# Patient Record
Sex: FEMALE | Race: BLACK OR AFRICAN AMERICAN | NOT HISPANIC OR LATINO | Employment: UNEMPLOYED | URBAN - METROPOLITAN AREA
[De-identification: names, ages, dates, MRNs, and addresses within clinical notes are randomized per-mention and may not be internally consistent; named-entity substitution may affect disease eponyms.]

---

## 2017-03-08 ENCOUNTER — HISTORICAL (OUTPATIENT)
Dept: ADMINISTRATIVE | Facility: HOSPITAL | Age: 42
End: 2017-03-08

## 2017-03-20 ENCOUNTER — OFFICE VISIT (OUTPATIENT)
Dept: PHYSICAL MEDICINE AND REHAB | Facility: CLINIC | Age: 42
End: 2017-03-20
Payer: COMMERCIAL

## 2017-03-20 VITALS
BODY MASS INDEX: 31.96 KG/M2 | DIASTOLIC BLOOD PRESSURE: 90 MMHG | SYSTOLIC BLOOD PRESSURE: 128 MMHG | WEIGHT: 191.81 LBS | HEART RATE: 91 BPM | HEIGHT: 65 IN

## 2017-03-20 DIAGNOSIS — G95.9 CERVICAL MYELOPATHY: Primary | ICD-10-CM

## 2017-03-20 PROCEDURE — 99999 PR PBB SHADOW E&M-EST. PATIENT-LVL III: CPT | Mod: PBBFAC,,, | Performed by: PHYSICAL MEDICINE & REHABILITATION

## 2017-03-20 PROCEDURE — 99204 OFFICE O/P NEW MOD 45 MIN: CPT | Mod: S$GLB,,, | Performed by: PHYSICAL MEDICINE & REHABILITATION

## 2017-03-20 PROCEDURE — 1160F RVW MEDS BY RX/DR IN RCRD: CPT | Mod: S$GLB,,, | Performed by: PHYSICAL MEDICINE & REHABILITATION

## 2017-03-20 RX ORDER — ACETAMINOPHEN AND CODEINE PHOSPHATE 300; 30 MG/1; MG/1
TABLET ORAL
Refills: 0 | COMMUNITY
Start: 2017-02-16 | End: 2017-03-20

## 2017-03-20 NOTE — MR AVS SNAPSHOT
[facility]  [address] [place] [address]  [place] [address]  Phone: [phone]  Fax: [phone]                  Zaira Jensen   3/20/2017 11:00 AM   Office Visit    Description:  Female : 1975   Provider:  Aminata Reyna DO   Department:  [facility]           Reason for Visit     Consult           Diagnoses this Visit        Comments    Cervical myelopathy    -  Primary            To Do List           Future Appointments        Provider Department Dept Phone    2017 11:40 AM Aminata Reyna DO [facility] [phone]      Goals (5 Years of Data)     None      Follow-Up and Disposition     Return in about 4 weeks (around 2017).    Follow-up and Disposition History      [facility]     [facility] -  Assistance  Registered nurses in the [facility] provide clinical advisement, health education, appointment booking, and other advisory services.  Call for this free service at [phone].             Medications           Message regarding Medications     Verify the changes and/or additions to your medication regime listed below are the same as discussed with your clinician today.  If any of these changes or additions are incorrect, please notify your healthcare provider.        STOP taking these medications     acetaminophen-codeine 300-30mg (TYLENOL #3) 300-30 mg Tab TK 1 T PO Q 4 TO 6 H PRN           Verify that the below list of medications is an accurate representation of the medications you are currently taking.  If none reported, the list may be blank. If incorrect, please contact your healthcare provider. Carry this list with you in case of emergency.           Current Medications     acetaminophen (TYLENOL) 500 MG tablet Take 500 mg by mouth every 6 (six) hours as needed.    fluticasone (FLONASE) 50 mcg/actuation nasal spray 1 spray by Nasal route once daily.      "ibuprofen (ADVIL,MOTRIN) 800 MG tablet Take 800 mg by mouth every 6 (six) hours as needed.     loratadine-pseudoephedrine  mg (CLARITIN-D 24-HOUR)  mg per 24 hr tablet Take 1 tablet by mouth once daily.           Clinical Reference Information           Your Vitals Were     BP Pulse Height Weight BMI    128/90 (BP Location: Right arm, Patient Position: Sitting, BP Method: Automatic) 91 5' 5" (1.651 m) 87 kg (191 lb 12.8 oz) 31.92 kg/m2      Blood Pressure          Most Recent Value    BP  (!)  128/90      Allergies as of 3/20/2017     Bactrim [Sulfamethoxazole-trimethoprim]    Pcn [Penicillins]    Vicks Vapor Inhaler [L-desoxyephedrine]      Immunizations Administered on Date of Encounter - 3/20/2017     None      Orders Placed During Today's Visit      Normal Orders This Visit    Ambulatory Referral to Physical/Occupational Therapy     Ambulatory Referral to Physical/Occupational Therapy       Language Assistance Services     ATTENTION: Language assistance services are available, free of charge. Please call [phone].      ATENCIÓN: Si livela robel, tiene a baker disposición servicios gratuitos de asistencia lingüística. Llame al [phone].     ORQUIDEA Ý: N?u b?n nói Ti?ng Vi?t, có các d?ch v? h? tr? ngôn ng? mi?n phí dành cho b?n. G?i s? [phone].         [facility] complies with applicable Federal civil rights laws and does not discriminate on the basis of race, color, national origin, age, disability, or sex.        "

## 2017-03-20 NOTE — LETTER
March 20, 2017      Demetrio Marion MD  [address]  [address]  [facility] [address]           [facility]  [address] [place] [address]  Phone: [phone]  Fax: [phone]          Patient: Zaira Jensen   MR Number: [id number]   YOB: 1975   Date of Visit: 3/20/2017       Dear Dr. Demetrio Marion:    Thank you for referring Zaira Jensen to me for evaluation. Attached you will find relevant portions of my assessment and plan of care.    If you have questions, please do not hesitate to call me. I look forward to following Zaira Jensen along with you.    Sincerely,    Aminata Reyna,     Enclosure  CC:  No Recipients    If you would like to receive this communication electronically, please contact [email or web address] or [phone] to request more information on [employer] access.    For providers and/or their staff who would like to refer a patient to [facility], please contact us through our one-stop-shop provider referral line, [facility] , at [phone].    If you feel you have received this communication in error or would no longer like to receive these types of communications, please e-mail [email or web address]

## 2017-03-20 NOTE — PROGRESS NOTES
HPI:  Patient is a 41 y.o. year old female w. Cervical myelpathy she is s/p decompression on 2012, and 2015. After the second surgery she seemed to have some mild improvement. She lives w. Parents. She is having a harder time w. Adl's including bathing, self care and feeding. She does have poor motor skills.    Imaging  3/9/2017  Mri cervical spine  Subtle changes of cord signal alteration at C3/C4 related to myelomyalacia unchanged from prior studies  C5/C6 shallow broad based disc bulge slightly deforms ventral margin of cord ( does not recommend surgery)    Labs  EGFR, cr, lft's gluc nl      Past Medical History:   Diagnosis Date    Allergy     Anemia     Arthritis     Chronic back pain     Chronic neck pain     Functional ovarian cysts     GERD (gastroesophageal reflux disease)     Headache(784.0)     Radiculopathy of arm     Rash     Respiratory distress     bronchospasm at emergence years ago     Past Surgical History:   Procedure Laterality Date    CERVICAL FUSION      CHOLECYSTECTOMY      OVARIAN CYST REMOVAL      TOTAL BODY LIFT       Family History   Problem Relation Age of Onset    Diabetes Father     Cancer Father      prostate    Heart disease Father     Allergic rhinitis Neg Hx     Allergies Neg Hx     Angioedema Neg Hx     Asthma Neg Hx     Atopy Neg Hx     Eczema Neg Hx     Immunodeficiency Neg Hx     Rhinitis Neg Hx     Urticaria Neg Hx      Social History     Social History    Marital status: Single     Spouse name: N/A    Number of children: N/A    Years of education: N/A     Social History Main Topics    Smoking status: Never Smoker    Smokeless tobacco: Never Used    Alcohol use No    Drug use: No    Sexual activity: Not on file     Other Topics Concern    Not on file     Social History Narrative       Review of patient's allergies indicates:   Allergen Reactions    Bactrim [sulfamethoxazole-trimethoprim]     Pcn [penicillins]     Vicks vapor inhaler  [l-desoxyephedrine]        Current Outpatient Prescriptions:     acetaminophen (TYLENOL) 500 MG tablet, Take 500 mg by mouth every 6 (six) hours as needed., Disp: , Rfl:     fluticasone (FLONASE) 50 mcg/actuation nasal spray, 1 spray by Nasal route once daily. , Disp: , Rfl: 1    ibuprofen (ADVIL,MOTRIN) 800 MG tablet, Take 800 mg by mouth every 6 (six) hours as needed. , Disp: , Rfl:     loratadine-pseudoephedrine  mg (CLARITIN-D 24-HOUR)  mg per 24 hr tablet, Take 1 tablet by mouth once daily., Disp: , Rfl:       Review of Systems:  No nausea, vomiting, fevers, chills , contipation, diarrhea or sweats,no weight change, occ neck stiffness, no chest pain, no sob, no change of bowel or bladder habits,+ coordination issues        Physical Exam:      Vitals:    03/20/17 1042   BP: (!) 128/90   Pulse: 91   alert and oriented ×4 follows commands answers all questions appropriately,affect wnl  Manual muscle test 4 out of 5 sensation to light touch grossly intact  + tenderness b/l cervical paraspinals   Ataxic gait  No C/C/E      Assessment:  S/p cervical myelomalacia  Cervical paraspinals   Plan:  Trial of baclofen nightly  PT/OT for gait training and motor skills    Thank you for this interesting referral

## 2017-03-31 ENCOUNTER — CLINICAL SUPPORT (OUTPATIENT)
Dept: REHABILITATION | Facility: HOSPITAL | Age: 42
End: 2017-03-31
Attending: PHYSICAL MEDICINE & REHABILITATION
Payer: COMMERCIAL

## 2017-03-31 DIAGNOSIS — M25.611 SHOULDER STIFFNESS, RIGHT: Primary | ICD-10-CM

## 2017-03-31 DIAGNOSIS — R29.898 BILATERAL ARM WEAKNESS: ICD-10-CM

## 2017-03-31 DIAGNOSIS — M25.631 WRIST STIFFNESS, RIGHT: ICD-10-CM

## 2017-03-31 DIAGNOSIS — M25.631 STIFFNESS OF RIGHT WRIST JOINT: ICD-10-CM

## 2017-03-31 DIAGNOSIS — G95.9 CERVICAL MYELOPATHY: Primary | ICD-10-CM

## 2017-03-31 PROCEDURE — 97162 PT EVAL MOD COMPLEX 30 MIN: CPT | Mod: PN | Performed by: PHYSICAL THERAPIST

## 2017-03-31 PROCEDURE — 97166 OT EVAL MOD COMPLEX 45 MIN: CPT | Mod: PN,59

## 2017-03-31 NOTE — PLAN OF CARE
TIME RECORD    Date: 03/31/2017    Start Time:  1205  Stop Time:  100    PROCEDURES:    TIMED  Procedure Time Min.    Start:  Stop:     Start:  Stop:     Start:  Stop:     Start:  Stop:          UNTIMED  Procedure Time Min.    Start:  Stop:     Start:  Stop:      Total Timed Minutes:  0  Total Timed Units:  0  Total Untimed Units:  1  Charges Billed/# of units:  1    OUTPATIENT PHYSICAL THERAPY   PATIENT EVALUATION  Onset Date: 2011  Primary Diagnosis:   1. Cervical myelopathy       Treatment Diagnosis: gait abnormality  Past Medical History:   Diagnosis Date    Allergy     Anemia     Arthritis     Chronic back pain     Chronic neck pain     Functional ovarian cysts     GERD (gastroesophageal reflux disease)     Headache(784.0)     Radiculopathy of arm     Rash     Respiratory distress     bronchospasm at emergence years ago   SX history: tummy tuck, gall bladder removed, cystectomy from ovary, 2 cervical fusion  Precautions: fall risk  Prior Therapy: Yes, after her first surgery (for 9 months, got to 85%, then had the second HNP).   Medications: Zaira Jensen has a current medication list which includes the following prescription(s): acetaminophen, fluticasone, ibuprofen, and loratadine-pseudoephedrine  mg.  Nutrition:  Overweight  History of Present Illness: Pt was working out a lot to lose 100 pounds. 2011: HNP in neck doing exercise (2010), SX started having symptoms. Took 2 years to get correct dx. Symptoms worsening the entire time. First surgery in 2012, fusion of c spine affect RUE. 2.5 years to get back to 85% normal, then another HTN occurred. This sx took place in Oct 2015 (another fusion). Second sx caused issues in B hands. As far as legs go, first HNP caused prob with RLE, this started to improve until the second HNP occurred. Pt states the LLE has now begun to feel like the L knee and ankle are unstable. Pt still has pain in the R hip where bone was taken for cervical fusions.  "Balance and walking upright is very difficult for her right now. Pt feels like she is not having appropriate motor control of her LEs. Pt states that she feelslide she has regressed over the last 2 months.   Prior Level of Function: Assistive Device, pt knows she needs to use walker or cane and wants to use one again, but today presents without AD.   Social History: Pt lives with her parents until she recovers well enough to live independently. 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  Place of Residence (Steps/Adaptations): Lives in 2 stephanie home, but she is able to live downstairs. Pt was using upstairs bathroom, but now is afraid to go downstairs.   Functional Deficits Leading to Referral/Nature of Injury: difficulty with balance, fall risk, standing, walking  Patient Therapy Goals: improve balance and walk swiftly, improve transfers    Subjective     Zaira Jensen states "I want to strength my neck". Pt also states that she is becoming more reclusive because she does not want people to see her disabilities and because she is afraid of falling or getting hurt in the community. She has stopped driving because she is afraid to drive due to lack of fine motor coordination of her hands.     Pain:  Location: left knee, right hip (posterior), left shoulder   Description: Tightness around joints in RLE and left knee  Activities Which Increase Pain: nothing in particular  Activities Which Decrease Pain: rest  Pain Scale: 4/10 at best 6/10 now  10/10 at worst    Objective     Posture: minimal movement of the neck  Palpation: hypertonicity in cervical parapspinals  Sensation: light touch sensation is equal bilaterally  DTRs: Absent lumbar reflexes B  Range of Motion/Strength:     Knee ext and DF ROM are to neutral against gravity    Strength  Right  Left  Hip flex   3/5  4/5  Hip ext   3+/5  4/5  Hip abd  4-/5  4/5  Hip add  4-/5  4/5  Knee flex  4-/5  4-/5  Knee " ext  4/5  4/5  DF   4-/5  4-/5     Cervical rotation 30* B.    Flexibility: Decreased flexibility in B hamstings and gastroc-soleus complexes  Gait: Without AD (however pt would benefit from use of an AD and we will train as part of PT program)  Analysis: SBA - to CGA, minimal foot clearance, toe drag intermittently (especially as pt fatigues), slow orquidea, hip ER, minimal knee and hip flexion, insufficient hip extension, no trunk rotation/UE rotation. Ataxic.  Transfers: Assistance - SBA, difficulty with motor control/motor planning to execute sit to stand transfer  Special Tests: Neg Clonus B  Other: LEFS: 15/80  Treatment: discussed PT POC with patient. Discussed safety concerns due to not walking with an AD.     Assessment       Initial Assessment (Pertinent finding, problem list and factors affecting outcome): Pt is a 40 y/o female with c/o BLE weakness and lack of cervical spine strength/mobility s/p two cervical fusions since 2012. She demonstrates gait ataxia which has been present since before her first surgery. Weakness of the RLE > LLE. She also demonstrates lack of functional cervical range of motion, limiting her ability to turn her head for community involvement. Pt also demonstrates significant mobility dysfunction in BUEs, which is being addressed in OT. Pt will benefit from PT to improve her BLE strength, balance and coordination and to improve ROM and stability of the cervical spine to improve her quality of gait and transfers so she can resume community involvement and eventually resume working. Pt meets criteria for moderate complexity based on several comorbidities, examination of greater than 3 body structures/functions, activity and participation limitation, evolving clinical presentation and moderate complexity of clinical decision making.     Rehab Potiential: good    Short Term Goals (6 Weeks):   1. Pt will walk 200 feet with SBA, with least restrictive AD, demonstrating full foot  clearance.  2. Pt will be able to perform 5 sit to stand transfers in 45 seconds with UE support.  3. Pt will be able to ascend/descend one flight of stairs, step-to gait pattern, unilateral UE support, with CGA.  Long Term Goals (12 Weeks):   1. Pt will walk 500 feet without AD, mod I, demonstrating full foot clearance and maintaining straight gait path.  2. Pt will be able to perform 10 sit to stand transfers in 30 seconds without UE support.   3. Pt will be able to ascend/descend one flight of stairs, reciprocal gait pattern, unilateral UE support, mod I.   3. Pt will score >/=30/80 on LEFS.     Plan     Certification Period: 3/31/17 to 6/23/17  Recommended Treatment Plan: 3 times per week, dropping to 2 times per week as pt progresses, for 12 weeks: Electrical Stimulation IFC for pain control, NMES/Russian stim for muscle recruitment, Gait Training, Group Therapy, Manual Therapy, Moist Heat/ Ice, Neuromuscular Re-ed, Patient Education, Therapeutic Activites, Therapeutic Exercise and Ultrasound/Phonophoresis  Other Recommendations: NA      Therapist: Jordyn Love, PT    I CERTIFY THE NEED FOR THESE SERVICES FURNISHED UNDER THIS PLAN OF TREATMENT AND WHILE UNDER MY CARE    Physician's comments: ________________________________________________________________________________________________________________________________________________      Physician's Name: ___________________________________

## 2017-03-31 NOTE — PLAN OF CARE
TIME RECORD    Date: 03/31/2017    Start Time:  11  Stop Time:  12    PROCEDURES:    TIMED  Procedure Time Min.    Start:  Stop:     Start:  Stop:     Start:  Stop:     Start:  Stop:          UNTIMED  Procedure Time Min.   eval Start:11  Stop:12     Start:  Stop:      Total Timed Minutes:  0  Total Timed Units:  0  Total Untimed Units:  1  Charges Billed/# of units:  1    OUTPATIENT OCCUPATIONAL THERAPY   PATIENT EVALUATION  Onset Date: about 2 months ago  Primary Diagnosis: cervical fusion c4 and 5 2012, C3 and 4 fusion 2015  Treatment Diagnosis: bilateral arm weakness, r shoulder stiffness, r wrist stiffness  Past Medical History:   Diagnosis Date    Allergy     Anemia     Arthritis     Chronic back pain     Chronic neck pain     Functional ovarian cysts     GERD (gastroesophageal reflux disease)     Headache(784.0)     Radiculopathy of arm     Rash     Respiratory distress     bronchospasm at emergence years ago     Precautions: universal  Prior Therapy: PT/OT post last surgery  Medications: Zaira Jensen has a current medication list which includes the following prescription(s): acetaminophen, fluticasone, ibuprofen, and loratadine-pseudoephedrine  mg.  Nutrition:  Normal  History of Present Illness: reports insidious onset of bilateral arm weakness which is concerning since she states since last surgery arms were improving with respect to strength and use  Prior Level of Function: Independent   Social History: over the counter pain meds prn, denies nicotine, caffeine sometimes if in over the counter pain meds, lives with parents  Place of Residence (Steps/Adaptations): no concerns  DME: not tested  Functional Deficits Leading to Referral/Nature of Injury: decreased rom, use, and strength with ADL  Patient Therapy Goals: full painless use    Subjective     Zaira Jensen states understanding of HEP importance and general anticipated progression of therapy.    Pain:  0/10 at rest, with  "sleep, with light use    Objective     Posture: odalys deferred, r ue with slight guarding  Palpation: nt  Sensation: intermittent diffuse burning thur both arms  DTRs: wnl  Range of Motion/Strength:     r prom er at 0 abd 40   at 45 abd 90    at 90 abd 90      slir 50         func ir 15" lift off  l prom er at 0 abd 70    at 45 abd 90   At  90 abd 90     Slir 70         func ir 15" lift off    Elbow thru hands with = prom bilaterally except r wrist df 20 vs 45 on l    Formal strength testing not done however overall roughly between 3+/5 and 2-/5 both arms      Edema: none noted  ADL: severe decrease in r ue use and moderate decrease in l ue use with all basic self care tasks; roughly max to mod a with all ADL  Hand Dominance: r  Job: not working  Duties: Normal self  care tasks  Balance: wnl for OT  Fine Motor Coordination: moderate decrease  Gross Motor Coordination: mild decrease  Special Tests: 1/2 finger inferior gh subluxation bilaterally via sulcus sign    Treatment: issued wrist df stretch on r, explained rehab will focus on resolving joint stiffness and on strengthening both arms    Assessment       Initial Assessment (Pertinent finding, problem list and factors affecting outcome): Needs skilled OT to properly promote rom, use, and strength given type, nature, and extent of diagnosis  Rehab Potiential: good    stg 1. Pt. Will be I with HEP 2. Pt. Will have 2/10 pain with light use 3. Pt. Will have = prom of bilateral  arms To enhance affected arm use with ADL      ltg 1. Pt. Will be I with d/c HEP 2. Pt. Will have 1/10 pain with all use 3. Pt. Will have roughly 3+/5 MMT bilaterally shoulder thru hand to improve ADL status                                                                          4. Pt. Will be roughly min a with all ADL    Plan     Certification Period: 3-31-17 to 9-15-17  Recommended Treatment Plan: 3 times per week for 24 weeks: eval and tx  Other Recommendations: above visit frequency and " duration in above dates may be adjusted based on pt. progress and need for therapy      Therapist: Brady Mora OT/TARIQ    I CERTIFY THE NEED FOR THESE SERVICES FURNISHED UNDER THIS PLAN OF TREATMENT AND WHILE UNDER MY CARE    Physician's comments: ________________________________________________________________________________________________________________________________________________      Physician's Name: ___________________________________

## 2017-04-07 ENCOUNTER — CLINICAL SUPPORT (OUTPATIENT)
Dept: REHABILITATION | Facility: HOSPITAL | Age: 42
End: 2017-04-07
Attending: PHYSICAL MEDICINE & REHABILITATION
Payer: COMMERCIAL

## 2017-04-07 DIAGNOSIS — R29.898 BILATERAL ARM WEAKNESS: ICD-10-CM

## 2017-04-07 DIAGNOSIS — M25.611 SHOULDER STIFFNESS, RIGHT: Primary | ICD-10-CM

## 2017-04-07 DIAGNOSIS — M25.631 WRIST STIFFNESS, RIGHT: ICD-10-CM

## 2017-04-07 DIAGNOSIS — G95.9 CERVICAL MYELOPATHY: ICD-10-CM

## 2017-04-07 PROCEDURE — 97010 HOT OR COLD PACKS THERAPY: CPT | Mod: PN

## 2017-04-07 PROCEDURE — 97140 MANUAL THERAPY 1/> REGIONS: CPT | Mod: PN

## 2017-04-07 PROCEDURE — 97110 THERAPEUTIC EXERCISES: CPT | Mod: PN

## 2017-04-07 NOTE — PROGRESS NOTES
Time in 115  Time out 2      Timed units  1 manual                              Time:115-130  2 therex                              Time:130-2      S:doing HEP, understands likely tx progression  Pain:no difference since last visit    O:  HEP: added sleeper stretch on r    manual tx:     r gh resting position traction, prom circumduction, bursal massage abd 1 and 2, axial traction 90 abd    prom as tolerated-pain free: n/a    stretches as tolerated-pain free: r er 0 abd, sleeper    theraband 2 x 20:  n/a            A: once all tightness gone on r ue, progressive PRE of shoulder complex thru hand should improver fxnl use of r ue with ADL            P: fix gh hypomobility then screen prom elevation x 3, fix r wrist df hypomobility

## 2017-04-07 NOTE — PROGRESS NOTES
Name: Zaira Jensen  [facility] Number: [id number]  Date of Treatment: 04/07/2017   Diagnosis:   Encounter Diagnosis   Name Primary?    Cervical myelopathy        Time in: 1202  Time Out: 1305  Total Treatment Time: 63        Subjective:    Zaira reports her pain is mostly in her joints (ie hips, knees, R ankle, lower back, L shoulder).  Patient reports their pain to be 4/10 on a 0-10 scale with 0 being no pain and 10 being the worst pain imaginable.    Objective    Patient received individual therapy to increase strength, endurance, ROM, flexibility, posture and core stabilization with 0 patients with activities as follows:     Zaira received therapeutic exercises to develop strength, endurance, ROM, flexibility, posture and core stabilization for 53 minutes including:     nustep x 10 min L-2  Quad sets 2 x 10 reps  Bridging x 10 reps  Ball squeezes 2 x 10 rerps  Hip abd RTB 2 x 10 reps  SLR 2 x 10 reps B LE  SAQ 2 x 10 reps B LE  LTR 2 x 10 reps    MHP x 10 min to low/mid back for muscle relaxation    Written Home Exercises Provided: Copy pf HEP given to patients  Pt demo good understanding of the education provided. Zaira demonstrated fair  return demonstration of activities.     Assessment:       Pt will continue to benefit from skilled PT intervention. Medical Necessity is demonstrated by:  Fall Risk, Unable to participate in daily activities, Continued inability to participate in vocational pursuits, Pain limits function of effected part for some activities, Unable to participate fully in daily activities, Requires skilled supervision to complete and progress HEP and Weakness.    Patient is making fair progress towards established goals.          Plan:  Continue with established Plan of Care towards PT goals.

## 2017-04-10 ENCOUNTER — CLINICAL SUPPORT (OUTPATIENT)
Dept: REHABILITATION | Facility: HOSPITAL | Age: 42
End: 2017-04-10
Attending: PHYSICAL MEDICINE & REHABILITATION
Payer: COMMERCIAL

## 2017-04-10 DIAGNOSIS — M25.631 WRIST STIFFNESS, RIGHT: ICD-10-CM

## 2017-04-10 DIAGNOSIS — R29.898 BILATERAL ARM WEAKNESS: ICD-10-CM

## 2017-04-10 DIAGNOSIS — M25.611 SHOULDER STIFFNESS, RIGHT: Primary | ICD-10-CM

## 2017-04-10 DIAGNOSIS — G95.9 CERVICAL MYELOPATHY: ICD-10-CM

## 2017-04-10 DIAGNOSIS — M25.631 STIFFNESS OF RIGHT WRIST JOINT: ICD-10-CM

## 2017-04-10 PROCEDURE — 97140 MANUAL THERAPY 1/> REGIONS: CPT | Mod: PN

## 2017-04-10 PROCEDURE — 97110 THERAPEUTIC EXERCISES: CPT | Mod: PN

## 2017-04-10 NOTE — PROGRESS NOTES
Time in 10  Time out 11        Timed units  2 manual                              Time:  2 therex                              Time:103-11      S:understands rationale of current care and probable tx progression  Pain:continues to decrease in intensity and frequency    O:  HEP: added er 0 abd stretch on r    manual tx:     R: gh resting position traction, prom circumduction, bursal massage abd 1 and 2, axial traction 90 abd    prom as tolerated-pain free: n/a    stretches as tolerated-pain free: R sleeper, er 0 abd    theraband 2 x 20:  n/a            A:r joint hypomobility needs to be resolved; functional use of arms should improve once all stiffness gone            P: test prom elevation x 3 on r once gh hypomobility resolved; progress to full shoulder complex PRE HEP bilaterally as well as bilateral distal PRE HEP eventually so improved functional use and mechanics can increase arm use with ADL

## 2017-04-10 NOTE — PROGRESS NOTES
Name: Zaira Jensen  [facility] Number: [id number]  Date of Treatment: 04/10/2017   Diagnosis:   Encounter Diagnosis   Name Primary?    Cervical myelopathy        Time in: 1103  Time Out: 1200  Total Treatment Time: 57      Subjective:    Zaira reports weakness and pain combined.  Patient reports she was too sore and fatigued to perform her HEP this weekend, but she will be doing it.  Patient reports their pain to be 4/10 on a 0-10 scale with 0 being no pain and 10 being the worst pain imaginable.    Objective  Zaira received therapeutic exercises to develop strength, endurance, ROM, flexibility, posture and core stabilization for 57 minutes including:    Nustep x 10 min L-2   Quad sets 3 x 10 reps   Bridging 3 x 5 reps   Ball squeezes 3 x 10 rerps   Hip abd RTB 3 x 10 reps   SLR 3 x 10 reps B LE (with frequent rests)   SAQ(with QS)  3 x 10 reps B LE   LTR 3 x 10 reps     Written Home Exercises Provided: Cont with HEP   Pt demo fair understanding of the education provided. Zaira demonstrated fair return demonstration of activities.     Assessment:   Patient tolerated therex with increase in reps without difficulty, rests during sets req'd    Pt will continue to benefit from skilled PT intervention. Medical Necessity is demonstrated by:  Unable to participate in daily activities, Continued inability to participate in vocational pursuits, Pain limits function of effected part for some activities, Requires skilled supervision to complete and progress HEP and Weakness.    Patient is making fair progress towards established goals.    Plan:  Continue with established Plan of Care towards PT goals.

## 2017-04-13 ENCOUNTER — CLINICAL SUPPORT (OUTPATIENT)
Dept: REHABILITATION | Facility: HOSPITAL | Age: 42
End: 2017-04-13
Attending: PHYSICAL MEDICINE & REHABILITATION
Payer: COMMERCIAL

## 2017-04-13 DIAGNOSIS — G95.9 CERVICAL MYELOPATHY: ICD-10-CM

## 2017-04-13 PROCEDURE — 97150 GROUP THERAPEUTIC PROCEDURES: CPT | Mod: PN

## 2017-04-13 NOTE — PROGRESS NOTES
Name: Zaira Jensen  [facility] Number: [id number]  Date of Treatment: 04/13/2017   Diagnosis:   Encounter Diagnosis   Name Primary?    Cervical myelopathy        Time in: 1302  Time Out: 1400  Total Treatment Time: 58  Group Time: 58      Subjective:    Zaira reports her knees feel like they are locking in place when she is walking.  Patient reports their pain to be 3/10 on a 0-10 scale with 0 being no pain and 10 being the worst pain imaginable.    Objective    Patient received individual therapy to increase strength, endurance, ROM, flexibility, posture and core stabilization with 0 patients with activities as follows:     Zaira received therapeutic exercises to develop strength, endurance, ROM, flexibility, posture and core stabilization for 58 minutes including:       nustep x 10 min L-2  Hamstring stretch 3 x 30 sec B LE  gastroc stretch 3 x 30 sec B LE  Quad sets x 30 reps  Bridging x 30 reps  Ball squeezes x 30 reps  Hip abd RTB x 30 reps      Pt demo good understanding of the education provided. Zaira demonstrated fair return demonstration of activities.     Assessment:       Pt will continue to benefit from skilled PT intervention. Medical Necessity is demonstrated by:  Fall Risk, Pain limits function of effected part for some activities, Unable to participate fully in daily activities, Requires skilled supervision to complete and progress HEP and Weakness.    Patient is making fair progress towards established goals.          Plan:  Continue with established Plan of Care towards PT goals.

## 2017-04-18 ENCOUNTER — CLINICAL SUPPORT (OUTPATIENT)
Dept: REHABILITATION | Facility: HOSPITAL | Age: 42
End: 2017-04-18
Attending: PHYSICAL MEDICINE & REHABILITATION
Payer: COMMERCIAL

## 2017-04-18 DIAGNOSIS — M25.611 SHOULDER STIFFNESS, RIGHT: Primary | ICD-10-CM

## 2017-04-18 DIAGNOSIS — G95.9 CERVICAL MYELOPATHY: ICD-10-CM

## 2017-04-18 DIAGNOSIS — M25.631 WRIST STIFFNESS, RIGHT: ICD-10-CM

## 2017-04-18 DIAGNOSIS — R29.898 BILATERAL ARM WEAKNESS: ICD-10-CM

## 2017-04-18 PROCEDURE — 97110 THERAPEUTIC EXERCISES: CPT | Mod: PN

## 2017-04-18 NOTE — PROGRESS NOTES
Name: Zaira Jensen  Date:   04/18/2017  Primary Diagnosis: .  Problem List Items Addressed This Visit     Cervical myelopathy          Time in: 1408  Time Out: 1458  Total Treatment Time: 50  Group Time: 0      Subjective:    Patient reports improvement of symptoms. However, reports her knees feel like they are locking when she walks. Patient reports their pain to be 3/10 on a 0-10 scale with 0 being no pain and 10 being the worst pain imaginable.    Objective    Patient received individual therapy to address strength, endurance, ROM and flexibility with 0 other patients with activities as follows:     Patient received therapeutic exercises to develop strength, endurance, ROM and flexibility for 50 minutes including:     Seated hamstring stretches 3/30sec B  Nustep L3 x 10 min  Hip ADD ball squeezes  Standing gastroc stretch 3/30sec B  Seated LAQ 2x10    Assessment:     Patient tolerating treatment well.    Pt will continue to benefit from skilled PT intervention. Medical Necessity is demonstrated by:  Fall Risk, Pain limits function of effected part for some activities, Unable to participate fully in daily activities, Requires skilled supervision to complete and progress HEP and Weakness.    Patient is making good progress towards established goals.    Plan:  Continue with established Plan of Care towards PT goals.

## 2017-04-19 ENCOUNTER — CLINICAL SUPPORT (OUTPATIENT)
Dept: REHABILITATION | Facility: HOSPITAL | Age: 42
End: 2017-04-19
Attending: PHYSICAL MEDICINE & REHABILITATION
Payer: COMMERCIAL

## 2017-04-19 DIAGNOSIS — G95.9 CERVICAL MYELOPATHY: ICD-10-CM

## 2017-04-19 PROCEDURE — 97110 THERAPEUTIC EXERCISES: CPT | Mod: PN | Performed by: PHYSICAL THERAPIST

## 2017-04-19 NOTE — PROGRESS NOTES
TIME RECORD    Date:  04/19/2017    Start Time:  1505  Stop Time:  1551    PROCEDURES:    TIMED  Procedure Time Min.   Exercise Start:1505  Stop:1551 46    Start:  Stop:     Start:  Stop:     Start:  Stop:          UNTIMED  Procedure Time Min.    Start:  Stop:     Start:  Stop:      Total Timed Minutes:  46  Total Timed Units:  3  Total Untimed Units:  0  Charges Billed/# of units:  3      Progress/Current Status    Subjective:     Patient ID: Zaira Jensen is a 41 y.o. female.  Diagnosis:   1. Shoulder stiffness, right     2. Bilateral arm weakness     3. Wrist stiffness, right       Pain:Pt reports pain with side stretches (IR) and ER    Objective:     Session began with AIDET. Began with pt to demonstrate HEP previously assigned by CHT. Pt unable to recall specific exercises. Supine on mat: ER R UE at 0 abduction: to 30 degrees PROM. IR while sidelying 5 stretches 1 minute each. Instructed pt in HEP including prolonged gentle stretches, pendulum exercises.    Assessment:     Pt presents with B shoulder tightness, limited ER, IR: encouraged to comply with HEP.    Patient Education/Response:     ongoing    Plans and Goals:     Cont per poc.

## 2017-04-20 NOTE — PROGRESS NOTES
Name: Zaira Jensen  [facility] Number: [id number]  Date of Treatment:4/19/17  Diagnosis:   Encounter Diagnosis   Name Primary?    Cervical myelopathy        Time in: 200  Time Out: 253  Total Treatment Time: 53  Group Time: 0      Subjective:    Zaira reports she is feeling pretty good today.  Patient reports their pain to be 0/10 on a 0-10 scale with 0 being no pain and 10 being the worst pain imaginable.    Objective    Patient received individual therapy to increase strength, endurance, flexibility and core stabilization with 0 patients with activities as follows:     Zaira received therapeutic exercises to develop strength, endurance, flexibility and core stabilization for 53 minutes including:     Nustep x 10 min L-2  Quad sets 3 x 10 reps  Bridging 3 x 5 reps  Ball squeezes 3 x 10 rerps  Hip abd RTB 3 x 10 reps    Gait training with rollator and without AD x 12 min. Cuing for appropriate body to AD distance, foot clearance, posture.       Assessment:   Pt demonstrates multiple freezing episodes during transfers and ambulation in clinic today.     Pt will continue to benefit from skilled PT intervention. Medical Necessity is demonstrated by:  Fall Risk, Continued inability to participate in vocational pursuits, Unable to participate fully in daily activities, Requires skilled supervision to complete and progress HEP and Weakness.    Patient is making good progress towards established goals.    New/Revised goals: NA      Plan:  Continue with established Plan of Care towards PT goals.

## 2017-04-24 ENCOUNTER — CLINICAL SUPPORT (OUTPATIENT)
Dept: REHABILITATION | Facility: HOSPITAL | Age: 42
End: 2017-04-24
Attending: PHYSICAL MEDICINE & REHABILITATION
Payer: MEDICAID

## 2017-04-24 ENCOUNTER — CLINICAL SUPPORT (OUTPATIENT)
Dept: REHABILITATION | Facility: HOSPITAL | Age: 42
End: 2017-04-24
Attending: PHYSICAL MEDICINE & REHABILITATION
Payer: COMMERCIAL

## 2017-04-24 DIAGNOSIS — R29.898 BILATERAL ARM WEAKNESS: ICD-10-CM

## 2017-04-24 DIAGNOSIS — G95.9 CERVICAL MYELOPATHY: ICD-10-CM

## 2017-04-24 DIAGNOSIS — M25.631 WRIST STIFFNESS, RIGHT: ICD-10-CM

## 2017-04-24 DIAGNOSIS — M25.611 SHOULDER STIFFNESS, RIGHT: Primary | ICD-10-CM

## 2017-04-24 PROCEDURE — 97110 THERAPEUTIC EXERCISES: CPT | Mod: PN

## 2017-04-24 PROCEDURE — 97140 MANUAL THERAPY 1/> REGIONS: CPT | Mod: PN

## 2017-04-24 NOTE — PROGRESS NOTES
"Time in 1  Time out 2      Timed units  2 manual                              Time:1-130  2 therex                              Time:130-2      S: understands likely tx progression  Pain:continues to decrease in intensity and frequency  O:  r prom er at 0 abd 70   at 45 abd 90    at 90 abd 90      slir 70         func ir 15" lift off  l prom er at 0 abd 70    at 45 abd 90   At  90 abd 90     Slir 60         func ir 15' lift off    HEP: reviewed    manual tx:   r gh resting position traction, prom circumduction, bursal massage abd 1 and 2, axial traction 90 abd  abd pain relief gh resting position traction, slide; scapulothoracic tx and subscapularis stretch not done; warm up rhythmic inf slides, abd with inf slides    prom as tolerated-pain free: n/a    stretches as tolerated-pain free: r sleeper    theraband 2 x 20:  r orange row 2 x 20    isometrics 2 x 20: n/a    education: explained transition to shoulder thru hand PRE should happen soon    ube: n/a            A: r shoulder with continued decrease in hypomobility, may need d2 stretches once er x 3 and ir x 2 = to l side            P: clinic PRE bilateral arms    6-8-17: d/cd since has not attended since 4-24-17      "

## 2017-04-24 NOTE — PROGRESS NOTES
"Name: Zaira Jensen  [facility] Number: [id number]  Date of Treatment: 04/24/2017   Diagnosis:   Encounter Diagnosis   Name Primary?    Cervical myelopathy        Time in: 1400  Time Out: 1500  Total Treatment Time: 60        Subjective:    Pt wanting to continue with PT despite not feeling well.  PTA discussed with patient perf light exercises.  Pt also reports that all she had eaten today was a [place] muffin around 8 and some nuts around 10 am.    Zaira reports feeling weak and lightheaded.  Also, "weakness in the chest". Pt stated she did a lot of walking and activities om Saturday a\nd she thinks it really wore her out.   Patient reports their pain to be 2/10 on a 0-10 scale with 0 being no pain and 10 being the worst pain imaginable.    Objective    Patient received individual therapy to increase strength, endurance, ROM, flexibility, posture and core stabilization with 0 patients with activities as follows:     Zaira received therapeutic exercises to develop strength, endurance, ROM, flexibility, posture and core stabilization for 60 minutes including:     Vitals prior to PT:  130/78, 82 bpm      Gait training without AD.   Decreased orquidea, decreased step length, decreased stride length, decreased weight shifting/foot clearance, increased time in stance       hamstring stretch 3 x 30 sec B LE  Ball squeezes x 30 reps  Hip abd RTB x 30 reps  LAQ 3 x 10 reps B LE  Trunk flexion with large SB x 10 reps      Pt demo fair understanding of the education provided. Zaira demonstrated fair return demonstration of activities.     Assessment:   Held nustep today due to pt not feeling well (reports feeling weak, lightheaded, weakness in chest, tingling).  Vitals taken prior to PT.  Pt ambulating and perf transfers VERY SLOWLY.  Small steps L >R, decreased foot clearance, decreased weight shifting.  Guarded posture and movements.  Increased time need ed to perf gait as well as transfers.    Pt will continue to benefit " from skilled PT intervention. Medical Necessity is demonstrated by:  Fall Risk, Unable to participate in daily activities, Continued inability to participate in vocational pursuits, Pain limits function of effected part for some activities, Unable to participate fully in daily activities, Requires skilled supervision to complete and progress HEP and Weakness.    Patient is making fair progress towards established goals.          Plan:  Continue with established Plan of Care towards PT goals.

## 2017-04-27 ENCOUNTER — TELEPHONE (OUTPATIENT)
Dept: PHYSICAL MEDICINE AND REHAB | Facility: CLINIC | Age: 42
End: 2017-04-27

## 2017-04-27 ENCOUNTER — OFFICE VISIT (OUTPATIENT)
Dept: PHYSICAL MEDICINE AND REHAB | Facility: CLINIC | Age: 42
End: 2017-04-27
Payer: COMMERCIAL

## 2017-04-27 VITALS
HEIGHT: 65 IN | SYSTOLIC BLOOD PRESSURE: 134 MMHG | DIASTOLIC BLOOD PRESSURE: 82 MMHG | WEIGHT: 185 LBS | BODY MASS INDEX: 30.82 KG/M2 | HEART RATE: 95 BPM

## 2017-04-27 DIAGNOSIS — R53.1 WEAKNESS: Primary | ICD-10-CM

## 2017-04-27 PROCEDURE — 99999 PR PBB SHADOW E&M-EST. PATIENT-LVL III: CPT | Mod: PBBFAC,,, | Performed by: PHYSICAL MEDICINE & REHABILITATION

## 2017-04-27 PROCEDURE — 1160F RVW MEDS BY RX/DR IN RCRD: CPT | Mod: S$GLB,,, | Performed by: PHYSICAL MEDICINE & REHABILITATION

## 2017-04-27 PROCEDURE — 99214 OFFICE O/P EST MOD 30 MIN: CPT | Mod: S$GLB,,, | Performed by: PHYSICAL MEDICINE & REHABILITATION

## 2017-04-27 RX ORDER — ALPRAZOLAM 0.25 MG/1
TABLET ORAL
COMMUNITY
Start: 2017-04-25 | End: 2017-06-29

## 2017-04-27 NOTE — PROGRESS NOTES
HPI:  Patient is a 41 y.o. year old female  W. Significantly worsened weakness for the last 2 wks. She has to hold on because of worsening balance. She is s/p cervical myelopathy s/p cervical fusion *2 2012 and 2015. She has had to use wheelchair twice b/c she stated she couldn't walk.    Past Medical History:   Diagnosis Date    Allergy     Anemia     Arthritis     Chronic back pain     Chronic neck pain     Functional ovarian cysts     GERD (gastroesophageal reflux disease)     Headache     Radiculopathy of arm     Rash     Respiratory distress     bronchospasm at emergence years ago     Past Surgical History:   Procedure Laterality Date    CERVICAL FUSION      CHOLECYSTECTOMY      OVARIAN CYST REMOVAL      TOTAL BODY LIFT       Family History   Problem Relation Age of Onset    Diabetes Father     Cancer Father      prostate    Heart disease Father     Allergic rhinitis Neg Hx     Allergies Neg Hx     Angioedema Neg Hx     Asthma Neg Hx     Atopy Neg Hx     Eczema Neg Hx     Immunodeficiency Neg Hx     Rhinitis Neg Hx     Urticaria Neg Hx      Social History     Social History    Marital status: Single     Spouse name: N/A    Number of children: N/A    Years of education: N/A     Social History Main Topics    Smoking status: Never Smoker    Smokeless tobacco: Never Used    Alcohol use No    Drug use: No    Sexual activity: Not on file     Other Topics Concern    Not on file     Social History Narrative       Review of patient's allergies indicates:   Allergen Reactions    Bactrim [sulfamethoxazole-trimethoprim]     Codeine Other (See Comments)     cramping    Hydrocodone     Pcn [penicillins]     Sulfa (sulfonamide antibiotics)     Vicks vapor inhaler [l-desoxyephedrine]        Current Outpatient Prescriptions:     acetaminophen (TYLENOL) 500 MG tablet, Take 500 mg by mouth every 6 (six) hours as needed., Disp: , Rfl:     alprazolam (XANAX) 0.25 MG tablet, , Disp: , Rfl:      fluticasone (FLONASE) 50 mcg/actuation nasal spray, 1 spray by Nasal route once daily. , Disp: , Rfl: 1    ibuprofen (ADVIL,MOTRIN) 800 MG tablet, Take 800 mg by mouth every 6 (six) hours as needed. , Disp: , Rfl:     loratadine-pseudoephedrine  mg (CLARITIN-D 24-HOUR)  mg per 24 hr tablet, Take 1 tablet by mouth once daily., Disp: , Rfl:         Review of Systems  No nausea, vomiting, fevers, Chills , contipation, diarrhea or sweats      Physical Exam:      Vitals:    04/27/17 1211   BP: 134/82   Pulse: 95     alert and oriented ×4 follows commands answers all questions appropriately,affect wnl  Manual muscle test 4 out of 5  Right weaker than left sensation to light touch grossly intact  Gait not tested pt in wheelchair  Babinski down  No C/C/E  No atrophy  Assessment:  H/o cervical myelopathy s/p fusion worsening weakness      Plan:  Refer to neuro  Mri's of cervical, thoracic, and lumbar  labs  Any signficant worsensing go to ER    ADDENDUM  LABS DONE IN ER [facility]  WBC LOW 3.7, H/H 11.8/35.9, GLUC , CR, LFTS NL, TSH NL    Sacrum xray  Right sacrolitis

## 2017-04-27 NOTE — TELEPHONE ENCOUNTER
I spoke with pt and she received my v/m, but I again let her know again Dr. Reyna would like for her to take it easy for the next few days until we get her MRI results back from her Wednesday appts. Pt v/u and also informed me she had done a lot of walking on Saturday going up and down stairs in [place], too. She said she will take it easy until after the results are given to her though.

## 2017-04-27 NOTE — TELEPHONE ENCOUNTER
----- Message from Ricco Holley sent at 4/27/2017  3:11 PM CDT -----  Contact: same  Unsuccessful call placed to office.  Patient called in and stated she was returning a call to Amanda.  Patient call back number is [phone]

## 2017-04-27 NOTE — MR AVS SNAPSHOT
[facility]  [address] [place] [address]  [place] [address]  Phone: [phone]  Fax: [phone]                  Zaira Jensen   2017 11:40 AM   Office Visit    Description:  Female : 1975   Provider:  Aminata Reyna DO   Department:  [facility]           Reason for Visit     Knee Pain     Hip Pain           Diagnoses this Visit        Comments    Weakness    -  Primary            To Do List           Future Appointments        Provider Department Dept Phone    2017 12:00 PM Jordyn Love, PT [facility] [phone]    2017 1:00 PM Brady Mora, OT [facility] [phone]    2017 1:00 PM Brady Mora, OT [facility] [phone]    2017 2:00 PM Jordyn Love, PT [facility] [phone]    5/3/2017 11:00 AM Mica Church PTA [facility] [phone]      Goals (5 Years of Data)     None      [facility]     [facility] Nurse Care Line -  Assistance  Unless otherwise directed by your provider, please contact [facility], our nurse care line that is available for  assistance.     Registered nurses in the [facility] Center provide: appointment scheduling, clinical advisement, health education, and other advisory services.  Call: [phone] (toll free)               Medications           Message regarding Medications     Verify the changes and/or additions to your medication regime listed below are the same as discussed with your clinician today.  If any of these changes or additions are incorrect, please notify your healthcare provider.             Verify that the below list of medications is an accurate representation of the medications you are currently taking.  If none reported, the list may be blank. If incorrect, please contact your healthcare provider. Carry this list with you in case  "of emergency.           Current Medications     acetaminophen (TYLENOL) 500 MG tablet Take 500 mg by mouth every 6 (six) hours as needed.    alprazolam (XANAX) 0.25 MG tablet     fluticasone (FLONASE) 50 mcg/actuation nasal spray 1 spray by Nasal route once daily.     ibuprofen (ADVIL,MOTRIN) 800 MG tablet Take 800 mg by mouth every 6 (six) hours as needed.     loratadine-pseudoephedrine  mg (CLARITIN-D 24-HOUR)  mg per 24 hr tablet Take 1 tablet by mouth once daily.           Clinical Reference Information           Your Vitals Were     BP Pulse Height Weight BMI    134/82 95 5' 5" (1.651 m) 83.9 kg (185 lb) 30.79 kg/m2      Blood Pressure          Most Recent Value    BP  134/82      Allergies as of 4/27/2017     Bactrim [Sulfamethoxazole-trimethoprim]    Codeine    Hydrocodone    Pcn [Penicillins]    Sulfa (Sulfonamide Antibiotics)    Vicks Vapor Inhaler [L-desoxyephedrine]      Immunizations Administered on Date of Encounter - 4/27/2017     None      Orders Placed During Today's Visit      Normal Orders This Visit    Ambulatory Referral to Neurology     Future Labs/Procedures Expected by Expires    C-REACTIVE PROTEIN  4/27/2017 6/26/2018    CBC W/ AUTO DIFFERENTIAL  4/27/2017 6/26/2018    CK  4/27/2017 6/26/2018    COMPREHENSIVE METABOLIC PANEL  4/27/2017 6/26/2018    MRI Cervical Spine Without Contrast  4/27/2017 4/27/2018    MRI Lumbar Spine Without Contrast  4/27/2017 4/27/2018    MRI Thoracic Spine Without Contrast  4/27/2017 4/27/2018    SEDIMENTATION RATE, MANUAL  4/27/2017 6/26/2018    TSH  4/27/2017 6/26/2018    VITAMIN B12  4/27/2017 6/26/2018    VITAMIN B1  4/27/2017 6/26/2018    VITAMIN B6  4/27/2017 6/26/2018      Language Assistance Services     ATTENTION: Language assistance services are available, free of charge. Please call [phone].      ATENCIÓN: Si habla español, tiene a baker disposición servicios gratuitos de asistencia lingüística. Llame al [phone].     CHÚ Ý: N?u b?n " nói Ti?ng Vi?t, có các d?ch v? h? tr? ngôn ng? mi?n phí dành cho b?n. G?i s? [phone].         [facility] complies with applicable Federal civil rights laws and does not discriminate on the basis of race, color, national origin, age, disability, or sex.

## 2017-04-28 ENCOUNTER — TELEPHONE (OUTPATIENT)
Dept: PHYSICAL MEDICINE AND REHAB | Facility: CLINIC | Age: 42
End: 2017-04-28

## 2017-04-28 NOTE — TELEPHONE ENCOUNTER
----- Message from Stacey M Lefort sent at 4/28/2017  9:48 AM CDT -----  Contact: Patient   Patient would like a callback - she wants to talk to you about obtaining home health services. She can be reached at [phone]. Thank you.

## 2017-04-28 NOTE — TELEPHONE ENCOUNTER
I am very concerned about her sudden weakness. I would like to get the results from her MRI's before starting her in any kind of therapy including home health-pls notify

## 2017-04-28 NOTE — TELEPHONE ENCOUNTER
LM that Dr. Reyna wants to wait until she sees the results of her MRI to order home health or any other therapy at this time. The pt called at the time I was typing this msg and I spoke with her letting her know the same. She v/u and thanked me for calling.

## 2017-05-06 ENCOUNTER — HOSPITAL ENCOUNTER (OUTPATIENT)
Dept: RADIOLOGY | Facility: HOSPITAL | Age: 42
Discharge: HOME | End: 2017-05-06
Attending: PHYSICAL MEDICINE & REHABILITATION
Payer: COMMERCIAL

## 2017-05-06 DIAGNOSIS — R53.1 WEAKNESS: ICD-10-CM

## 2017-05-06 PROCEDURE — 72141 MRI NECK SPINE W/O DYE: CPT | Mod: TC

## 2017-05-06 PROCEDURE — 72148 MRI LUMBAR SPINE W/O DYE: CPT | Mod: TC

## 2017-05-06 PROCEDURE — 72146 MRI CHEST SPINE W/O DYE: CPT | Mod: TC

## 2017-05-06 PROCEDURE — 72148 MRI LUMBAR SPINE W/O DYE: CPT | Mod: 26,,, | Performed by: RADIOLOGY

## 2017-05-06 PROCEDURE — 72146 MRI CHEST SPINE W/O DYE: CPT | Mod: 26,,, | Performed by: RADIOLOGY

## 2017-05-06 PROCEDURE — 72141 MRI NECK SPINE W/O DYE: CPT | Mod: 26,,, | Performed by: RADIOLOGY

## 2017-05-08 ENCOUNTER — TELEPHONE (OUTPATIENT)
Dept: PHYSICAL MEDICINE AND REHAB | Facility: CLINIC | Age: 42
End: 2017-05-08

## 2017-05-08 DIAGNOSIS — G95.9 MYELOPATHY: Primary | ICD-10-CM

## 2017-05-08 NOTE — TELEPHONE ENCOUNTER
MRI does show narrowing, I still do not think it is the cause of her symptoms, but pls refer to nsg to get their opinion. Also pls make sure she has emg already scheduled, this will help us to diagnose- pls notify pt

## 2017-05-10 ENCOUNTER — TELEPHONE (OUTPATIENT)
Dept: REHABILITATION | Facility: HOSPITAL | Age: 42
End: 2017-05-10

## 2017-05-11 ENCOUNTER — TELEPHONE (OUTPATIENT)
Dept: PHYSICAL MEDICINE AND REHAB | Facility: CLINIC | Age: 42
End: 2017-05-11

## 2017-05-11 ENCOUNTER — TELEPHONE (OUTPATIENT)
Dept: NEUROSURGERY | Facility: CLINIC | Age: 42
End: 2017-05-11

## 2017-05-11 NOTE — TELEPHONE ENCOUNTER
Called pt to schedule appt per referral. Appt date, time, and location given. Pt verbalized understanding.

## 2017-05-11 NOTE — TELEPHONE ENCOUNTER
She was complaining of a lot more than sijt pain last time i evaluated her- she can follow up w me first before anything further is done to reassess

## 2017-05-12 NOTE — TELEPHONE ENCOUNTER
TOMAS for pt to return my call on Monday. Dr. Reyna would like to see the pt back before proceeding with anything else.

## 2017-05-15 ENCOUNTER — TELEPHONE (OUTPATIENT)
Dept: PHYSICAL MEDICINE AND REHAB | Facility: CLINIC | Age: 42
End: 2017-05-15

## 2017-05-15 NOTE — TELEPHONE ENCOUNTER
Spoke with pt and she is asking if Dr. Reyna still wants her to get an EMG. The question has been sent to Dr. Reyna and a request for orders if she does and I will schedule.

## 2017-05-22 ENCOUNTER — TELEPHONE (OUTPATIENT)
Dept: REHABILITATION | Facility: HOSPITAL | Age: 42
End: 2017-05-22

## 2017-06-01 ENCOUNTER — TELEPHONE (OUTPATIENT)
Dept: PHYSICAL MEDICINE AND REHAB | Facility: CLINIC | Age: 42
End: 2017-06-01

## 2017-06-01 DIAGNOSIS — G95.9 MYELOPATHY: Primary | ICD-10-CM

## 2017-06-01 NOTE — TELEPHONE ENCOUNTER
----- Message from Stacey M Lefort sent at 5/31/2017 12:43 PM CDT -----  Patient needs a callback at [phone]. Thank you.

## 2017-06-01 NOTE — TELEPHONE ENCOUNTER
Returned pts call. LM that I may not be able to answer during clinic hours, but that I could call her back after clinic.

## 2017-06-01 NOTE — TELEPHONE ENCOUNTER
The pt called this morning asking if you still want her to get the EMG done? If so, please place the orders and I'll get her scheduled.  She is also asking for orders for a skilled nursing facility or home health as she is not doing PT at this time because she is still having a hard time walking and getting things done on her own. Her mother is in her mid 80's and is not able to assist her at all.   Please advise on both and I'll call the pt back. Thank you.

## 2017-06-01 NOTE — TELEPHONE ENCOUNTER
She does not qualify for home health or skilled nursing- she needs to be receiving therapy as an outpt- It is what she will benefit most from. I will place emg orders

## 2017-06-02 NOTE — TELEPHONE ENCOUNTER
Spoke with patient made appt for emg and explained qualifications for home health and explained to patient that she did not meet those qualifications.

## 2017-06-29 ENCOUNTER — OFFICE VISIT (OUTPATIENT)
Dept: FAMILY MEDICINE | Facility: CLINIC | Age: 42
End: 2017-06-29
Payer: COMMERCIAL

## 2017-06-29 ENCOUNTER — TELEPHONE (OUTPATIENT)
Dept: NEUROLOGY | Facility: CLINIC | Age: 42
End: 2017-06-29

## 2017-06-29 VITALS
WEIGHT: 189 LBS | SYSTOLIC BLOOD PRESSURE: 132 MMHG | HEART RATE: 80 BPM | DIASTOLIC BLOOD PRESSURE: 91 MMHG | TEMPERATURE: 99 F | HEIGHT: 65 IN | BODY MASS INDEX: 31.49 KG/M2

## 2017-06-29 DIAGNOSIS — R25.1 TREMOR: ICD-10-CM

## 2017-06-29 DIAGNOSIS — R26.9 ABNORMALITY OF GAIT AND MOBILITY: Primary | ICD-10-CM

## 2017-06-29 PROBLEM — F48.8 BRIEF SITUATIONAL NON-PSYCHOTIC DISORDER: Status: ACTIVE | Noted: 2017-06-29

## 2017-06-29 PROBLEM — M99.59 INTERVERTEBRAL DISC STENOSIS OF NEURAL CANAL: Status: ACTIVE | Noted: 2017-06-29

## 2017-06-29 PROBLEM — G95.89 MYELOMALACIA: Status: ACTIVE | Noted: 2017-06-29

## 2017-06-29 PROBLEM — R09.89 OTHER SPECIFIED SYMPTOMS AND SIGNS INVOLVING THE CIRCULATORY AND RESPIRATORY SYSTEMS: Status: ACTIVE | Noted: 2017-06-29

## 2017-06-29 PROBLEM — E04.9 GOITER: Status: ACTIVE | Noted: 2017-06-29

## 2017-06-29 PROCEDURE — 99204 OFFICE O/P NEW MOD 45 MIN: CPT | Mod: ,,, | Performed by: INTERNAL MEDICINE

## 2017-06-29 NOTE — PROGRESS NOTES
Subjective:       Patient ID: Zaira Jensen is a 42 y.o. female.    Chief Complaint: Establish Care ( )    This is a 42-year-old -American who seeks to establish with me as a new primary care physician.    She has a long and unfortunate history of back surgeries after which she has been rendered incapacitated and significantly handicapped.    She seeks help for difficulty walking and continued back pain and incapacitation.    She had the first back surgery (cervical spine) approximately 7 or 8 years back after which she had weakness in all extremities. She went through rehabilitation and showed improvement. Unfortunately she apparently had another disc herniation following which she underwent another surgery and never made a good recovery.    She has gone to multiple rehabs including consultations in and out of town from physiatrists, neurosurgeons, chronic pain managements and neurologists.    At this point she is on a wheelchair and is barely able to stand and ambulate at home. She is significantly dependent upon activities of daily living including cooking and transportation. She is able to go to the bathroom but is  is unable to bath effectively.    Patient's upper extremities strength is diminished with difficulty with finer  and coordination.    Apparently at one time there was a suspicion that she may have Parkinson's based upon resting tremors and an expressionless face,  but this was never confirmed by Neuro consultation at [place].    In the interim, suspicions of multiple sclerosis, and amyotrophic lateral sclerosis have also been made but never confirmed. I do not have the details for theseworkups    Thus far patient does not have any major chronic diagnoses like hypertension, dyslipidemia , asthma or diabetes.    As time passes by, patient's frustrations are increasing and her support system is dwindling. Her parents are frail and in their late 80s and 90s and only help at this  point    She is currently continuing with [employer] as well as perhaps Medicare and Medicaid insurance.    She is wondering if she can go to acute rehabilitation facility to get a better.      She also needs a DMV handicap.    She currently does not seem to have any durable equipment at home.      Most of the chores at home are done by her parents. She does not have any siblings. She is not  and has no children. She is currently disabled and not employed.    Patient has a history of morbid obesity and she had lost significant weight after Elisha Rohan diet. It seems that her first problem of a disc herniation have started after the weight loss.            Past Medical History:   Diagnosis Date    Allergy     Anemia     Arthritis     Chronic back pain     Chronic neck pain     Functional ovarian cysts     GERD (gastroesophageal reflux disease)     Headache     Radiculopathy of arm     Rash     Respiratory distress     bronchospasm at emergence years ago     Social History     Social History    Marital status: Single     Spouse name: N/A    Number of children: N/A    Years of education: N/A     Occupational History    Not on file.     Social History Main Topics    Smoking status: Never Smoker    Smokeless tobacco: Never Used    Alcohol use No    Drug use: No    Sexual activity: No     Other Topics Concern    Not on file     Social History Narrative    No narrative on file     Past Surgical History:   Procedure Laterality Date    CERVICAL FUSION      CHOLECYSTECTOMY      COSMETIC SURGERY      EYE SURGERY      FRACTURE SURGERY      OVARIAN CYST REMOVAL      SPINE SURGERY      TOTAL BODY LIFT       Family History   Problem Relation Age of Onset    Diabetes Father     Cancer Father      prostate    Heart disease Father     No Known Problems Mother     Allergic rhinitis Neg Hx     Allergies Neg Hx     Angioedema Neg Hx     Asthma Neg Hx     Atopy Neg Hx     Eczema  "Neg Hx     Immunodeficiency Neg Hx     Rhinitis Neg Hx     Urticaria Neg Hx        Review of Systems   Constitutional: Negative for activity change, chills, fatigue, fever and unexpected weight change.   HENT: Negative for congestion, postnasal drip and sinus pressure.    Eyes: Negative for pain, discharge and visual disturbance.   Respiratory: Negative for cough, chest tightness and shortness of breath.    Cardiovascular: Negative for chest pain, palpitations and leg swelling.   Gastrointestinal: Negative for abdominal distention, anal bleeding, constipation and diarrhea.   Genitourinary: Negative for difficulty urinating, dysuria, flank pain, frequency, menstrual problem, pelvic pain, vaginal bleeding and vaginal pain.   Musculoskeletal: Positive for arthralgias, back pain, gait problem, neck pain and neck stiffness. Negative for joint swelling.   Skin: Negative for color change, pallor and rash.   Allergic/Immunologic: Negative for environmental allergies, food allergies and immunocompromised state.   Neurological: Negative for dizziness, tremors, seizures, syncope, light-headedness and headaches.   Hematological: Negative for adenopathy. Does not bruise/bleed easily.   Psychiatric/Behavioral: Negative for agitation, confusion, dysphoric mood, self-injury and suicidal ideas. The patient is nervous/anxious.        Objective:       Vitals:    06/29/17 1434   BP: (!) 132/91   Pulse: 80   Temp: 98.8 °F (37.1 °C)   Weight: 85.7 kg (189 lb)   Height: 5' 5" (1.651 m)     Physical Exam   Constitutional: She is oriented to person, place, and time. She appears well-developed and well-nourished. She is cooperative. No distress.   HENT:   Head: Normocephalic and atraumatic.   Right Ear: Tympanic membrane normal.   Left Ear: Tympanic membrane normal.   Eyes: Conjunctivae, EOM and lids are normal. Pupils are equal, round, and reactive to light. Lids are everted and swept, no foreign bodies found. Right pupil is round and " reactive. Left pupil is round and reactive.   Neck: Trachea normal and normal range of motion. Neck supple. No JVD present.   Cardiovascular: Normal rate, regular rhythm, S1 normal, S2 normal and normal heart sounds.    Pulmonary/Chest: Breath sounds normal.   Abdominal: Soft. Bowel sounds are normal. There is no rigidity and no guarding.   Musculoskeletal:   Patient has restricted range of motion of both shoulders. She took time to stand but needed assistance for balance.   Lymphadenopathy:     She has no cervical adenopathy.     She has no axillary adenopathy.   Neurological: She is alert and oriented to person, place, and time. She is not disoriented. She displays tremor (resting in both hands). No sensory deficit. She exhibits abnormal muscle tone. Coordination and gait (able to stand but lacks coordination) abnormal.   Reflex Scores:       Tricep reflexes are 0 on the right side and 0 on the left side.       Bicep reflexes are 1+ on the right side and 0 on the left side.       Brachioradialis reflexes are 1+ on the right side and 0 on the left side.       Patellar reflexes are 1+ on the right side and 1+ on the left side.       Achilles reflexes are 1+ on the right side and 1+ on the left side.  Pt is wheel chair bound    Expressiononless face    Dysymmetry and lack of coordination in upper arm     Slight spasticity and hypertonia in upper extremities.   Skin: Skin is warm and dry.   Psychiatric: Judgment normal. Her affect is not inappropriate. She exhibits a depressed mood.   Nursing note and vitals reviewed.      Assessment:       1. Abnormality of gait and mobility    2. Tremor         Plan:           Abnormality of gait and mobility  -     Ambulatory referral to Home Health    Tremor  -     Ambulatory referral to Home Health    Pt seen with Jenni as Chaperone.    This is patient's first visit to establish with me as a primary care physician. I have no detailed information about her complete get past  medical history and phasic surgeries.    She had multiple visits and trips in and out [facility] ( [address]) to various specialists including neurologists, pain management specialties and neurosurgeons.    The cause of for failed back surgery and incapacitation is still not clear to me.    Apparently at one time there was a suspicion for Parkinson's given her resting tremors and lack of facial expression. This was never conformed by an urologist. Other suspicions were multiple sclerosis and ALS. I would think any of these suspicions with conformed.    Patient states after the first surgery she felt better and was more or less back to near normal status mention another of the discs had herniated and the  surgery after  that  had rendered her incapacitated.    Patient seems to have limited social help from her parents octogenarians and nonagenarians. She is not only child and has no siblings. There are no close by cousins. No friends and acquaintances at this station of her life.     Patient requests me to put her in the hospital for 3 days so that she qualifies for inpatient rehabilitation. I'm unable to comply with her ldemand and request because she does not meet any acute inpatient care criteria at this point.    I'll be willing to help her within my limitations a primary care physician and to the extent that I can. I probably cannot solve her complex neurological/rehabilitation/orthopedic issues.    I will be happy to guide her and counseled her otherwise including primary care and preventive care issues.    Home health will keep me updated including social service input.

## 2017-06-29 NOTE — PATIENT INSTRUCTIONS
Fall Prevention  Falls often occur due to slipping, tripping or losing your balance. Millions of people fall every year and injure themselves. Here are ways to reduce your risk of falling again.  · Think about your fall, was there anything that caused your fall that can be fixed, removed, or replaced?  · Make your home safe by keeping walkways clear of objects you may trip over.  · Use non-slip pads under rugs. Do not use area rugs or small throw rugs.  · Use non-slip mats in bathtubs and showers.  · Install handrails and lights on staircases.  · Do not walk in poorly lit areas.  · Do not stand on chairs or wobbly ladders.  · Use caution when reaching overhead or looking upward. This position can cause a loss of balance.  · Be sure your shoes fit properly, have non-slip bottoms and are in good condition.   · Wear shoes both inside and out. Avoid going barefoot or wearing slippers.  · Be cautious when going up and down stairs, curbs, and when walking on uneven sidewalks.  · If your balance is poor, consider using a cane or walker.  · If your fall was related to alcohol use, stop or limit alcohol intake.   · If your fall was related to use of sleeping medicines, talk to your doctor about this. You may need to reduce your dosage at bedtime if you awaken during the night to go to the bathroom.    · To reduce the need for nighttime bathroom trips:  ¨ Avoid drinking fluids for several hours before going to bed  ¨ Empty your bladder before going to bed  ¨ Men can keep a urinal at the bedside  · Stay as active as you can. Balance, flexibility, strength, and endurance all come from exercise. They all play a role in preventing falls. Ask your healthcare provider which types of activity are right for you.  · Get your vision checked on a regular basis.  · If you have pets, know where they are before you stand up or walk so you don't trip over them.  · Use night lights.  Date Last Reviewed: 11/5/2015  © 7519-6467 [employer]. [address] [place] [address], [place] [address]. All rights reserved. This information is not intended as a substitute for professional medical care. Always follow your healthcare professional's instructions.

## 2017-07-07 ENCOUNTER — TELEPHONE (OUTPATIENT)
Dept: NEUROLOGY | Facility: CLINIC | Age: 42
End: 2017-07-07

## 2017-07-11 ENCOUNTER — TELEPHONE (OUTPATIENT)
Dept: PHYSICAL MEDICINE AND REHAB | Facility: CLINIC | Age: 42
End: 2017-07-11

## 2017-07-11 NOTE — TELEPHONE ENCOUNTER
----- Message from Stacey M Lefort sent at 7/11/2017 12:31 PM CDT -----  Patient would like a callback so that she can reschedule her EMG appointment with you. She can be reached at [phone]. Thank you.

## 2017-07-12 PROCEDURE — G0180 MD CERTIFICATION HHA PATIENT: HCPCS | Mod: ,,, | Performed by: INTERNAL MEDICINE

## 2017-08-02 PROBLEM — G25.2 COARSE TREMORS: Status: ACTIVE | Noted: 2017-08-02

## 2017-08-02 PROBLEM — R26.9 ABNORMALITY OF GAIT: Status: ACTIVE | Noted: 2017-08-02

## 2017-08-09 ENCOUNTER — OFFICE VISIT (OUTPATIENT)
Dept: PHYSICAL MEDICINE AND REHAB | Facility: CLINIC | Age: 42
End: 2017-08-09
Payer: MEDICARE

## 2017-08-09 ENCOUNTER — PROCEDURE VISIT (OUTPATIENT)
Dept: PHYSICAL MEDICINE AND REHAB | Facility: CLINIC | Age: 42
End: 2017-08-09
Payer: MEDICARE

## 2017-08-09 DIAGNOSIS — G95.9 MYELOPATHY: ICD-10-CM

## 2017-08-09 DIAGNOSIS — M53.3 SACROILIAC DYSFUNCTION: Primary | ICD-10-CM

## 2017-08-09 DIAGNOSIS — R53.1 WEAKNESS: ICD-10-CM

## 2017-08-09 PROCEDURE — 95910 NRV CNDJ TEST 7-8 STUDIES: CPT | Mod: 26,S$PBB,, | Performed by: PHYSICAL MEDICINE & REHABILITATION

## 2017-08-09 PROCEDURE — 99212 OFFICE O/P EST SF 10 MIN: CPT | Mod: PBBFAC,PN | Performed by: PHYSICAL MEDICINE & REHABILITATION

## 2017-08-09 PROCEDURE — 99999 PR PBB SHADOW E&M-EST. PATIENT-LVL II: CPT | Mod: PBBFAC,,, | Performed by: PHYSICAL MEDICINE & REHABILITATION

## 2017-08-09 PROCEDURE — 95886 MUSC TEST DONE W/N TEST COMP: CPT | Mod: 26,S$PBB,, | Performed by: PHYSICAL MEDICINE & REHABILITATION

## 2017-08-09 PROCEDURE — 99214 OFFICE O/P EST MOD 30 MIN: CPT | Mod: S$PBB,,, | Performed by: PHYSICAL MEDICINE & REHABILITATION

## 2017-08-09 NOTE — PROCEDURES
Procedures     [facility]  Aminata Reyna D.O  Physical Medicine and Rehab  Phone: [phone]   Fax: [phone]        Full Name: Zaira Jensen Gender: Female  Patient ID: [id number] YOB: 1975      Visit Date: 8/9/2017 12:37  Age: 42 Years 3 Months Old  Reason for referral: arm and leg weakness s/p cervical myelopathy w. decompression      Sensory NCS      Nerve / Sites Rec. Site Onset Lat Peak Lat Ref. NP Amp Ref. PP Amp Ref. Segments Distance Velocity     ms ms ms µV µV µV µV  cm m/s   R Median - Digit II (Antidromic)      Wrist Dig II 2.45 3.18 ?3.40 33.4 ?15.0 33.1 ?20.0 Wrist - Dig II 13 53   R Ulnar - Digit V (Antidromic)      Wrist Dig V 2.24 2.92 ?3.10 31.5 ?10.0 42.9 ?15.0 Wrist - Dig V 11 49   R Sural - Ankle (Calf)      Calf Ankle NR NR ?4.20 NR ?5.0 NR ?5.0 Calf - Ankle 14 NR   R Superficial peroneal - Ankle      Lat leg Ankle 1.04 1.77 ?4.40 25.6 ?5.0 3.2 ?5.0 Lat leg - Ankle 14 134       Motor NCS      Nerve / Sites Muscle Latency Ref. Amplitude Ref. Amp % Duration Segments Distance Lat Diff Velocity Ref.     ms ms mV mV % ms  cm ms m/s m/s   R Median - APB      Wrist APB 2.92 ?4.40 10.0 ?4.0 100 5.89 Wrist - APB 7         Elbow APB 7.34  0.8  7.5 5.16 Elbow - Wrist 22 4.43 50 ?49   R Ulnar - ADM      Wrist ADM 2.66 ?3.60 7.9 ?5.0 100 5.89 Wrist - ADM 7         B.Elbow ADM 5.99  8.0  101 8.18 B.Elbow - Wrist 21 3.33 63 ?49      A.Elbow ADM 8.70  1.2  15 6.67 A.Elbow - B.Elbow 10 2.71 37 ?49   R Peroneal - EDB      Ankle EDB 3.85 ?6.20 3.0 ?2.0 100 5.78 Ankle - EDB 8         Fib head EDB 9.74  3.2  106 6.30 Fib head - Ankle 30 5.89 51 ?39      Pop fossa EDB 10.94  3.0  100 5.89 Pop fossa - Fib head 10 1.20 83 ?39   R Tibial - AH      Ankle AH 4.27 ?6.00 5.5 ?3.0 100 5.78 Ankle - AH 8         Pop fossa AH 11.35  0.5  8.61 5.89 Pop fossa - Ankle 33 7.08 47 ?39       F  Wave      Nerve Fmin Ref.    ms ms   R Tibial - AH 48.18 ?58.00       EMG      EMG Summary Table      Spontaneous MUAP Recruitment   Muscle IA Fib PSW Fasc H.F. Amp Dur. PPP Pattern   R. Triceps brachii N None None None None N N N N   R. Deltoid N None None None None 1+ 1+ 1+ Reduced   R. Biceps brachii N None None None None N N 1+ Reduced   R. Extensor carpi radialis brevis N None None None None N N N N   R. First dorsal interosseous N None None None None N N N N   R. Vastus lateralis N None None None None N N N N   R. Biceps femoris (short head) N None None None None N N N N   R. Rectus femoris N None None None None N N N N   R. Tibialis anterior N None None None None N N N N   R. Gastrocnemius (Medial head) N None None None None N N N N       Summary    The motor conduction test was performed on 4 nerve(s). The results were normal in 3 nerve(s): R Median - APB, R Peroneal - EDB, R Tibial - AH. Results outside the specified normal range were found in 1 nerve(s), as follows:   In the R Ulnar - ADM study  o the take off velocity result was reduced for A.Elbow - B.Elbow segment    The sensory conduction test was performed on 4 nerve(s). The results were normal in 3 nerve(s): R Median - Digit II (Antidromic), R Ulnar - Digit V (Antidromic), R Superficial peroneal - Ankle. Results outside the specified normal range were found in 1 nerve(s), as follows:   In the R Sural - Ankle (Calf) study  o the response was considered absent for Calf stimulation    The F wave study was normal in all 1 of the tested nerves: R Tibial - AH.    The needle EMG examination was performed in 10 muscles. It was normal in 8 muscle(s): R. Triceps brachii, R. Extensor carpi radialis brevis, R. First dorsal interosseous, R. Vastus lateralis, R. Biceps femoris (short head), R. Rectus femoris, R. Tibialis anterior, R. Gastrocnemius (Medial head). The study was abnormal in 2 muscle(s), with the following distribution:   The MUP waveform abnormality was found in R. Deltoid, R. Biceps brachii.   Abnormal interference pattern was found in R.  Deltoid, R. Biceps brachii.          Conclusion:   Large units right biceps and deltoid. This could be remanant of her history of cervical myelopathy. However , it can also be seen in chronic myopathies.  Superimposed mild right ulnar neuropathy at elbow.          ____________________________  Aminata Reyna D.O.

## 2017-08-09 NOTE — PROGRESS NOTES
HPI:  Patient is a 42 y.o. year old female w. H/o cervical myelopathy in 2011. She is still having difficulty w. adl's and feels she is regressing. She uses a wheelchair most of the time. Today's emg was nl except for chronic neurogenic muaps of biceps and deltoid. This could be a remanant from the myelpathy, however the pattern should have normalized proximally  To distally. I had ordered a cpk to make sure she did not have a myopathy but it had never been done. Althought myopathies typically have smaller muap's they can develop into a more neurogenic pattern if the myopathies have been occurring long enough.  Pt. Today is also complaining of SIJT pain. This started prior to the myelopathy. 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    Past Medical History:   Diagnosis Date    Allergy     Anemia     Arthritis     Chronic back pain     Chronic neck pain     Functional ovarian cysts     GERD (gastroesophageal reflux disease)     Headache(784.0)     Radiculopathy of arm     Rash     Respiratory distress     bronchospasm at emergence years ago     Past Surgical History:   Procedure Laterality Date    CERVICAL FUSION      CHOLECYSTECTOMY      COSMETIC SURGERY      EYE SURGERY      FRACTURE SURGERY      OVARIAN CYST REMOVAL      SPINE SURGERY      TOTAL BODY LIFT       Family History   Problem Relation Age of Onset    Diabetes Father     Cancer Father      prostate    Heart disease Father     No Known Problems Mother     Allergic rhinitis Neg Hx     Allergies Neg Hx     Angioedema Neg Hx     Asthma Neg Hx     Atopy Neg Hx     Eczema Neg Hx     Immunodeficiency Neg Hx     Rhinitis Neg Hx     Urticaria Neg Hx      Social History     Social History    Marital status: Single     Spouse name: N/A    Number of children: N/A    Years of education: N/A     Social History Main Topics    Smoking status: Never Smoker    Smokeless tobacco:  Never Used    Alcohol use No    Drug use: No    Sexual activity: No     Other Topics Concern    Not on file     Social History Narrative    No narrative on file       Review of patient's allergies indicates:   Allergen Reactions    Bactrim [sulfamethoxazole-trimethoprim]     Codeine Other (See Comments)     cramping    Hydrocodone     Nexium [esomeprazole magnesium]     Nickel sutures [surgical stainless steel]     Pcn [penicillins]     Sulfa (sulfonamide antibiotics)     Vicks vapor inhaler [l-desoxyephedrine]        Current Outpatient Prescriptions:     ibuprofen (ADVIL,MOTRIN) 800 MG tablet, Take 800 mg by mouth every 6 (six) hours as needed. , Disp: , Rfl:     Review of Systems  No nausea, vomiting, fevers, Chills , contipation, diarrhea or sweats      Physical Exam:      She has tremors throughout  Weakness throughtout, mmt 3-4 all muscles unchanged, no atrophy  She is able to get on the examination table  She comes in on a wheelchair  alert and oriented ×4 follows commands answers all questions appropriately,affect wnl  No C/C/E    Assessment:  H/o cervical myelopathy s/p decompression  sijt pain  Plan:  sijt xray  She is requesting prp to right sijt, apparently she saw  who recommended prp. She has an xray 4 yrs ago that indicated right sacrolitis. She fully understands this would only be to help her sijt. NOT the numerous other symptoms she has including her weakness  The EMG and the mri's did not provide an explanation to her profound weakness that affects her adl's. The only other potential differential I can think of is a chronic myopathy. I will order labs including a cpk.  At this time, I would feel more comfortable if a neuromusc. Specialist , specifically  would examine her and I do not think her history of spine issues accounts for her weakness today.  Will schedule prp to help w. Her sijt  She will need to get in a good pelvic alignment before we perform prp. I have  given her prescription for Brandon Callais PT for this

## 2017-08-12 ENCOUNTER — OUTSIDE PLACE OF SERVICE (OUTPATIENT)
Dept: ADMINISTRATIVE | Facility: HOSPITAL | Age: 42
End: 2017-08-12
Payer: COMMERCIAL

## 2017-08-14 ENCOUNTER — TELEPHONE (OUTPATIENT)
Dept: PHYSICAL MEDICINE AND REHAB | Facility: CLINIC | Age: 42
End: 2017-08-14

## 2017-08-14 NOTE — TELEPHONE ENCOUNTER
----- Message from Jessica Levy sent at 8/11/2017  2:07 PM CDT -----  Contact: self  Patient [phone] is calling to speak with Nurse/Dr Reyna's office has to fax the referral to the doctor that Dr Reyna is recommending/Dr Angelo Burt fax [phone]/the office will not schedule the appt unless the referral is faxed/please fax

## 2017-08-18 ENCOUNTER — HOSPITAL ENCOUNTER (OUTPATIENT)
Dept: RADIOLOGY | Facility: HOSPITAL | Age: 42
Discharge: HOME | End: 2017-08-18
Attending: PHYSICAL MEDICINE & REHABILITATION
Payer: MEDICARE

## 2017-08-18 DIAGNOSIS — M53.3 SACROILIAC DYSFUNCTION: ICD-10-CM

## 2017-08-18 PROCEDURE — 72200 X-RAY EXAM SI JOINTS: CPT | Mod: 26,,, | Performed by: RADIOLOGY

## 2017-08-18 PROCEDURE — 72200 X-RAY EXAM SI JOINTS: CPT | Mod: TC

## 2017-08-23 ENCOUNTER — TELEPHONE (OUTPATIENT)
Dept: PHYSICAL MEDICINE AND REHAB | Facility: CLINIC | Age: 42
End: 2017-08-23

## 2017-08-23 NOTE — TELEPHONE ENCOUNTER
Dr. Reyna, Pt states she is scheduled to see Dr. Burt, but not until 3/18/18. Is there another MD you can suggest? She is also asking about her SI Joint x-ray results. Thank you.

## 2017-08-24 NOTE — TELEPHONE ENCOUNTER
Xray shows mild arthritis b/l sijts.  Dr [facility] another option- pls notify

## 2017-08-31 ENCOUNTER — TELEPHONE (OUTPATIENT)
Dept: PHYSICAL MEDICINE AND REHAB | Facility: CLINIC | Age: 42
End: 2017-08-31

## 2017-08-31 RX ORDER — ALCOHOL 2.38 KG/3.79L
1 GEL TOPICAL 2 TIMES DAILY
Qty: 60 CAPSULE | Refills: 3 | Status: ON HOLD | OUTPATIENT
Start: 2017-08-31 | End: 2017-11-16 | Stop reason: HOSPADM

## 2017-08-31 NOTE — TELEPHONE ENCOUNTER
Pt notified. I also answered a couple of questions she had about her injection on 9/19. She thanked me for calling.

## 2017-09-15 ENCOUNTER — TELEPHONE (OUTPATIENT)
Dept: PHYSICAL MEDICINE AND REHAB | Facility: CLINIC | Age: 42
End: 2017-09-15

## 2017-09-15 NOTE — TELEPHONE ENCOUNTER
Spoke with pt and she is waiting until after she heals from neck surgery before she does her PRP injection.

## 2017-09-15 NOTE — TELEPHONE ENCOUNTER
----- Message from Jessica Rogers sent at 9/15/2017 12:45 PM CDT -----  Contact: Zaira  Patient is calling regarding appointment on 9/19/17 and asking to speak to Amadna. Please call [phone]. Thanks!

## 2017-09-21 ENCOUNTER — HOSPITAL ENCOUNTER (OUTPATIENT)
Dept: PREADMISSION TESTING | Facility: HOSPITAL | Age: 42
Discharge: HOME | End: 2017-09-21
Attending: ORTHOPAEDIC SURGERY
Payer: MEDICARE

## 2017-09-21 ENCOUNTER — HOSPITAL ENCOUNTER (OUTPATIENT)
Dept: RADIOLOGY | Facility: HOSPITAL | Age: 42
Discharge: HOME | End: 2017-09-21
Attending: ORTHOPAEDIC SURGERY
Payer: MEDICARE

## 2017-09-21 VITALS — WEIGHT: 186 LBS | HEIGHT: 65 IN | BODY MASS INDEX: 30.99 KG/M2

## 2017-09-21 DIAGNOSIS — Z01.818 PRE-OP EXAM: ICD-10-CM

## 2017-09-21 DIAGNOSIS — M48.02 CERVICAL SPINAL STENOSIS: Primary | ICD-10-CM

## 2017-09-21 LAB
ALBUMIN SERPL BCP-MCNC: 3.5 G/DL
ALP SERPL-CCNC: 40 U/L
ALT SERPL W/O P-5'-P-CCNC: 12 U/L
ANION GAP SERPL CALC-SCNC: 9 MMOL/L
APTT BLDCRRT: 31.4 SEC
AST SERPL-CCNC: 14 U/L
BASOPHILS # BLD AUTO: 0 K/UL
BASOPHILS NFR BLD: 0.4 %
BILIRUB SERPL-MCNC: 0.4 MG/DL
BILIRUB UR QL STRIP: NEGATIVE
BUN SERPL-MCNC: 14 MG/DL
CALCIUM SERPL-MCNC: 9.3 MG/DL
CHLORIDE SERPL-SCNC: 107 MMOL/L
CLARITY UR: CLEAR
CO2 SERPL-SCNC: 24 MMOL/L
COLOR UR: YELLOW
CREAT SERPL-MCNC: 0.7 MG/DL
DIFFERENTIAL METHOD: ABNORMAL
EOSINOPHIL # BLD AUTO: 0.1 K/UL
EOSINOPHIL NFR BLD: 3.8 %
ERYTHROCYTE [DISTWIDTH] IN BLOOD BY AUTOMATED COUNT: 13.7 %
EST. GFR  (AFRICAN AMERICAN): >60 ML/MIN/1.73 M^2
EST. GFR  (NON AFRICAN AMERICAN): >60 ML/MIN/1.73 M^2
GLUCOSE SERPL-MCNC: 81 MG/DL
GLUCOSE UR QL STRIP: NEGATIVE
HCT VFR BLD AUTO: 36.5 %
HGB BLD-MCNC: 12.3 G/DL
HGB UR QL STRIP: NEGATIVE
INR PPP: 1
KETONES UR QL STRIP: NEGATIVE
LEUKOCYTE ESTERASE UR QL STRIP: NEGATIVE
LYMPHOCYTES # BLD AUTO: 0.9 K/UL
LYMPHOCYTES NFR BLD: 25.9 %
MCH RBC QN AUTO: 30.4 PG
MCHC RBC AUTO-ENTMCNC: 33.7 G/DL
MCV RBC AUTO: 90 FL
MONOCYTES # BLD AUTO: 0.2 K/UL
MONOCYTES NFR BLD: 5.6 %
NEUTROPHILS # BLD AUTO: 2.3 K/UL
NEUTROPHILS NFR BLD: 64.3 %
NITRITE UR QL STRIP: NEGATIVE
PH UR STRIP: 7 [PH] (ref 5–8)
PLATELET # BLD AUTO: 252 K/UL
PMV BLD AUTO: 8 FL
POTASSIUM SERPL-SCNC: 3.6 MMOL/L
PROT SERPL-MCNC: 7.5 G/DL
PROT UR QL STRIP: NEGATIVE
PROTHROMBIN TIME: 10.8 SEC
RBC # BLD AUTO: 4.04 M/UL
SODIUM SERPL-SCNC: 140 MMOL/L
SP GR UR STRIP: 1.02 (ref 1–1.03)
URN SPEC COLLECT METH UR: NORMAL
UROBILINOGEN UR STRIP-ACNC: 1 EU/DL
WBC # BLD AUTO: 3.5 K/UL

## 2017-09-21 PROCEDURE — 93010 ELECTROCARDIOGRAM REPORT: CPT | Mod: ,,, | Performed by: INTERNAL MEDICINE

## 2017-09-21 PROCEDURE — 87081 CULTURE SCREEN ONLY: CPT

## 2017-09-21 PROCEDURE — 36415 COLL VENOUS BLD VENIPUNCTURE: CPT

## 2017-09-21 PROCEDURE — 72040 X-RAY EXAM NECK SPINE 2-3 VW: CPT | Mod: 26,,, | Performed by: RADIOLOGY

## 2017-09-21 PROCEDURE — 85610 PROTHROMBIN TIME: CPT

## 2017-09-21 PROCEDURE — 85025 COMPLETE CBC W/AUTO DIFF WBC: CPT

## 2017-09-21 PROCEDURE — 81003 URINALYSIS AUTO W/O SCOPE: CPT

## 2017-09-21 PROCEDURE — 72040 X-RAY EXAM NECK SPINE 2-3 VW: CPT | Mod: TC

## 2017-09-21 PROCEDURE — 99900103 DSU ONLY-NO CHARGE-INITIAL HR (STAT)

## 2017-09-21 PROCEDURE — 93005 ELECTROCARDIOGRAM TRACING: CPT

## 2017-09-21 PROCEDURE — 71020 XR CHEST PA AND LATERAL PRE-OP: CPT | Mod: 26,,, | Performed by: RADIOLOGY

## 2017-09-21 PROCEDURE — 99900104 DSU ONLY-NO CHARGE-EA ADD'L HR (STAT)

## 2017-09-21 PROCEDURE — 85730 THROMBOPLASTIN TIME PARTIAL: CPT

## 2017-09-21 PROCEDURE — 71020 XR CHEST PA AND LATERAL PRE-OP: CPT | Mod: TC

## 2017-09-21 PROCEDURE — 80053 COMPREHEN METABOLIC PANEL: CPT

## 2017-09-23 LAB — MRSA SPEC QL CULT: NORMAL

## 2017-09-29 ENCOUNTER — TELEPHONE (OUTPATIENT)
Dept: PHYSICAL MEDICINE AND REHAB | Facility: CLINIC | Age: 42
End: 2017-09-29

## 2017-09-29 NOTE — TELEPHONE ENCOUNTER
----- Message from Steven Hurley sent at 9/29/2017 12:25 PM CDT -----  Contact: pt  She's calling in regards to a questions about her Rehab, please advise, [phone] (home)

## 2017-10-02 ENCOUNTER — ANESTHESIA EVENT (OUTPATIENT)
Dept: SURGERY | Facility: HOSPITAL | Age: 42
DRG: 472 | End: 2017-10-02
Payer: MEDICARE

## 2017-10-03 ENCOUNTER — ANESTHESIA (OUTPATIENT)
Dept: SURGERY | Facility: HOSPITAL | Age: 42
DRG: 472 | End: 2017-10-03
Payer: MEDICARE

## 2017-10-03 ENCOUNTER — HOSPITAL ENCOUNTER (INPATIENT)
Facility: HOSPITAL | Age: 42
LOS: 2 days | Discharge: NURSING, REHAB OR LONG-TERM CARE | DRG: 472 | End: 2017-10-05
Attending: ORTHOPAEDIC SURGERY | Admitting: INTERNAL MEDICINE
Payer: MEDICARE

## 2017-10-03 DIAGNOSIS — M48.02 CERVICAL SPINAL STENOSIS: Primary | ICD-10-CM

## 2017-10-03 PROBLEM — M54.9 CHRONIC BACK PAIN: Status: ACTIVE | Noted: 2017-10-03

## 2017-10-03 PROBLEM — K21.9 GERD (GASTROESOPHAGEAL REFLUX DISEASE): Status: ACTIVE | Noted: 2017-10-03

## 2017-10-03 PROBLEM — G89.29 CHRONIC BACK PAIN: Status: ACTIVE | Noted: 2017-10-03

## 2017-10-03 LAB
ANION GAP SERPL CALC-SCNC: 8 MMOL/L
B-HCG UR QL: NEGATIVE
BASOPHILS # BLD AUTO: 0 K/UL
BASOPHILS NFR BLD: 0.3 %
BUN SERPL-MCNC: 6 MG/DL
CALCIUM SERPL-MCNC: 8.6 MG/DL
CHLORIDE SERPL-SCNC: 105 MMOL/L
CO2 SERPL-SCNC: 25 MMOL/L
CREAT SERPL-MCNC: 0.7 MG/DL
CTP QC/QA: YES
DIFFERENTIAL METHOD: ABNORMAL
EOSINOPHIL # BLD AUTO: 0.1 K/UL
EOSINOPHIL NFR BLD: 3.1 %
ERYTHROCYTE [DISTWIDTH] IN BLOOD BY AUTOMATED COUNT: 14.3 %
EST. GFR  (AFRICAN AMERICAN): >60 ML/MIN/1.73 M^2
EST. GFR  (NON AFRICAN AMERICAN): >60 ML/MIN/1.73 M^2
GLUCOSE SERPL-MCNC: 101 MG/DL
HCT VFR BLD AUTO: 32.6 %
HGB BLD-MCNC: 11 G/DL
LYMPHOCYTES # BLD AUTO: 0.9 K/UL
LYMPHOCYTES NFR BLD: 23.8 %
MCH RBC QN AUTO: 30.8 PG
MCHC RBC AUTO-ENTMCNC: 33.8 G/DL
MCV RBC AUTO: 91 FL
MONOCYTES # BLD AUTO: 0.1 K/UL
MONOCYTES NFR BLD: 3.1 %
NEUTROPHILS # BLD AUTO: 2.5 K/UL
NEUTROPHILS NFR BLD: 69.7 %
PLATELET # BLD AUTO: 209 K/UL
PMV BLD AUTO: 7.5 FL
POTASSIUM SERPL-SCNC: 3.6 MMOL/L
RBC # BLD AUTO: 3.58 M/UL
SODIUM SERPL-SCNC: 138 MMOL/L
WBC # BLD AUTO: 3.6 K/UL

## 2017-10-03 PROCEDURE — 25000003 PHARM REV CODE 250: Performed by: ANESTHESIOLOGY

## 2017-10-03 PROCEDURE — S0020 INJECTION, BUPIVICAINE HYDRO: HCPCS | Performed by: ORTHOPAEDIC SURGERY

## 2017-10-03 PROCEDURE — 99900103 DSU ONLY-NO CHARGE-INITIAL HR (STAT): Performed by: ORTHOPAEDIC SURGERY

## 2017-10-03 PROCEDURE — 81025 URINE PREGNANCY TEST: CPT | Performed by: ANESTHESIOLOGY

## 2017-10-03 PROCEDURE — 25000003 PHARM REV CODE 250: Performed by: PHYSICIAN ASSISTANT

## 2017-10-03 PROCEDURE — 85025 COMPLETE CBC W/AUTO DIFF WBC: CPT

## 2017-10-03 PROCEDURE — 25000003 PHARM REV CODE 250: Performed by: ORTHOPAEDIC SURGERY

## 2017-10-03 PROCEDURE — D9220A PRA ANESTHESIA: Mod: CRNA,,, | Performed by: NURSE ANESTHETIST, CERTIFIED REGISTERED

## 2017-10-03 PROCEDURE — 0RT30ZZ RESECTION OF CERVICAL VERTEBRAL DISC, OPEN APPROACH: ICD-10-PCS | Performed by: ORTHOPAEDIC SURGERY

## 2017-10-03 PROCEDURE — 71000033 HC RECOVERY, INTIAL HOUR: Performed by: ORTHOPAEDIC SURGERY

## 2017-10-03 PROCEDURE — 0RG10A0 FUSION OF CERVICAL VERTEBRAL JOINT WITH INTERBODY FUSION DEVICE, ANTERIOR APPROACH, ANTERIOR COLUMN, OPEN APPROACH: ICD-10-PCS | Performed by: ORTHOPAEDIC SURGERY

## 2017-10-03 PROCEDURE — 36000711: Performed by: ORTHOPAEDIC SURGERY

## 2017-10-03 PROCEDURE — 99222 1ST HOSP IP/OBS MODERATE 55: CPT | Mod: ,,, | Performed by: INTERNAL MEDICINE

## 2017-10-03 PROCEDURE — 37000008 HC ANESTHESIA 1ST 15 MINUTES: Performed by: ORTHOPAEDIC SURGERY

## 2017-10-03 PROCEDURE — C1713 ANCHOR/SCREW BN/BN,TIS/BN: HCPCS | Performed by: ORTHOPAEDIC SURGERY

## 2017-10-03 PROCEDURE — 99900104 DSU ONLY-NO CHARGE-EA ADD'L HR (STAT): Performed by: ORTHOPAEDIC SURGERY

## 2017-10-03 PROCEDURE — 27201423 OPTIME MED/SURG SUP & DEVICES STERILE SUPPLY: Performed by: ORTHOPAEDIC SURGERY

## 2017-10-03 PROCEDURE — D9220A PRA ANESTHESIA: Mod: ANES,,, | Performed by: ANESTHESIOLOGY

## 2017-10-03 PROCEDURE — 63600175 PHARM REV CODE 636 W HCPCS: Performed by: ORTHOPAEDIC SURGERY

## 2017-10-03 PROCEDURE — 63600175 PHARM REV CODE 636 W HCPCS: Performed by: NURSE ANESTHETIST, CERTIFIED REGISTERED

## 2017-10-03 PROCEDURE — 0QB20ZZ EXCISION OF RIGHT PELVIC BONE, OPEN APPROACH: ICD-10-PCS | Performed by: ORTHOPAEDIC SURGERY

## 2017-10-03 PROCEDURE — 36000710: Performed by: ORTHOPAEDIC SURGERY

## 2017-10-03 PROCEDURE — 37000009 HC ANESTHESIA EA ADD 15 MINS: Performed by: ORTHOPAEDIC SURGERY

## 2017-10-03 PROCEDURE — 63600175 PHARM REV CODE 636 W HCPCS: Performed by: PHYSICIAN ASSISTANT

## 2017-10-03 PROCEDURE — 25000003 PHARM REV CODE 250: Performed by: NURSE ANESTHETIST, CERTIFIED REGISTERED

## 2017-10-03 PROCEDURE — 88304 TISSUE EXAM BY PATHOLOGIST: CPT | Performed by: PATHOLOGY

## 2017-10-03 PROCEDURE — C1729 CATH, DRAINAGE: HCPCS | Performed by: ORTHOPAEDIC SURGERY

## 2017-10-03 PROCEDURE — 88311 DECALCIFY TISSUE: CPT | Mod: 26,,, | Performed by: PATHOLOGY

## 2017-10-03 PROCEDURE — 12000002 HC ACUTE/MED SURGE SEMI-PRIVATE ROOM

## 2017-10-03 PROCEDURE — 63600175 PHARM REV CODE 636 W HCPCS: Performed by: ANESTHESIOLOGY

## 2017-10-03 PROCEDURE — 80048 BASIC METABOLIC PNL TOTAL CA: CPT

## 2017-10-03 PROCEDURE — 36415 COLL VENOUS BLD VENIPUNCTURE: CPT

## 2017-10-03 PROCEDURE — 71000039 HC RECOVERY, EACH ADD'L HOUR: Performed by: ORTHOPAEDIC SURGERY

## 2017-10-03 DEVICE — IMPLANTABLE DEVICE: Type: IMPLANTABLE DEVICE | Site: NECK | Status: FUNCTIONAL

## 2017-10-03 RX ORDER — LIDOCAINE HCL/PF 100 MG/5ML
SYRINGE (ML) INTRAVENOUS
Status: DISCONTINUED | OUTPATIENT
Start: 2017-10-03 | End: 2017-10-03

## 2017-10-03 RX ORDER — PHENYLEPHRINE HYDROCHLORIDE 10 MG/ML
INJECTION INTRAVENOUS
Status: DISCONTINUED | OUTPATIENT
Start: 2017-10-03 | End: 2017-10-03

## 2017-10-03 RX ORDER — CLINDAMYCIN PHOSPHATE 900 MG/50ML
INJECTION, SOLUTION INTRAVENOUS
Status: DISCONTINUED
Start: 2017-10-03 | End: 2017-10-03 | Stop reason: WASHOUT

## 2017-10-03 RX ORDER — SODIUM CHLORIDE, SODIUM LACTATE, POTASSIUM CHLORIDE, CALCIUM CHLORIDE 600; 310; 30; 20 MG/100ML; MG/100ML; MG/100ML; MG/100ML
INJECTION, SOLUTION INTRAVENOUS CONTINUOUS
Status: DISCONTINUED | OUTPATIENT
Start: 2017-10-03 | End: 2017-10-05

## 2017-10-03 RX ORDER — HYDROMORPHONE HYDROCHLORIDE 2 MG/ML
0.2 INJECTION, SOLUTION INTRAMUSCULAR; INTRAVENOUS; SUBCUTANEOUS EVERY 5 MIN PRN
Status: DISCONTINUED | OUTPATIENT
Start: 2017-10-03 | End: 2017-10-03 | Stop reason: HOSPADM

## 2017-10-03 RX ORDER — KETAMINE HYDROCHLORIDE 100 MG/ML
INJECTION, SOLUTION INTRAMUSCULAR; INTRAVENOUS
Status: DISCONTINUED | OUTPATIENT
Start: 2017-10-03 | End: 2017-10-03

## 2017-10-03 RX ORDER — BACITRACIN 50000 [IU]/1
INJECTION, POWDER, FOR SOLUTION INTRAMUSCULAR
Status: DISCONTINUED | OUTPATIENT
Start: 2017-10-03 | End: 2017-10-03 | Stop reason: HOSPADM

## 2017-10-03 RX ORDER — PROPOFOL 10 MG/ML
VIAL (ML) INTRAVENOUS CONTINUOUS PRN
Status: DISCONTINUED | OUTPATIENT
Start: 2017-10-03 | End: 2017-10-03

## 2017-10-03 RX ORDER — ROCURONIUM BROMIDE 10 MG/ML
INJECTION, SOLUTION INTRAVENOUS
Status: DISCONTINUED | OUTPATIENT
Start: 2017-10-03 | End: 2017-10-03

## 2017-10-03 RX ORDER — GLYCOPYRROLATE 0.2 MG/ML
INJECTION INTRAMUSCULAR; INTRAVENOUS
Status: DISCONTINUED | OUTPATIENT
Start: 2017-10-03 | End: 2017-10-03

## 2017-10-03 RX ORDER — LORAZEPAM 2 MG/ML
0.25 INJECTION INTRAMUSCULAR
Status: COMPLETED | OUTPATIENT
Start: 2017-10-03 | End: 2017-10-03

## 2017-10-03 RX ORDER — ACETAMINOPHEN 10 MG/ML
INJECTION, SOLUTION INTRAVENOUS
Status: DISCONTINUED | OUTPATIENT
Start: 2017-10-03 | End: 2017-10-03

## 2017-10-03 RX ORDER — DEXTROMETHORPHAN HYDROBROMIDE, GUAIFENESIN 5; 100 MG/5ML; MG/5ML
650 LIQUID ORAL EVERY 8 HOURS
Status: ON HOLD | COMMUNITY
End: 2017-11-16 | Stop reason: HOSPADM

## 2017-10-03 RX ORDER — SODIUM CHLORIDE, SODIUM LACTATE, POTASSIUM CHLORIDE, CALCIUM CHLORIDE 600; 310; 30; 20 MG/100ML; MG/100ML; MG/100ML; MG/100ML
1000 INJECTION, SOLUTION INTRAVENOUS ONCE
Status: DISCONTINUED | OUTPATIENT
Start: 2017-10-03 | End: 2017-10-03 | Stop reason: HOSPADM

## 2017-10-03 RX ORDER — CEFAZOLIN SODIUM 2 G/50ML
2 SOLUTION INTRAVENOUS
Status: COMPLETED | OUTPATIENT
Start: 2017-10-03 | End: 2017-10-03

## 2017-10-03 RX ORDER — NALOXONE HCL 0.4 MG/ML
0.02 VIAL (ML) INJECTION
Status: DISCONTINUED | OUTPATIENT
Start: 2017-10-03 | End: 2017-10-05

## 2017-10-03 RX ORDER — LIDOCAINE HYDROCHLORIDE 10 MG/ML
1 INJECTION, SOLUTION EPIDURAL; INFILTRATION; INTRACAUDAL; PERINEURAL ONCE
Status: COMPLETED | OUTPATIENT
Start: 2017-10-03 | End: 2017-10-03

## 2017-10-03 RX ORDER — HYDROMORPHONE HCL IN 0.9% NACL 6 MG/30 ML
PATIENT CONTROLLED ANALGESIA SYRINGE INTRAVENOUS CONTINUOUS
Status: DISCONTINUED | OUTPATIENT
Start: 2017-10-03 | End: 2017-10-05

## 2017-10-03 RX ORDER — BUPIVACAINE HYDROCHLORIDE 5 MG/ML
INJECTION, SOLUTION EPIDURAL; INTRACAUDAL
Status: DISCONTINUED | OUTPATIENT
Start: 2017-10-03 | End: 2017-10-03 | Stop reason: HOSPADM

## 2017-10-03 RX ORDER — KETOROLAC TROMETHAMINE 30 MG/ML
15 INJECTION, SOLUTION INTRAMUSCULAR; INTRAVENOUS ONCE
Status: COMPLETED | OUTPATIENT
Start: 2017-10-03 | End: 2017-10-03

## 2017-10-03 RX ORDER — ONDANSETRON HYDROCHLORIDE 2 MG/ML
INJECTION, SOLUTION INTRAMUSCULAR; INTRAVENOUS
Status: DISCONTINUED | OUTPATIENT
Start: 2017-10-03 | End: 2017-10-03

## 2017-10-03 RX ORDER — SODIUM CHLORIDE, SODIUM LACTATE, POTASSIUM CHLORIDE, CALCIUM CHLORIDE 600; 310; 30; 20 MG/100ML; MG/100ML; MG/100ML; MG/100ML
INJECTION, SOLUTION INTRAVENOUS CONTINUOUS
Status: DISCONTINUED | OUTPATIENT
Start: 2017-10-03 | End: 2017-10-04

## 2017-10-03 RX ORDER — MIDAZOLAM HYDROCHLORIDE 1 MG/ML
INJECTION, SOLUTION INTRAMUSCULAR; INTRAVENOUS
Status: DISCONTINUED | OUTPATIENT
Start: 2017-10-03 | End: 2017-10-03

## 2017-10-03 RX ORDER — HYDROMORPHONE HCL 2 MG/ML
VIAL (ML) INJECTION
Status: DISCONTINUED | OUTPATIENT
Start: 2017-10-03 | End: 2017-10-03

## 2017-10-03 RX ORDER — SUCCINYLCHOLINE CHLORIDE 20 MG/ML
INJECTION INTRAMUSCULAR; INTRAVENOUS
Status: DISCONTINUED | OUTPATIENT
Start: 2017-10-03 | End: 2017-10-03

## 2017-10-03 RX ORDER — PROPOFOL 10 MG/ML
VIAL (ML) INTRAVENOUS
Status: DISCONTINUED | OUTPATIENT
Start: 2017-10-03 | End: 2017-10-03

## 2017-10-03 RX ORDER — DEXAMETHASONE SODIUM PHOSPHATE 4 MG/ML
INJECTION, SOLUTION INTRA-ARTICULAR; INTRALESIONAL; INTRAMUSCULAR; INTRAVENOUS; SOFT TISSUE
Status: DISCONTINUED | OUTPATIENT
Start: 2017-10-03 | End: 2017-10-03

## 2017-10-03 RX ORDER — FENTANYL CITRATE 50 UG/ML
INJECTION, SOLUTION INTRAMUSCULAR; INTRAVENOUS
Status: DISCONTINUED | OUTPATIENT
Start: 2017-10-03 | End: 2017-10-03

## 2017-10-03 RX ADMIN — SUCCINYLCHOLINE CHLORIDE 140 MG: 20 INJECTION, SOLUTION INTRAMUSCULAR; INTRAVENOUS at 12:10

## 2017-10-03 RX ADMIN — KETOROLAC TROMETHAMINE 15 MG: 30 INJECTION, SOLUTION INTRAMUSCULAR at 04:10

## 2017-10-03 RX ADMIN — ACETAMINOPHEN 1000 MG: 10 INJECTION, SOLUTION INTRAVENOUS at 01:10

## 2017-10-03 RX ADMIN — LORAZEPAM 0.25 MG: 2 INJECTION, SOLUTION INTRAMUSCULAR; INTRAVENOUS at 04:10

## 2017-10-03 RX ADMIN — GLYCOPYRROLATE 0.2 MG: 0.2 INJECTION, SOLUTION INTRAMUSCULAR; INTRAVENOUS at 01:10

## 2017-10-03 RX ADMIN — PHENYLEPHRINE HYDROCHLORIDE 120 MCG: 10 INJECTION INTRAVENOUS at 12:10

## 2017-10-03 RX ADMIN — ROCURONIUM BROMIDE 5 MG: 10 INJECTION, SOLUTION INTRAVENOUS at 12:10

## 2017-10-03 RX ADMIN — FENTANYL CITRATE 50 MCG: 50 INJECTION INTRAMUSCULAR; INTRAVENOUS at 01:10

## 2017-10-03 RX ADMIN — CEFAZOLIN SODIUM 2 G: 2 SOLUTION INTRAVENOUS at 12:10

## 2017-10-03 RX ADMIN — DEXAMETHASONE SODIUM PHOSPHATE 8 MG: 10 INJECTION, SOLUTION INTRAMUSCULAR; INTRAVENOUS at 11:10

## 2017-10-03 RX ADMIN — HYDROMORPHONE HYDROCHLORIDE 0.5 MG: 2 INJECTION INTRAMUSCULAR; INTRAVENOUS; SUBCUTANEOUS at 12:10

## 2017-10-03 RX ADMIN — Medication: at 03:10

## 2017-10-03 RX ADMIN — LIDOCAINE HYDROCHLORIDE 100 MG: 20 INJECTION, SOLUTION INTRAVENOUS at 12:10

## 2017-10-03 RX ADMIN — MIDAZOLAM 2 MG: 1 INJECTION INTRAMUSCULAR; INTRAVENOUS at 12:10

## 2017-10-03 RX ADMIN — SODIUM CHLORIDE, SODIUM LACTATE, POTASSIUM CHLORIDE, AND CALCIUM CHLORIDE: .6; .31; .03; .02 INJECTION, SOLUTION INTRAVENOUS at 10:10

## 2017-10-03 RX ADMIN — SODIUM CHLORIDE, SODIUM LACTATE, POTASSIUM CHLORIDE, AND CALCIUM CHLORIDE: 600; 310; 30; 20 INJECTION, SOLUTION INTRAVENOUS at 04:10

## 2017-10-03 RX ADMIN — SODIUM CHLORIDE, SODIUM LACTATE, POTASSIUM CHLORIDE, AND CALCIUM CHLORIDE: .6; .31; .03; .02 INJECTION, SOLUTION INTRAVENOUS at 01:10

## 2017-10-03 RX ADMIN — LIDOCAINE HYDROCHLORIDE 10 MG: 10 INJECTION, SOLUTION EPIDURAL; INFILTRATION; INTRACAUDAL; PERINEURAL at 10:10

## 2017-10-03 RX ADMIN — VANCOMYCIN HYDROCHLORIDE 1500 MG: 1 INJECTION, POWDER, LYOPHILIZED, FOR SOLUTION INTRAVENOUS at 06:10

## 2017-10-03 RX ADMIN — KETAMINE HYDROCHLORIDE 25 MG: 100 INJECTION, SOLUTION, CONCENTRATE INTRAMUSCULAR; INTRAVENOUS at 01:10

## 2017-10-03 RX ADMIN — HYDROMORPHONE HYDROCHLORIDE 0.5 MG: 2 INJECTION INTRAMUSCULAR; INTRAVENOUS; SUBCUTANEOUS at 01:10

## 2017-10-03 RX ADMIN — ONDANSETRON 4 MG: 2 INJECTION, SOLUTION INTRAMUSCULAR; INTRAVENOUS at 01:10

## 2017-10-03 RX ADMIN — PROPOFOL 100 MCG/KG/MIN: 10 INJECTION, EMULSION INTRAVENOUS at 01:10

## 2017-10-03 RX ADMIN — FENTANYL CITRATE 50 MCG: 50 INJECTION INTRAMUSCULAR; INTRAVENOUS at 12:10

## 2017-10-03 RX ADMIN — DEXAMETHASONE SODIUM PHOSPHATE 8 MG: 4 INJECTION, SOLUTION INTRAMUSCULAR; INTRAVENOUS at 01:10

## 2017-10-03 RX ADMIN — PROPOFOL 150 MG: 10 INJECTION, EMULSION INTRAVENOUS at 12:10

## 2017-10-03 NOTE — ANESTHESIA POSTPROCEDURE EVALUATION
"Anesthesia Post Evaluation    Patient: Zaira Jensen    Procedure(s) Performed: Procedure(s) (LRB):  DISKECTOMY AND FUSION-ANTERIOR CERVICAL (ACDF) C5-6 (N/A)    Final Anesthesia Type: general  Patient location during evaluation: PACU  Patient participation: Yes- Able to Participate  Level of consciousness: awake and alert  Post-procedure vital signs: reviewed and stable  Pain management: adequate  Airway patency: patent  PONV status at discharge: No PONV  Anesthetic complications: no      Cardiovascular status: hemodynamically stable  Respiratory status: unassisted and room air  Hydration status: euvolemic  Follow-up not needed.        Visit Vitals  /75   Pulse (!) 56   Temp 36.4 °C (97.5 °F) (Temporal)   Resp 16   Ht 5' 5" (1.651 m)   Wt 84.4 kg (186 lb)   LMP 09/03/2017   SpO2 99%   Breastfeeding? No   BMI 30.95 kg/m²       Pain/Vivi Score: Pain Assessment Performed: Yes (10/3/2017  5:00 PM)  Presence of Pain: complains of pain/discomfort (10/3/2017  5:00 PM)  Pain Rating Prior to Med Admin: 0 (10/3/2017  5:14 PM)  Vivi Score: 8 (10/3/2017  5:00 PM)      "

## 2017-10-03 NOTE — ANESTHESIA PREPROCEDURE EVALUATION
10/03/2017  Zaira Jensne is a 42 y.o., female.    Anesthesia Evaluation    I have reviewed the Patient Summary Reports.    I have reviewed the Nursing Notes.   I have reviewed the Medications.     Review of Systems  Anesthesia Hx:  No problems with previous Anesthesia    Social:  Non-Smoker    Hematology/Oncology:     Oncology Normal    -- Anemia:   EENT/Dental:EENT/Dental Normal   Cardiovascular:  Cardiovascular Normal     Pulmonary:  Pulmonary Normal    Renal/:  Renal/ Normal     Hepatic/GI:   GERD, well controlled    Musculoskeletal:   Arthritis   Spine Disorders: cervical and lumbar    Neurological:   Neuromuscular Disease, Headaches   Chronic Pain Syndrome   Psych:   Psychiatric History          Physical Exam  General:  Obesity    Airway/Jaw/Neck:  Airway Findings: Mouth Opening: Normal Tongue: Normal  General Airway Assessment: Adult  Mallampati: II  Jaw/Neck Findings:  Neck ROM: Extension Decreased, Mod., Extension Painful, Decreased Lateral Motion     Eyes/Ears/Nose:  EYES/EARS/NOSE FINDINGS: Normal   Dental:  DENTAL FINDINGS: Normal   Chest/Lungs:  Chest/Lungs Findings: Clear to auscultation, Normal Respiratory Rate     Heart/Vascular:  Heart Findings: Rate: Normal  Rhythm: Regular Rhythm  Sounds: Normal     Abdomen:  Abdomen Findings: Normal    Musculoskeletal:  Musculoskeletal Findings: Normal   Skin:  Skin Findings: Normal    Mental Status:  Mental Status Findings: Normal        Anesthesia Plan  Type of Anesthesia, risks & benefits discussed:  Anesthesia Type:  general  Patient's Preference:   Intra-op Monitoring Plan: standard ASA monitors  Intra-op Monitoring Plan Comments:   Post Op Pain Control Plan:   Post Op Pain Control Plan Comments:   Induction:   IV  Beta Blocker:  Patient is not currently on a Beta-Blocker (No further documentation required).       Informed Consent: Patient  understands risks and agrees with Anesthesia plan.  Questions answered. Anesthesia consent signed with patient.  ASA Score: 3     Day of Surgery Review of History & Physical: I have interviewed and examined the patient. I have reviewed the patient's H&P dated:  There are no significant changes.  H&P update referred to the surgeon.         Ready For Surgery From Anesthesia Perspective.

## 2017-10-03 NOTE — BRIEF OP NOTE
[facility]  Brief Operative Note    SUMMARY     Surgery Date: 10/3/2017     Surgeon(s) and Role:     * Magan Neal MD - Primary    Assisting Surgeon: dante Burnett    Pre-op Diagnosis:  Myelomalacia of cervical cord [G95.89]  Cervical stenosis of spine [M48.02]    Post-op Diagnosis:  Post-Op Diagnosis Codes:     * Myelomalacia of cervical cord [G95.89]     * Cervical stenosis of spine [M48.02]    Procedure(s) (LRB):  DISKECTOMY AND FUSION-ANTERIOR CERVICAL (ACDF) C5-6 (N/A)    Anesthesia: General    Description of Procedure: dictated    Description of the findings of the procedure: dictated    Estimated Blood Loss: 50 mL         Specimens:   Specimen (12h ago through future)    Start     Ordered    10/03/17 1352  Specimen to Pathology - Surgery  Once     Comments:  Pre-op Diagnosis: Myelomalacia of cervical cord [G95.89]Cervical stenosis of spine [M48.02]Post-op Diagnosis: sameProcedure(s):DISKECTOMY AND FUSION-ANTERIOR CERVICAL (ACDF) C5-6 Number of specimens: 1Name of specimens: disk C5-6      10/03/17 7578

## 2017-10-03 NOTE — H&P
PCP: Cuate Alvarez MD    History & Physical    Chief Complaint: s/p C5-6 ACDF    History of Present Illness:  Patient is a 42 y.o. female admitted to Hospitalist Service from Operation Room s/p C5-6 ACDF performed by Dr. Neal. Patient reportedly has past medical history significant for chronic neck pain, chronic back pain, GERD and history of anemia. Post-operatively, patient denied chest pain, shortness of breath, abdominal pain, nausea, vomiting, headache, vision changes, focal neuro-deficits, cough or fever.    Past Medical History:   Diagnosis Date    Allergy     Anemia     Arthritis     Chronic back pain     Chronic neck pain     Functional ovarian cysts     GERD (gastroesophageal reflux disease)     Headache(784.0)     Radiculopathy of arm     Rash     Respiratory distress     bronchospasm at emergence years ago     Past Surgical History:   Procedure Laterality Date    CERVICAL FUSION      CHOLECYSTECTOMY      COSMETIC SURGERY      EYE SURGERY      FRACTURE SURGERY      OVARIAN CYST REMOVAL      SPINE SURGERY      TOTAL BODY LIFT       Family History   Problem Relation Age of Onset    Diabetes Father     Cancer Father      prostate    Heart disease Father     No Known Problems Mother     Allergic rhinitis Neg Hx     Allergies Neg Hx     Angioedema Neg Hx     Asthma Neg Hx     Atopy Neg Hx     Eczema Neg Hx     Immunodeficiency Neg Hx     Rhinitis Neg Hx     Urticaria Neg Hx      Social History   Substance Use Topics    Smoking status: Never Smoker    Smokeless tobacco: Never Used    Alcohol use No      Review of patient's allergies indicates:   Allergen Reactions    Bactrim [sulfamethoxazole-trimethoprim]     Codeine Other (See Comments)     cramping    Hydrocodone     Nexium [esomeprazole magnesium]     Nickel sutures [surgical stainless steel]     Pcn [penicillins]     Sulfa (sulfonamide antibiotics)     Vicks vapor inhaler [l-desoxyephedrine]      PTA Medications    Medication Sig    acetaminophen (TYLENOL) 650 MG TbSR Take 650 mg by mouth every 8 (eight) hours.    ibuprofen (ADVIL,MOTRIN) 800 MG tablet Take 800 mg by mouth every 6 (six) hours as needed.     tqffxjuha-N0-[id number]-algal oil (METANX, ALGAL OIL,) 3 mg-35 mg-2 mg -90.314 mg Cap Take 1 tablet by mouth 2 (two) times daily.     Review of Systems:  Constitutional: no fever or chills  Eyes: no visual changes  Ears, nose, mouth, throat, and face: no nasal congestion or sore throat  Respiratory: no cough or shorness of breath  Cardiovascular: no chest pain or palpitations  Gastrointestinal: no nausea or vomiting, no abdominal pain or change in bowel habits  Genitourinary: no hematuria or dysuria  Integument/breast: no rash or pruritis  Hematologic/lymphatic: no easy bruising or lymphadenopathy  Musculoskeletal: see HPI  Neurological: no seizures or tremors.  Behavioral/Psych: no auditory or visual hallucinations  Endocrine: no heat or cold intolerance     OBJECTIVE:     Vital Signs (Most Recent)  Temp: 97.3 °F (36.3 °C) (10/03/17 1535)  Pulse: 64 (10/03/17 1620)  Resp: 13 (10/03/17 1620)  BP: 132/72 (10/03/17 1620)  SpO2: 99 % (10/03/17 1620)    Physical Exam:  General appearance: well developed, appears stated age  Head: normocephalic, atraumatic  Eyes:  conjunctivae/corneas clear. PERRL.  Nose: Nares normal. Septum midline.  Throat: lips, mucosa, and tongue normal; teeth and gums normal, no throat erythema.  Neck: supple, symmetrical, trachea midline, no JVD and thyroid not enlarged, symmetric, no tenderness/mass/nodules. Neck dressing C/D/I with a MAXWELL drain in place.  Lungs:  clear to auscultation bilaterally and normal respiratory effort  Chest wall: no tenderness  Heart: regular rate and rhythm, S1, S2 normal, no murmur, click, rub or gallop  Abdomen: soft, non-tender non-distented; bowel sounds normal; no masses,  no organomegaly  Extremities: no cyanosis, clubbing or edema.   Pulses: 2+ and symmetric  Skin: Skin  color, texture, turgor normal. Right Iliac crest dressing C/D/I with a MAXWELL drain in place.  Lymph nodes: Cervical, supraclavicular, and axillary nodes normal.  Neurologic: Normal strength and tone. No focal numbness or weakness. CNII-XII intact.      Laboratory:   CBC:   Recent Labs  Lab 10/03/17  1453   WBC 3.60*   RBC 3.58*   HGB 11.0*   HCT 32.6*      MCV 91   MCH 30.8   MCHC 33.8     CMP:   Recent Labs  Lab 10/03/17  1453      CALCIUM 8.6*      K 3.6   CO2 25      BUN 6   CREATININE 0.7       No results found for: HGBA1C    Diagnostic Results: None    Assessment/Plan:     Active Hospital Problems    Diagnosis  POA    *Cervical spinal stenosis s/p C5-6 ACDF [M48.02]  Yes    GERD (gastroesophageal reflux disease) [K21.9]  Yes    Chronic back pain [M54.9, G89.29]  Yes    Chronic neck pain [M54.2, G89.29]  Yes      Resolved Hospital Problems    Diagnosis Date Resolved POA   No resolved problems to display.   Continue to follow Orthopedic recommendations.  Needs aggressive incentive spirometry.  Follow hemoglobin and hematocrit closely.  Pain control with IV narcotics and antiemetics as needed.  Physical therapy as per Orthopedics protocol with fall precautions.    VTE Risk Mitigation         Ordered     Place CELSA hose  Until discontinued      10/03/17 1006     Place sequential compression device  Until discontinued      10/03/17 1006        Ann Bowen MD  Department of Hospital Medicine   [facility]

## 2017-10-03 NOTE — OP NOTE
DATE OF PROCEDURE:  10/03/2017.    PREOPERATIVE DIAGNOSES:  1.  Cervical disk syndrome with cervical stenosis and cervical spinous   spondylosis, C5-C6.  2.  Previous anterior cervical fusion, C3-C4, C4-C5.    FINAL DIAGNOSES:  1.  Cervical disk syndrome with cervical stenosis and cervical spinous   spondylosis, C5-C6.  2.  Previous anterior cervical fusion, C3-C4, C4-C5.    PROCEDURES PERFORMED:  1.  Anterior cervical diskectomy and spinal fusion at C5-C6 level with   autogenous iliac crest bone graft; CPT code 13779.  2.  Insertion of intervertebral prosthetic interbody device at C5-C6 level using   Medtronic Divergence Stand Alone implant; CPT code 00527.  3.  Harvesting morselized autograft, right iliac crest through separate incision   for spinal fusion; CPT code 18564    SURGEON:  Magan Neal M.D.    ASSISTANT:  KEO Loreod.    ANESTHESIA:  General.    OPERATIVE REPORT:  Ms. Jensen was brought to the Operating Room, while supine   was intubated.  A catheter was placed in the bladder.  Neuromonitoring leads   placed on the patient.  She was placed on the table using the Joiner extension   with a chin strap, holding her head in a neutral position with her arms tucked   her side and bony prominences padded.  We visualized C5-C6 with the image   intensifier, then cleansed the anterior aspect of the neck as well as the right   iliac crest with alcohol and then prepped and those areas with ChloraPrep   solution.  A transverse incision was made at C5-C6 on the right side and we   dissected down through the subcutaneous tissues through the superficial and deep   cervical fascia down to the prevertebral fascia in the midline.  Self-retaining   retractors were inserted.  A needle was placed in the C5-C6 disk space   confirming our level.  We confirmed that we were at C5-C6 level.  We protected   vascular structures and incised the disk annulus anteriorly and then removed the   C5-C6 disk in a piecemeal  fashion.  [place] distraction pins were inserted for   further visualization and cartilaginous endplates, posterior longitudinal   ligament was also removed.  Bilateral foraminotomies were performed.  Using   trial interbody devices, then we found that a 17 mm wide x 14 mm deep x 9 mm   Divergence Medtronic trial fit well.  Next, we addressed the iliac crest.  We   used the previous scar on the lower abdomen to make an incision of the iliac   crest and identified the outer table of the iliac crest.  Using an osteotome, we   removed the cortical covering and then harvested some cancellous bone from in   between the inner and outer tables and the defect where the bone had been   harvested.  We then placed some Gelfoam soaked in Marcaine and then brought out   through a separate stab incision the drain and closed the wound in layers with   staples on the skin.  The Medtronic Divergence Stand Alone interbody cage was   then brought onto the field and filled with morcellized cancellous bone under   direct visualization, impacted the interbody device containing the autogenous   bone graft into the C5-C6 disk space making sure it was in good position.  We   then removed the [place] distraction pins.  Two Sofamor Danek self-drilling 3.5   mm x 15 mm screws were then inserted through the Stand Alone implant to fixate   it to the bone and we had good bony purchase.  Once we confirmed the position of   the construct, we engaged the locking mechanism.  A drain was brought through a   separate stab incision.  The cervical incision was closed in layers using   Dermabond on the skin followed by sterile dressings.  Soft collar was placed.    The patient was awakened, extubated and brought to the Recovery Room in stable   condition.  Throughout the case, neuromonitoring was necessary and performed and   showed no abnormalities.      YARIEL/IN  dd: 10/03/2017 14:48:41 (CDT)  td: 10/03/2017 18:50:31 (CDT)  Doc ID   #[id number]  Job ID  #[id number]    CC:

## 2017-10-03 NOTE — PLAN OF CARE
Patient is stable at this time.  VSS. Anesthesiologist at patient's bedside and is ok for patient to transfer to the floor.  Dressings clean, dry and intact to right hip and anterior neck.  Pain is a 4/10.PCA in place.  No complaints of nausea or vomiting.  Patient tolerating po intake well.

## 2017-10-04 ENCOUNTER — TELEPHONE (OUTPATIENT)
Dept: PHYSICAL MEDICINE AND REHAB | Facility: CLINIC | Age: 42
End: 2017-10-04

## 2017-10-04 LAB
ANION GAP SERPL CALC-SCNC: 11 MMOL/L
BASOPHILS # BLD AUTO: 0 K/UL
BASOPHILS NFR BLD: 0.3 %
BUN SERPL-MCNC: 6 MG/DL
CALCIUM SERPL-MCNC: 9.5 MG/DL
CHLORIDE SERPL-SCNC: 104 MMOL/L
CO2 SERPL-SCNC: 21 MMOL/L
CREAT SERPL-MCNC: 0.7 MG/DL
DIFFERENTIAL METHOD: ABNORMAL
EOSINOPHIL # BLD AUTO: 0 K/UL
EOSINOPHIL NFR BLD: 0 %
ERYTHROCYTE [DISTWIDTH] IN BLOOD BY AUTOMATED COUNT: 14.3 %
EST. GFR  (AFRICAN AMERICAN): >60 ML/MIN/1.73 M^2
EST. GFR  (NON AFRICAN AMERICAN): >60 ML/MIN/1.73 M^2
GLUCOSE SERPL-MCNC: 123 MG/DL
HCT VFR BLD AUTO: 36.1 %
HGB BLD-MCNC: 12.3 G/DL
LYMPHOCYTES # BLD AUTO: 0.6 K/UL
LYMPHOCYTES NFR BLD: 8 %
MCH RBC QN AUTO: 30.8 PG
MCHC RBC AUTO-ENTMCNC: 34 G/DL
MCV RBC AUTO: 91 FL
MONOCYTES # BLD AUTO: 0.1 K/UL
MONOCYTES NFR BLD: 0.9 %
NEUTROPHILS # BLD AUTO: 6.7 K/UL
NEUTROPHILS NFR BLD: 90.8 %
PLATELET # BLD AUTO: 232 K/UL
PMV BLD AUTO: 7.7 FL
POTASSIUM SERPL-SCNC: 4.3 MMOL/L
RBC # BLD AUTO: 3.98 M/UL
SODIUM SERPL-SCNC: 136 MMOL/L
WBC # BLD AUTO: 7.4 K/UL

## 2017-10-04 PROCEDURE — 94799 UNLISTED PULMONARY SVC/PX: CPT

## 2017-10-04 PROCEDURE — G8988 SELF CARE GOAL STATUS: HCPCS | Mod: CM

## 2017-10-04 PROCEDURE — 63600175 PHARM REV CODE 636 W HCPCS: Performed by: PHYSICIAN ASSISTANT

## 2017-10-04 PROCEDURE — 97162 PT EVAL MOD COMPLEX 30 MIN: CPT

## 2017-10-04 PROCEDURE — 99900035 HC TECH TIME PER 15 MIN (STAT)

## 2017-10-04 PROCEDURE — 94761 N-INVAS EAR/PLS OXIMETRY MLT: CPT

## 2017-10-04 PROCEDURE — 85025 COMPLETE CBC W/AUTO DIFF WBC: CPT

## 2017-10-04 PROCEDURE — 97166 OT EVAL MOD COMPLEX 45 MIN: CPT

## 2017-10-04 PROCEDURE — 97530 THERAPEUTIC ACTIVITIES: CPT

## 2017-10-04 PROCEDURE — 97535 SELF CARE MNGMENT TRAINING: CPT

## 2017-10-04 PROCEDURE — 12000002 HC ACUTE/MED SURGE SEMI-PRIVATE ROOM

## 2017-10-04 PROCEDURE — 25000003 PHARM REV CODE 250: Performed by: PHYSICIAN ASSISTANT

## 2017-10-04 PROCEDURE — 97110 THERAPEUTIC EXERCISES: CPT

## 2017-10-04 PROCEDURE — 36415 COLL VENOUS BLD VENIPUNCTURE: CPT

## 2017-10-04 PROCEDURE — G8987 SELF CARE CURRENT STATUS: HCPCS | Mod: CM

## 2017-10-04 PROCEDURE — 94770 HC EXHALED C02 TEST: CPT

## 2017-10-04 PROCEDURE — 27000221 HC OXYGEN, UP TO 24 HOURS

## 2017-10-04 PROCEDURE — 99232 SBSQ HOSP IP/OBS MODERATE 35: CPT | Mod: ,,, | Performed by: INTERNAL MEDICINE

## 2017-10-04 PROCEDURE — 80048 BASIC METABOLIC PNL TOTAL CA: CPT

## 2017-10-04 RX ORDER — ONDANSETRON 4 MG/1
8 TABLET, ORALLY DISINTEGRATING ORAL EVERY 6 HOURS PRN
Status: DISCONTINUED | OUTPATIENT
Start: 2017-10-04 | End: 2017-10-05 | Stop reason: HOSPADM

## 2017-10-04 RX ORDER — OXYCODONE HYDROCHLORIDE 5 MG/1
15 TABLET ORAL EVERY 4 HOURS PRN
Status: DISCONTINUED | OUTPATIENT
Start: 2017-10-04 | End: 2017-10-05 | Stop reason: HOSPADM

## 2017-10-04 RX ORDER — RAMELTEON 8 MG/1
8 TABLET ORAL NIGHTLY PRN
Status: DISCONTINUED | OUTPATIENT
Start: 2017-10-04 | End: 2017-10-05 | Stop reason: HOSPADM

## 2017-10-04 RX ORDER — MAG HYDROX/ALUMINUM HYD/SIMETH 200-200-20
30 SUSPENSION, ORAL (FINAL DOSE FORM) ORAL EVERY 4 HOURS PRN
Status: DISCONTINUED | OUTPATIENT
Start: 2017-10-04 | End: 2017-10-05 | Stop reason: HOSPADM

## 2017-10-04 RX ORDER — HYDROMORPHONE HYDROCHLORIDE 2 MG/ML
2 INJECTION, SOLUTION INTRAMUSCULAR; INTRAVENOUS; SUBCUTANEOUS
Status: DISCONTINUED | OUTPATIENT
Start: 2017-10-04 | End: 2017-10-05 | Stop reason: HOSPADM

## 2017-10-04 RX ORDER — HYDROMORPHONE HYDROCHLORIDE 2 MG/ML
1 INJECTION, SOLUTION INTRAMUSCULAR; INTRAVENOUS; SUBCUTANEOUS
Status: DISCONTINUED | OUTPATIENT
Start: 2017-10-04 | End: 2017-10-05 | Stop reason: HOSPADM

## 2017-10-04 RX ORDER — OXYCODONE HYDROCHLORIDE 5 MG/1
5 TABLET ORAL EVERY 4 HOURS PRN
Status: DISCONTINUED | OUTPATIENT
Start: 2017-10-04 | End: 2017-10-05 | Stop reason: HOSPADM

## 2017-10-04 RX ORDER — BISACODYL 10 MG
10 SUPPOSITORY, RECTAL RECTAL DAILY
Status: DISCONTINUED | OUTPATIENT
Start: 2017-10-04 | End: 2017-10-05 | Stop reason: HOSPADM

## 2017-10-04 RX ORDER — DOCUSATE SODIUM 100 MG/1
100 CAPSULE, LIQUID FILLED ORAL DAILY
Status: DISCONTINUED | OUTPATIENT
Start: 2017-10-04 | End: 2017-10-05 | Stop reason: HOSPADM

## 2017-10-04 RX ORDER — OXYCODONE HYDROCHLORIDE 5 MG/1
10 TABLET ORAL EVERY 4 HOURS PRN
Status: DISCONTINUED | OUTPATIENT
Start: 2017-10-04 | End: 2017-10-05 | Stop reason: HOSPADM

## 2017-10-04 RX ORDER — ACETAMINOPHEN 325 MG/1
650 TABLET ORAL EVERY 6 HOURS PRN
Status: DISCONTINUED | OUTPATIENT
Start: 2017-10-04 | End: 2017-10-05 | Stop reason: HOSPADM

## 2017-10-04 RX ORDER — AMOXICILLIN 250 MG
2 CAPSULE ORAL NIGHTLY PRN
Status: DISCONTINUED | OUTPATIENT
Start: 2017-10-04 | End: 2017-10-05 | Stop reason: HOSPADM

## 2017-10-04 RX ADMIN — DOCUSATE SODIUM 100 MG: 100 CAPSULE, LIQUID FILLED ORAL at 10:10

## 2017-10-04 RX ADMIN — DEXAMETHASONE SODIUM PHOSPHATE 8 MG: 10 INJECTION, SOLUTION INTRAMUSCULAR; INTRAVENOUS at 04:10

## 2017-10-04 RX ADMIN — DEXAMETHASONE SODIUM PHOSPHATE 8 MG: 10 INJECTION, SOLUTION INTRAMUSCULAR; INTRAVENOUS at 06:10

## 2017-10-04 RX ADMIN — OXYCODONE HYDROCHLORIDE 5 MG: 5 TABLET ORAL at 04:10

## 2017-10-04 RX ADMIN — Medication 1 TABLET: at 10:10

## 2017-10-04 RX ADMIN — SODIUM CHLORIDE, SODIUM LACTATE, POTASSIUM CHLORIDE, AND CALCIUM CHLORIDE: 600; 310; 30; 20 INJECTION, SOLUTION INTRAVENOUS at 04:10

## 2017-10-04 RX ADMIN — OXYCODONE HYDROCHLORIDE 15 MG: 5 TABLET ORAL at 11:10

## 2017-10-04 NOTE — PT/OT/SLP EVAL
Occupational Therapy  Evaluation    Zaira Jensen   MRN: [id number]   Admitting Diagnosis: Cervical spinal stenosis    OT Date of Treatment: 10/04/17   OT Start Time: 1126  OT Stop Time: 1200  OT Start Time: 1214  OT Stop Time: 1232  OT Total Time (min): 52 min    Billable Minutes:  Evaluation 14  Self Care/Home Management 24  Therapeutic Exercise 14    Diagnosis: Cervical spinal stenosis   S/p ACDF C5-6 on 10/3/17    Past Medical History:   Diagnosis Date    Allergy     Anemia     Arthritis     Chronic back pain     Chronic neck pain     Functional ovarian cysts     GERD (gastroesophageal reflux disease)     Headache(784.0)     Radiculopathy of arm     Rash     Respiratory distress     bronchospasm at emergence years ago      Past Surgical History:   Procedure Laterality Date    CERVICAL FUSION      CHOLECYSTECTOMY      COSMETIC SURGERY      EYE SURGERY      FRACTURE SURGERY      OVARIAN CYST REMOVAL      SPINE SURGERY      TOTAL BODY LIFT         Referring physician:   Date referred to OT: 10/4/17    General Precautions: Standard, fall  Orthopedic Precautions:   cervical spinal  Braces: Cervical collar (for comfort)        Patient History:  Living Environment  Lives With: parent(s)  Living Arrangements: house  Home Accessibility: stairs to enter home, stairs within home  Home Layout: Bathroom on 2nd floor  Number of Stairs to Enter Home: 1  Number of Stairs Within Home:  (1 flight)  Transportation Available: family or friend will provide  Living Environment Comment: Pt lives with her parents in a 2 story home with only a half bath on the first floor. She stated that her parents are elderly and she is needing more care than they can provide.  Equipment Currently Used at Home: bedside commode, wheelchair, shower chair, walker, rolling, rollator (Pt stated that the wheelchair is her father's and the bedside commode is her mother's and that it is in poor condition.)    Prior level of function:  "  Bed Mobility/Transfers: needs assist  Grooming: needs assist  Bathing: needs assist  Upper Body Dressing: needs assist  Lower Body Dressing: needs assist  Toileting: needs assist  Home Management Skills: unable to perform  Homemaking Responsibilities: No  Type of Occupation:   IADL Comments: PTA, pt was needing extensive assist with ADL's due to arthropathy in multiple joints with a functional decline over the past 7-8 months. She has been unable to work and has been relying on family for assistance.     Dominant hand: right    Subjective:  Communicated with nurse Zehra & TOR Sofia prior to session.  Stated patient was cleared for OT today and was in the chair.    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    Pain/Comfort  Pain Rating 1:  (did not rate)  Location - Side 1: Bilateral  Location - Orientation 1: generalized  Location 1: neck (multiple sites, but primarily the neck)  Pain Addressed 1: Distraction    Objective:  Patient found with: garsia catheter    Cognitive Exam:  Oriented to: Person, Place, Time and Situation  Follows Commands/attention: Follows multistep  commands  Communication: clear/fluent  Memory:  No Deficits noted  Safety awareness/insight to disability: intact  Coping skills/emotional control: Appropriate to situation and Tearful    Visual/perceptual:  Intact    Physical Exam:  Postural examination/scapula alignment: seated in bedside chair with cervical collar in place & R UE resting on a pillow.  Skin integrity: Visible skin intact  Edema: None noted     Sensation:   Pt reported intermittent sensations of tingling and feeling like her arm is asleep and that it was often due to positioning. At evaluation, light touch was impaired in R hand.    Upper Extremity Range of Motion:  Right Upper Extremity: Shoulder flexion limited to 75* AROM; rest of arm WFL with hyperextension noted at " DIP joints of 2nd-4th fingers   Left Upper Extremity: Shoulder flexion limited to 90* AROM; rest of arm WFL    Upper Extremity Strength:  Right Upper Extremity: shoulder 2+/5; rest of arm 3/5  Left Upper Extremity: shoulder 2+/5; rest of arm 3/5   Strength:B hands full grasp but weak    Fine motor coordination:   Bilateral tremors noted with slow, deliberate movement and increased effort needed for pinch, grasp and manipulation grasp of small objects.     Gross motor coordination: Impaired    Functional Mobility:  Bed Mobility: Refer to PT Notes    Activities of Daily Living:  Feeding Level of Assistance: Set-up Assistance, Stand by assistance, Assistive Devices as needed. Pt was able to scoop with R hand one bite of applesauce from a cup held in her L hand and bring it to her mouth with extra time and effort. Items were placed in pt's hands and R upper arm positioned in 60* scaption on pillow.  Feeding adaptive equipment: build up handle  UE Dressing Level of Assistance: Total assistance    LE Dressing Level of Assistance: Total assistance    Grooming Position: bedside chair  Grooming Level of Assistance: Maximum assistance (to wipe face' unable to reach her upper face)  Toileting Where Assessed: Bed level  Toileting Level of Assistance: Total assistance    Therapeutic Activities and Exercises:  OT ed pt on OT role & POC as well as discharge recommendations.  OT educated pt at length on various types of adaptive equipment to increase ADL independence.   OT issued foam tubing to pt and placed on handle of feeding utensil to improve  and control for self feeding task. OT ed pt as well on tubing use on toothbrush for increased independence and control with oral hygiene  OT also ed pt on accessing the accessibility features on her iPhone to increase her ability to use her phone independently.  OT ed pt on fine motor coordination therapeutic exercises to perform with to increase coordination & motor control of  "both arms and hands, including tip to tip opposition, ad/abduction, intrinsic plus/minus.  Pt became tearful twice during session and needed assistance to wipe her eyes. Pt was given encouragement and had stopped crying when OT left room. Nurse aware.    AM-PAC 6 CLICK ADL  How much help from another person does this patient currently need?  1 = Unable, Total/Dependent Assistance  2 = A lot, Maximum/Moderate Assistance  3 = A little, Minimum/Contact Guard/Supervision  4 = None, Modified [place]/Independent    Putting on and taking off regular lower body clothing? : 1  Bathing (including washing, rinsing, drying)?: 1  Toileting, which includes using toilet, bedpan, or urinal? : 1  Putting on and taking off regular upper body clothing?: 1  Taking care of personal grooming such as brushing teeth?: 2  Eating meals?: 2  Total Score: 8    AM-PAC Raw Score CMS "G-Code Modifier Level of Impairment Assistance   6 % Total / Unable   7 - 9 CM 80 - 100% Maximal Assist   10-14 CL 60 - 80% Moderate Assist   15 - 19 CK 40 - 60% Moderate Assist   20 - 22 CJ 20 - 40% Minimal Assist   23 CI 1-20% SBA / CGA   24 CH 0% Independent/ Mod I       Patient left left sidelying with all lines intact, call button in reach and Zehra, nurse notified    Assessment:  Zaira Jensen is a 42 y.o. female with a medical diagnosis of Cervical spinal stenosis, s/p ACDF and presents with severe decline in functional status due to the below listed impairments, impacting ADLs and functional mobility. However, pt is very motivated to return to her job as a .  Recommend OT treatment to maximize B UE strength & coordination, endurance, safety & independence with ADL's & functional mobility.  Pt is an excellent candidate for inpatient rehab due to her young age & high prior level of functioning in order to facilitate return to prior level of function before returning home.    Rehab identified problem list/impairments: " Rehab identified problem list/impairments: weakness, impaired self care skills, impaired joint extensibility, decreased ROM, impaired coordination, decreased upper extremity function, impaired sensation, impaired functional mobilty, pain, orthopedic precautions, impaired fine motor, abnormal tone, decreased lower extremity function    Rehab potential is excellent.    Activity tolerance: Fair    Discharge recommendations: Discharge Facility/Level Of Care Needs: rehabilitation facility     Barriers to discharge: Barriers to Discharge: Inaccessible home environment, Decreased caregiver support    Equipment recommendations:  (TBD)     GOALS:    Occupational Therapy Goals        Problem: Occupational Therapy Goal    Goal Priority Disciplines Outcome Interventions   Occupational Therapy Goal     OT, PT/OT Ongoing (interventions implemented as appropriate)    Description:  Goals to be met by: 10/18/17     Patient will increase functional independence with ADLs by performing:    Upper extremity AROM and fine motor exercise program x10 reps per handout, with supervision.  Pt to scoop three bites of food & bring to mouth with <5% spillage with set-up & Supervision using adaptive equipment as needed.                    PLAN:  Patient to be seen 3 x/week, 5 x/week to address the above listed problems via self-care/home management, therapeutic activities, therapeutic exercises, neuromuscular re-education  Plan of Care expires: 10/18/17  Plan of Care reviewed with: patient    OT G-codes  Functional Assessment Tool Used: [facility]  Score: 8  Functional Limitation: Self care  Self Care Current Status (): ARON  Self Care Goal Status (): BREANA Patterson  10/04/2017

## 2017-10-04 NOTE — UM SECONDARY REVIEW
Physician Advisor External    Level of Care Issue     Referral to EHR for inpt review for 10/3/2017.

## 2017-10-04 NOTE — PT/OT/SLP EVAL
Physical Therapy  Evaluation/Treatment    Zaira Jensen   MRN: [id number]   Admitting Diagnosis: Cervical spinal stenosis    PT Received On: 10/04/17  PT Start Time: 1021 1st session; 1200 2nd session  PT Stop Time: 1113 1st session; 1214 2nd session  PT Total Time (min): 66 min       Billable Minutes:  Evaluation 25 and Therapeutic Activity 41    Diagnosis: Cervical spinal stenosis s/p C5-6 ACDF    Past Medical History:   Diagnosis Date    Allergy     Anemia     Arthritis     Chronic back pain     Chronic neck pain     Functional ovarian cysts     GERD (gastroesophageal reflux disease)     Headache(784.0)     Radiculopathy of arm     Rash     Respiratory distress     bronchospasm at emergence years ago      Past Surgical History:   Procedure Laterality Date    CERVICAL FUSION      CHOLECYSTECTOMY      COSMETIC SURGERY      EYE SURGERY      FRACTURE SURGERY      OVARIAN CYST REMOVAL      SPINE SURGERY      TOTAL BODY LIFT         Referring physician:   KEO Wiggins   Date referred to PT: 10/3/2017    General Precautions: Standard, fall  Orthopedic Precautions:  (spinal (cervical))   Braces: Cervical collar       Do you have any cultural, spiritual, Anglican conflicts, given your current situation?: None    Patient History:  Lives With: parent(s)  Living Arrangements: house  Home Accessibility: stairs within home, stairs to enter home  Home Layout: Bathroom on 2nd floor  Number of Stairs to Enter Home: 1  Number of Stairs Within Home:  (1 flight)  Transportation Available: family or friend will provide  Living Environment Comment: Pt lives with her parents in a 2SH with 1 SHA. She required assist for all mobility but was able to walk short distances. She reports significant declien in function over the past 7-8 months.   Equipment Currently Used at Home: bedside commode, wheelchair, walker, rolling, rollator, shower chair    Previous Level of Function:  Ambulation Skills: needs device  "and assist  Transfer Skills: needs device and assist  ADL Skills: needs device and assist  Work/Leisure Activity: needs device and assist    Subjective:  Communicated with RN prior to session.  Pt reported "My 6 month goal is to walk."  Chief Complaint: Weakness  Patient goals: To improve mobility    Pain/Comfort  Pain Rating 1:  (pt did not rate)  Location - Orientation 1: generalized  Location 1: neck  Pain Addressed 1: Distraction, Reposition  Pain Rating Post-Intervention 1: 0/10    Objective:   Patient found with: garsia catheter, peripheral IV     Cognitive Exam:  Oriented to: Person, Place, Time and Situation    Follows Commands/attention: Follows multistep  commands  Communication: clear/fluent  Safety awareness/insight to disability: intact    Physical Exam:  Postural examination/scapula alignment: Rounded shoulder, Head forward and Posterior pelvic tilt    Skin integrity: Visible skin intact  Edema: Mild in BLE    Sensation:   Impaired  light/touch to R hand and R foot      Strength   Right Left   Knee extension: 2-/5 3-/5   Knee flexion: 2-/5 2-/5   Hip flexion: 2-/5 2-/5   Hip abduction: 2-/5 2-/5   Hip adduction: 2-/5 2-/5   Ankle dorsiflexion:   trace   2-/5   Ankle plantarflexion: 2-/5 2-/5   Shoulder flexion: 3-/5 3-/5   Elbow flexion: 3+/5 3+/5   Elbow extension: 3+/5 3+/5   Wrist flexion: 2+/5 3+/5   Wrist extension: 2+/5 3+/5   : 3+/5 3+/5       Lower Extremity Range of Motion:  Right Lower Extremity: WFL except ankle DF minimally limited  Left Lower Extremity: WFL except ankle DF minimally limited     Fine motor coordination:  Impaired  Left hand thumb/finger opposition skills , and Right hand thumb/finger opposition skills .    Gross motor coordination: Imapired 2/2 weakness    Functional Mobility:  Bed Mobility:  Rolling/Turning to Left: Maximum assistance  Scooting/Bridging: Maximum Assistance  Supine to Sit: Maximum Assistance, With assist of 2 (pt assisted using abdominals to sit up)  Sit " to Supine: Maximum Assistance, With assist of  2 (pt asisted wtih scooting back in bed)    Transfers:  Bed <> Chair Technique: Squat Pivot  Bed <> Chair Assistance: Maximum Assistance (x 2l pt able to accept weight through BLE)  Bed <> Chair Assistive Device: No Assistive Device    Gait:   Gait Distance: NT due to poor sitting and standing balance    Balance:   Static Sit: POOR: Needs MODERATE assist to maintain  Dynamic Sit: POOR: N/A  Static Stand: 0: Needs MAXIMAL assist to maintain   Dynamic stand: 0: N/A    Therapeutic Activities and Exercises:  Pt sat EOB x 8 minutes with minimal to moderate assist due to posterior trunk lean. Pt encouraged to use BUE on EOB to help scoot to EOB.   Pt assisted to transfer to bedside chair via squat pivot transfer. She sat up x 1 hour then was assisted back to bed.     AM-PAC 6 CLICK MOBILITY  How much help from another person does this patient currently need?   1 = Unable, Total/Dependent Assistance  2 = A lot, Maximum/Moderate Assistance  3 = A little, Minimum/Contact Guard/Supervision  4 = None, Modified [place]/Independent    Turning over in bed (including adjusting bedclothes, sheets and blankets)?: 2  Sitting down on and standing up from a chair with arms (e.g., wheelchair, bedside commode, etc.): 2  Moving from lying on back to sitting on the side of the bed?: 2  Moving to and from a bed to a chair (including a wheelchair)?: 2  Need to walk in hospital room?: 1  Climbing 3-5 steps with a railing?: 1  Total Score: 10     AM-PAC Raw Score CMS G-Code Modifier Level of Impairment Assistance   6 % Total / Unable   7 - 9 CM 80 - 100% Maximal Assist   10 - 14 CL 60 - 80% Moderate Assist   15 - 19 CK 40 - 60% Moderate Assist   20 - 22 CJ 20 - 40% Minimal Assist   23 CI 1-20% SBA / CGA   24 CH 0% Independent/ Mod I     Patient left left sidelying with all lines intact, call button in reach and RN notified.    Assessment:   Zaira Jensen is a 42 y.o. female with a  medical diagnosis of Cervical spinal stenosis s/p ACDF and presents with generalized weakness, impaired coordination and limited safety and independence with all functional mobility. She is very motivated to progress with therapy and would benefit from IP rehab to improve independence with mobility.    Rehab identified problem list/impairments: Rehab identified problem list/impairments: weakness, impaired endurance, impaired functional mobilty, gait instability, impaired sensation, impaired self care skills, impaired balance, decreased lower extremity function, decreased upper extremity function, impaired coordination, pain, abnormal tone, impaired fine motor, orthopedic precautions, edema    Rehab potential is good.    Activity tolerance: Fair    Discharge recommendations: Discharge Facility/Level Of Care Needs: rehabilitation facility     Barriers to discharge: Barriers to Discharge: Decreased caregiver support, Inaccessible home environment    Equipment recommendations: Equipment Needed After Discharge:  (TBD pending progress)     GOALS:    Physical Therapy Goals        Problem: Physical Therapy Goal    Goal Priority Disciplines Outcome Goal Variances Interventions   Physical Therapy Goal     PT/OT, PT Ongoing (interventions implemented as appropriate)     Description:  Goals to be met by: 10/11/2017     Patient will increase functional independence with mobility by performin. Supine to sit with MInimal Assistance  2. Sit to supine with MInimal Assistance  3. Rolling to Left and Right with Minimal Assistance.  4. Sit to stand transfer with Moderate Assistance  5. Bed to chair transfer with Moderate Assistance using AD as needed  6. Sitting at edge of bed x 5 minutes with Minimal Assistance  7. Stand for 1 minute with Moderate Assistance using Rolling Walker  8. Lower extremity exercise program x10 reps, with assistance as needed                      PLAN:    Patient to be seen BID to address the above  listed problems via gait training, therapeutic activities, therapeutic exercises, neuromuscular re-education, wheelchair management/training  Plan of Care expires: 11/02/17  Plan of Care reviewed with: patient, family    Jessica LeJeune, PT, DPT

## 2017-10-04 NOTE — PROGRESS NOTES
Progress Note  Hospital Medicine  Patient Name:Zaira Jensen  MRN:  [id number]  Patient Class: IP- Inpatient  Admit Date: 10/3/2017  Length of Stay: 1 days  Expected Discharge Date:   Attending Physician: Ann Bowen MD  Primary Care Provider:  Cuate Alvarez MD    SUBJECTIVE:     Principal Problem: Cervical spinal stenosis  Initial history of present illness: Patient is a 42 y.o. female admitted to Hospitalist Service from [facility] s/p C5-6 ACDF performed by Dr. Neal. Patient reportedly has past medical history significant for chronic neck pain, chronic back pain, GERD and history of anemia. Post-operatively, patient denied chest pain, shortness of breath, abdominal pain, nausea, vomiting, headache, vision changes, focal neuro-deficits, cough or fever.    PMH/PSH/SH/FH/Meds: reviewed.    Symptoms/Review of Systems: Slow to get out of bed. She thinks she needs assistance. No shortness of breath, cough, chest pain or headache, fever or abdominal pain.     Diet:  Adequate intake.    Activity level: Up with assistance  Pain:  Patient reports no pain.       OBJECTIVE:   Vital Signs (Most Recent):      Temp: 99.3 °F (37.4 °C) (10/04/17 0908)  Pulse: 92 (10/04/17 0908)  Resp: 20 (10/04/17 0908)  BP: (!) 153/74 (10/04/17 0908)  SpO2: 98 % (10/04/17 0908)       Vital Signs Range (Last 24H):  Temp:  [97.3 °F (36.3 °C)-99.3 °F (37.4 °C)]   Pulse:  []   Resp:  [7-20]   BP: (106-187)/()   SpO2:  [94 %-100 %]     Weight: 84.4 kg (186 lb)  Body mass index is 30.95 kg/m².    Intake/Output Summary (Last 24 hours) at 10/04/17 0917  Last data filed at 10/04/17 0640   Gross per 24 hour   Intake          3501.67 ml   Output             2835 ml   Net           666.67 ml     Physical Examination:  General appearance: well developed, appears stated age  Head: normocephalic, atraumatic  Eyes:  conjunctivae/corneas clear. PERRL.  Nose: Nares normal. Septum midline.  Throat: lips, mucosa, and tongue normal; teeth and gums  normal, no throat erythema.  Neck: supple, symmetrical, trachea midline, no JVD and thyroid not enlarged, symmetric, no tenderness/mass/nodules. Neck dressing C/D/I with a MAXWELL drain in place.  Lungs:  clear to auscultation bilaterally and normal respiratory effort  Chest wall: no tenderness  Heart: regular rate and rhythm, S1, S2 normal, no murmur, click, rub or gallop  Abdomen: soft, non-tender non-distented; bowel sounds normal; no masses,  no organomegaly  Extremities: no cyanosis, clubbing or edema.   Pulses: 2+ and symmetric  Skin: Skin color, texture, turgor normal. Right Iliac crest dressing C/D/I with a MAXWELL drain in place.  Lymph nodes: Cervical, supraclavicular, and axillary nodes normal.  Neurologic: Normal strength and tone. No focal numbness or weakness. CNII-XII intact.      CBC:    Recent Labs  Lab 10/03/17  1453 10/04/17  0543   WBC 3.60* 7.40   RBC 3.58* 3.98*   HGB 11.0* 12.3   HCT 32.6* 36.1*    232   MCV 91 91   MCH 30.8 30.8   MCHC 33.8 34.0   BMP    Recent Labs  Lab 10/03/17  1453 10/04/17  0543    123*    136   K 3.6 4.3    104   CO2 25 21*   BUN 6 6   CREATININE 0.7 0.7   CALCIUM 8.6* 9.5      Diagnostic Results:  Microbiology Results (last 7 days)     ** No results found for the last 168 hours. **         Assessment/Plan:     Active Hospital Problems    Diagnosis  POA    *Cervical spinal stenosis s/p C5-6 ACDF [M48.02]  Yes    GERD (gastroesophageal reflux disease) [K21.9]  Yes    Chronic back pain [M54.9, G89.29]  Yes    Chronic neck pain [M54.2, G89.29]  Yes      Resolved Hospital Problems    Diagnosis Date Resolved POA   No resolved problems to display.   Continue to follow Orthopedic recommendations.  Needs aggressive incentive spirometry.  Follow hemoglobin and hematocrit closely.  Pain control with PO narcotics and antiemetics as needed.  Physical therapy as per Orthopedics protocol with fall precautions.  Dr. Neal requring rehab request as patient  demanding.    Await Dr. Rowley evaluation.  VTE Risk Mitigation         Ordered     Low Risk of VTE  Once      10/04/17 0610        Ann Bowen MD  Department of Hospital Medicine   [facility]

## 2017-10-04 NOTE — ASSESSMENT & PLAN NOTE
Plan to attempt ambulation with p.t.  Patient request rehab consult for placement. Will contact case mgt.

## 2017-10-04 NOTE — SUBJECTIVE & OBJECTIVE
"Principal Problem:Cervical spinal stenosis    Principal Orthopedic Problem: s/p ACDF,history of myelomalacia of cervical cord,history of fibromyalgia       Review of patient's allergies indicates:   Allergen Reactions    Bactrim [sulfamethoxazole-trimethoprim]     Codeine Other (See Comments)     cramping    Hydrocodone     Nexium [esomeprazole magnesium]     Nickel sutures [surgical stainless steel]     Pcn [penicillins]     Sulfa (sulfonamide antibiotics)     Vicks vapor inhaler [l-desoxyephedrine]        Current Facility-Administered Medications   Medication    acetaminophen tablet 650 mg    aluminum-magnesium hydroxide-simethicone 200-200-20 mg/5 mL suspension 30 mL    bisacodyl suppository 10 mg    dexamethasone (DECADRON) 8 mg IV    docusate sodium capsule 100 mg    folic acid-vit B6-vit B12 2.5-25-2 mg tablet 1 tablet    hydromorphone (PF) injection 1 mg    hydromorphone (PF) injection 2 mg    HYDROmorphone PCA in 0.9 % NaCl 6 Mg/30 mL (0.2 mg/mL)    lactated ringers infusion    naloxone 0.4 mg/mL injection 0.02 mg    ondansetron disintegrating tablet 8 mg    oxycodone immediate release tablet 10 mg    oxycodone immediate release tablet 15 mg    oxycodone immediate release tablet 5 mg    promethazine (PHENERGAN) 6.25 mg in dextrose 5 % 50 mL IVPB    ramelteon tablet 8 mg    senna-docusate 8.6-50 mg per tablet 2 tablet     Objective:     Vital Signs (Most Recent):  Temp: 98 °F (36.7 °C) (10/03/17 1900)  Pulse: 92 (10/04/17 0640)  Resp: 16 (10/04/17 0640)  BP: 133/81 (10/04/17 0640)  SpO2: 100 % (10/04/17 0640) Vital Signs (24h Range):  Temp:  [97.3 °F (36.3 °C)-98 °F (36.7 °C)] 98 °F (36.7 °C)  Pulse:  [] 92  Resp:  [7-16] 16  SpO2:  [94 %-100 %] 100 %  BP: (106-187)/() 133/81     Weight: 84.4 kg (186 lb)  Height: 5' 5" (165.1 cm)  Body mass index is 30.95 kg/m².      Intake/Output Summary (Last 24 hours) at 10/04/17 0703  Last data filed at 10/03/17 1713   Gross per 24 " hour   Intake             1795 ml   Output              885 ml   Net              910 ml       General    Vitals reviewed.  Constitutional: She is oriented to person, place, and time.   Neurological: She is alert and oriented to person, place, and time.   Psychiatric: She has a normal mood and affect.         Back (L-Spine & T-Spine) / Neck (C-Spine) Exam     Comments:  Dressings changed and both drains removed.both incisions look fine.decreased  both upper ext as noted prior to surgery        Significant Labs: All pertinent labs within the past 24 hours have been reviewed.    Significant Imaging: None

## 2017-10-04 NOTE — NURSING
14:58 - message sent to pharmacy regarding missing dose of Decadron. Pharmacist noted will send.    16:00 - Pharmacy called regarding missing dose of Decadron. Will send now.     16:15 - Medication brought to floor

## 2017-10-04 NOTE — PLAN OF CARE
Problem: Occupational Therapy Goal  Goal: Occupational Therapy Goal  Goals to be met by: 10/18/17     Patient will increase functional independence with ADLs by performing:    Upper extremity AROM and fine motor exercise program x10 reps per handout, with supervision.  Pt to scoop three bites of food & bring to mouth with <5% spillage with set-up & Supervision using adaptive equipment as needed.  Outcome: Ongoing (interventions implemented as appropriate)  OT evaluation completed today. Goals & care plan established.    BREANA Gamboa  10/4/2017

## 2017-10-04 NOTE — HPI
POD#2- s/p ACDF.  Minimal post-op pain.  Patient remains concerned about abitlity to ambulate based on pre-existing multiple musculoskeletal problems ans fibromyalgia.  Pt is awaiting formal rehab eval by PM&R

## 2017-10-04 NOTE — PLAN OF CARE
"Met with pt to complete her assessment.  Pt's mother was in the room.  Educated pt on the blue discharge folder and left the folder in the room.  Pt, who is living with her parents, needs assistance with bathing and dressing.  Pt stated that she ambulates by "shuffiling" and will occasionally use the rolling walker.  Pt has a rolling walker, rollator, BSC and lift at home.  Pt stated that the lift and BSC were old and is requesting new ones.  Dr. Neal reportedly provided pt with a new prescription for a lift chair however pt has not had time to get it filled.  Informed pt that upon her discharge from therapy the facility could order her a BSC as pt's BSC was not paid for through insurance.  Pt verified her PCP as Dr. Alvarez,  Primary insurance as Medicare and secondary insurance as BC/BS.  Updated pt that plan is for her to be evaluated for inpatient rehab.  Pt is in agreement for SNF placement if denied at Rehab.  Provided pt with a list of SNF facilities in the [facility].  Obtained signature for the disclosure form.  Awaiting status of Rehab eval.       10/04/17 1500   Discharge Assessment   Assessment Type Discharge Planning Assessment   Confirmed/corrected address and phone number on facesheet? Yes   Assessment information obtained from? Patient   Prior to hospitilization cognitive status: Alert/Oriented   Prior to hospitalization functional status: Needs Assistance;Assistive Equipment   Current cognitive status: Alert/Oriented   Current Functional Status: Needs Assistance;Assistive Equipment   Lives With parent(s)   Able to Return to Prior Arrangements yes   Is patient able to care for self after discharge? No   Who are your caregiver(s) and their phone number(s)? (mother Holly Jensen, [phone])   Patient's perception of discharge disposition rehab facility   Readmission Within The Last 30 Days no previous admission in last 30 days   Patient currently being followed by outpatient case management? No "   Patient currently receives any other outside agency services? No   Equipment Currently Used at Home bedside commode;walker, rolling;wheelchair;rollator;lift device   Do you have any problems affording any of your prescribed medications? No  (pharmacy is [facility] on [address])   Is the patient taking medications as prescribed? (pt denies being on any prescribed meds at this time)   Does the patient have transportation home? Yes   Transportation Available family or friend will provide   Does the patient receive services at the [facility]? No   Discharge Plan A Rehab   Patient/Family In Agreement With Plan yes

## 2017-10-04 NOTE — TELEPHONE ENCOUNTER
LM for pt that I tried calling. She can try again and I will call her back tomorrow after clinic if she's unable to reach me today.

## 2017-10-04 NOTE — PLAN OF CARE
Problem: Physical Therapy Goal  Goal: Physical Therapy Goal  Goals to be met by: 10/11/2017     Patient will increase functional independence with mobility by performin. Supine to sit with MInimal Assistance  2. Sit to supine with MInimal Assistance  3. Rolling to Left and Right with Minimal Assistance.  4. Sit to stand transfer with Moderate Assistance  5. Bed to chair transfer with Moderate Assistance using AD as needed  6. Sitting at edge of bed x 5 minutes with Minimal Assistance  7. Stand for 1 minute with Moderate Assistance using Rolling Walker  8. Lower extremity exercise program x10 reps, with assistance as needed    Outcome: Ongoing (interventions implemented as appropriate)  PT evaluation complete. Recommend IP rehab at discharge.

## 2017-10-04 NOTE — PLAN OF CARE
Problem: Patient Care Overview  Goal: Plan of Care Review  Outcome: Ongoing (interventions implemented as appropriate)  Patient aao x 4. Denies pain at this time. Plan of care reviewed with patient and family. Verbalized understanding. SCD's initiated. PCA pump in  use. Fuentes intact. Bed in lowest position, call light within reach and family to remain at bedside.

## 2017-10-04 NOTE — PROGRESS NOTES
[facility]  Orthopedics  Progress Note    Patient Name: Zaira Jensen  MRN: [id number]  Admission Date: 10/3/2017  Hospital Length of Stay: 1 days  Attending Provider: Ann Bowen MD  Primary Care Provider: Cuate Alvarez MD  Follow-up For: Procedure(s) (LRB):  DISKECTOMY AND FUSION-ANTERIOR CERVICAL (ACDF) C5-6 (N/A)    Post-Operative Day: 1 Day Post-Op  Subjective:     Principal Problem:Cervical spinal stenosis    Principal Orthopedic Problem: s/p ACDF,history of myelomalacia of cervical cord,history of fibromyalgia       Review of patient's allergies indicates:   Allergen Reactions    Bactrim [sulfamethoxazole-trimethoprim]     Codeine Other (See Comments)     cramping    Hydrocodone     Nexium [esomeprazole magnesium]     Nickel sutures [surgical stainless steel]     Pcn [penicillins]     Sulfa (sulfonamide antibiotics)     Vicks vapor inhaler [l-desoxyephedrine]        Current Facility-Administered Medications   Medication    acetaminophen tablet 650 mg    aluminum-magnesium hydroxide-simethicone 200-200-20 mg/5 mL suspension 30 mL    bisacodyl suppository 10 mg    dexamethasone (DECADRON) 8 mg IV    docusate sodium capsule 100 mg    folic acid-vit B6-vit B12 2.5-25-2 mg tablet 1 tablet    hydromorphone (PF) injection 1 mg    hydromorphone (PF) injection 2 mg    HYDROmorphone PCA in 0.9 % NaCl 6 Mg/30 mL (0.2 mg/mL)    lactated ringers infusion    naloxone 0.4 mg/mL injection 0.02 mg    ondansetron disintegrating tablet 8 mg    oxycodone immediate release tablet 10 mg    oxycodone immediate release tablet 15 mg    oxycodone immediate release tablet 5 mg    promethazine (PHENERGAN) 6.25 mg in dextrose 5 % 50 mL IVPB    ramelteon tablet 8 mg    senna-docusate 8.6-50 mg per tablet 2 tablet     Objective:     Vital Signs (Most Recent):  Temp: 98 °F (36.7 °C) (10/03/17 1900)  Pulse: 92 (10/04/17 0640)  Resp: 16 (10/04/17 0640)  BP: 133/81 (10/04/17 0640)  SpO2: 100 %  "(10/04/17 0640) Vital Signs (24h Range):  Temp:  [97.3 °F (36.3 °C)-98 °F (36.7 °C)] 98 °F (36.7 °C)  Pulse:  [] 92  Resp:  [7-16] 16  SpO2:  [94 %-100 %] 100 %  BP: (106-187)/() 133/81     Weight: 84.4 kg (186 lb)  Height: 5' 5" (165.1 cm)  Body mass index is 30.95 kg/m².      Intake/Output Summary (Last 24 hours) at 10/04/17 0703  Last data filed at 10/03/17 1713   Gross per 24 hour   Intake             1795 ml   Output              885 ml   Net              910 ml       General    Vitals reviewed.  Constitutional: She is oriented to person, place, and time.   Neurological: She is alert and oriented to person, place, and time.   Psychiatric: She has a normal mood and affect.         Back (L-Spine & T-Spine) / Neck (C-Spine) Exam     Comments:  Dressings changed and both drains removed.both incisions look fine.decreased  both upper ext as noted prior to surgery        Significant Labs: All pertinent labs within the past 24 hours have been reviewed.    Significant Imaging: None    Assessment/Plan:     * Cervical spinal stenosis s/p C5-6 ACDF    Plan to attempt ambulation with p.t.  Patient request rehab consult for placement. Will contact case mgt.              Magan Neal MD  Orthopedics  [facility]  "

## 2017-10-04 NOTE — TELEPHONE ENCOUNTER
----- Message from Floyd Velez sent at 10/4/2017  8:20 AM CDT -----  Contact: self   Patient want to speak with a nurse has a question, please call back at [phone] (home)

## 2017-10-05 ENCOUNTER — HOSPITAL ENCOUNTER (INPATIENT)
Facility: HOSPITAL | Age: 42
LOS: 42 days | Discharge: HOME HEALTH | DRG: 949 | End: 2017-11-16
Attending: PHYSICAL MEDICINE & REHABILITATION | Admitting: PHYSICAL MEDICINE & REHABILITATION
Payer: MEDICARE

## 2017-10-05 VITALS
RESPIRATION RATE: 18 BRPM | BODY MASS INDEX: 30.99 KG/M2 | SYSTOLIC BLOOD PRESSURE: 147 MMHG | WEIGHT: 186 LBS | DIASTOLIC BLOOD PRESSURE: 76 MMHG | HEIGHT: 65 IN | TEMPERATURE: 98 F | HEART RATE: 92 BPM | OXYGEN SATURATION: 99 %

## 2017-10-05 DIAGNOSIS — G82.53 QUADRIPLEGIA, C5-C7 COMPLETE: ICD-10-CM

## 2017-10-05 LAB
ANION GAP SERPL CALC-SCNC: 10 MMOL/L
BASOPHILS # BLD AUTO: 0 K/UL
BASOPHILS NFR BLD: 0.3 %
BILIRUB UR QL STRIP: NEGATIVE
BUN SERPL-MCNC: 9 MG/DL
CALCIUM SERPL-MCNC: 9.1 MG/DL
CHLORIDE SERPL-SCNC: 106 MMOL/L
CLARITY UR: CLEAR
CO2 SERPL-SCNC: 25 MMOL/L
COLOR UR: YELLOW
CREAT SERPL-MCNC: 0.7 MG/DL
DIFFERENTIAL METHOD: ABNORMAL
EOSINOPHIL # BLD AUTO: 0 K/UL
EOSINOPHIL NFR BLD: 0 %
ERYTHROCYTE [DISTWIDTH] IN BLOOD BY AUTOMATED COUNT: 14 %
EST. GFR  (AFRICAN AMERICAN): >60 ML/MIN/1.73 M^2
EST. GFR  (NON AFRICAN AMERICAN): >60 ML/MIN/1.73 M^2
GLUCOSE SERPL-MCNC: 109 MG/DL
GLUCOSE UR QL STRIP: NEGATIVE
HCT VFR BLD AUTO: 32.8 %
HGB BLD-MCNC: 10.9 G/DL
HGB UR QL STRIP: NEGATIVE
KETONES UR QL STRIP: NEGATIVE
LEUKOCYTE ESTERASE UR QL STRIP: NEGATIVE
LYMPHOCYTES # BLD AUTO: 0.9 K/UL
LYMPHOCYTES NFR BLD: 8.5 %
MCH RBC QN AUTO: 30.8 PG
MCHC RBC AUTO-ENTMCNC: 33.3 G/DL
MCV RBC AUTO: 93 FL
MONOCYTES # BLD AUTO: 0.6 K/UL
MONOCYTES NFR BLD: 5.7 %
NEUTROPHILS # BLD AUTO: 8.8 K/UL
NEUTROPHILS NFR BLD: 85.5 %
NITRITE UR QL STRIP: NEGATIVE
PH UR STRIP: 7 [PH] (ref 5–8)
PLATELET # BLD AUTO: 214 K/UL
PMV BLD AUTO: 8.1 FL
POTASSIUM SERPL-SCNC: 3.9 MMOL/L
PROT UR QL STRIP: NEGATIVE
RBC # BLD AUTO: 3.54 M/UL
SODIUM SERPL-SCNC: 141 MMOL/L
SP GR UR STRIP: 1.01 (ref 1–1.03)
URN SPEC COLLECT METH UR: NORMAL
UROBILINOGEN UR STRIP-ACNC: NEGATIVE EU/DL
WBC # BLD AUTO: 10.3 K/UL

## 2017-10-05 PROCEDURE — 80048 BASIC METABOLIC PNL TOTAL CA: CPT

## 2017-10-05 PROCEDURE — 36415 COLL VENOUS BLD VENIPUNCTURE: CPT

## 2017-10-05 PROCEDURE — 81003 URINALYSIS AUTO W/O SCOPE: CPT

## 2017-10-05 PROCEDURE — 90471 IMMUNIZATION ADMIN: CPT | Performed by: INTERNAL MEDICINE

## 2017-10-05 PROCEDURE — G0008 ADMIN INFLUENZA VIRUS VAC: HCPCS | Performed by: INTERNAL MEDICINE

## 2017-10-05 PROCEDURE — 97530 THERAPEUTIC ACTIVITIES: CPT

## 2017-10-05 PROCEDURE — 87086 URINE CULTURE/COLONY COUNT: CPT

## 2017-10-05 PROCEDURE — 99900035 HC TECH TIME PER 15 MIN (STAT)

## 2017-10-05 PROCEDURE — 87088 URINE BACTERIA CULTURE: CPT

## 2017-10-05 PROCEDURE — 3E0234Z INTRODUCTION OF SERUM, TOXOID AND VACCINE INTO MUSCLE, PERCUTANEOUS APPROACH: ICD-10-PCS | Performed by: INTERNAL MEDICINE

## 2017-10-05 PROCEDURE — 12800000 HC REHAB SEMI-PRIVATE ROOM

## 2017-10-05 PROCEDURE — 87186 SC STD MICRODIL/AGAR DIL: CPT

## 2017-10-05 PROCEDURE — 85025 COMPLETE CBC W/AUTO DIFF WBC: CPT

## 2017-10-05 PROCEDURE — 90686 IIV4 VACC NO PRSV 0.5 ML IM: CPT | Performed by: INTERNAL MEDICINE

## 2017-10-05 PROCEDURE — 25000003 PHARM REV CODE 250: Performed by: INTERNAL MEDICINE

## 2017-10-05 PROCEDURE — 94799 UNLISTED PULMONARY SVC/PX: CPT

## 2017-10-05 PROCEDURE — 63600175 PHARM REV CODE 636 W HCPCS: Performed by: INTERNAL MEDICINE

## 2017-10-05 PROCEDURE — 87077 CULTURE AEROBIC IDENTIFY: CPT

## 2017-10-05 PROCEDURE — 25000003 PHARM REV CODE 250: Performed by: PHYSICIAN ASSISTANT

## 2017-10-05 PROCEDURE — 94761 N-INVAS EAR/PLS OXIMETRY MLT: CPT

## 2017-10-05 PROCEDURE — 97110 THERAPEUTIC EXERCISES: CPT

## 2017-10-05 PROCEDURE — 99238 HOSP IP/OBS DSCHRG MGMT 30/<: CPT | Mod: ,,, | Performed by: INTERNAL MEDICINE

## 2017-10-05 RX ORDER — RAMELTEON 8 MG/1
8 TABLET ORAL NIGHTLY PRN
Status: CANCELLED | OUTPATIENT
Start: 2017-10-05

## 2017-10-05 RX ORDER — LACTULOSE 10 G/15ML
20 SOLUTION ORAL DAILY PRN
Status: DISCONTINUED | OUTPATIENT
Start: 2017-10-05 | End: 2017-11-16

## 2017-10-05 RX ORDER — OXYCODONE HYDROCHLORIDE 5 MG/1
5 TABLET ORAL EVERY 4 HOURS PRN
Status: DISCONTINUED | OUTPATIENT
Start: 2017-10-05 | End: 2017-10-26

## 2017-10-05 RX ORDER — MAG HYDROX/ALUMINUM HYD/SIMETH 200-200-20
30 SUSPENSION, ORAL (FINAL DOSE FORM) ORAL EVERY 4 HOURS PRN
Status: DISCONTINUED | OUTPATIENT
Start: 2017-10-05 | End: 2017-11-16

## 2017-10-05 RX ORDER — OXYCODONE HYDROCHLORIDE 5 MG/1
10 TABLET ORAL EVERY 4 HOURS PRN
Status: DISCONTINUED | OUTPATIENT
Start: 2017-10-05 | End: 2017-10-26

## 2017-10-05 RX ORDER — AMOXICILLIN 250 MG
2 CAPSULE ORAL NIGHTLY PRN
Status: CANCELLED | OUTPATIENT
Start: 2017-10-05

## 2017-10-05 RX ORDER — HYDROMORPHONE HYDROCHLORIDE 2 MG/ML
1 INJECTION, SOLUTION INTRAMUSCULAR; INTRAVENOUS; SUBCUTANEOUS
Status: CANCELLED | OUTPATIENT
Start: 2017-10-05

## 2017-10-05 RX ORDER — BISACODYL 10 MG
10 SUPPOSITORY, RECTAL RECTAL DAILY
Status: DISCONTINUED | OUTPATIENT
Start: 2017-10-06 | End: 2017-10-06

## 2017-10-05 RX ORDER — RAMELTEON 8 MG/1
8 TABLET ORAL NIGHTLY PRN
Status: DISCONTINUED | OUTPATIENT
Start: 2017-10-05 | End: 2017-11-16 | Stop reason: HOSPADM

## 2017-10-05 RX ORDER — ONDANSETRON 8 MG/1
8 TABLET, ORALLY DISINTEGRATING ORAL EVERY 6 HOURS PRN
Status: DISCONTINUED | OUTPATIENT
Start: 2017-10-05 | End: 2017-11-16 | Stop reason: HOSPADM

## 2017-10-05 RX ORDER — ONDANSETRON 4 MG/1
8 TABLET, ORALLY DISINTEGRATING ORAL EVERY 6 HOURS PRN
Status: CANCELLED | OUTPATIENT
Start: 2017-10-05

## 2017-10-05 RX ORDER — DOCUSATE SODIUM 100 MG/1
100 CAPSULE, LIQUID FILLED ORAL DAILY
Status: DISCONTINUED | OUTPATIENT
Start: 2017-10-06 | End: 2017-11-16 | Stop reason: HOSPADM

## 2017-10-05 RX ORDER — OXYCODONE HYDROCHLORIDE 5 MG/1
15 TABLET ORAL EVERY 4 HOURS PRN
Status: CANCELLED | OUTPATIENT
Start: 2017-10-05

## 2017-10-05 RX ORDER — DOCUSATE SODIUM 100 MG/1
100 CAPSULE, LIQUID FILLED ORAL DAILY
Status: CANCELLED | OUTPATIENT
Start: 2017-10-06

## 2017-10-05 RX ORDER — AMOXICILLIN 250 MG
2 CAPSULE ORAL NIGHTLY PRN
Status: DISCONTINUED | OUTPATIENT
Start: 2017-10-05 | End: 2017-11-16 | Stop reason: HOSPADM

## 2017-10-05 RX ORDER — OXYCODONE HYDROCHLORIDE 5 MG/1
10 TABLET ORAL EVERY 4 HOURS PRN
Status: CANCELLED | OUTPATIENT
Start: 2017-10-05

## 2017-10-05 RX ORDER — BISACODYL 10 MG
10 SUPPOSITORY, RECTAL RECTAL DAILY
Status: CANCELLED | OUTPATIENT
Start: 2017-10-06

## 2017-10-05 RX ORDER — ACETAMINOPHEN 325 MG/1
650 TABLET ORAL EVERY 6 HOURS PRN
Status: CANCELLED | OUTPATIENT
Start: 2017-10-05

## 2017-10-05 RX ORDER — ACETAMINOPHEN 325 MG/1
650 TABLET ORAL EVERY 6 HOURS PRN
Status: DISCONTINUED | OUTPATIENT
Start: 2017-10-05 | End: 2017-10-26

## 2017-10-05 RX ORDER — HYDROMORPHONE HYDROCHLORIDE 2 MG/ML
2 INJECTION, SOLUTION INTRAMUSCULAR; INTRAVENOUS; SUBCUTANEOUS
Status: CANCELLED | OUTPATIENT
Start: 2017-10-05

## 2017-10-05 RX ORDER — OXYCODONE HYDROCHLORIDE 5 MG/1
5 TABLET ORAL EVERY 4 HOURS PRN
Status: CANCELLED | OUTPATIENT
Start: 2017-10-05

## 2017-10-05 RX ORDER — OXYCODONE HYDROCHLORIDE 5 MG/1
15 TABLET ORAL EVERY 4 HOURS PRN
Status: DISCONTINUED | OUTPATIENT
Start: 2017-10-05 | End: 2017-10-26

## 2017-10-05 RX ORDER — MAG HYDROX/ALUMINUM HYD/SIMETH 200-200-20
30 SUSPENSION, ORAL (FINAL DOSE FORM) ORAL EVERY 4 HOURS PRN
Status: CANCELLED | OUTPATIENT
Start: 2017-10-05

## 2017-10-05 RX ADMIN — Medication 1 TABLET: at 09:10

## 2017-10-05 RX ADMIN — SENNOSIDES AND DOCUSATE SODIUM 2 TABLET: 8.6; 5 TABLET ORAL at 09:10

## 2017-10-05 RX ADMIN — INFLUENZA A VIRUS A/MICHIGAN/45/2015 X-275 (H1N1) ANTIGEN (FORMALDEHYDE INACTIVATED), INFLUENZA A VIRUS A/HONG KONG/4801/2014 X-263B (H3N2) ANTIGEN (FORMALDEHYDE INACTIVATED), INFLUENZA B VIRUS B/PHUKET/3073/2013 ANTIGEN (FORMALDEHYDE INACTIVATED), AND INFLUENZA B VIRUS B/BRISBANE/60/2008 ANTIGEN (FORMALDEHYDE INACTIVATED) 0.5 ML: 15; 15; 15; 15 INJECTION, SUSPENSION INTRAMUSCULAR at 12:10

## 2017-10-05 RX ADMIN — OXYCODONE HYDROCHLORIDE 5 MG: 5 TABLET ORAL at 09:10

## 2017-10-05 RX ADMIN — DOCUSATE SODIUM 100 MG: 100 CAPSULE, LIQUID FILLED ORAL at 09:10

## 2017-10-05 NOTE — PLAN OF CARE
Problem: Patient Care Overview  Goal: Plan of Care Review  Outcome: Ongoing (interventions implemented as appropriate)  Patient aao x 4. Complains of pain to R hip. Controlled with oral medication. Plan of care reviewed with patient and family. Verbalized understanding. PCA pump d/c. Fuentes d/c, voided x2. Bedside commode with max assist. PT/ OT today. Remained free from fall and injury. Bed in lowest position, call light within reach and family to remain at bedside.

## 2017-10-05 NOTE — SUBJECTIVE & OBJECTIVE
"Principal Problem:Cervical spinal stenosis    Principal Orthopedic Problem: Cervical cord myelomalacia with BUE weakness and decreased ambulatory ability. S/P ACDF C5-6    Interval History: Awaiting rehab consult    Review of patient's allergies indicates:   Allergen Reactions    Bactrim [sulfamethoxazole-trimethoprim]     Codeine Other (See Comments)     cramping    Hydrocodone     Nexium [esomeprazole magnesium]     Nickel sutures [surgical stainless steel]     Pcn [penicillins]     Sulfa (sulfonamide antibiotics)     Vicks vapor inhaler [l-desoxyephedrine]        Current Facility-Administered Medications   Medication    acetaminophen tablet 650 mg    aluminum-magnesium hydroxide-simethicone 200-200-20 mg/5 mL suspension 30 mL    bisacodyl suppository 10 mg    docusate sodium capsule 100 mg    folic acid-vit B6-vit B12 2.5-25-2 mg tablet 1 tablet    hydromorphone (PF) injection 1 mg    hydromorphone (PF) injection 2 mg    HYDROmorphone PCA in 0.9 % NaCl 6 Mg/30 mL (0.2 mg/mL)    lactated ringers infusion    naloxone 0.4 mg/mL injection 0.02 mg    ondansetron disintegrating tablet 8 mg    oxycodone immediate release tablet 10 mg    oxycodone immediate release tablet 15 mg    oxycodone immediate release tablet 5 mg    promethazine (PHENERGAN) 6.25 mg in dextrose 5 % 50 mL IVPB    ramelteon tablet 8 mg    senna-docusate 8.6-50 mg per tablet 2 tablet     Objective:     Vital Signs (Most Recent):  Temp: 98 °F (36.7 °C) (10/05/17 0000)  Pulse: 98 (10/05/17 0000)  Resp: 18 (10/05/17 0000)  BP: 132/85 (10/05/17 0000)  SpO2: 95 % (10/05/17 0000) Vital Signs (24h Range):  Temp:  [98 °F (36.7 °C)-99.3 °F (37.4 °C)] 98 °F (36.7 °C)  Pulse:  [] 98  Resp:  [16-20] 18  SpO2:  [95 %-100 %] 95 %  BP: (132-171)/(72-95) 132/85     Weight: 84.4 kg (186 lb)  Height: 5' 5" (165.1 cm)  Body mass index is 30.95 kg/m².      Intake/Output Summary (Last 24 hours) at 10/05/17 0619  Last data filed at 10/04/17 " 1800   Gross per 24 hour   Intake          3426.67 ml   Output             2500 ml   Net           926.67 ml       General    Vitals reviewed.  Constitutional: She is oriented to person, place, and time. She appears well-developed and well-nourished.   Pulmonary/Chest: Effort normal.   Abdominal: Soft. Bowel sounds are normal.   Neurological: She is alert and oriented to person, place, and time.   Psychiatric: She has a normal mood and affect.         Back (L-Spine & T-Spine) / Neck (C-Spine) Exam     Comments:  Dressings clean and dry. BUE weakness unchanged. Soft collar in place while supine in bed.        Significant Labs:   CBC:   Recent Labs  Lab 10/03/17  1453 10/04/17  0543 10/05/17  0525   WBC 3.60* 7.40 10.30   HGB 11.0* 12.3 10.9*   HCT 32.6* 36.1* 32.8*    232 214     CMP:   Recent Labs  Lab 10/03/17  1453 10/04/17  0543    136   K 3.6 4.3    104   CO2 25 21*    123*   BUN 6 6   CREATININE 0.7 0.7   CALCIUM 8.6* 9.5   ANIONGAP 8 11   EGFRNONAA >60 >60     All pertinent labs within the past 24 hours have been reviewed.    Significant Imaging: None

## 2017-10-05 NOTE — PLAN OF CARE
Problem: Patient Care Overview  Goal: Plan of Care Review  Outcome: Ongoing (interventions implemented as appropriate)  Pt on room air with IS, pt achieves 1500 on IS

## 2017-10-05 NOTE — PLAN OF CARE
Problem: Patient Care Overview  Goal: Plan of Care Review  Outcome: Ongoing (interventions implemented as appropriate)  Plan of care reviewed with patient at bedside. Verbalizes effective pain control with Oxycodone prn. PT/OT consulted. Vital signs stable on room air. Up OOB to BSC , max assist. Safety maintained. Side rails up x 2, Bed rails up x 2, personal items within reach.

## 2017-10-05 NOTE — PLAN OF CARE
Pt was accepted to In Rehab by Dr Sung per pt's nurse, Crystal. I notified Dr Bowen who placed orders.   Hetal notified of orders and states the nurse can call report to [facility], [phone]. Crystal, pt's nurse notified of the above....SHANTE Iyer CM

## 2017-10-05 NOTE — PROGRESS NOTES
[facility]  Orthopedics  Progress Note    Patient Name: Zaira Jensen  MRN: [id number]  Admission Date: 10/3/2017  Hospital Length of Stay: 2 days  Attending Provider: Ann Bowen MD  Primary Care Provider: Cuate Alvarez MD  Follow-up For: Procedure(s) (LRB):  DISKECTOMY AND FUSION-ANTERIOR CERVICAL (ACDF) C5-6 (N/A)    Post-Operative Day: 2 Days Post-Op  Subjective:     Principal Problem:Cervical spinal stenosis    Principal Orthopedic Problem: Cervical cord myelomalacia with BUE weakness and decreased ambulatory ability. S/P ACDF C5-6    Interval History: Awaiting rehab consult    Review of patient's allergies indicates:   Allergen Reactions    Bactrim [sulfamethoxazole-trimethoprim]     Codeine Other (See Comments)     cramping    Hydrocodone     Nexium [esomeprazole magnesium]     Nickel sutures [surgical stainless steel]     Pcn [penicillins]     Sulfa (sulfonamide antibiotics)     Vicks vapor inhaler [l-desoxyephedrine]        Current Facility-Administered Medications   Medication    acetaminophen tablet 650 mg    aluminum-magnesium hydroxide-simethicone 200-200-20 mg/5 mL suspension 30 mL    bisacodyl suppository 10 mg    docusate sodium capsule 100 mg    folic acid-vit B6-vit B12 2.5-25-2 mg tablet 1 tablet    hydromorphone (PF) injection 1 mg    hydromorphone (PF) injection 2 mg    HYDROmorphone PCA in 0.9 % NaCl 6 Mg/30 mL (0.2 mg/mL)    lactated ringers infusion    naloxone 0.4 mg/mL injection 0.02 mg    ondansetron disintegrating tablet 8 mg    oxycodone immediate release tablet 10 mg    oxycodone immediate release tablet 15 mg    oxycodone immediate release tablet 5 mg    promethazine (PHENERGAN) 6.25 mg in dextrose 5 % 50 mL IVPB    ramelteon tablet 8 mg    senna-docusate 8.6-50 mg per tablet 2 tablet     Objective:     Vital Signs (Most Recent):  Temp: 98 °F (36.7 °C) (10/05/17 0000)  Pulse: 98 (10/05/17 0000)  Resp: 18 (10/05/17 0000)  BP: 132/85 (10/05/17  "0000)  SpO2: 95 % (10/05/17 0000) Vital Signs (24h Range):  Temp:  [98 °F (36.7 °C)-99.3 °F (37.4 °C)] 98 °F (36.7 °C)  Pulse:  [] 98  Resp:  [16-20] 18  SpO2:  [95 %-100 %] 95 %  BP: (132-171)/(72-95) 132/85     Weight: 84.4 kg (186 lb)  Height: 5' 5" (165.1 cm)  Body mass index is 30.95 kg/m².      Intake/Output Summary (Last 24 hours) at 10/05/17 0619  Last data filed at 10/04/17 1800   Gross per 24 hour   Intake          3426.67 ml   Output             2500 ml   Net           926.67 ml       General    Vitals reviewed.  Constitutional: She is oriented to person, place, and time. She appears well-developed and well-nourished.   Pulmonary/Chest: Effort normal.   Abdominal: Soft. Bowel sounds are normal.   Neurological: She is alert and oriented to person, place, and time.   Psychiatric: She has a normal mood and affect.         Back (L-Spine & T-Spine) / Neck (C-Spine) Exam     Comments:  Dressings clean and dry. BUE weakness unchanged. Soft collar in place while supine in bed.        Significant Labs:   CBC:   Recent Labs  Lab 10/03/17  1453 10/04/17  0543 10/05/17  0525   WBC 3.60* 7.40 10.30   HGB 11.0* 12.3 10.9*   HCT 32.6* 36.1* 32.8*    232 214     CMP:   Recent Labs  Lab 10/03/17  1453 10/04/17  0543    136   K 3.6 4.3    104   CO2 25 21*    123*   BUN 6 6   CREATININE 0.7 0.7   CALCIUM 8.6* 9.5   ANIONGAP 8 11   EGFRNONAA >60 >60     All pertinent labs within the past 24 hours have been reviewed.    Significant Imaging: None    Assessment/Plan:     * Cervical spinal stenosis s/p C5-6 ACDF    Stable from an ortho stand point and awaiting neuro rehab eval              KEO MEDINA  Orthopedics  [facility]  "

## 2017-10-05 NOTE — PT/OT/SLP PROGRESS
Physical Therapy  Treatment    Zaira Jensen   MRN: [id number]   Admitting Diagnosis: Cervical spinal stenosis    PT Received On: 10/05/17  PT Start Time: 1115     PT Stop Time: 1130    PT Total Time (min): 15 min       Billable Minutes:  Therapeutic Activity 15    Treatment Type: Treatment  PT/PTA: PTA     PTA Visit Number: 2       General Precautions: Standard, fall  Orthopedic Precautions:  (spinal (cervical))   Braces: Cervical collar    Do you have any cultural, spiritual, Taoism conflicts, given your current situation?: None    Subjective:  Communicated with nurse Rojas prior to session.  Pt agreeable to session, requesting BTB transfer 2' discomfort in chair    Pain/Comfort  Pain Rating 1:  (did not rate, pt reporting pain all over)  Location - Orientation 1: generalized  Pain Addressed 1: Pre-medicate for activity, Reposition, Distraction, Cessation of Activity, Nurse notified    Objective:   Patient found with: peripheral IV (cervical collar, soft)    Functional Mobility:  Bed Mobility:   Scooting/Bridging: Maximum Assistance  Supine to Sit: Maximum Assistance, With assist of 2  Sit to Supine: Maximum Assistance, With assist of  2    Transfers:  Sit <> Stand Assistance: Moderate Assistance (A of 2 for safety)  Sit <> Stand Assistive Device: No Assistive Device  Bed <> Chair Technique: Stand Pivot  Bed <> Chair Assistance: Moderate Assistance (A of 2 for safety)  Bed <> Chair Assistive Device: No Assistive Device    Gait:   Gait Distance: few steps for chair to bed t/f  Assistance 1: Moderate assistance (A of 2 for safety)  Gait Assistive Device: No device  Gait Deviation(s): increased time in double stance, decreased velocity of limb motion, decreased step length, decreased toe-to-floor clearance, decreased weight-shifting ability        Balance:   Static Sit: FAIR: Maintains without assist, but unable to take any challenges   Dynamic Sit: FAIR: Cannot move trunk without losing balance  Static Stand: POOR:  Needs MODERATE assist to maintain  Dynamic stand: POOR: N/A     Therapeutic Activities and Exercises:  Pt assisted with repositioning in bed and pillows adjusted for pt comfort.       Patient left supine with all lines intact, call button in reach, nurse Crystal notified and family memeber present.    Assessment:  Zaira Jensen is a 42 y.o. female with a medical diagnosis of Cervical spinal stenosis and presents with improving activity/mobility tolerance. Pt able to progress to taking small steps for chair to bed transfer this PM.    Rehab identified problem list/impairments: Rehab identified problem list/impairments: weakness, impaired endurance, impaired self care skills, impaired functional mobilty, impaired balance, decreased coordination, decreased lower extremity function, pain, abnormal tone, decreased ROM, impaired coordination, impaired joint extensibility, orthopedic precautions    Rehab potential is good.    Activity tolerance: Good    Discharge recommendations: Discharge Facility/Level Of Care Needs: rehabilitation facility       GOALS:    Physical Therapy Goals     Not on file          Multidisciplinary Problems (Resolved)        Problem: Physical Therapy Goal    Goal Priority Disciplines Outcome Goal Variances Interventions   Physical Therapy Goal   (Resolved)     PT/OT, PT Outcome(s) achieved     Description:  Goals to be met by: 10/11/2017     Patient will increase functional independence with mobility by performin. Supine to sit with MInimal Assistance  2. Sit to supine with MInimal Assistance  3. Rolling to Left and Right with Minimal Assistance.  4. Sit to stand transfer with Moderate Assistance  5. Bed to chair transfer with Moderate Assistance using AD as needed  6. Sitting at edge of bed x 5 minutes with Minimal Assistance  7. Stand for 1 minute with Moderate Assistance using Rolling Walker  8. Lower extremity exercise program x10 reps, with assistance as needed                      PLAN:     Patient to be seen BID  to address the above listed problems via gait training, therapeutic activities, therapeutic exercises, neuromuscular re-education, wheelchair management/training  Plan of Care expires: 11/02/17  Plan of Care reviewed with: patient, family         Briseida Murillo, PTA  10/05/2017

## 2017-10-05 NOTE — DISCHARGE SUMMARY
Discharge Summary  Hospital Medicine    Admit Date: 10/3/2017    Date and Time: 10/5/427616:23 AM    Discharge Attending Physician: Ann Bowen MD    Primary Care Physician: Cuate Alvarez MD    Diagnoses:  Active Hospital Problems    Diagnosis  POA    *Cervical spinal stenosis s/p C5-6 ACDF [M48.02]  Yes    GERD (gastroesophageal reflux disease) [K21.9]  Yes    Chronic back pain [M54.9, G89.29]  Yes    Chronic neck pain [M54.2, G89.29]  Yes      Resolved Hospital Problems    Diagnosis Date Resolved POA   No resolved problems to display.     Discharged Condition: Good    Hospital Course:   Patient is a 42 y.o. female admitted to Hospitalist Service from Operation Room s/p C5-6 ACDF performed by Dr. Neal. Patient reportedly has past medical history significant for chronic neck pain, chronic back pain, GERD and history of anemia. Post-operatively, patient denied chest pain, shortness of breath, abdominal pain, nausea, vomiting, headache, vision changes, focal neuro-deficits, cough or fever. Patient was admitted to Hospitalist medicine service. Patient was followed by Dr. Neal. Post-operative, patient is slow to work with PT. Pain adequately controlled. Patient participated with physical therapy. Fall precautions discussed with the patient. Patient has been accepted at inpatient rehab. In case of chest pain, shortness of breath, stroke or stroke like symptoms, high grade fever or any signs or symptoms of surgical site wound infection symptoms, patient to return to nearest emergency room as soon as possible. Patient was discharged to inpatient rehab in stable condition with following discharge plan of care.     Consults: Dr. Neal  Significant Diagnostic Studies:     Microbiology Results (last 7 days)     ** No results found for the last 168 hours. **        Special Treatments/Procedures: as above  Disposition: Inpatient Rehab    Medications:  Reconciled Home Medications:   Current Discharge Medication List       CONTINUE these medications which have NOT CHANGED    Details   acetaminophen (TYLENOL) 650 MG TbSR Take 650 mg by mouth every 8 (eight) hours.      ibuprofen (ADVIL,MOTRIN) 800 MG tablet Take 800 mg by mouth every 6 (six) hours as needed.       pquvwlxoe-D3-vqX17-algal oil (METANX, ALGAL OIL,) 3 mg-35 mg-2 mg -90.314 mg Cap Take 1 tablet by mouth 2 (two) times daily.  Qty: 60 capsule, Refills: 3           No discharge procedures on file.  Follow-up Information     Magan Neal MD.    Specialty:  Orthopedic Surgery  Contact information:  [address] [address]  [phone]

## 2017-10-05 NOTE — CONSULTS
[facility]  Physical Medicine & Rehab  Consult Note    Patient Name: Zaira Jensen  MRN: [id number]  Admission Date: 10/3/2017  Hospital Length of Stay: 2 days  Attending Physician: Lyudmila Bowen MD   Primary Care Provider: Cuate Alvarez MD     Inpatient consult to Physical Medicine Rehab  Consult performed by: EDILBERTO BLAKE  Consult ordered by: LYUDMILA BOWEN        Subjective:     Principal Problem: Cervical spinal stenosis    HPI: pt is 42 y.o female with dx of myelomalacia of cervical cord, cervical spine stenosis    Hospital Course: pt was admired to [facility] on 10-3-1-17. Pt  Is s/p diskectomy & fusion - ant cervical ( ACDF) c5-c6. Procedure was performed on 10-3-17 by Dr Neal . I am consulted for consideration of admission to in pt rehab       Functional History:  Needed assistance with bed mobility & transfers & dressing , very little mobility 2-3 steps several months ago & progressively worsening     Current Functional Status:  Grooming max a , toile ting total a  , upper body dressing total a . Poor sitting balance     Past Medical History:   Diagnosis Date    Allergy     Anemia     Arthritis     Chronic back pain     Chronic neck pain     Functional ovarian cysts     GERD (gastroesophageal reflux disease)     Headache(784.0)     Radiculopathy of arm     Rash     Respiratory distress     bronchospasm at emergence years ago     Past Surgical History:   Procedure Laterality Date    CERVICAL FUSION      CHOLECYSTECTOMY      COSMETIC SURGERY      EYE SURGERY      FRACTURE SURGERY      OVARIAN CYST REMOVAL      SPINE SURGERY      TOTAL BODY LIFT       Review of patient's allergies indicates:   Allergen Reactions    Bactrim [sulfamethoxazole-trimethoprim]     Codeine Other (See Comments)     cramping    Hydrocodone     Nexium [esomeprazole magnesium]     Nickel sutures [surgical stainless steel]     Pcn [penicillins]     Sulfa (sulfonamide antibiotics)     Vicks  vapor inhaler [l-desoxyephedrine]        Scheduled Medications:    bisacodyl  10 mg Rectal Daily    docusate sodium  100 mg Oral Daily    folic acid-vit B6-vit B12 2.5-25-2 mg  1 tablet Oral Daily       PRN Medications: acetaminophen, aluminum-magnesium hydroxide-simethicone, HYDROmorphone, HYDROmorphone, naloxone, ondansetron, oxycodone, oxycodone, oxycodone, promethazine (PHENERGAN) IVPB, ramelteon, senna-docusate 8.6-50 mg    Family History     Problem Relation (Age of Onset)    Cancer Father    Diabetes Father    Heart disease Father    No Known Problems Mother        Social History Main Topics    Smoking status: Never Smoker    Smokeless tobacco: Never Used    Alcohol use No    Drug use: No    Sexual activity: No     Review of Systems   Constitutional: Negative.    HENT: Negative.    Eyes: Negative.    Respiratory: Negative.    Cardiovascular: Negative.    Gastrointestinal: Negative.    Endocrine: Negative.    Genitourinary: Negative.    Musculoskeletal: Negative.    Skin: Negative.    Allergic/Immunologic: Negative.    Neurological: Negative.    Hematological: Negative.    Psychiatric/Behavioral: Negative.      Objective:     Vital Signs (Most Recent):  Temp: 97.8 °F (36.6 °C) (10/05/17 0803)  Pulse: 92 (10/05/17 0803)  Resp: 18 (10/05/17 0803)  BP: (!) 147/76 (10/05/17 0803)  SpO2: 98 % (10/05/17 0803)    Vital Signs (24h Range):  Temp:  [97.8 °F (36.6 °C)-99.3 °F (37.4 °C)] 97.8 °F (36.6 °C)  Pulse:  [] 92  Resp:  [16-20] 18  SpO2:  [95 %-98 %] 98 %  BP: (132-153)/(72-91) 147/76     Body mass index is 30.95 kg/m².    Physical Exam     Calm pleasant in no distress  Heent: normocephalic + soft cervical collar, eomGI  Lung: CTA B  Heart: rrr no m  Abd: soft, NTND + BS  Ext: no edema  Mms: right upper ext proximally 2/5, distally 1+ to 2-/5  Left upper ext proximally 3-/5, distally 2-/5  Left lower ext 2+/5 overall, right lower ext 1-/5 overall   Gait , genital, rectal deferred     Diagnostic  Results: available imaging report reviewed   Lab Results   Component Value Date    WBC 10.30 10/05/2017    HGB 10.9 (L) 10/05/2017    HCT 32.8 (L) 10/05/2017    MCV 93 10/05/2017     10/05/2017     CMP  Sodium   Date Value Ref Range Status   10/05/2017 141 136 - 145 mmol/L Final     Potassium   Date Value Ref Range Status   10/05/2017 3.9 3.5 - 5.1 mmol/L Final     Chloride   Date Value Ref Range Status   10/05/2017 106 95 - 110 mmol/L Final     CO2   Date Value Ref Range Status   10/05/2017 25 23 - 29 mmol/L Final     Glucose   Date Value Ref Range Status   10/05/2017 109 70 - 110 mg/dL Final     BUN, Bld   Date Value Ref Range Status   10/05/2017 9 6 - 20 mg/dL Final     Creatinine   Date Value Ref Range Status   10/05/2017 0.7 0.5 - 1.4 mg/dL Final     Calcium   Date Value Ref Range Status   10/05/2017 9.1 8.7 - 10.5 mg/dL Final     Total Protein   Date Value Ref Range Status   09/21/2017 7.5 6.0 - 8.4 g/dL Final     Albumin   Date Value Ref Range Status   09/21/2017 3.5 3.5 - 5.2 g/dL Final     Total Bilirubin   Date Value Ref Range Status   09/21/2017 0.4 0.1 - 1.0 mg/dL Final     Comment:     For infants and newborns, interpretation of results should be based  on gestational age, weight and in agreement with clinical  observations.  Premature Infant recommended reference ranges:  Up to 24 hours.............<8.0 mg/dL  Up to 48 hours............<12.0 mg/dL  3-5 days..................<15.0 mg/dL  6-29 days.................<15.0 mg/dL       Alkaline Phosphatase   Date Value Ref Range Status   09/21/2017 40 (L) 55 - 135 U/L Final     AST   Date Value Ref Range Status   09/21/2017 14 10 - 40 U/L Final     ALT   Date Value Ref Range Status   09/21/2017 12 10 - 44 U/L Final     Anion Gap   Date Value Ref Range Status   10/05/2017 10 8 - 16 mmol/L Final     eGFR if    Date Value Ref Range Status   10/05/2017 >60 >60 mL/min/1.73 m^2 Final     eGFR if non    Date Value Ref Range  Status   10/05/2017 >60 >60 mL/min/1.73 m^2 Final     Comment:     Calculation used to obtain the estimated glomerular filtration  rate (eGFR) is the CKD-EPI equation. Since race is unknown   in our information system, the eGFR values for   -American and Non--American patients are given   for each creatinine result.       Assessment/Plan:     No new Assessment & Plan notes have been filed under this hospital service since the last note was generated.  Service: Physical Medicine and Rehabilitation  s/p ACDF c5-c6   Quadriparesis/plegic   Ok for in pt rehab     Thank you for your consult. I will sign off. Please contact us if you have any additional questions.    Anirudh Rowley MD  Department of Physical Medicine & Rehab  [facility]

## 2017-10-05 NOTE — PT/OT/SLP PROGRESS
Physical Therapy  Treatment    Zaira Jensen   MRN: [id number]   Admitting Diagnosis: Cervical spinal stenosis    PT Received On: 10/05/17  PT Start Time: 1011     PT Stop Time: 1046    PT Total Time (min): 35 min       Billable Minutes:  Therapeutic Activity 25 and Therapeutic Exercise 10    Treatment Type: Treatment  PT/PTA: PTA     PTA Visit Number: 1       General Precautions: Standard, fall  Orthopedic Precautions:  (spinal (cervical))   Braces: Cervical collar    Do you have any cultural, spiritual, Worship conflicts, given your current situation?: None    Subjective:  Communicated with nurse Rojas prior to session.  Pt pleasant and agreeable to session.    Pain/Comfort  Pain Rating 1:  (did not rate, pt reporting pain all over)  Location - Orientation 1: generalized  Pain Addressed 1: Reposition, Distraction, Cessation of Activity    Objective:   Patient found with: peripheral IV (cervical collar, soft)    Functional Mobility:  Bed Mobility:   Supine to Sit: Maximum Assistance, With assist of 2  Scooting: Max to EOB with cueing for tech and weight shifting    Transfers:  Sit <> Stand Assistance: Moderate Assistance (A of 2 for safety)  Sit <> Stand Assistive Device: No Assistive Device  Bed <> Chair Technique: Stand Pivot  Bed <> Chair Assistance: Maximum Assistance (A of 2 for safety)  Bed <> Chair Assistive Device: No Assistive Device      Balance:   Static Sit: FAIR: Maintains without assist, but unable to take any challenges   Dynamic Sit: FAIR: Cannot move trunk without losing balance  Static Stand: POOR: Needs MODERATE assist to maintain  Dynamic stand: POOR: N/A     Therapeutic Activities and Exercises:  Seated EOB balance approx 4' with CGA/SBA for balance prior to chair transfer.  Pt was then assisted to chair with A of 2 for safety.    Seated BLE therex/AAROM/PROM performed: AP, LAQ, marching, hip abd/add x 10-12 reps each    Pillows and towel rolls place around pt for comfort in chair.     Patient  left up in chair, relined with LE's elevated with all lines intact, call button in reach, nurse Crystal notified and family memeber present.    Assessment:  Zaira Jensen is a 42 y.o. female with a medical diagnosis of Cervical spinal stenosis and presents with improving mobility and balance despite limited ROM and pain throughout. Pt highly motivated to return to PLOF.     Rehab identified problem list/impairments: Rehab identified problem list/impairments: weakness, impaired endurance, impaired self care skills, impaired functional mobilty, impaired balance, decreased coordination, decreased upper extremity function, decreased lower extremity function, pain, abnormal tone, decreased ROM, impaired coordination, impaired joint extensibility, orthopedic precautions    Rehab potential is excellent.    Activity tolerance: Good    Discharge recommendations: Discharge Facility/Level Of Care Needs: rehabilitation facility       GOALS:    Physical Therapy Goals        Problem: Physical Therapy Goal    Goal Priority Disciplines Outcome Goal Variances Interventions   Physical Therapy Goal     PT/OT, PT Ongoing (interventions implemented as appropriate)     Description:  Goals to be met by: 10/11/2017     Patient will increase functional independence with mobility by performin. Supine to sit with MInimal Assistance  2. Sit to supine with MInimal Assistance  3. Rolling to Left and Right with Minimal Assistance.  4. Sit to stand transfer with Moderate Assistance  5. Bed to chair transfer with Moderate Assistance using AD as needed  6. Sitting at edge of bed x 5 minutes with Minimal Assistance  7. Stand for 1 minute with Moderate Assistance using Rolling Walker  8. Lower extremity exercise program x10 reps, with assistance as needed                      PLAN:    Patient to be seen BID  to address the above listed problems via gait training, therapeutic activities, therapeutic exercises, neuromuscular re-education,  wheelchair management/training  Plan of Care expires: 11/02/17  Plan of Care reviewed with: patient, family         Briseida Murillo, PTA  10/05/2017

## 2017-10-06 LAB
ANION GAP SERPL CALC-SCNC: 8 MMOL/L
BASOPHILS # BLD AUTO: 0 K/UL
BASOPHILS NFR BLD: 0.2 %
BUN SERPL-MCNC: 10 MG/DL
CALCIUM SERPL-MCNC: 8.7 MG/DL
CHLORIDE SERPL-SCNC: 104 MMOL/L
CO2 SERPL-SCNC: 27 MMOL/L
CREAT SERPL-MCNC: 0.7 MG/DL
DIFFERENTIAL METHOD: ABNORMAL
EOSINOPHIL # BLD AUTO: 0.1 K/UL
EOSINOPHIL NFR BLD: 1 %
ERYTHROCYTE [DISTWIDTH] IN BLOOD BY AUTOMATED COUNT: 14.4 %
EST. GFR  (AFRICAN AMERICAN): >60 ML/MIN/1.73 M^2
EST. GFR  (NON AFRICAN AMERICAN): >60 ML/MIN/1.73 M^2
GLUCOSE SERPL-MCNC: 89 MG/DL
HCT VFR BLD AUTO: 33.4 %
HGB BLD-MCNC: 11.3 G/DL
LYMPHOCYTES # BLD AUTO: 1.2 K/UL
LYMPHOCYTES NFR BLD: 17 %
MCH RBC QN AUTO: 31 PG
MCHC RBC AUTO-ENTMCNC: 33.8 G/DL
MCV RBC AUTO: 92 FL
MONOCYTES # BLD AUTO: 0.5 K/UL
MONOCYTES NFR BLD: 7.1 %
NEUTROPHILS # BLD AUTO: 5.4 K/UL
NEUTROPHILS NFR BLD: 74.7 %
PLATELET # BLD AUTO: 206 K/UL
PMV BLD AUTO: 7.6 FL
POTASSIUM SERPL-SCNC: 3.7 MMOL/L
RBC # BLD AUTO: 3.64 M/UL
SODIUM SERPL-SCNC: 139 MMOL/L
WBC # BLD AUTO: 7.3 K/UL

## 2017-10-06 PROCEDURE — 97162 PT EVAL MOD COMPLEX 30 MIN: CPT

## 2017-10-06 PROCEDURE — 36415 COLL VENOUS BLD VENIPUNCTURE: CPT

## 2017-10-06 PROCEDURE — 25000003 PHARM REV CODE 250: Performed by: PHYSICAL MEDICINE & REHABILITATION

## 2017-10-06 PROCEDURE — 85025 COMPLETE CBC W/AUTO DIFF WBC: CPT

## 2017-10-06 PROCEDURE — 12800000 HC REHAB SEMI-PRIVATE ROOM

## 2017-10-06 PROCEDURE — 80048 BASIC METABOLIC PNL TOTAL CA: CPT

## 2017-10-06 PROCEDURE — 25000003 PHARM REV CODE 250: Performed by: INTERNAL MEDICINE

## 2017-10-06 PROCEDURE — 97110 THERAPEUTIC EXERCISES: CPT

## 2017-10-06 PROCEDURE — 97535 SELF CARE MNGMENT TRAINING: CPT

## 2017-10-06 PROCEDURE — 97166 OT EVAL MOD COMPLEX 45 MIN: CPT

## 2017-10-06 RX ORDER — SYRING-NEEDL,DISP,INSUL,0.3 ML 29 G X1/2"
296 SYRINGE, EMPTY DISPOSABLE MISCELLANEOUS ONCE
Status: COMPLETED | OUTPATIENT
Start: 2017-10-06 | End: 2017-10-06

## 2017-10-06 RX ORDER — BISACODYL 10 MG
10 SUPPOSITORY, RECTAL RECTAL DAILY PRN
Status: DISCONTINUED | OUTPATIENT
Start: 2017-10-06 | End: 2017-11-16

## 2017-10-06 RX ADMIN — Medication 1 TABLET: at 09:10

## 2017-10-06 RX ADMIN — OXYCODONE HYDROCHLORIDE 5 MG: 5 TABLET ORAL at 10:10

## 2017-10-06 RX ADMIN — DOCUSATE SODIUM 100 MG: 100 CAPSULE, LIQUID FILLED ORAL at 09:10

## 2017-10-06 RX ADMIN — MAGNESIUM CITRATE 296 ML: 1.75 LIQUID ORAL at 02:10

## 2017-10-06 NOTE — PLAN OF CARE
Problem: Occupational Therapy Goal  Goal: Occupational Therapy Goal  Goals to be met by: 10/26/17     Patient will increase functional independence with ADLs by performing:    Feeding with Contact Guard Assistance.  UE Dressing with Minimal Assistance.  LE Dressing with Minimal Assistance.  Grooming while seated with Minimal Assistance.  Toileting from toilet with Moderate Assistance for hygiene and clothing management.   Bathing from most appropriate location with Minimal Assistance.  Toilet transfer to toilet with Minimal Assistance.    OT POC initiated & established in collaboration with patient
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
## 2017-10-06 NOTE — H&P
PHYSICIAN ADMISSION UPDATE AND COMMENTS REGARDING PREADMISSION SCREENING STATUS:    There are no substantial changes between the preadmission assessment and the   patient's current status.    APPROPRIATENESS FOR ADMISSION TO INPATIENT REHABILITATION FACILITY:  The   patient's condition is sufficiently stable to allow active participation in   intensive inpatient rehabilitation program.  The patient would benefit from   inpatient rehabilitation due to the followin.  Three hours of therapy at least 5 days per week.    PLAN FOR COMMUNITY DISCHARGE:  The patient has good family support.  The patient   is motivated and willing to participate.  The patient is cognitively relatively   intact.  The patient has good premorbid functional status and there is a need   for interdisciplinary inpatient rehabilitation provided by a physician with   rehab focus nursing and need for PT, OT and speech.    REHAB DIAGNOSIS:  Quadriparesis.    IMPAIRMENTS AND DISABILITIES:  Inability to manage self ADLs, inability to   ambulate.    REHABILITATION PRECAUTIONS/RESTRICTIONS:  Fall.    POSSIBLE BARRIERS TO PROGRESS AND DISCHARGE:  The patient's progress may be   impaired by the followin.  Quadriparesis.  2.  Pain.    ACTIVE PROBLEM LIST AND POTENTIAL COMPLICATION FROM PATIENT'S MEDICAL CONDITION   AND PLAN TO PREVENT AND ADDRESS THESE:  The patient remains at risk for fall,   DVT as well as pressure ulcer; however, likely above will be decreased as the   patient will be engaged in therapeutic activities.    This patient's medical complexity requires close physician supervision and   24-hour rehabilitation nursing care to address the etiologic diagnosis, which is   further complicated by following comorbidities:  1.  Quadriparesis.  2.  Inability to manage self ADLs, inability to ambulate safely, pain, risk for   UTI and risk for pressure ulcer as well as DVT.    FUNCTIONAL GOALS TO BE ACHIEVED:  Standby assist to modified  independent.    The patient requires intensive inpatient rehabilitation with care and treatment   by following disciplines:  1.  Medical supervision.  2.  A 24-hour rehabilitation nursing.  3.  Physical therapy.  4.  Occupational therapy.  5.  Speech therapy.    PLAN OF CARE:  The interdisciplinary team will work collaboratively to address   the patient's problems as identified on the individualized interdisciplinary   plan of care.  The plan of care will be initiated and reviewed on a regular   basis to ensure that all appropriate concerns are being addressed throughout the   entire rehab stay.    The patient's expected level of improvement with inpatient rehabilitation   admission is good.    ANTICIPATED DESTINATION POST-DISCHARGE FROM INPATIENT REHABILITATION:  Home with   family.    INTERDISCIPLINARY CARE PLAN:  Reviewed and there are no changes at this time.    ESTIMATED LENGTH OF STAY:  Approximately 4 to 6 weeks.    MEDICAL REHABILITATION AND PROGNOSIS:  Fair.    ARE THE ESTABLISHED GOAL SUFFICIENT FOR ACHIEVING THE OPTIMUM LEVEL OF FUNCTION:    Yes.    ARE THE INTERVENTIONS NOTED ALONG WITH MEDICAL INTERVENTION AS DOCUMENTED IN THE   HISTORY AND PHYSICAL SUFFICIENT FOR ACHIEVING THE OPTIMUM LEVEL OF FUNCTION:    Yes.    THERAPY PLAN:  Physical therapy b.i.d. for 5 days and every day for Saturday and   Sunday.  Occupational therapy b.i.d. for 5 days and every day for Saturday and   Sunday.  Speech pathologist initial evaluation for swallowing.    ANTICIPATED DISCHARGE DESTINATION:  Home with family.      AV/HN  dd: 10/06/2017 08:57:16 (CDT)  td: 10/06/2017 11:33:19 (CDT)  Doc ID   #[id number]  Job ID #[id number]    CC:

## 2017-10-06 NOTE — PLAN OF CARE
Problem: Fall Risk (Adult)  Goal: Identify Related Risk Factors and Signs and Symptoms  Related risk factors and signs and symptoms are identified upon initiation of [employer] Clinical Practice Guideline (CPG)   Outcome: Ongoing (interventions implemented as appropriate)  Pt alert and oriented . Cont of b and b. Mod to max assist with transfers.weakness to arms and legs. Chair and bed alarm at all times. Pt knows to call for help. Call light in reach.

## 2017-10-06 NOTE — PT/OT/SLP EVAL
Occupational Therapy  Evaluation    Zaira Jensen   MRN: [id number]   Admitting Diagnosis: Cervical spinal stenosis; S/p ACDF C5-6 on 10/3/17    OT Date of Treatment: 10/06/17   OT Start Time: 0935  OT Stop Time: 1105  OT Total Time (min): 90 min    Billable Minutes:  Evaluation 60, Self Care/Home Management 15 and Therapeutic Exercise 15  Total Minutes: 90    Diagnosis: cervical spinal stenosis; S/p ACDF C5-6 on 10/3/17    Past Medical History:   Diagnosis Date    Allergy     Anemia     Arthritis     Chronic back pain     Chronic neck pain     Functional ovarian cysts     GERD (gastroesophageal reflux disease)     Headache(784.0)     Radiculopathy of arm     Rash     Respiratory distress     bronchospasm at emergence years ago      Past Surgical History:   Procedure Laterality Date    CERVICAL FUSION      CHOLECYSTECTOMY      COSMETIC SURGERY      EYE SURGERY      FRACTURE SURGERY      OVARIAN CYST REMOVAL      SPINE SURGERY      TOTAL BODY LIFT         Referring physician: Amrik  Date referred to OT: 10/6/17    General Precautions: Standard, fall  Orthopedic Precautions:  (Spinal/Cervical)  Braces: Cervical collar    Do you have any cultural, spiritual, Judaism conflicts, given your current situation?: None     Patient History:  Living Environment  Lives With: parent(s)  Living Arrangements: house  Home Accessibility: stairs within home, stairs to enter home  Home Layout: Bathroom on 2nd floor  Number of Stairs to Enter Home: 1  Number of Stairs Within Home:  (flight)  Transportation Available: car  Living Environment Comment: Lives with parents in 2  with 1 SHA  Equipment Currently Used at Home: bedside commode, walker, rolling, rollator, wheelchair, lift device    Prior level of function:   Bed Mobility/Transfers: needs assist  Grooming: needs assist  Bathing: needs assist  Upper Body Dressing: needs assist  Lower Body Dressing: needs assist  Toileting: needs assist  Home Management  "Skills: unable to perform  Homemaking Responsibilities: No  Mode of Transportation: Family  Type of Occupation: Special   IADL Comments: PTA, required mod/max (A) with ADLs with an increase in functional decline over the past ~ 7 mos.      Dominant hand: right    Subjective:  Communicated with nurse prior to session.  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."    Chief Complaint: Discomfort/pain of neck and left shoulder  Patient/Family stated goals: To get clean and to have a BM    Pain/Comfort  Pain Rating 1:  (Unrated pain of neck and shoulders with L shoulder pain > R )  Pain Addressed 1: Reposition, Distraction, Nurse notified, Cessation of Activity  Pain Rating Post-Intervention 1:  (discomfort of neck; unrated )  Pain Addressed 2: Reposition, Cessation of Activity, Nurse notified (At end of eval)    Objective:  Patient found with: bed alarm, peripheral IV    Cognitive Exam:  Oriented to: Person, Place, Time and Situation  Follows Commands/attention: Follows multistep  commands  Communication: clear/fluent  Memory:  No Deficits noted  Safety awareness/insight to disability: intact  Coping skills/emotional control: Appropriate to situation    Visual/perceptual:  Intact    Physical Exam:  Postural examination/scapula alignment: Rounded shoulder  Skin integrity: Dry scar bilateral hip crease  Edema: Mild generalized    Sensation:   Bilateral UE light touch/temperature intact    Upper Extremity Range of Motion:  Right Upper Extremity: PROM WFL  Left Upper Extremity: PROM WFL    Upper Extremity Strength:  Right Upper Extremity: overall 2 to 3 /5  Left Upper Extremity: overall 2 to 3/5   Strength: 3/5 bilaterally      Fine motor coordination:   Impaired  Left hand, finger to nose  , Right hand, finger to nose  , Left hand thumb/finger opposition skills  , Right hand thumb/finger opposition skills  , Left hand, manipulation of objects   and Right hand, manipulation of objects      Gross " "motor coordination: Severely impaired bilaterally    Functional Mobility:  Bed Mobility:   Supine to sit: Total (A)    Transfers:  Bed<>Chair: Stand pivot with Total (A) x 2+ staff members for pt/staff safety with no AD - step by step verbal instruction with [place] for hand placement & B LE blocked  Toilet transfer: Stand pivot with Total (A) x 2+ staff members for pt/staff safety with use of grab bar and bedside commode positioned over toilet - step by step verbal instruction with De[place] for hand placement and B LE blocked - pt able to maintain grasp of grab bar     Feeding:  Total (A)/Max (A) with adapted utensils    Grooming:  Total (A)    Bathing:  Total (A) to sponge bathe from toilet     UE Dressing:  Total (A) from toilet   -instructed on techniques to increase (I) with task    LE Dressing:  Total (A) from toilet    Toileting:  Total (A) from toilet with use of grab bar and bedside commode over toilet    Balance:   Static Sit: FAIR+: Able to take MINIMAL challenges from all directions  Dynamic Sit:  FAIR: Cannot move trunk without losing balanceto FAIR+    Additional Treatment:  - Education provided regarding OT role/POC, safety awareness, calling for (A), cervical spinal precautions, adaptive techniques to increase independence & safety, etc  - Pt educated on HEP regarding B UE self PROM and very gentle stretches ("praying" for digits/wrist extension, elbow flex/exten, etc) - pt performing 5 reps each     Patient left up in chair with all lines intact, puff call button in reach, chair alarm on and nurse notified  >> puff call button and regular call button available to pt in room; however, both cannot be plugged in at same time - ensure pt's preference is plugged in prior to leaving pt     Assessment:  Zaira Jensen is a 42 y.o. female with a medical diagnosis of cervical spinal stenosis; S/p ACDF C5-6 on 10/3/17 and presents with significant deficits with functional independence and mobility. Patient " demonstrates severe weakness of B UE/LE, impaired fine/gross motor of bilateral UE, decreased endurance, pain, and cervical spinal precautions. Patient should benefit from skilled therapy services to increase functional independence, mobility, and safety.     Rehab potential is good    Activity tolerance: Good    Discharge recommendations: home     Equipment recommendations:  (TBD)     GOALS:    Occupational Therapy Goals        Problem: Occupational Therapy Goal    Goal Priority Disciplines Outcome Interventions   Occupational Therapy Goal     OT, PT/OT     Description:  Goals to be met by: 10/26/17     Patient will increase functional independence with ADLs by performing:    Feeding with Contact Guard Assistance.  UE Dressing with Minimal Assistance.  LE Dressing with Minimal Assistance.  Grooming while seated with Minimal Assistance.  Toileting from toilet with Moderate Assistance for hygiene and clothing management.   Bathing from most appropriate location with Minimal Assistance.  Toilet transfer to toilet with Minimal Assistance.                      PLAN: Patient to be seen 6 x/week to address the above listed problems via self-care/home management, community/work re-entry, therapeutic activities, therapeutic exercises, therapeutic groups, neuromuscular re-education, sensory integration, wheelchair management/training  Plan of Care expires: 10/26/17  Plan of Care reviewed with: patient    Suzanne HUGH Perry LOTR  10/06/2017

## 2017-10-06 NOTE — PROGRESS NOTES
Spoke with patient at length to complete assessment.  Patient is single and lives in a 2 story home with her elderly parents in [place].  Patient has been on disability since August.  Patient hopes to return home at discharge.  She has friends who have offered to help out with transportation.  Patient has a RW, wheelchair and TTB at home.  Patient had [facility] back in June.  Patient uses [facility] on [address] and [address].  SW will follow and assist with discharge planning as needed.

## 2017-10-06 NOTE — PLAN OF CARE
Problem: Physical Therapy Goal  Goal: Physical Therapy Goal  Goals to be met by: 2017     Patient will increase functional independence with mobility by performin). Supine to sit with Stand-by Assistance  2). Sit to supine with Stand-by Assistance  3). Rolling to Left and Right with Stand-by Assistance.  4). Sit to stand transfer with Minimal Assistance  5). Gait  x  > 25 feet with Contact Guard Assistance using Rolling Walker.   6). Wheelchair propulsion x > 150 feet with Stand-by Assistance     Outcome: Ongoing (interventions implemented as appropriate)  Initial Evaluation completed. POC initiated with patient.

## 2017-10-06 NOTE — H&P
"DATE OF ADMISSION:  10/05/2017.    ATTENDING PHYSICIAN:  Dr. Rowley.    REFERRING FACILITY:  [facility].    CHIEF COMPLAINT:  "Unable to take care of myself."    HISTORY OF PRESENT ILLNESS:  Ms. Jensen is a pleasant 42-year-old female who   was initially admitted to [facility] on 10/31/2017.  The patient with   diagnosis of myelomalacia of the cervical cord and cervical spine stenosis and   multiple prior cervical surgery.  The patient is now status post anterior   cervical diskectomy and fusion at C5 and C6 level.  Procedure was performed on   10/03/2017 and surgeon was Dr. Neal.  Upon completion of acute medical issue,   I had the opportunity to evaluate the patient as the patient has significant   weakness involving all four extremities and not able to manage her ADLs and not   able to ambulate; therefore, I recommended intensive inhouse rehabilitation   prior to consideration of discharge to home.    PAST MEDICAL HISTORY:  Significant for allergy, anemia, arthritis, chronic back   and neck pain, history of GERD, headache.    PAST SURGICAL HISTORY:  Significant for cervical fusion, cholecystectomy,   cosmetic surgery, eye surgery, fracture surgery, ovarian cyst removal, spine   surgery and total body, left.    ALLERGIES:  Bactrim, codeine, hydrocodone, Nexium, nickel___ sutures,   penicillin, sulfa and Darryl vapor inhalers.    MEDICATIONS:  Bisacodyl suppository 10 mg rectal daily, Docusate 100 mg p.o.   daily, folic acid 1 tab daily, Tylenol 650 mg q. 6 hours p.r.n. temperature and   Zofran 8 mg q. 6 hours p.r.n. nausea, oxycodone 10 mg every 4 hours p.r.n. pain   and oxycodone 15 mg q. 4 hours p.r.n. pain and oxycodone 5 mg q. 4 hours p.r.n.   pain based on a sliding scale, ramelteon 8 mg 1 p.o. at bedtime p.r.n. insomnia.    LABORATORIES:  10/06/2017 CBC:  White blood cells 7.3, hemoglobin is 11.3,   hematocrit is 33.4 and platelet is 206 and 10/06/2017, base metabolic panel   included " sodium 139, potassium 3.7, chloride 104, CO2 27, glucose 89, BUN 10,   creatinine 0.7, calcium is 8.7 and also 10/06/2017 UA is negative.    PHYSICAL EXAMINATION:  VITAL SIGNS:  Temperature is 98.6, pulse is 91, respirations 18, systolic blood   pressure is 118, diastolic blood pressure is 73 and pulse ox 94%.  GENERAL:  Calm, cooperative, pleasant, in no acute distress.  HEENT:  Normocephalic, atraumatic, extraocular muscles are intact.  The patient   wear a soft collar.  Surgical incision involving anterior neck is stable.  LUNGS:  Clear to auscultation bilateral.  HEART:  Regular rate and rhythm.  No murmur or gallops noted.  ABDOMEN:  Soft, nontender, nondistended, positive bowel sounds.  EXTREMITIES:  No clubbing, cyanosis or edema is noted.  SKIN:  Again surgical incision involving anterior neck is stable and surgical   incision involving right anterior hip/pelvic secured with staples and is also   stable, sensation to light touch is intact throughout the entire extremities and   trunks.  MANUAL MUSCLE STRENGTH:  Left upper extremity proximally is 3-/5 distally is   2-/5 and left wrist flexion and extension is 1+/5.  Right upper extremity   proximally is 2+ to 3-/5 and distally is 1+ to 2-/5, left lower extremity at   toes flexion and extension is 3/5, ankle dorsiflexion and plantar flexion is 2+   to 3-/5, knee flexion is 2/5 at best.  Straight leg raising is 2-/5, right lower   extremity toe flexion and extension is 1+ to 2/5, ankle dorsiflexion and   plantar flexion is 1/5.  Knee flexion and extension is 1/5.  Straight leg   raising is 1+/5.  GAIT:  Deferred.  GENITAL:  Deferred.  RECTAL:  Deferred.    ASSESSMENT:  1.  Status post anterior cervical diskectomy and fusion at C5-C6.  2.  Quadriparesis.  3.  History of cervical myelomalacia of the cervical cord and history of   cervical spinal stenosis.    PLAN:  PT, OT, speech eval, treat as indicated.  Social Service to arrange for   post-discharge planning.   Rehab to nursing with emphasis on bowel and bladder   management, skin care and fall precautions.    PROGNOSIS:  Fair.    ESTIMATED LENGTH OF STAY:  Approximately 4 to 6 weeks.      AV/IN  dd: 10/06/2017 08:51:42 (CDT)  td: 10/06/2017 10:28:11 (CDT)  Doc ID   #[id number]  Job ID #[id number]    CC:

## 2017-10-06 NOTE — PLAN OF CARE
10/06/17 1636   Final Note   Assessment Type Final Discharge Note   Discharge Disposition Rehab   Hospital Follow Up  Appt(s) scheduled? Yes   Discharge plans and expectations educations in teach back method with documentation complete? Yes

## 2017-10-06 NOTE — PLAN OF CARE
Problem: Patient Care Overview  Goal: Plan of Care Review  Outcome: Ongoing (interventions implemented as appropriate)  Awake, alert and oriented in no distress. Medicated for pain x 1. Max assist with turning and adl's. Continent of bladder. Safety ongoing with alarms in use.

## 2017-10-07 LAB
ANION GAP SERPL CALC-SCNC: 10 MMOL/L
BASOPHILS # BLD AUTO: 0 K/UL
BASOPHILS NFR BLD: 0.8 %
BUN SERPL-MCNC: 10 MG/DL
CALCIUM SERPL-MCNC: 9.2 MG/DL
CHLORIDE SERPL-SCNC: 102 MMOL/L
CO2 SERPL-SCNC: 26 MMOL/L
CREAT SERPL-MCNC: 0.7 MG/DL
DIFFERENTIAL METHOD: ABNORMAL
EOSINOPHIL # BLD AUTO: 0.1 K/UL
EOSINOPHIL NFR BLD: 2.7 %
ERYTHROCYTE [DISTWIDTH] IN BLOOD BY AUTOMATED COUNT: 14.1 %
EST. GFR  (AFRICAN AMERICAN): >60 ML/MIN/1.73 M^2
EST. GFR  (NON AFRICAN AMERICAN): >60 ML/MIN/1.73 M^2
GLUCOSE SERPL-MCNC: 85 MG/DL
HCT VFR BLD AUTO: 36.5 %
HGB BLD-MCNC: 12.2 G/DL
LYMPHOCYTES # BLD AUTO: 1.1 K/UL
LYMPHOCYTES NFR BLD: 20.5 %
MCH RBC QN AUTO: 30.3 PG
MCHC RBC AUTO-ENTMCNC: 33.3 G/DL
MCV RBC AUTO: 91 FL
MONOCYTES # BLD AUTO: 0.4 K/UL
MONOCYTES NFR BLD: 7.8 %
NEUTROPHILS # BLD AUTO: 3.8 K/UL
NEUTROPHILS NFR BLD: 68.2 %
PLATELET # BLD AUTO: 238 K/UL
PMV BLD AUTO: 7.9 FL
POTASSIUM SERPL-SCNC: 4.2 MMOL/L
RBC # BLD AUTO: 4.01 M/UL
SODIUM SERPL-SCNC: 138 MMOL/L
WBC # BLD AUTO: 5.5 K/UL

## 2017-10-07 PROCEDURE — 12800000 HC REHAB SEMI-PRIVATE ROOM

## 2017-10-07 PROCEDURE — 25000003 PHARM REV CODE 250: Performed by: INTERNAL MEDICINE

## 2017-10-07 PROCEDURE — 25000003 PHARM REV CODE 250: Performed by: PHYSICAL MEDICINE & REHABILITATION

## 2017-10-07 PROCEDURE — 97535 SELF CARE MNGMENT TRAINING: CPT

## 2017-10-07 PROCEDURE — 92610 EVALUATE SWALLOWING FUNCTION: CPT

## 2017-10-07 PROCEDURE — 97530 THERAPEUTIC ACTIVITIES: CPT

## 2017-10-07 PROCEDURE — 36415 COLL VENOUS BLD VENIPUNCTURE: CPT

## 2017-10-07 PROCEDURE — 85025 COMPLETE CBC W/AUTO DIFF WBC: CPT

## 2017-10-07 PROCEDURE — 80048 BASIC METABOLIC PNL TOTAL CA: CPT

## 2017-10-07 PROCEDURE — 97110 THERAPEUTIC EXERCISES: CPT

## 2017-10-07 RX ORDER — BISACODYL 5 MG
10 TABLET, DELAYED RELEASE (ENTERIC COATED) ORAL DAILY PRN
Status: DISCONTINUED | OUTPATIENT
Start: 2017-10-07 | End: 2017-11-16

## 2017-10-07 RX ADMIN — BISACODYL 10 MG: 10 SUPPOSITORY RECTAL at 10:10

## 2017-10-07 RX ADMIN — BISACODYL 10 MG: 5 TABLET, COATED ORAL at 10:10

## 2017-10-07 RX ADMIN — DOCUSATE SODIUM 100 MG: 100 CAPSULE, LIQUID FILLED ORAL at 09:10

## 2017-10-07 RX ADMIN — OXYCODONE HYDROCHLORIDE 5 MG: 5 TABLET ORAL at 01:10

## 2017-10-07 RX ADMIN — OXYCODONE HYDROCHLORIDE 5 MG: 5 TABLET ORAL at 11:10

## 2017-10-07 RX ADMIN — Medication 1 TABLET: at 10:10

## 2017-10-07 NOTE — PT/OT/SLP EVAL
"Speech Language Pathology  Evaluation    Zaira Jensen   MRN: [id number]   Admitting Diagnosis: <principal problem not specified>    Diet recommendations: Solid Diet Level: Dental Soft  Liquid Diet Level: Thin HOB to 90 degrees, Small bites/sips, Alternating bites/sips and 1 bite/sip at a time    SLP Treatment Date: 10/07/17  Speech Start Time: 1208     Speech Stop Time: 1221     Speech Total (min): 13 min       TREATMENT BILLABLE MINUTES:  Eval Swallow and Oral Function 13 and Total Time 13    Diagnosis: <principal problem not specified>      Past Medical History:   Diagnosis Date    Allergy     Anemia     Arthritis     Chronic back pain     Chronic neck pain     Functional ovarian cysts     GERD (gastroesophageal reflux disease)     Headache(784.0)     Radiculopathy of arm     Rash     Respiratory distress     bronchospasm at emergence years ago     Past Surgical History:   Procedure Laterality Date    CERVICAL FUSION      CHOLECYSTECTOMY      COSMETIC SURGERY      EYE SURGERY      FRACTURE SURGERY      OVARIAN CYST REMOVAL      SPINE SURGERY      TOTAL BODY LIFT         Has the patient been evaluated by SLP for swallowing? : Yes  Keep patient NPO?: No   General Precautions: Standard, fall          Social Hx: Patient lives with parents.    Prior diet: Regular textures and thin liquids.    Subjective:  Just finished working with OT. Sitting upright in bed. AAOx4. Very pleasant and cooperative. Answers/asks questions appropriately.She shares "some trouble eating and with food getting caught. Its not bad." She reports it is improving daily and liquid wash is effective in washing down bolus. She denies choking/coughing with PO.    Objective:   Patient found with: cervical collar    Oral Musculature Evaluation  Oral Musculature: WFL  Dentition: present and adequate  Mucosal Quality: good  Mandibular Strength and Mobility: WFL  Oral Labial Strength and Mobility: WFL  Velar Elevation: WFL  Buccal " Strength and Mobility: WFL  Volitional Swallow: unable to palpate 2/2 cervical collar  Voice Prior to PO Intake: clear     Bedside Swallow Eval:  Consistencies Assessed: Thin liquids via tsp, straw, Puree via tsp, Soft solids peaches via tsp and Solids 1/2 cookie  Oral Phase: WFL  Pharyngeal Phase: globus sensation with cookie trial only    Assessment:  Zaira Jensen is a 42 y.o. female with a medical diagnosis of s/p ACF C5-C6 and presents with c/o globus sensation with solid foods. She shares she independently uses liquid wash and is effective in eliminating globus sensation. She tolerated all po trials with no overt s/s penetration/asrptiaton. Report of globus sensation with cookie trial which was eliminated with liquid wash. REC downgrading diet to dental soft with thin liquids, alternate bites/sios, small bites/sips. Silva on all meats would be optimal, however, she does not like gravy. Skilled ST not indicated dATT as pt is already using strategies independently. Please reconsult PRN. Upgrade diet as tolerated.      Plan:   Patient to be seen     Plan of Care expires:    Plan of Care reviewed with: patient (cousin)  SLP Follow-up?: No              Zoraida Ortega, CCC-SLP  10/07/2017

## 2017-10-07 NOTE — PLAN OF CARE
Problem: Patient Care Overview  Goal: Plan of Care Review  ALERT, ORIENTED APPETITE IS GOOD FALL PREVENTION ONGOING. MED TEACHING ONGOING. TRANSFERS 2 PERSON MOD ASSIST FOR SAFETY. INCOMPLETE QUADRIPARESIS. WEARS SOFT NECK COLLAR FOR COMFORT MEASURES.  TOTAL FEED. UNABLE TO USE FINE MOTOR SKILLS. BED AND CHAIR ALARM IN USE FOR SAFETY.

## 2017-10-07 NOTE — PROGRESS NOTES
"REHAB FOLLOWUP NOTE    Zaira Jensen is a 42 y.o. female patient.    Chief Complaint: Quadriparesis post cervical revision decompression fusion postop day #4 for inpatient rehabilitation.    History: 42-year-old female with history of chronic neck pain had cervical decompression fusion done ×2 in the past has been progressively getting weaker in all 4 extremities. Neuro workup consistent with myelomalacia of the cervical spine and did undergo revision cervical decompression fusion done on 10/3/2017 by Dr. Neal. Patient presents with quadriparesis for acute inpatient rehabilitation.    Past Medical History:   Diagnosis Date    Allergy     Anemia     Arthritis     Chronic back pain     Chronic neck pain     Functional ovarian cysts     GERD (gastroesophageal reflux disease)     Headache(784.0)     Radiculopathy of arm     Rash     Respiratory distress     bronchospasm at emergence years ago       Temp: 96.4 °F (35.8 °C) (10/07/17 0745)  Pulse: 88 (10/07/17 0745)  Resp: 18 (10/07/17 0745)  BP: 127/83 (10/07/17 0745)  SpO2: 97 % (10/07/17 0745)  Weight: 84.4 kg (186 lb) (10/05/17 1429)  Height: 5' 5" (165.1 cm) (10/05/17 1429)    Lab Results   Component Value Date    WBC 5.50 10/07/2017    HGB 12.2 10/07/2017    HCT 36.5 (L) 10/07/2017     10/07/2017    ALT 12 09/21/2017    AST 14 09/21/2017     10/07/2017    K 4.2 10/07/2017     10/07/2017    CREATININE 0.7 10/07/2017    BUN 10 10/07/2017    CO2 26 10/07/2017    TSH 0.830 08/18/2017    INR 1.0 09/21/2017       Physical Examination:  Alert, voices no complaints. Denies any dysphagia problems.  Anterior cervical incision clean and dry. Dry dressing in place.  Lungs are clear to auscultation.  Heart with a regular rate and rhythm.  Abdomen is soft, nontender, bowel sounds noted. Complaints of constipation. States she is continent of bladder.  Extremities without any edema or calf tenderness noted. Presents with decreased active to passive " range of motion and decreased motor strength in all 4 extremities. Presents with some tremors in upper extremities.    Impression:  42-year-old female status post revision cervical decompression and fusion for cervical spinal stenosis, cervical myelomalacia with quadriparesis for inpatient rehabilitation.  History of DJD.  History of cervical decompression fusion ×2 in the past.  Postoperative acute blood loss anemia.    Recommendations: Continue current PT, OT, nursing care.    Hamilton Drummond MD  10/7/2017

## 2017-10-07 NOTE — PLAN OF CARE
Problem: Patient Care Overview  Goal: Plan of Care Review  Outcome: Ongoing (interventions implemented as appropriate)  Bed alarm on at all times, side rails up for safety, call light is within easy reach , instructed patient to call at any time for assistance.Incision is clean dry and intact, with no drainage noted at this time, will continue to observe during HS hours.Patient will be kept pain free, during shift.Resp are even and unlabored, lungs sound clear, no s & s of distress noted at this time.  Will continue to monitor and observe during HS hours.Patient turned and repositioned often during HS hours.

## 2017-10-07 NOTE — PT/OT/SLP PROGRESS
Physical Therapy         Treatment        Zaira Jensen   MRN: [id number]     PT Received On: 10/07/17  Total Time (min): 75        Billable Minutes:  Therapeutic Activity 55 and Therapeutic Exercise 20  Total Minutes: 75 (9:05 - 10:30)    Treatment Type: Treatment  PT/PTA: PT     PTA Visit Number: 0       General Precautions: Standard, fall  Orthopedic Precautions:     Braces: Braces: Cervical collar         Subjective:  Communicated with MD prior to session.    Pain/Comfort  Pain Rating 1: 2/10  Location 1: lumbar spine  Pain Addressed 1: Reposition, Distraction, Cessation of Activity  Pain Rating Post-Intervention 1: 2/10    Objective:  Patient found seated in reclining w/c,  Patient found with: cervical collar in place throughout session.    Functional Mobility:  Bed Mobility:   Supine to sit: Maximum Assistance   Sit to supine: Maximum Assistance   Rolling: Maximum Assistance     Transfer Training:  Sit to stand:Moderate Assistance and Maximum Assistance with No Assistive Device and Grab bars   x3  Bed <> Chair:  Squat Pivot with Maximum Assistance with No Assistive Device      Wheelchair Training: 3' with min assist and cues    Additional Treatment:  SUPINE: SLR, hip abd/add, x 10 reps each; gentle manual stretch B heelcords x 15 sec x 2 each  STAND BALANCE TRAINING: at grab rail: x 8 min x 1, 12 min x 1 with min assist and cues, with lateral wt shifting  Completed transfer and LE strength assessment.    Activity Tolerance:  Patient tolerated treatment well and Patient limited by fatigue    Patient left supine with call button in reach and family present.    Assessment:  Zaira Jensen is a 42 y.o. female.    Rehab potential is good.    Activity tolerance: Good    GOALS:    Physical Therapy Goals        Problem: Physical Therapy Goal    Goal Priority Disciplines Outcome Goal Variances Interventions   Physical Therapy Goal     PT/OT, PT Ongoing (interventions implemented as appropriate)     Description:  Goals  to be met by: 2017     Patient will increase functional independence with mobility by performin). Supine to sit with Stand-by Assistance  2). Sit to supine with Stand-by Assistance  3). Rolling to Left and Right with Stand-by Assistance.  4). Sit to stand transfer with Minimal Assistance  5). Gait  x  > 25 feet with Contact Guard Assistance using Rolling Walker.   6). Wheelchair propulsion x > 150 feet with Stand-by Assistance                       PLAN:    Patient to be seen daily  to address the above listed problems via gait training, therapeutic activities, therapeutic exercises, neuromuscular re-education, wheelchair management/training  Plan of Care expires: 17  Plan of Care reviewed with: patient         Richie T Allan, PT 10/7/2017

## 2017-10-07 NOTE — PT/OT/SLP PROGRESS
Occupational Therapy  Treatment    Zaira Jensen   MRN: [id number]   Admitting Diagnosis: <principal problem not specified>    OT Date of Treatment: 10/07/17          Billable Minutes:  Self Care/Home Management 90  Total Minutes: 90    General Precautions: Standard, fall  Orthopedic Precautions:    Braces: Cervical collar    Do you have any cultural, spiritual, Latter day conflicts, given your current situation?: None    Subjective:  Communicated with nurse prior to session.    Pain/Comfort  Pain Rating 1: 0/10  Pain Rating 2: 0/10    Objective:  Patient found with: cervical collar    Functional Mobility:  Bed Mobility:   Supine to sit: Activity did not occur   Sit to supine: Maximum Assistance   Rolling: Maximum Assistance   Scooting: Activity did not occur    Transfer Training:   Bed <> Chair:  Stand Pivot with Maximum Assistance 2 staff with No Assistive Device.  Toilet Transfer:  Pt Stand Pivot with Maximum Assistance 2 staff with grab bar  Bathing:  Patient performed bathing with Total Assistance from toilet in bathroom.   UE Dressing:  Patient performed UE Dressing with Maximum Assistance.   LE Dressing:  Patient don/doffed socks with Total Assistance    Toilet Training:  Pt performed toileting with Total Assistance with Grab  bar at toilet.    Balance:   Static Sit: GOOD: Takes MODERATE challenges from all directions  Dynamic Sit:  GOOD: Maintains balance through MODERATE excursions of active trunk movement  Static Stand: 0: Needs MAXIMAL assist to maintain   Dynamic stand: 0: N/A      Patient left HOB elevated with call button in reach, bed alarm on and family present    ASSESSMENT:  Zaira Jensen is a 42 y.o. female with a medical diagnosis of <principal problem not specified> .    Rehab potential is good    Activity tolerance: Good    Discharge recommendations: home     Equipment recommendations:  (tbd)     GOALS:    Occupational Therapy Goals        Problem: Occupational Therapy Goal    Goal Priority  Disciplines Outcome Interventions   Occupational Therapy Goal     OT, PT/OT Ongoing (interventions implemented as appropriate)    Description:  Goals to be met by: 10/26/17     Patient will increase functional independence with ADLs by performing:    Feeding with Contact Guard Assistance.  UE Dressing with Minimal Assistance.  LE Dressing with Minimal Assistance.  Grooming while seated with Minimal Assistance.  Toileting from toilet with Moderate Assistance for hygiene and clothing management.   Bathing from most appropriate location with Minimal Assistance.  Toilet transfer to toilet with Minimal Assistance.                      Plan:  Patient to be seen 6 x/week to address the above listed problems via self-care/home management, community/work re-entry, therapeutic activities, therapeutic exercises, therapeutic groups, neuromuscular re-education, sensory integration, wheelchair management/training  Plan of Care expires: 10/26/17  Plan of Care reviewed with: patient, family         BREANA Recinos  10/07/2017

## 2017-10-07 NOTE — PLAN OF CARE
Problem: Occupational Therapy Goal  Goal: Occupational Therapy Goal  Goals to be met by: 10/26/17     Patient will increase functional independence with ADLs by performing:    Feeding with Contact Guard Assistance.  UE Dressing with Minimal Assistance.  LE Dressing with Minimal Assistance.  Grooming while seated with Minimal Assistance.  Toileting from toilet with Moderate Assistance for hygiene and clothing management.   Bathing from most appropriate location with Minimal Assistance.  Toilet transfer to toilet with Minimal Assistance.     Outcome: Ongoing (interventions implemented as appropriate)  OT Goals remain appropriate.  BREANA Recinos

## 2017-10-07 NOTE — PLAN OF CARE
Overall Plan of Care      Zaira Jensen   MRN: [id number]  Admit Date/Time: 10/5/2017  2:18 PM   Admitting Diagnosis: Quadriparesis secondary to cervical myelopathy post decompression fusion.  Estimated Length of Stay (days): 4 weeks.    1.  Medical Prognosis:     I have reviewed each team member's Treatment Plan and the current list of barriers/impairments and limitations for this patient.  I approve the goals and recommendations outlined in the Transdisciplinary Plan of Care     1. Assessment:      Medical Prognosis       [ x  ]  Improvement expected in admitting condition, with associated             Functional improvement       [   ] Medical condition is chronic or deteriorating, but the patient          Should have functional improvement       [   ]  Other:        Medical/Nursing Interventions:    [ x ]  Fall Prevention Program                                 [ x ]  Adequate Nutrition/Hydration    [x  ]  Monitor Skin Condition                                   [x  ]  Adaptive Equipment    [x  ]  Implement Rehab Therapy Goals                  [x  ]  Bowel and Bladder Program    [x  ]  Monitor Surgical Site Healing                         [  ]  Manage Swallowing disorder    [  ]  Manage Swallowing Disorder                         [ x ]  Pain Management    [x  ]  Effectiveness of Medications                          [ x ]  Patient / Family Education    [x  ]  Manage Risk of UTI ([facility])                   [  ]  Diabetic Teaching, Blood Sugar                                                                                      Checks / Insulin Administration    [  ]  Peg Tube Feeds                                             [x  ]  Frequent Vitals/Neuro                                                                                       Assessments and/or Changes     [  ] Trach Care/Education           [  ]  Ostomy Care/Education      Therapy Plan:    Physical Therapy:  Intensity (hrs/day): 1-1.5  Frequency  (day/wk:) 7  Estimated Discharge Date: 11/18/17     PT Treatment Plan:  Plan: gait training, therapeutic activities, therapeutic exercises, neuromuscular re-education, wheelchair management/training      Occupational Therapy:  Intensity (hrs/day): 1-1.5  Frequency (day/wk:) 7  Estimated Discharge Date:  10/26/17    OT Treatment Plan:  OT Plan: self-care/home management, community/work re-entry, therapeutic activities, therapeutic exercises, therapeutic groups, neuromuscular re-education, sensory integration, wheelchair management/training        Therapy Recommendations:    Therapy Discharge Recommendations: home     Therapy Equipment Recommendations:  (TBD)          Anticipated Functional Outcome:    [  ]  Independent    [  ]  Modified Independent    [x  ]  Requiring Supervision / Assistance      Discharge Planning:    Discharge Disposition:    [  ]  Home with Home Health    [x  ]  Home with Oupatient    [  ]  Assisted Living Facility      Team Goal:    To enable the patient's safe return to the home or a community based environment upon discharge.       Hamilton Drummond MD  10/07/2017  1:16 PM

## 2017-10-08 PROCEDURE — 97110 THERAPEUTIC EXERCISES: CPT

## 2017-10-08 PROCEDURE — 97530 THERAPEUTIC ACTIVITIES: CPT

## 2017-10-08 PROCEDURE — 12800000 HC REHAB SEMI-PRIVATE ROOM

## 2017-10-08 PROCEDURE — 25000003 PHARM REV CODE 250: Performed by: INTERNAL MEDICINE

## 2017-10-08 PROCEDURE — 97112 NEUROMUSCULAR REEDUCATION: CPT

## 2017-10-08 PROCEDURE — 97535 SELF CARE MNGMENT TRAINING: CPT

## 2017-10-08 RX ADMIN — DOCUSATE SODIUM 100 MG: 100 CAPSULE, LIQUID FILLED ORAL at 07:10

## 2017-10-08 RX ADMIN — Medication 1 TABLET: at 07:10

## 2017-10-08 RX ADMIN — OXYCODONE HYDROCHLORIDE 5 MG: 5 TABLET ORAL at 03:10

## 2017-10-08 RX ADMIN — OXYCODONE HYDROCHLORIDE 5 MG: 5 TABLET ORAL at 07:10

## 2017-10-08 NOTE — PLAN OF CARE
10/07/17 2057   Patient Assessment/Suction   Level of Consciousness (AVPU) alert   Respiratory Effort Normal;Unlabored   Expansion/Accessory Muscles/Retractions no use of accessory muscles   PRE-TX-O2-ETCO2   O2 Device (Oxygen Therapy) room air   SpO2 100 %   Pulse 92   Incentive Spirometer   Administration (Incentive Spirometer) done with encouragement   Number of Repetitions (Incentive Spirometer) 10   Level (mL) (Incentive Spirometer) 1250   Patient Tolerance good   Pt has great effort with IS and tolerates RA well.

## 2017-10-08 NOTE — PT/OT/SLP PROGRESS
Physical Therapy         Treatment        Zaira Jensen   MRN: [id number]     PT Received On: 10/08/17  Total Time (min): 30        Billable Minutes:  Therapeutic Activity 20 and Therapeutic Exercise 10  Total Minutes: 30    Treatment Type: Treatment  PT/PTA: PT     PTA Visit Number: 0       General Precautions: Standard, fall  Orthopedic Precautions:     Braces: Braces: Cervical collar         Subjective:  Communicated with nurse prior to session.         Objective:  Patient found seated in w/c, pleasant and cooperative. Patient found with: cervical collar in place throughout session.    Functional Mobility:    Transfer Training:  Sit to stand:Moderate Assistance with Grab bars  x 3    Gait Trainin' x 3 in // bars with min assist and cues    Additional Treatment:  STANDING: in // bars: ankle PF, minisquats, x 20 reps each     Lateral wt shifting    Activity Tolerance:  Patient tolerated treatment well    Patient left up in chair with call button in reach, chair alarm on and family present.    Assessment:  Zaira Jensen is a 42 y.o. female.    Rehab potential is good.    Activity tolerance: Good       GOALS:    Physical Therapy Goals        Problem: Physical Therapy Goal    Goal Priority Disciplines Outcome Goal Variances Interventions   Physical Therapy Goal     PT/OT, PT Ongoing (interventions implemented as appropriate)     Description:  Goals to be met by: 2017     Patient will increase functional independence with mobility by performin). Supine to sit with Stand-by Assistance  2). Sit to supine with Stand-by Assistance  3). Rolling to Left and Right with Stand-by Assistance.  4). Sit to stand transfer with Minimal Assistance  5). Gait  x  > 25 feet with Contact Guard Assistance using Rolling Walker.   6). Wheelchair propulsion x > 150 feet with Stand-by Assistance                       PLAN:    Patient to be seen daily  to address the above listed problems via gait training, therapeutic  activities, therapeutic exercises, neuromuscular re-education, wheelchair management/training  Plan of Care expires: 11/18/17  Plan of Care reviewed with: patient         Richie MENDEZ Allan, PT 10/8/2017

## 2017-10-08 NOTE — PT/OT/SLP PROGRESS
"Occupational Therapy  Treatment    Zaira Jensen   MRN: [id number]   Admitting Diagnosis: <principal problem not specified>    OT Date of Treatment: 10/08/17   Total Time (min): 90 min      Billable Minutes:  Self Care/Home Management 45, Therapeutic Activity 15, Therapeutic Exercise 15 and Neuromuscular Re-education 15  Total Minutes: 90    General Precautions: Standard, fall  Orthopedic Precautions:  (spinal/cervical)  Braces: Cervical collar    Do you have any cultural, spiritual, Yazdanism conflicts, given your current situation?: None    Subjective:  Communicated with Pat, nurse prior to session.    Pain/Comfort  Pain Rating 1: 0/10  Location - Side 1: Right  Location - Orientation 1: generalized  Location 1: shoulder  Pain Addressed 1: Cessation of Activity, Reposition  Pain Rating Post-Intervention 1: 1/10    Objective:  Patient found with: cervical collar seated in wheelchair    Functional Mobility:  Feeding:  OT placed foam cylinder on spoon handle and set up breakfast. Pt was able to scoop using R hand and bring to mouth with SBA at slow pace with tremors noted in hand. As pt fatigued, OT provided upper arm support and guided pt's movements to facilitate supination. Pt was able to eat a muffin with L hand and bring milk carton to mouth with cues to use B hands with CGA. Extended time needed to complete task.    Grooming:  Pt brushed her teeth with partial set-up on table at wheelchair level. Pt was able to squeeze paste onto brush with CGA and used R hand to brush and L hand to reposition brush in hand as needed.  Pt needed Max A to wash face with washcloth and apply cream.    Balance & Positioning:   Pt noted to be leaning slightly to her L side in wheelchair. OT repositioned pt to midline and reclined wheelchair back ~ 15* due to pt c/o her "head jutting too far forward." Cervical roll placed behind her neck.    Additional Treatment:  FM activities: Pincer grasp of marble with each finger of B hands, " transferring marble to other hand and then releasing into jar.  Handwriting activities with R forearm and wrist stabilized on table and pen built up with foam.  AROM exercises x 5 reps each: forearm supination & pronation, wrist extension, finger extension.  All tasks required extended time.    Patient left up in chair with all lines intact, call button in reach and cousin present    ASSESSMENT:  Zaira Jensen is a 42 y.o. female with a medical diagnosis of <principal problem not specified>. Pt put forth good effort with all treatment activities.  Continue OT treatment to maximize B UE strength, fine motor control & independence with ADLs.    Rehab potential is good    Activity tolerance: Fair    Discharge recommendations: home     Equipment recommendations:  (TBD)     GOALS:    Occupational Therapy Goals        Problem: Occupational Therapy Goal    Goal Priority Disciplines Outcome Interventions   Occupational Therapy Goal     OT, PT/OT Ongoing (interventions implemented as appropriate)    Description:  Goals to be met by: 10/26/17     Patient will increase functional independence with ADLs by performing:    Feeding with Contact Guard Assistance.  UE Dressing with Minimal Assistance.  LE Dressing with Minimal Assistance.  Grooming while seated with Minimal Assistance.  Toileting from toilet with Moderate Assistance for hygiene and clothing management.   Bathing from most appropriate location with Minimal Assistance.  Toilet transfer to toilet with Minimal Assistance.                      Plan:  Patient to be seen 6 x/week to address the above listed problems via self-care/home management, community/work re-entry, therapeutic activities, therapeutic exercises, neuromuscular re-education, sensory integration, wheelchair management/training  Plan of Care expires: 10/26/17  Plan of Care reviewed with: patient         BREANA Hill  10/08/2017

## 2017-10-08 NOTE — PLAN OF CARE
Problem: Patient Care Overview  Goal: Plan of Care Review  Outcome: Ongoing (interventions implemented as appropriate)  Awake, alert and oriented in no distress. Medicated for pain x 1. Max assist with adl's and transfers. Safety ongoing.

## 2017-10-08 NOTE — PLAN OF CARE
Problem: Patient Care Overview  Goal: Plan of Care Review  Alert, oriented family member at bedside. Med teaching ongoing fall prevention ongoing. Transfers one-two person min-mod assist

## 2017-10-08 NOTE — PLAN OF CARE
Problem: Occupational Therapy Goal  Goal: Occupational Therapy Goal  Goals to be met by: 10/26/17     Patient will increase functional independence with ADLs by performing:    Feeding with Contact Guard Assistance.  UE Dressing with Minimal Assistance.  LE Dressing with Minimal Assistance.  Grooming while seated with Minimal Assistance.  Toileting from toilet with Moderate Assistance for hygiene and clothing management.   Bathing from most appropriate location with Minimal Assistance.  Toilet transfer to toilet with Minimal Assistance.     Outcome: Ongoing (interventions implemented as appropriate)  Patient is making progress. Goals remain appropriate. Continue OT plan of care.  BREANA Gamboa  10/8/2017

## 2017-10-09 LAB — BACTERIA UR CULT: NORMAL

## 2017-10-09 PROCEDURE — 97530 THERAPEUTIC ACTIVITIES: CPT

## 2017-10-09 PROCEDURE — 25000003 PHARM REV CODE 250: Performed by: INTERNAL MEDICINE

## 2017-10-09 PROCEDURE — 97110 THERAPEUTIC EXERCISES: CPT

## 2017-10-09 PROCEDURE — 97116 GAIT TRAINING THERAPY: CPT

## 2017-10-09 PROCEDURE — 12800000 HC REHAB SEMI-PRIVATE ROOM

## 2017-10-09 RX ADMIN — Medication 1 TABLET: at 08:10

## 2017-10-09 RX ADMIN — OXYCODONE HYDROCHLORIDE 5 MG: 5 TABLET ORAL at 12:10

## 2017-10-09 RX ADMIN — DOCUSATE SODIUM 100 MG: 100 CAPSULE, LIQUID FILLED ORAL at 08:10

## 2017-10-09 NOTE — PLAN OF CARE
Had a mental melt down in gym was sobbing uncontrollable. Allowed to vent and redirected to focus on improvements. Patient began to control emotions.

## 2017-10-09 NOTE — PLAN OF CARE
10/08/17 2132   Patient Assessment/Suction   Level of Consciousness (AVPU) alert   Respiratory Effort Normal;Unlabored   Expansion/Accessory Muscles/Retractions no use of accessory muscles   PRE-TX-O2-ETCO2   O2 Device (Oxygen Therapy) room air   SpO2 97 %   Pulse 92   Incentive Spirometer   Administration (Incentive Spirometer) done with encouragement   Number of Repetitions (Incentive Spirometer) 8   Level (mL) (Incentive Spirometer) 1500   Patient Tolerance good   pt tolerates RA well. Pt has great effort with IS.

## 2017-10-09 NOTE — PROGRESS NOTES
[facility]  Physical Medicine & Rehab  Progress Note    Patient Name: Zaira Jensen  MRN: [id number]  Patient Class: IP- Rehab   Admission Date: 10/5/2017  Length of Stay: 4 days  Attending Physician: Anirudh Rowley MD  Primary Care Provider: Cuate Alvarez MD    Subjective:     Principal Problem: quadrapresis  Interval History: pt is 42 y.o female s/p ACDF & h/o cervical myelomalacia  & spinal stenosis, participating with therapy     Scheduled Medications:    docusate sodium  100 mg Oral Daily    folic acid-vit B6-vit B12 2.5-25-2 mg  1 tablet Oral Daily       PRN Medications: acetaminophen, aluminum-magnesium hydroxide-simethicone, bisacodyl, bisacodyl, lactulose, ondansetron, oxycodone, oxycodone, oxycodone, ramelteon, senna-docusate 8.6-50 mg    Review of Systems   Constitutional: Negative.    HENT: Negative.    Eyes: Negative.    Respiratory: Negative.    Cardiovascular: Negative.    Gastrointestinal: Negative.    Endocrine: Negative.    Genitourinary: Negative.    Musculoskeletal: Negative.    Skin: Negative.    Allergic/Immunologic: Negative.    Neurological: Negative.    Hematological: Negative.    Psychiatric/Behavioral: Negative.      Objective:     Vital Signs (Most Recent):  Temp: 97.6 °F (36.4 °C) (10/09/17 0523)  Pulse: 91 (10/09/17 0523)  Resp: 16 (10/09/17 0523)  BP: (!) 100/58 (10/09/17 0523)  SpO2: 97 % (10/09/17 0523)    Vital Signs (24h Range):  Temp:  [97.6 °F (36.4 °C)-98.6 °F (37 °C)] 97.6 °F (36.4 °C)  Pulse:  [] 91  Resp:  [16] 16  SpO2:  [97 %-100 %] 97 %  BP: (100-126)/(58-85) 100/58     Physical Exam   Constitutional: She is oriented to person, place, and time. She appears well-developed and well-nourished. No distress.   HENT:   Head: Normocephalic and atraumatic.   Right Ear: External ear normal.   Left Ear: External ear normal.   Nose: Nose normal.   Mouth/Throat: Oropharynx is clear and moist. No oropharyngeal exudate.   Eyes: Conjunctivae and EOM are  normal. Pupils are equal, round, and reactive to light. Right eye exhibits no discharge. Left eye exhibits no discharge. No scleral icterus.   Neck:   + soft cervical collar    Cardiovascular: Normal rate, regular rhythm, normal heart sounds and intact distal pulses.  Exam reveals no gallop and no friction rub.    No murmur heard.  Pulmonary/Chest: Effort normal and breath sounds normal. No respiratory distress. She has no wheezes. She has no rales. She exhibits no tenderness.   Abdominal: Soft. Bowel sounds are normal. She exhibits no distension and no mass. There is no tenderness. There is no rebound and no guarding. No hernia.   Genitourinary:   Genitourinary Comments: deferred   Musculoskeletal: Normal range of motion. She exhibits no edema, tenderness or deformity.   Neurological: She is alert and oriented to person, place, and time. She exhibits normal muscle tone.   Skin: No rash noted. She is not diaphoretic. No erythema. No pallor.   Incisions stable    Psychiatric: She has a normal mood and affect. Her behavior is normal.     NEUROLOGICAL EXAMINATION:     MENTAL STATUS   Oriented to person, place, and time.     CRANIAL NERVES     CN III, IV, VI   Pupils are equal, round, and reactive to light.  Extraocular motions are normal.       Assessment/Plan:      Zaira Jensen is a 42 y.o. female admitted to inpatient rehabilitation on 10/5/2017 for <principal problem not specified> with impaired mobility and ADLs. Patient remains appropriate for PT, OT, and as required Speech therapy. Patient continues to require 24 hour nursing care as well as daily Physician assessment.    Active Diagnoses:    Diagnosis Date Noted POA    Quadriplegia, C5-C7 complete [G82.53] 10/05/2017 Yes      Problems Resolved During this Admission:    Diagnosis Date Noted Date Resolved POA     Quadriparesis, cont PT, OT, ST  DVT prophylaxis, + SCD     DISCHARGE PLANNING:  Tentative Discharge Date:     No future appointments.    Anirudh  MD Amrik  Department of Physical Medicine & Rehab  [facility]

## 2017-10-09 NOTE — PT/OT/SLP PROGRESS
Occupational Therapy  Treatment    Zaira Jensen   MRN: [id number]   Admitting Diagnosis: <principal problem not specified>    OT Date of Treatment: 10/09/17   Total Time (min): 90 min      Billable Minutes:  Therapeutic Activity 45 and Therapeutic Exercise 45  Total Minutes: 90    General Precautions: Standard, fall  Orthopedic Precautions:    Braces: Cervical collar    Do you have any cultural, spiritual, Jainism conflicts, given your current situation?: None    Subjective:  Communicated with nurse prior to session.    Pain/Comfort  Pain Rating 1: 0/10  Pain Rating 2: 0/10    Objective:  Patient found with: cervical collar    Functional Mobility:  Bed Mobility:   Supine to sit: Activity did not occur   Sit to supine: Activity did not occur   Rolling: Activity did not occur   Scooting: Activity did not occur    Additional Treatment:  Session brought to gym via w/c. Passive pulleys B UE as tolerated. Worked on fine motor, grasp and light strengthening B UE. Reach and place activities to illicit in hand manipulation and shoulder activity.    Patient left up in chair with Physical Therapist present    ASSESSMENT:  Zaira Jensen is a 42 y.o. female with a medical diagnosis of <principal problem not specified> performance deficits of physical skills including impaired balance, mobility, strength, dexterity, fine motor coordination, gross motor coordination and endurance.  These performance deficits have resulted in activity limitations including, but not limited to: bed mobility, transfers, ambulating short distances, navigating around obstacles during ambulation, transitional movement patterns ( kneeling, bending, reaching), upper body dressing, lower body dressing, grooming, toileting, bathing and carrying objects.   Pt's role as active adult  has been significantly affected.  Patient will benefit from skilled OT services to maximize level of independence with deficits listed above.  .    Rehab potential is  good    Activity tolerance: Good    Discharge recommendations: home     Equipment recommendations:  (tbd)     GOALS:    Occupational Therapy Goals        Problem: Occupational Therapy Goal    Goal Priority Disciplines Outcome Interventions   Occupational Therapy Goal     OT, PT/OT Ongoing (interventions implemented as appropriate)    Description:  Goals to be met by: 10/26/17     Patient will increase functional independence with ADLs by performing:    Feeding with Contact Guard Assistance.  UE Dressing with Minimal Assistance.  LE Dressing with Minimal Assistance.  Grooming while seated with Minimal Assistance.  Toileting from toilet with Moderate Assistance for hygiene and clothing management.   Bathing from most appropriate location with Minimal Assistance.  Toilet transfer to toilet with Minimal Assistance.                      Plan:  Patient to be seen 6 x/week to address the above listed problems via self-care/home management, community/work re-entry, therapeutic activities, therapeutic exercises, neuromuscular re-education, sensory integration, wheelchair management/training  Plan of Care expires: 10/26/17  Plan of Care reviewed with: patient         BREANA Recinos  10/09/2017

## 2017-10-09 NOTE — PLAN OF CARE
Problem: Occupational Therapy Goal  Goal: Occupational Therapy Goal  Goals to be met by: 10/26/17     Patient will increase functional independence with ADLs by performing:    Feeding with Contact Guard Assistance.  UE Dressing with Minimal Assistance.  LE Dressing with Minimal Assistance.  Grooming while seated with Minimal Assistance.  Toileting from toilet with Moderate Assistance for hygiene and clothing management.   Bathing from most appropriate location with Minimal Assistance.  Toilet transfer to toilet with Minimal Assistance.     Outcome: Ongoing (interventions implemented as appropriate)  OT goals remain appropriate.

## 2017-10-09 NOTE — PT/OT/SLP PROGRESS
"Physical Therapy         Treatment        Zaira Jensen   MRN: [id number]     PT Received On: 10/09/17  Total Time (min): 90        Billable Minutes:  Gait Training 20, Therapeutic Activity 30 and Therapeutic Exercise 40  Total Minutes: 90    Treatment Type: Treatment  PT/PTA: PT     PTA Visit Number: 0       General Precautions: Standard, fall  Orthopedic Precautions:     Braces: Braces: Cervical collar         Subjective:  Pain/Comfort  Pain Rating 1: 5/10  Location 1: lumbar spine  Pain Addressed 1: Reposition, Distraction, Cessation of Activity  Pain Rating Post-Intervention 1: 5/10  Pain Rating 2: 5/10  Location - Side 2: Right  Location 2: leg  Pain Addressed 2: Reposition, Distraction, Cessation of Activity  Pain Rating Post-Intervention 2: 5/10    Objective:  Patient found seated in reclining w/c, pleasant and cooperative. Patient found with: cervical collar    Functional Mobility:  Bed Mobility:   Supine to sit: Maximum Assistance   Sit to supine: Moderate Assistance x 2   Rolling: Moderate Assistance      Transfer Training:  Sit to stand:Moderate Assistance with Rolling Walker 7  Bed <> Chair:  Stand Pivot with Moderate Assistance with No Assistive Device  x 3    Wheelchair Trainin' with min assist and cues    Gait Trainin' x 2 in // bars with min assist and cues    Additional Treatment:  SUPINE: Hip Abd/Add Active assist, Heel slides Active assist, ankle pumps; 20x  SEATED: Hip flexion marches Active assist, LAQ; 20x   STANDING: in // bars: heel raises 2x10        Reassessed function and LE strength.    Pt reported difficulty breathing while transferring from supine to sit apparently due to RLE pain, nursing notified who promptly arrived, pt reported merely had a "panic attack", nursing checked vitals and cleared pt to continue, pt was returned to supine in bed after nursing toileted patient.    Activity Tolerance:  Patient tolerated treatment well    Patient left supine with call button in reach, " RN notified and family present.    Assessment:  Zaira Jensen is a 42 y.o. female with a medical diagnosis of ACDF c5-c6. Pt required modifications including active assist and/or gravity eliminated positions in order to perform LE exercises due to weakness. However pt was able to ambulate with Min A in // bars.  Pt remains appropriate for PT POC.     Rehab potential is fair.    Activity tolerance: Fair    GOALS:    Physical Therapy Goals        Problem: Physical Therapy Goal    Goal Priority Disciplines Outcome Goal Variances Interventions   Physical Therapy Goal     PT/OT, PT Ongoing (interventions implemented as appropriate)     Description:  Goals to be met by: 2017     Patient will increase functional independence with mobility by performin). Supine to sit with Stand-by Assistance  2). Sit to supine with Stand-by Assistance  3). Rolling to Left and Right with Stand-by Assistance.  4). Sit to stand transfer with Minimal Assistance  5). Gait  x  > 25 feet with Contact Guard Assistance using Rolling Walker.   6). Wheelchair propulsion x > 150 feet with Stand-by Assistance                       PLAN:    Patient to be seen daily  to address the above listed problems via gait training, therapeutic activities, therapeutic exercises, neuromuscular re-education, wheelchair management/training  Plan of Care expires: 17  Plan of Care reviewed with: patient         Richie VANESSA Lemus, PT 10/9/2017

## 2017-10-09 NOTE — PLAN OF CARE
Problem: Patient Care Overview  Goal: Plan of Care Review  Outcome: Ongoing (interventions implemented as appropriate)  Awake, alert and oriented in no distress. Denies pain or discomfort.Continent of bowel and bladder. Understands and is compliant with goals. Safety ongoing with alarms in use.

## 2017-10-10 PROCEDURE — 97116 GAIT TRAINING THERAPY: CPT

## 2017-10-10 PROCEDURE — 25000003 PHARM REV CODE 250: Performed by: INTERNAL MEDICINE

## 2017-10-10 PROCEDURE — 97530 THERAPEUTIC ACTIVITIES: CPT

## 2017-10-10 PROCEDURE — 12800000 HC REHAB SEMI-PRIVATE ROOM

## 2017-10-10 PROCEDURE — 25000003 PHARM REV CODE 250: Performed by: PHYSICAL MEDICINE & REHABILITATION

## 2017-10-10 PROCEDURE — 97110 THERAPEUTIC EXERCISES: CPT

## 2017-10-10 PROCEDURE — 97112 NEUROMUSCULAR REEDUCATION: CPT

## 2017-10-10 PROCEDURE — 97535 SELF CARE MNGMENT TRAINING: CPT

## 2017-10-10 RX ORDER — CALCIUM CARBONATE 200(500)MG
500 TABLET,CHEWABLE ORAL 3 TIMES DAILY PRN
Status: DISCONTINUED | OUTPATIENT
Start: 2017-10-10 | End: 2017-11-16

## 2017-10-10 RX ADMIN — CALCIUM CARBONATE (ANTACID) CHEW TAB 500 MG 500 MG: 500 CHEW TAB at 03:10

## 2017-10-10 RX ADMIN — Medication 1 TABLET: at 09:10

## 2017-10-10 RX ADMIN — DOCUSATE SODIUM 100 MG: 100 CAPSULE, LIQUID FILLED ORAL at 09:10

## 2017-10-10 RX ADMIN — OXYCODONE HYDROCHLORIDE 5 MG: 5 TABLET ORAL at 07:10

## 2017-10-10 NOTE — PROGRESS NOTES
Team conference attended and patient discussed. Spoke with patient to discuss treatment plan, goals and ELOS.  SW will follow and assist with discharge planning as needed.

## 2017-10-10 NOTE — PATIENT CARE CONFERENCE
Weekly Staffing Report      Date Admitted: 10/5/2017 :   Staffing Date: 10/10/2017     Patient Active Problem List   Diagnosis    Lumbago    Chronic neck pain    Cervical spondylosis without myelopathy    Degeneration of cervical intervertebral disc    Brachial neuritis or radiculitis NOS    Acquired spondylolisthesis    Biliary colic    Cervical spondylosis with myelopathy    Intervertebral disc disorder of cervical region with myelopathy    Spondylosis with myelopathy, lumbar region    Degeneration of lumbar or lumbosacral intervertebral disc    Atypical chest pain    Normocytic normochromic anemia    Abnormal CT scan    Cervical myelopathy    Stiffness of right shoulder joint    Stiffness of right wrist joint    Bilateral arm weakness    Fibroadenoma    Intervertebral disc stenosis of neural canal    Myelomalacia    Other specified symptoms and signs involving the circulatory and respiratory systems    Brief situational non-psychotic disorder    Goiter    Abnormality of gait    Coarse tremors    Cervical spinal stenosis s/p C5-6 ACDF    GERD (gastroesophageal reflux disease)    Chronic back pain    Quadriplegia, C5-C7 complete          Team Members Present:  Physician Team Member: Dr Rowley  Nursing Team Member: Hitesh Ayala RN  Case Management Team Member: Missy Bailey CM/SW  PT Team Member: Richie Lemus PT  OT Team Member: Suzanne Perry OT  SLP Team Member: Julien CASTANEDA    Nursing  Skin: Intact, incisions stable   Bowel: Continent  Bladder:continent  Last BM: 10-7-17  Diet: Regular  Appetite: good   Pain Level: 5/10 generalized     Physical Therapy  Supine to Sit:  maximal assist  Sit to Stand: moderate assist  Gait: 0-25 feet minimal assist Parallel bars  Wheelchair Mobility: 0-25 feet minimal assist , slow  ROM: prom wfl  Stairs:na N/A  Strength: right ankle 3-/5, hip 2- to 2/5, knee ext 4-/5. Left ankle 3/5, hip 2- to 2+/5, knee ext 4/5    Occupational Therapy  Feeding:  standy by assistance  Grooming:moderate assist  UED: total assist  LED: total assist  Bathing:total assist   Toileting:total assist  Toilet Transfer:  moderate assist x 2  Tub Transfer:  pending   Strength: right 2- to 3/5, left 2+ to 3+/5    Speech Therapy  Swallow: wfl        Summary of Progress:  On going     Barriers to Progress/Discharge:   Pain     Tolerates 3 hours of therapy: Yes    Comments:     Estimated Length of Stay: 11-16-17

## 2017-10-10 NOTE — PT/OT/SLP PROGRESS
Physical Therapy         Treatment        Zaira Jensen   MRN: [id number]     PT Received On: 10/10/17  Total Time (min): 90        Billable Minutes: 90  Gait [id number], Therapeutic Activity 20, Therapeutic Exercise 55  Total Minutes: 90    Treatment Type: Treatment  PT/PTA: PT     PTA Visit Number: 0       General Precautions: Standard, fall  Orthopedic Precautions:     Braces: Braces: Cervical collar       Subjective:  Communicated with patient prior to session.     Pain/Comfort  Pain Rating 1: 2/10  Location - Side 1: Right  Location 1: lumbar spine  Pain Addressed 1: Reposition, Distraction, Cessation of Activity  Pain Rating Post-Intervention 1: 2/10    Objective:  Patient found in w/c; cousin present. Patient found with: cervical collar    Functional Mobility:  Bed Mobility:   Sit to supine: Moderate Assistance     Transfer Training:  Sit<>stand: Minimal Assistance with Rolling Walker and // bars x6    Gait Training:   Ambulated 12' in // bars; 33' using Rolling Walker with Minimal Assistance; 55' using Rolling Walker on level tile with Minimal Assistance and w/c close in tow. Pt using a step-to gait with decreased orquidea, increased time in double stance, decreased velocity of limb motion, decreased step length, decreased stride length, decreased swing-to-stance ratio, decreased toe-to-floor clearance and decreased weight-shifting ability. Impairments contributing to gait deviations include impaired balance, impaired coordination, impaired motor control and decreased strength    Additional Treatment:  STANDING (using Rolling Walker): heel raises, hip flexion marches; 20x  SEATED: hip adduction w/ ball, LAQ, glute sets; 20x  SciFit StepOne: L 1.0 x 15 min, LEs only  Manual stretch B heelcords x 15 sec x 2 each    Activity Tolerance:  Patient tolerated treatment well    Patient left up in chair with call button in reach and chair alarm on.    Assessment:  Zaira Jensen is a 42 y.o. female with a medical  diagnosis of . Pt able to increase ambulation distance today and progress to gait training using Rolling walker. Pt continues to progress and remains appropriate with PT POC.     Rehab potential is good.    Activity tolerance: Good      GOALS:    Physical Therapy Goals        Problem: Physical Therapy Goal    Goal Priority Disciplines Outcome Goal Variances Interventions   Physical Therapy Goal     PT/OT, PT Ongoing (interventions implemented as appropriate)     Description:  Goals to be met by: 2017     Patient will increase functional independence with mobility by performin). Supine to sit with Stand-by Assistance  2). Sit to supine with Stand-by Assistance  3). Rolling to Left and Right with Stand-by Assistance.  4). Sit to stand transfer with Minimal Assistance  5). Gait  x  > 25 feet with Contact Guard Assistance using Rolling Walker.   6). Wheelchair propulsion x > 150 feet with Stand-by Assistance                       PLAN:    Patient to be seen daily  to address the above listed problems via gait training, therapeutic activities, therapeutic exercises, neuromuscular re-education, wheelchair management/training  Plan of Care expires: 17  Plan of Care reviewed with: patient         Richie Lemus, PT 10/10/2017

## 2017-10-10 NOTE — PROGRESS NOTES
[facility]  Physical Medicine & Rehab  Progress Note    Patient Name: Zaira Jensen  MRN: [id number]  Patient Class: IP- Rehab   Admission Date: 10/5/2017  Length of Stay: 5 days  Attending Physician: Anirudh Rowley MD  Primary Care Provider: Cuate Alvarez MD    Subjective:     Principal Problem:  quadrapresis  Interval History: pt is  42 y.o female s/p  Cervical discectomy, & c5-c6 fusion , participation with therapy     Scheduled Medications:    docusate sodium  100 mg Oral Daily    folic acid-vit B6-vit B12 2.5-25-2 mg  1 tablet Oral Daily       PRN Medications: acetaminophen, aluminum-magnesium hydroxide-simethicone, bisacodyl, bisacodyl, calcium carbonate, lactulose, ondansetron, oxycodone, oxycodone, oxycodone, ramelteon, senna-docusate 8.6-50 mg    Review of Systems   Constitutional: Negative.    HENT: Negative.    Eyes: Negative.    Respiratory: Negative.    Cardiovascular: Negative.    Gastrointestinal: Negative.    Endocrine: Negative.    Genitourinary: Negative.    Musculoskeletal: Negative.    Skin: Negative.    Allergic/Immunologic: Negative.    Neurological: Negative.    Hematological: Negative.    Psychiatric/Behavioral: Negative.      Objective:     Vital Signs (Most Recent):  Temp: 98.1 °F (36.7 °C) (10/10/17 0722)  Pulse: 93 (10/10/17 0722)  Resp: 17 (10/10/17 0722)  BP: 128/80 (10/10/17 0722)  SpO2: 99 % (10/10/17 0722)    Vital Signs (24h Range):  Temp:  [97.5 °F (36.4 °C)-99.2 °F (37.3 °C)] 98.1 °F (36.7 °C)  Pulse:  [93-97] 93  Resp:  [16-20] 17  SpO2:  [97 %-100 %] 99 %  BP: (122-142)/(72-80) 128/80     Physical Exam   Constitutional: She is oriented to person, place, and time. She appears well-developed and well-nourished. No distress.   HENT:   Head: Normocephalic and atraumatic.   Right Ear: External ear normal.   Left Ear: External ear normal.   Nose: Nose normal.   Mouth/Throat: Oropharynx is clear and moist. No oropharyngeal exudate.   Eyes: Conjunctivae and EOM  are normal. Pupils are equal, round, and reactive to light. Right eye exhibits no discharge. Left eye exhibits no discharge. No scleral icterus.   Neck: Normal range of motion. Neck supple. No JVD present. No tracheal deviation present. No thyromegaly present.   Cardiovascular: Normal rate, regular rhythm, normal heart sounds and intact distal pulses.  Exam reveals no gallop and no friction rub.    No murmur heard.  Pulmonary/Chest: Effort normal and breath sounds normal. No stridor. No respiratory distress. She has no wheezes. She has no rales. She exhibits no tenderness.   Abdominal: Soft. Bowel sounds are normal. She exhibits no distension and no mass. There is no tenderness. There is no rebound and no guarding. No hernia.   Genitourinary:   Genitourinary Comments: deferred   Musculoskeletal: Normal range of motion. She exhibits no edema, tenderness or deformity.   Lymphadenopathy:     She has no cervical adenopathy.   Neurological: She is alert and oriented to person, place, and time. No cranial nerve deficit. She exhibits normal muscle tone.   Skin: No rash noted. She is not diaphoretic. No erythema. No pallor.   Incision healing well   Psychiatric: She has a normal mood and affect. Her behavior is normal. Thought content normal.     NEUROLOGICAL EXAMINATION:     MENTAL STATUS   Oriented to person, place, and time.     CRANIAL NERVES     CN III, IV, VI   Pupils are equal, round, and reactive to light.  Extraocular motions are normal.       Assessment/Plan:      Zaira Jensen is a 42 y.o. female admitted to inpatient rehabilitation on 10/5/2017 for <principal problem not specified> with impaired mobility and ADLs. Patient remains appropriate for PT, OT, and as required Speech therapy. Patient continues to require 24 hour nursing care as well as daily Physician assessment.    Active Diagnoses:    Diagnosis Date Noted POA    Quadriplegia, C5-C7 complete [G82.53] 10/05/2017 Yes      Problems Resolved During this  Admission:    Diagnosis Date Noted Date Resolved POA     Quadriparesis, cont PT, OT, ST   DVT prophylaxis, cont SCD   Check CXR  DISCHARGE PLANNING:  Tentative Discharge Date:     No future appointments.    Anirudh Rowley MD  Department of Physical Medicine & Rehab  [facility]

## 2017-10-10 NOTE — PT/OT/SLP PROGRESS
Occupational Therapy  Treatment    Zaira Jensen   MRN: [id number]   Admitting Diagnosis: <principal problem not specified>    OT Date of Treatment: 10/10/17   Total Time (min): 30 min      Billable Minutes:  Self Care/Home Management 30  Total Minutes: 30    General Precautions: Standard, fall  Orthopedic Precautions: N/A  Braces: Cervical collar    Do you have any cultural, spiritual, Jewish conflicts, given your current situation?: None    Subjective:  Communicated with nurse prior to session.    Pain/Comfort  Pain Rating 1: 3/10  Location - Side 1: Bilateral  Location - Orientation 1: generalized  Location 1: neck  Pain Addressed 1: Reposition  Pain Rating Post-Intervention 1: 2/10    Objective:  Patient found with: cervical collar    Functional Mobility:  Bed Mobility:   Supine to sit: Moderate Assistance   Sit to supine: Activity did not occur   Rolling: Minimal Assistance   Scooting: Total Assistance    Transfer Training:   Bed <> Chair:  Stand Pivot with Maximum Assistance with No Assistive Device.    Feeding:  Patient performed feeding with Stand-by Assistance with Built up utensils.    Grooming:  Patient performed face washing with Moderate Assistance at bed.  Patient performed oral hygeine with Minimal Assistance at bed.  Patient performe hair grooming with Total Assistance at chair.      Balance:   Static Sit: FAIR: Maintains without assist, but unable to take any challenges   Dynamic Sit:  FAIR: Cannot move trunk without losing balance  Static Stand: 0: Needs MAXIMAL assist to maintain   Dynamic stand: 0: N/A    Additional Treatment:  Session included bed mobility, transfer, feeding and grooming as described above.     Patient left up in chair with call button in reach and chair alarm on    ASSESSMENT:  Zaira Jensen is a 42 y.o. female with a medical diagnosis of <principal problem not specified> and presents with performance deficits of physical skills including impaired balance, mobility,  strength, dexterity, fine motor coordination, gross motor coordination and endurance.  These performance deficits have resulted in activity limitations including, but not limited to: bed mobility, transfers, ambulating short distances, navigating around obstacles during ambulation, transitional movement patterns ( kneeling, bending, reaching), upper body dressing, lower body dressing, grooming, toileting, bathing and carrying objects.   Pt's role as mod I adult  has been significantly affected.  Patient will benefit from skilled OT services to maximize level of independence with deficits listed above.  .    Rehab potential is good    Activity tolerance: Fair    Discharge recommendations: home     Equipment recommendations:  (tbd)     GOALS:    Occupational Therapy Goals        Problem: Occupational Therapy Goal    Goal Priority Disciplines Outcome Interventions   Occupational Therapy Goal     OT, PT/OT Ongoing (interventions implemented as appropriate)    Description:  Goals to be met by: 10/26/17     Patient will increase functional independence with ADLs by performing:    Feeding with Contact Guard Assistance.  UE Dressing with Minimal Assistance.  LE Dressing with Minimal Assistance.  Grooming while seated with Minimal Assistance.  Toileting from toilet with Moderate Assistance for hygiene and clothing management.   Bathing from most appropriate location with Minimal Assistance.  Toilet transfer to toilet with Minimal Assistance.                      Plan:  Patient to be seen 6 x/week to address the above listed problems via self-care/home management, community/work re-entry, therapeutic activities, therapeutic exercises, therapeutic groups, wheelchair management/training, sensory integration  Plan of Care expires: 10/26/17  Plan of Care reviewed with: patient         BREANA Recinos  10/10/2017

## 2017-10-10 NOTE — PT/OT/SLP PROGRESS
"Occupational Therapy  Treatment    Zaira Jensen   MRN: [id number]   Admitting Diagnosis: cervical spinal stenosis; S/p ACDF C5-6 on 10/3/17    OT Date of Treatment: 10/10/17   Total Time (min): 65 min    Billable Minutes:  Therapeutic Exercise 35 and Neuromuscular Re-education 30  Total Minutes: 65    General Precautions: Standard, fall  Orthopedic Precautions: N/A  Braces: Cervical collar    Do you have any cultural, spiritual, Hindu conflicts, given your current situation?: None    Subjective:  Communicated with nurse prior to session.  Pt cooperative and pleasant as usual    Pain/Comfort  Pain Rating 1:  (Unrated pain of shoulders and cervical )  Pain Addressed 1: Reposition, Distraction, Cessation of Activity, Nurse notified  Pain Rating Post-Intervention 1: 0/10    Objective:  Patient found with: cervical collar    Additional Treatment:  - AAROM shoulder flex/exten/ABD/ADD for table slides - 2 sets x 5 reps each motion with rest breaks as needed   - Functional elbow flex/exten, forearm pronation/supination - palm to/from knee to/from shoulder 2 sets x 5 reps each motion  - Graded reaching to target x 5 each UE to facilitate ROM, strength, coordination  - Gentle stretches of wrist flexors - OTR reinforcing self stretches "steeples" to facilitate normal wrist ROM  - With forearm positioned on bolster and supported to prevent compensatory forearm mvts, pt performing 2 sets x 5 reps wrist extension     Patient left up in chair with call button in reach, chair alarm on and nurse notified cervical spinal stenosis; S/p ACDF C5-6 on 10/3/17      ASSESSMENT:  Zaira Jensen is a 42 y.o. female with a medical diagnosis of cervical spinal stenosis; S/p ACDF C5-6 on 10/3/17 and presents with daily improvements towards OT goals. Patient should continue to benefit from skilled OT services per est POC to increase functional independence, mobility, and safety     Rehab potential is good    Activity tolerance: " Good    Discharge recommendations: home     Equipment recommendations:  (TBD)     GOALS:    Occupational Therapy Goals        Problem: Occupational Therapy Goal    Goal Priority Disciplines Outcome Interventions   Occupational Therapy Goal     OT, PT/OT Ongoing (interventions implemented as appropriate)    Description:  Goals to be met by: 10/26/17     Patient will increase functional independence with ADLs by performing:    Feeding with Contact Guard Assistance.  UE Dressing with Minimal Assistance.  LE Dressing with Minimal Assistance.  Grooming while seated with Minimal Assistance.  Toileting from toilet with Moderate Assistance for hygiene and clothing management.   Bathing from most appropriate location with Minimal Assistance.  Toilet transfer to toilet with Minimal Assistance.                      Plan:  Patient to be seen 6 x/week to address the above listed problems via self-care/home management, community/work re-entry, therapeutic activities, therapeutic exercises, therapeutic groups, neuromuscular re-education, sensory integration, wheelchair management/training  Plan of Care expires: 10/26/17  Plan of Care reviewed with: patient    Suzanne HUGH Perry LOTR  10/10/2017

## 2017-10-10 NOTE — NURSING
Patient c/o sob. Pulse Ox presently 100%. Patient appears anxious.Instructed on breathing technique. Patient appears relaxed.Will continue to monitor.

## 2017-10-10 NOTE — PLAN OF CARE
Problem: Patient Care Overview  Goal: Plan of Care Review  Outcome: Ongoing (interventions implemented as appropriate)  Patient awake/alertDenies pain this shift. Generalized weakness noted. Remains continent. Free from falls. Plan of care continued

## 2017-10-11 PROCEDURE — 97116 GAIT TRAINING THERAPY: CPT

## 2017-10-11 PROCEDURE — 97110 THERAPEUTIC EXERCISES: CPT

## 2017-10-11 PROCEDURE — 97535 SELF CARE MNGMENT TRAINING: CPT

## 2017-10-11 PROCEDURE — 97112 NEUROMUSCULAR REEDUCATION: CPT

## 2017-10-11 PROCEDURE — 12800000 HC REHAB SEMI-PRIVATE ROOM

## 2017-10-11 PROCEDURE — 25000003 PHARM REV CODE 250: Performed by: INTERNAL MEDICINE

## 2017-10-11 PROCEDURE — 97530 THERAPEUTIC ACTIVITIES: CPT

## 2017-10-11 RX ADMIN — Medication 1 TABLET: at 09:10

## 2017-10-11 RX ADMIN — OXYCODONE HYDROCHLORIDE 5 MG: 5 TABLET ORAL at 10:10

## 2017-10-11 RX ADMIN — DOCUSATE SODIUM 100 MG: 100 CAPSULE, LIQUID FILLED ORAL at 09:10

## 2017-10-11 NOTE — PT/OT/SLP PROGRESS
Physical Therapy         Treatment        Zaira Jensen   MRN: [id number]     PT Received On: 10/11/17  Total Time (min): 90        Billable Minutes: 90  Gait Training 30, Therapeutic Activity 15 and Therapeutic Exercise 45  Total Minutes: 105 (11:15-12:10, 12:55-1:45)    Treatment Type: Treatment  PT/PTA: PT     PTA Visit Number: 0       General Precautions: Standard, fall  Orthopedic Precautions:     Braces: Braces: Cervical collar         Subjective:  Communicated with patient prior to session.    Pain/Comfort  Pain Rating 1: 4/10  Location - Side 1: Left  Location 1: shoulder  Pain Addressed 1: Distraction, Reposition, Cessation of Activity  Pain Rating Post-Intervention 1: 3/10    Objective:  Patient found in reclining w/c.     Functional Mobility:  Bed Mobility: n/p    Transfer Training:  w/c<>stand: Mod A using Rolling Walker x4 and Min A using // bar rails x2;     Wheelchair Training: Changed pt to standard wheelchair with rim projections to assist pt in propelling w/c. Instructed pt how to propel using heel of hand and feet. Pt propelled Standard wheelchair w/ rim projections x 15 feet on Level tile with Bilateral upper extremity with Minimal Assistance; verbal cueing for technique; slow orquidea. Pt could propel backwards with BLE, but not forwards; pt also unable to coordinate using UE and LE to propel w/c.    Gait Training:   Ambulated 12' in // bars using BHR and CGA.  Ambulated 102' x 2 using Rolling Walker and CGA; slow shuffling gait; verbal/tactile cues for trunk forward flexed posture. Frequent difficulty advancing LLE    Stair Training: n/p    Additional Treatment:  Standing (with RW and CGA): Heel raises, marches, mini squats  Seated: LAQ 2#, ankle PF and DF w/ Red T-band resistance; cueing for seated upright posture   Sci[employer] StepOne: L 1.0 x 15 min, LEs only    Activity Tolerance:  Pt somewhat limited due to fatigue    Patient left up in chair with chair alarm on.    Assessment:  Zaira Jensen  is a 42 y.o. female with a medical diagnosis of Cervical spinal stenosis s/p ACDF C5-C6. Pt required increased assistance for w/c<>stand transfer, but was able to increase ambulation distance by almost double from last session. Right sided DF exercise was noticeably more challenging than L. Pt remains appropriate for PT POC.    Rehab potential is fair.    Activity tolerance: Fair    Discharge recommendations: Discharge Facility/Level Of Care Needs:  (TBD)     Equipment recommendations: Equipment Needed After Discharge: walker, rolling, wheelchair     GOALS:    Physical Therapy Goals        Problem: Physical Therapy Goal    Goal Priority Disciplines Outcome Goal Variances Interventions   Physical Therapy Goal     PT/OT, PT Ongoing (interventions implemented as appropriate)     Description:  Goals to be met by: 2017     Patient will increase functional independence with mobility by performin). Supine to sit with Stand-by Assistance  2). Sit to supine with Stand-by Assistance  3). Rolling to Left and Right with Stand-by Assistance.  4). Sit to stand transfer with Minimal Assistance  5). Gait  x  > 25 feet with Contact Guard Assistance using Rolling Walker.   6). Wheelchair propulsion x > 150 feet with Stand-by Assistance                       PLAN:    Patient to be seen daily  to address the above listed problems via gait training, therapeutic activities, therapeutic exercises, wheelchair management/training  Plan of Care expires: 17  Plan of Care reviewed with: patient         Richie Lemus, PT 10/11/2017

## 2017-10-11 NOTE — PLAN OF CARE
Problem: Patient Care Overview  Goal: Plan of Care Review  Outcome: Ongoing (interventions implemented as appropriate)  Patient awake/alert. Generalized weakness noted.Remains continent. Free from falls. Medicated x 1 for pain this shift. Plan of care continued

## 2017-10-11 NOTE — PT/OT/SLP PROGRESS
Occupational Therapy  Treatment    Zaira Jensen   MRN: [id number]   Admitting Diagnosis: cervical spinal stenosis; S/p ACDF C5-6 on 10/3/17    OT Date of Treatment: 10/11/17   Total Time (min): 50 min    Billable Minutes:  Therapeutic Exercise 30 and Neuromuscular Re-education 20  Total Minutes: 50    General Precautions: Standard, fall  Orthopedic Precautions:  (Cervical spinal precautions)  Braces: Cervical collar    Do you have any cultural, spiritual, Congregation conflicts, given your current situation?: None    Subjective:  Communicated with nurse prior to session.    Pain/Comfort  Pain Rating 1: 0/10    Objective:  Patient found with: cervical collar -Pt crying at start of session as she just finished saying goodbye to her cousin who is returning home to [place] today and who has stayed with her everyday since her surgery; pt stopped crying within minutes and fully participatory during OT session     LE Dressing:  Total (A) to don/doff velcro shoes     Additional Treatment:  - Un-weighted pulleys for shoulder ROM flex/exten/ABD/ADD less than or = 90*  - Gentle stretches bilateral wrist/digit flexors with 10+ second hold end range   The following performed in circuit:  - Functional AROM elbow flex/exten & forearm supination/pronation palm <> knee and chin with bilateral 1# wrist weights  - Open/close hands 3 sets x 5 with emphasis on full digit ABD specifically R thumb ABD   - Graded reaching to target 3 x 5 each UE - unilateral and contralateral with emphasis on full wrist/digit extension  - Hand clapping with OTR as partner to facilitate B UE coordination, shoulder flex/exten/ABD, elbow flex/exten, wrist/digit extension as pt encouraged to connect palm to palm with partner once reaching target x 5 each UE unilaterally, then x 5 contralaterally - R UE rest break between 4th & 5th clap     Patient left up in chair with call button in reach, chair alarm on and nurse notified    ASSESSMENT:  Zaira Jensen is a 42  y.o. female with a medical diagnosis of cervical spinal stenosis; S/p ACDF C5-6 on 10/3/17 and presents with daily improvements towards OT goals. Patient should continue to benefit from skilled OT services to increase functional independence, mobility, safety, B UE ROM/strength/coordination.     Rehab potential is good    Activity tolerance: Good    Discharge recommendations: home     Equipment recommendations:  (tbd)     GOALS:    Occupational Therapy Goals        Problem: Occupational Therapy Goal    Goal Priority Disciplines Outcome Interventions   Occupational Therapy Goal     OT, PT/OT Ongoing (interventions implemented as appropriate)    Description:  Goals to be met by: 10/26/17     Patient will increase functional independence with ADLs by performing:    Feeding with Contact Guard Assistance.  UE Dressing with Minimal Assistance.  LE Dressing with Minimal Assistance.  Grooming while seated with Minimal Assistance.  Toileting from toilet with Moderate Assistance for hygiene and clothing management.   Bathing from most appropriate location with Minimal Assistance.  Toilet transfer to toilet with Minimal Assistance.                      Plan:  Patient to be seen 6 x/week to address the above listed problems via self-care/home management, community/work re-entry, therapeutic activities, therapeutic exercises, therapeutic groups, neuromuscular re-education, sensory integration, wheelchair management/training  Plan of Care expires: 10/26/17  Plan of Care reviewed with: patient    HUGH Perkins LOTR  10/11/2017

## 2017-10-11 NOTE — PLAN OF CARE
Problem: Patient Care Overview  Goal: Plan of Care Review  Plan of Care Review    Comments: A&OX4. Patient was crying as she was upset her cousin was leaving to go back to [facility] in the a.m. Bed and chair alarms utilized at all times. Remained free from falls. Continent of bowel and bladder. Standard precautions maintained.

## 2017-10-11 NOTE — PROGRESS NOTES
[facility]  Physical Medicine & Rehab  Progress Note    Patient Name: Zaira Jensen  MRN: [id number]  Patient Class: IP- Rehab   Admission Date: 10/5/2017  Length of Stay: 6 days  Attending Physician: Anirudh Rowley MD  Primary Care Provider: Cuate Alvarez MD    Subjective:     Principal Problem:  quadrapresis  Interval History: pt is 42 y.o female s/p  Cervical diskectomy & fusion  Quadriparesis, participating with therapy & making progress      Scheduled Medications:    docusate sodium  100 mg Oral Daily    folic acid-vit B6-vit B12 2.5-25-2 mg  1 tablet Oral Daily       PRN Medications: acetaminophen, aluminum-magnesium hydroxide-simethicone, bisacodyl, bisacodyl, calcium carbonate, lactulose, ondansetron, oxycodone, oxycodone, oxycodone, ramelteon, senna-docusate 8.6-50 mg    Review of Systems   Constitutional: Negative.    HENT: Negative.    Eyes: Negative.    Respiratory: Negative.    Cardiovascular: Negative.    Gastrointestinal: Negative.    Endocrine: Negative.    Genitourinary: Negative.    Musculoskeletal: Negative.    Skin: Negative.    Allergic/Immunologic: Negative.    Neurological: Negative.    Hematological: Negative.    Psychiatric/Behavioral: Negative.      Objective:     Vital Signs (Most Recent):  Temp: 97.9 °F (36.6 °C) (10/11/17 0712)  Pulse: 88 (10/11/17 0712)  Resp: 18 (10/11/17 0712)  BP: 126/64 (10/11/17 0712)  SpO2: 99 % (10/11/17 0712)    Vital Signs (24h Range):  Temp:  [97.2 °F (36.2 °C)-98.5 °F (36.9 °C)] 97.9 °F (36.6 °C)  Pulse:  [] 88  Resp:  [18] 18  SpO2:  [96 %-99 %] 99 %  BP: (121-126)/(59-64) 126/64     Physical Exam   Constitutional: She is oriented to person, place, and time. She appears well-developed and well-nourished. No distress.   HENT:   Head: Normocephalic and atraumatic.   Right Ear: External ear normal.   Left Ear: External ear normal.   Nose: Nose normal.   Mouth/Throat: Oropharynx is clear and moist. No oropharyngeal exudate.   Eyes:  Conjunctivae and EOM are normal. Pupils are equal, round, and reactive to light. Right eye exhibits no discharge. Left eye exhibits no discharge. No scleral icterus.   Neck: Normal range of motion. Neck supple. No JVD present. No tracheal deviation present. No thyromegaly present.   Cardiovascular: Normal rate, regular rhythm, normal heart sounds and intact distal pulses.  Exam reveals no gallop and no friction rub.    No murmur heard.  Pulmonary/Chest: Effort normal and breath sounds normal. No stridor. No respiratory distress. She has no wheezes. She has no rales. She exhibits no tenderness.   Abdominal: Soft. Bowel sounds are normal. She exhibits no distension and no mass. There is no tenderness. There is no rebound and no guarding. No hernia.   Genitourinary:   Genitourinary Comments: deferred   Musculoskeletal: Normal range of motion. She exhibits no edema, tenderness or deformity.   Lymphadenopathy:     She has no cervical adenopathy.   Neurological: She is alert and oriented to person, place, and time. No sensory deficit. She exhibits normal muscle tone.   Skin: No rash noted. She is not diaphoretic. No erythema. No pallor.   Incision stable    Psychiatric: She has a normal mood and affect. Her behavior is normal. Judgment and thought content normal.     NEUROLOGICAL EXAMINATION:     MENTAL STATUS   Oriented to person, place, and time.     CRANIAL NERVES     CN III, IV, VI   Pupils are equal, round, and reactive to light.  Extraocular motions are normal.       Assessment/Plan:      Zaira Jensen is a 42 y.o. female admitted to inpatient rehabilitation on 10/5/2017 for <principal problem not specified> with impaired mobility and ADLs. Patient remains appropriate for PT, OT, and as required Speech therapy. Patient continues to require 24 hour nursing care as well as daily Physician assessment.    Active Diagnoses:    Diagnosis Date Noted POA    Quadriplegia, C5-C7 complete [G82.53] 10/05/2017 Yes       Problems Resolved During this Admission:    Diagnosis Date Noted Date Resolved POA     Quadriparesis, cont PT, OT  Cont SCD for DVT prophylaxis     DISCHARGE PLANNING:  Tentative Discharge Date:     No future appointments.    Anirudh Rowley MD  Department of Physical Medicine & Rehab  [facility]

## 2017-10-11 NOTE — PT/OT/SLP PROGRESS
Occupational Therapy  Treatment    Zaira Jensen   MRN: [id number]   Admitting Diagnosis: <principal problem not specified>    OT Date of Treatment: 10/11/17   Total Time (min): 45 min      Billable Minutes:  Self Care/Home Management 45  Total Minutes: 45    General Precautions: Standard, fall  Orthopedic Precautions: N/A  Braces: Cervical collar    Do you have any cultural, spiritual, Scientology conflicts, given your current situation?: None    Subjective:  Communicated with nurse prior to session.    Pain/Comfort  Pain Rating 1: 0/10  Pain Rating Post-Intervention 1: 0/10    Objective:  Patient found with: cervical collar    Functional Mobility:  Bed Mobility:   Supine to sit: Maximum Assistance   Sit to supine: Activity did not occur   Rolling: Moderate Assistance   Scooting: Maximum Assistance    Transfer Training:   Bed <> Chair:  Stand Pivot with Moderate Assistance with No Assistive Device.    Feeding:  Patient performed feeding with Supervision or Set-up Assistance with Built up utensils and Plateguard.    Grooming:  Patient performed oral hygeine with Stand-by Assistance at chair.      Balance:   Static Sit: Good  Dynamic Sit:  GOOD-: Maintains balance through MODERATE excursions of active trunk movement,     Static Stand: POOR: Needs MODERATE assist to maintain  Dynamic stand: POOR: N/A    Additional Treatment:  Session included bed mobility, transfer, grooming and feeding as described above.    Patient left up in chair with call button in reach and family present    ASSESSMENT:  Zaira Jensen is a 42 y.o. female with a medical diagnosis of <principal problem not specified> and presents with deficits with ADL, mobility, fine motor, gross motor coordination, B UE limited ROM and B weakness. Pt needs encouragement to increase ADL independence.    Rehab potential is good    Activity tolerance: Good    Discharge recommendations: home     Equipment recommendations:  (tbd)     GOALS:    Occupational Therapy  Goals        Problem: Occupational Therapy Goal    Goal Priority Disciplines Outcome Interventions   Occupational Therapy Goal     OT, PT/OT Ongoing (interventions implemented as appropriate)    Description:  Goals to be met by: 10/26/17     Patient will increase functional independence with ADLs by performing:    Feeding with Contact Guard Assistance.  UE Dressing with Minimal Assistance.  LE Dressing with Minimal Assistance.  Grooming while seated with Minimal Assistance.  Toileting from toilet with Moderate Assistance for hygiene and clothing management.   Bathing from most appropriate location with Minimal Assistance.  Toilet transfer to toilet with Minimal Assistance.                      Plan:  Patient to be seen 6 x/week to address the above listed problems via self-care/home management, community/work re-entry, therapeutic activities, therapeutic exercises, therapeutic groups, neuromuscular re-education, sensory integration, wheelchair management/training  Plan of Care expires: 10/26/17  Plan of Care reviewed with: patient         BREANA Recinos  10/11/2017

## 2017-10-12 LAB
ALBUMIN SERPL BCP-MCNC: 3.5 G/DL
ALP SERPL-CCNC: 51 U/L
ALT SERPL W/O P-5'-P-CCNC: 16 U/L
ANION GAP SERPL CALC-SCNC: 9 MMOL/L
AST SERPL-CCNC: 14 U/L
BASOPHILS # BLD AUTO: 0 K/UL
BASOPHILS NFR BLD: 0.6 %
BILIRUB SERPL-MCNC: 0.6 MG/DL
BUN SERPL-MCNC: 10 MG/DL
CALCIUM SERPL-MCNC: 9.8 MG/DL
CHLORIDE SERPL-SCNC: 103 MMOL/L
CO2 SERPL-SCNC: 26 MMOL/L
CREAT SERPL-MCNC: 0.8 MG/DL
DIFFERENTIAL METHOD: ABNORMAL
EOSINOPHIL # BLD AUTO: 0.2 K/UL
EOSINOPHIL NFR BLD: 4.5 %
ERYTHROCYTE [DISTWIDTH] IN BLOOD BY AUTOMATED COUNT: 14.3 %
EST. GFR  (AFRICAN AMERICAN): >60 ML/MIN/1.73 M^2
EST. GFR  (NON AFRICAN AMERICAN): >60 ML/MIN/1.73 M^2
GLUCOSE SERPL-MCNC: 99 MG/DL
HCT VFR BLD AUTO: 39.5 %
HGB BLD-MCNC: 13.1 G/DL
LYMPHOCYTES # BLD AUTO: 1.5 K/UL
LYMPHOCYTES NFR BLD: 27.2 %
MCH RBC QN AUTO: 30.3 PG
MCHC RBC AUTO-ENTMCNC: 33.3 G/DL
MCV RBC AUTO: 91 FL
MONOCYTES # BLD AUTO: 0.4 K/UL
MONOCYTES NFR BLD: 7.9 %
NEUTROPHILS # BLD AUTO: 3.3 K/UL
NEUTROPHILS NFR BLD: 59.8 %
PLATELET # BLD AUTO: 333 K/UL
PLATELET BLD QL SMEAR: ABNORMAL
PMV BLD AUTO: 7.6 FL
POTASSIUM SERPL-SCNC: 4.1 MMOL/L
PROT SERPL-MCNC: 8 G/DL
RBC # BLD AUTO: 4.33 M/UL
SODIUM SERPL-SCNC: 138 MMOL/L
WBC # BLD AUTO: 5.5 K/UL

## 2017-10-12 PROCEDURE — 80053 COMPREHEN METABOLIC PANEL: CPT

## 2017-10-12 PROCEDURE — 97110 THERAPEUTIC EXERCISES: CPT

## 2017-10-12 PROCEDURE — 36415 COLL VENOUS BLD VENIPUNCTURE: CPT

## 2017-10-12 PROCEDURE — 85025 COMPLETE CBC W/AUTO DIFF WBC: CPT

## 2017-10-12 PROCEDURE — 97530 THERAPEUTIC ACTIVITIES: CPT

## 2017-10-12 PROCEDURE — 97116 GAIT TRAINING THERAPY: CPT

## 2017-10-12 PROCEDURE — 97535 SELF CARE MNGMENT TRAINING: CPT

## 2017-10-12 PROCEDURE — 12800000 HC REHAB SEMI-PRIVATE ROOM

## 2017-10-12 PROCEDURE — 25000003 PHARM REV CODE 250: Performed by: INTERNAL MEDICINE

## 2017-10-12 RX ADMIN — Medication 1 TABLET: at 08:10

## 2017-10-12 RX ADMIN — DOCUSATE SODIUM 100 MG: 100 CAPSULE, LIQUID FILLED ORAL at 08:10

## 2017-10-12 RX ADMIN — SENNOSIDES AND DOCUSATE SODIUM 2 TABLET: 8.6; 5 TABLET ORAL at 09:10

## 2017-10-12 NOTE — PLAN OF CARE
Problem: Physical Therapy Goal  Goal: Physical Therapy Goal  Goals to be met by: 2017     Patient will increase functional independence with mobility by performin). Supine to sit with Stand-by Assistance  2). Sit to supine with Stand-by Assistance  3). Rolling to Left and Right with Stand-by Assistance.  4). Sit to stand transfer with Minimal Assistance  5). Gait  x  > 25 feet with Contact Guard Assistance using Rolling Walker.   6). Wheelchair propulsion x > 150 feet with Stand-by Assistance      Outcome: Ongoing (interventions implemented as appropriate)  Prior goals still appropriate.

## 2017-10-12 NOTE — PLAN OF CARE
Problem: Patient Care Overview  Goal: Plan of Care Review  Awake, alert and oriented in no distress. Denies pain or discomfort. Max assist with transfers. Advancing with poc. Safety ongoing with alarms in use.

## 2017-10-12 NOTE — PT/OT/SLP PROGRESS
Occupational Therapy  Treatment    Zaira Jensen   MRN: [id number] ACDF C5-6 on 10/3/17    OT Date of Treatment: 10/12/17   Total Time (min): 90 min    Billable Minutes:  Self Care/Home Management 75 and Therapeutic Exercise 15  Total Minutes: 90    General Precautions: Standard, fall  Orthopedic Precautions:  (C-spinal precautions)  Braces: Cervical collar    Do you have any cultural, spiritual, Bahai conflicts, given your current situation?: None    Subjective:  Communicated with nurse prior to session.    Pain/Comfort  Pain Rating 1:  (unrated pain of neck and shoulders with R shd > L )  Pain Addressed 1: Reposition, Nurse notified (pt denies the need for pain medication - pt reports she would like to wait until a little closer to the start of PT)  Pain Rating Post-Intervention 1: 0/10    Objective:  Patient found with:  (pt found up in w/c without cervical collar)    Functional Mobility:  Feeding:  Patient performed feeding with Stand-by Assistance with Built up utensils and plate guard not used during breakfast as pt's eggs/hashbrowns served in small ramican pt requires occasional verbal cue to readjust fork with built up handle in R hand to facilitate more functional grasp .    Grooming:  Patient applying face cream, rinsing face, and applying face lotion and vaseline to lips with Min (A) to Stand By Assistance using bath mitt to rinse cream off of face - Min (A) for L elbow support to relieve shd pain while reaching forehead and eyes; OTR providing instruction with additional VC for R UE integration as pt is R handed     LE Dressing:  Total (A) to eleuterio velcro shoes    Additional Treatment:  - W/c propulsion approximately 25 ft total with Mod (A), increased time, and frequent rest breaks   - Moist heat to posterior c-spine and bilateral shoulders x 8 mins   - Gripper for bilateral  strengthening - 15 reps R hand and 20 reps L hand - pt instructed on B UE coordination/integration to transfer gripper from  R hand to L hand without (A)    Patient left up in chair with chair alarm on, nurse notified and PT student and PT present    ASSESSMENT:  Zaira Jensen is a 42 y.o. female with a medical diagnosis spinal stenosis s/p ACDF C5-6 on 10/3/17   and presents with daily improvements towards OT goals as pt feeding self with SBA and performing grooming tasks with Min (A). Patient should continue to benefit from skilled therapy services to increase functional independence, mobility, and safety.     Rehab potential is good    Activity tolerance: Good    Discharge recommendations: home     Equipment recommendations:  (TBD)     GOALS:    Occupational Therapy Goals        Problem: Occupational Therapy Goal    Goal Priority Disciplines Outcome Interventions   Occupational Therapy Goal     OT, PT/OT Ongoing (interventions implemented as appropriate)    Description:  Goals to be met by: 10/26/17     Patient will increase functional independence with ADLs by performing:    Feeding with Contact Guard Assistance.  UE Dressing with Minimal Assistance.  LE Dressing with Minimal Assistance.  Grooming while seated with Minimal Assistance.  Toileting from toilet with Moderate Assistance for hygiene and clothing management.   Bathing from most appropriate location with Minimal Assistance.  Toilet transfer to toilet with Minimal Assistance.                      Plan:  Patient to be seen 6 x/week to address the above listed problems via self-care/home management, community/work re-entry, therapeutic activities, therapeutic exercises, therapeutic groups, neuromuscular re-education, sensory integration, wheelchair management/training  Plan of Care expires: 10/26/17  Plan of Care reviewed with: patient    Suzanne HUGH Perry LOTR  10/12/2017

## 2017-10-12 NOTE — PROGRESS NOTES
[facility]  Physical Medicine & Rehab  Progress Note    Patient Name: Zaira Jensen  MRN: [id number]  Patient Class: IP- Rehab   Admission Date: 10/5/2017  Length of Stay: 7 days  Attending Physician: Anirudh Rowley MD  Primary Care Provider: Cuate Alvarez MD    Subjective:     Principal Problem: quadriparesis    Interval History: pt is 42 y.o female s/p  Cervical discectomy ,  Quadriparesis, participating with therapy    Scheduled Medications:    docusate sodium  100 mg Oral Daily    folic acid-vit B6-vit B12 2.5-25-2 mg  1 tablet Oral Daily       PRN Medications: acetaminophen, aluminum-magnesium hydroxide-simethicone, bisacodyl, bisacodyl, calcium carbonate, lactulose, ondansetron, oxycodone, oxycodone, oxycodone, ramelteon, senna-docusate 8.6-50 mg    Review of Systems   Constitutional: Negative.    HENT: Negative.    Eyes: Negative.    Respiratory: Negative.    Cardiovascular: Negative.    Gastrointestinal: Negative.    Endocrine: Negative.    Genitourinary: Negative.    Musculoskeletal: Negative.    Skin: Negative.    Allergic/Immunologic: Negative.    Neurological: Negative.    Hematological: Negative.    Psychiatric/Behavioral: Negative.      Objective:     Vital Signs (Most Recent):  Temp: 98.3 °F (36.8 °C) (10/12/17 0639)  Pulse: 92 (10/12/17 0639)  Resp: 14 (10/12/17 0639)  BP: 105/62 (10/12/17 0639)  SpO2: 98 % (10/12/17 0639)    Vital Signs (24h Range):  Temp:  [98.1 °F (36.7 °C)-98.3 °F (36.8 °C)] 98.3 °F (36.8 °C)  Pulse:  [] 92  Resp:  [14-16] 14  SpO2:  [98 %-100 %] 98 %  BP: (105-124)/(62-83) 105/62     Physical Exam   Constitutional: She is oriented to person, place, and time. She appears well-developed and well-nourished. No distress.   HENT:   Head: Normocephalic and atraumatic.   Right Ear: External ear normal.   Left Ear: External ear normal.   Nose: Nose normal.   Mouth/Throat: Oropharynx is clear and moist. No oropharyngeal exudate.   Eyes: Conjunctivae and EOM  are normal. Pupils are equal, round, and reactive to light. Right eye exhibits no discharge. Left eye exhibits no discharge. No scleral icterus.   Neck: Normal range of motion. Neck supple. No JVD present. No tracheal deviation present. No thyromegaly present.   Cardiovascular: Normal rate, regular rhythm, normal heart sounds and intact distal pulses.  Exam reveals no gallop and no friction rub.    No murmur heard.  Pulmonary/Chest: Effort normal and breath sounds normal. No stridor. No respiratory distress. She has no wheezes. She has no rales. She exhibits no tenderness.   Abdominal: Soft. Bowel sounds are normal. She exhibits no distension and no mass. There is no tenderness. There is no rebound and no guarding. No hernia.   Genitourinary:   Genitourinary Comments: deferred   Musculoskeletal: Normal range of motion. She exhibits no edema, tenderness or deformity.   Lymphadenopathy:     She has no cervical adenopathy.   Neurological: She is alert and oriented to person, place, and time. She exhibits normal muscle tone.   Skin: No rash noted. She is not diaphoretic. No erythema. No pallor.   Psychiatric: She has a normal mood and affect. Her behavior is normal. Judgment and thought content normal.     NEUROLOGICAL EXAMINATION:     MENTAL STATUS   Oriented to person, place, and time.     CRANIAL NERVES     CN III, IV, VI   Pupils are equal, round, and reactive to light.  Extraocular motions are normal.       Assessment/Plan:      Zaira Jensen is a 42 y.o. female admitted to inpatient rehabilitation on 10/5/2017 for <principal problem not specified> with impaired mobility and ADLs. Patient remains appropriate for PT, OT, and as required Speech therapy. Patient continues to require 24 hour nursing care as well as daily Physician assessment.    Active Diagnoses:    Diagnosis Date Noted POA    Quadriplegia, C5-C7 complete [G82.53] 10/05/2017 Yes      Problems Resolved During this Admission:    Diagnosis Date Noted  Date Resolved POA     Quadriparesis. Cont PT, OT  Pain, managed with current meds  DVT prophylaxis, cont SCD     DISCHARGE PLANNING:  Tentative Discharge Date:     No future appointments.    Anirudh Rowley MD  Department of Physical Medicine & Rehab  [facility]

## 2017-10-12 NOTE — PT/OT/SLP PROGRESS
Physical Therapy         Treatment        Zaira Jensen   MRN: [id number]     PT Received On: 10/12/17  Total Time (min): 90        Billable Minutes: 95  Gait Training 28, Therapeutic Activity 22 and Therapeutic Exercise 45  Total Minutes: 95    Treatment Type: Treatment  PT/PTA: PT     PTA Visit Number: 0       General Precautions: Standard, fall  Orthopedic Precautions:     Braces: Braces: Cervical collar (in place throughout session)         Subjective:    Pain/Comfort  Pain Rating 1: 310  Location 1: neck  Pain Addressed 1: Reposition, Distraction, Cessation of Activity  Pain Rating Post-Intervention 1: 310    Objective:  Patient found seated in w/c, in NAD. Patient found with: cervical collar    MUSCLE TONE  Modified Capri Scale: bilateral hamstrings (knee flexion) = 1+ Slight increase in muscle tone, manifested by a catch, followed by minimal resistance throughout the remainder (less than half) of the ROM    Functional Mobility:  Bed Mobility: n/p    Transfer Training:  Sit <> stand:  Minimal Assistance with Rolling Walker x6; // bar rails x2    Wheelchair Training: n/p    Gait Trainin' with RW (slow) with CGA and cues; Frequent standing rest breaks. 12' in // bars with CGA (no handrail use) tactile/verbal cues for posture. Pt still demonstrating occasional difficulty initiating LLE advancement.     Additional Treatment:   SEATED: LAQ 3#, hip abduction w/ Red T-band, hip adduction w/ ball; x30; cueing for seated upright posture   STANDING: Standing in // bars: Heel raises, marches, hip abduction    SciFit StepOne: L 1.5 x 15 min, LEs only - able to maintain 2 Mendez       Patient left up in chair with chair alarm on.     Assessment:  Zaira Jensen is a 42 y.o. female with a medical diagnosis of Cervical spinal stenosis s/p ACDF C5-C6. During ambulation trial, pt exhibited improved knee flexion which aided foot clearance. Pt also occasionally used a step-thru gait pattern, but still demonstrates  difficulty initiating LLE advancement. Pt remains appropriate for PT POC.       Rehab potential is fair.    Activity tolerance: Good Patient somewhat limited by fatigue/endurance    Discharge recommendations: Discharge Facility/Level Of Care Needs:  (TBD)     Equipment recommendations: Equipment Needed After Discharge: walker, rolling, wheelchair     GOALS:    Physical Therapy Goals        Problem: Physical Therapy Goal    Goal Priority Disciplines Outcome Goal Variances Interventions   Physical Therapy Goal     PT/OT, PT Ongoing (interventions implemented as appropriate)     Description:  Goals to be met by: 2017     Patient will increase functional independence with mobility by performin). Supine to sit with Stand-by Assistance  2). Sit to supine with Stand-by Assistance  3). Rolling to Left and Right with Stand-by Assistance.  4). Sit to stand transfer with Minimal Assistance  5). Gait  x  > 25 feet with Contact Guard Assistance using Rolling Walker.   6). Wheelchair propulsion x > 150 feet with Stand-by Assistance                       PLAN:    Patient to be seen daily  to address the above listed problems via gait training, therapeutic activities, therapeutic exercises, neuromuscular re-education, wheelchair management/training  Plan of Care expires: 17  Plan of Care reviewed with: patient      I certify that I was present in the room directing the student in service delivery and guiding them using my skilled judgment. As the co-signing therapist I have reviewed the students documentation and am responsible for the treatment, assessment, and plan.     Rina Delong 10/12/2017     Richie Lemus, TOR 10/12/2017

## 2017-10-13 PROBLEM — E66.9 OBESITY: Status: ACTIVE | Noted: 2017-10-13

## 2017-10-13 PROCEDURE — 97110 THERAPEUTIC EXERCISES: CPT

## 2017-10-13 PROCEDURE — 97542 WHEELCHAIR MNGMENT TRAINING: CPT

## 2017-10-13 PROCEDURE — 97535 SELF CARE MNGMENT TRAINING: CPT

## 2017-10-13 PROCEDURE — 97530 THERAPEUTIC ACTIVITIES: CPT

## 2017-10-13 PROCEDURE — 25000003 PHARM REV CODE 250: Performed by: INTERNAL MEDICINE

## 2017-10-13 PROCEDURE — 97116 GAIT TRAINING THERAPY: CPT

## 2017-10-13 PROCEDURE — 97802 MEDICAL NUTRITION INDIV IN: CPT

## 2017-10-13 PROCEDURE — 97112 NEUROMUSCULAR REEDUCATION: CPT

## 2017-10-13 PROCEDURE — 12800000 HC REHAB SEMI-PRIVATE ROOM

## 2017-10-13 RX ADMIN — Medication 1 TABLET: at 09:10

## 2017-10-13 RX ADMIN — DOCUSATE SODIUM 100 MG: 100 CAPSULE, LIQUID FILLED ORAL at 09:10

## 2017-10-13 RX ADMIN — OXYCODONE HYDROCHLORIDE 5 MG: 5 TABLET ORAL at 07:10

## 2017-10-13 NOTE — ASSESSMENT & PLAN NOTE
Related to (etiology):   Limited movement     Signs and Symptoms (as evidenced by):   1) BMI 31, 2)  Per chart review pt's wt has been around 185-186 since 4/27/17     Interventions/Recommendations (treatment strategy):  1) After pt noted a desire to lose weight offered limited calorie or low fat diet to patient and she declined in favor of monitoring her food herself. 2) Monitor weight     Nutrition Diagnosis Status:   New

## 2017-10-13 NOTE — PT/OT/SLP PROGRESS
Physical Therapy         Treatment        Zaira Jensen   MRN: [id number]     PT Received On: 10/13/17  Total Time (min): 85    Billable Minutes: 85  Gait Training 35, Therapeutic Activity 15, Therapeutic Exercise 27 and Train/Wheelchair Management 8  Total Minutes: 85    Treatment Type: Treatment  PT/PTA: PT     PTA Visit Number: 0       General Precautions: Standard, fall  Orthopedic Precautions:     Braces: Braces: Cervical collar       Subjective:  Communicated with patient prior to session.    Pain/Comfort  Pain Rating 1: 3/10  Location - Side 1: Right  Location - Orientation 1: lower  Location 1: back  Pain Addressed 1: Pre-medicate for activity, Reposition, Distraction (pain pill @ 730 am for LE jt soreness)  Pain Rating Post-Intervention 1: 3/10    Objective:  Patient found up in w/cPatient found with: cervical collar    Functional Mobility:  Bed Mobility: not performed    Transfer Training:  W/c <> stand: Moderate Assistance with Rolling Walker x3  Chair <> Mat: Stand Pivot with Contact Guard Assistance with  Rolling Walker x1    Sit <> stand on mat at different heights: Min A-CGA with Rolling Walker x4    Wheelchair Training:  Pt propelled Standard wheelchair with rim projections x 20 feet on Level tile with Bilateral upper extremity with Stand-by Assistance (Minimal Assistance required once); slow orquidea      Gait Training:   Ambulated 285 ft using Rolling Walker with CGA wearing AFO on left; Pt still exhibiting L knee wobble in L Terminal Stance which decreases forward progression and swing limb advancement, as well as a mild R knee extension thrust in R terminal Stance. Slow shuffling gait with decreased hip/knee flexion and foot clearance bilaterally. Tactile/verbal cueing to correct forward trunk posture.   Ambulated 275 ft using RW with CGA; 2nd walk pt exhibited increased step length and increased orquidea    Stair Training: not performed      Additional Treatment:  SEATED: LAQ 3#, ankle  pumps;30x  STANDING (with RW and CGA): heel raises unilateral support only to eliminate UE compensation, mini squats, marches; 30x  ScitFit: L 2.0 for 5 min (got to 5 Mendez momentarily, maintained steady state at 3 Mendez)    Activity Tolerance:  Patient tolerated treatment well    Patient left up in chair with call button in reach.    Assessment:  Zaira Jensen is a 42 y.o. female with a medical diagnosis of Cervical spinal stenosis s/p ACDF C5-C6. Today pt ambulated with AFO on L which did not appear to solve impaired L swing limb advancement, so was d/c. Despite existing gait deficits and slow orquidea, pt was able to increase ambulation distance today. Pt also able to decrease needed assistance with subsequent sit<>stand training on mat with different heights. Pt remains appropriate for PT POC.      Rehab potential is good.    Activity tolerance: Good    Discharge recommendations: Discharge Facility/Level Of Care Needs: home with home health, outpatient PT     Equipment recommendations: Equipment Needed After Discharge:  (TBD)     GOALS:    Physical Therapy Goals        Problem: Physical Therapy Goal    Goal Priority Disciplines Outcome Goal Variances Interventions   Physical Therapy Goal     PT/OT, PT Ongoing (interventions implemented as appropriate)     Description:  Goals to be met by: 2017     Patient will increase functional independence with mobility by performin). Supine to sit with Stand-by Assistance  2). Sit to supine with Stand-by Assistance  3). Rolling to Left and Right with Stand-by Assistance.  4). Sit to stand transfer with Minimal Assistance  5). Gait  x  > 25 feet with Contact Guard Assistance using Rolling Walker.   6). Wheelchair propulsion x > 150 feet with Stand-by Assistance                       PLAN:    Patient to be seen daily  to address the above listed problems via gait training, therapeutic activities, therapeutic exercises, wheelchair management/training  Plan of Care  expires: 11/18/17  Plan of Care reviewed with: patient      I certify that I was present in the room directing the student in service delivery and guiding them using my skilled judgment. As the co-signing therapist I have reviewed the students documentation and am responsible for the treatment, assessment, and plan.     Rina Delong, SPT 10/13/2017     Richie Lemus, PT 10/13/2017

## 2017-10-13 NOTE — PROGRESS NOTES
[facility]  Adult Nutrition  Progress Note    SUMMARY     Recommendations    Recommendation/Intervention: Recommend: 1) continue regular diet 2) monitor weight   Goal: 1) Meal intake at least 75% 2) Stable weight or slow weight loss of no more than 1-2 lbs per week  Nutrition Goal Status: new  Communication of RD Recs:  (care plan)    Discharge Plan     regular diet with patient monitoring to prevent additional wt gain    Reason for Assessment    Reason for Assessment: length of stay  Interdisciplinary Rounds: attended     Nutrition Prescription Ordered    Current Diet Order: Regular  Nutrition Order Comments: Add bottled water to each tray;  Pt notes she is concerned about her weight.  Offered calorie control or low fat option and patient declined noting she would monitor intake.        Evaluation of Received Nutrients/Fluid Intake     Energy Calories Required: meeting needs   Protein Required: meeting needs   Fluid Required: JOSE    No intake or output data in the 24 hours ending 10/13/17 1357       % Intake of Estimated Energy Needs: 75 - 100 %  % Meal Intake: 75%     Nutrition Risk Screen     Nutrition Risk Screen: no indicators present    Nutrition/Diet History    Patient Reported Diet/Restrictions/Preferences: general (avoids foods that have a lot of acid)   Food Preferences: No cultural or religous food preferences identified.    Supplemental Drinks or Food Habits:  (none)     Labs/Tests/Procedures/Meds    Diagnostic Test/Procedure Review: reviewed, pertinent  Pertinent Labs Reviewed: reviewed, pertinent      BMP  Lab Results   Component Value Date     10/12/2017    K 4.1 10/12/2017     10/12/2017    CO2 26 10/12/2017    BUN 10 10/12/2017    CREATININE 0.8 10/12/2017    CALCIUM 9.8 10/12/2017    ANIONGAP 9 10/12/2017    ESTGFRAFRICA >60 10/12/2017    EGFRNONAA >60 10/12/2017     Lab Results   Component Value Date    ALBUMIN 3.5 10/12/2017     Lab Results   Component Value Date  "   CRP 1.6 2017       Pertinent Medications Reviewed: reviewed, pertinent      Scheduled Meds:   docusate sodium  100 mg Oral Daily    folic acid-vit B6-vit B12 2.5-25-2 mg  1 tablet Oral Daily     Continuous Infusions:     Physical Findings    Overall Physical Appearance: overweight  Skin:  (Jc score 18, neck incision)    Anthropometrics    Temp: 97.4 °F (36.3 °C)   Height: 5' 5"  Weight Method: Bed Scale  Weight: 83.7 kg (184 lb 8.4 oz)   Ideal Body Weight (IBW), Female: 125 lb   % Ideal Body Weight, Female (lb): 147.62 lb  BMI (Calculated): 30.8  BMI Grade: 30 - 34.9- obesity - grade I   Usual Body Weight (UBW), k.9 kg (per chart review 17)   % Usual Body Weight: 99.97  % Weight Change From Usual Weight: -0.24 %     Estimated/Assessed Needs    Weight Used For Calorie Calculations: 83.7 kg (184 lb 8.4 oz)   Height (cm): 165.1 cm   Energy Need Method: Willacy-St Jeor    RMR (Willacy-St. Jeor Equation): 1487.88 no AF required when BMI >25   Weight Used For Protein Calculations: 83.7 kg (184 lb 8.4 oz)   0.8 gm Protein (gm): 67.1 and 1.0 gm Protein (gm): 83.88   Fluid Need Method: RDA Method (or per MD rec)   CHO Requirement: na     Assessment and Plan    Obesity    Related to (etiology):   Limited movement     Signs and Symptoms (as evidenced by):   1) BMI 31, 2)  Per chart review pt's wt has been around 185-186 since 17     Interventions/Recommendations (treatment strategy):  1) After pt noted a desire to lose weight offered limited calorie or low fat diet to patient and she declined in favor of monitoring her food herself. 2) Monitor weight     Nutrition Diagnosis Status:   New              Monitor and Evaluation    Food and Nutrient Intake: energy intake  Food and Nutrient Adminstration: diet order   Anthropometric Measurements: weight, weight change  Biochemical Data, Medical Tests and Procedures: electrolyte and renal panel, inflammatory profile  Nutrition-Focused Physical Findings: " overall appearance, skin    Nutrition Risk    Level of Risk:  (2 x weekly)    Nutrition Follow-Up     yes

## 2017-10-13 NOTE — PROGRESS NOTES
[facility]  Physical Medicine & Rehab  Progress Note    Patient Name: Zaira Jensen  MRN: [id number]  Patient Class: IP- Rehab   Admission Date: 10/5/2017  Length of Stay: 8 days  Attending Physician: Anirudh Rowley MD  Primary Care Provider: Cuate Alvarez MD    Subjective:     Principal Problem:  quadrapresis  Interval History: pt is 42 y.o female s/p ant cervical diskectomy & fusion c5-c6, participating with therapy & making progress    Scheduled Medications:    docusate sodium  100 mg Oral Daily    folic acid-vit B6-vit B12 2.5-25-2 mg  1 tablet Oral Daily       PRN Medications: acetaminophen, aluminum-magnesium hydroxide-simethicone, bisacodyl, bisacodyl, calcium carbonate, lactulose, ondansetron, oxycodone, oxycodone, oxycodone, ramelteon, senna-docusate 8.6-50 mg    Review of Systems   Constitutional: Negative.    HENT: Negative.    Eyes: Negative.    Respiratory: Negative.    Cardiovascular: Negative.    Gastrointestinal: Negative.    Endocrine: Negative.    Genitourinary: Negative.    Musculoskeletal: Negative.    Skin: Negative.    Allergic/Immunologic: Negative.    Neurological: Negative.    Hematological: Negative.    Psychiatric/Behavioral: Negative.      Objective:     Vital Signs (Most Recent):  Temp: 97.4 °F (36.3 °C) (10/13/17 0724)  Pulse: 99 (10/13/17 0724)  Resp: 16 (10/13/17 0724)  BP: 128/66 (10/13/17 0724)  SpO2: 98 % (10/13/17 0724)    Vital Signs (24h Range):  Temp:  [97.4 °F (36.3 °C)-98.3 °F (36.8 °C)] 97.4 °F (36.3 °C)  Pulse:  [] 99  Resp:  [14-16] 16  SpO2:  [98 %-99 %] 98 %  BP: (109-128)/(56-66) 128/66     Physical Exam   Constitutional: She is oriented to person, place, and time. She appears well-developed and well-nourished. No distress.   HENT:   Head: Normocephalic and atraumatic.   Right Ear: External ear normal.   Left Ear: External ear normal.   Nose: Nose normal.   Mouth/Throat: Oropharynx is clear and moist. No oropharyngeal exudate.   Eyes:  Conjunctivae and EOM are normal. Pupils are equal, round, and reactive to light. Right eye exhibits no discharge. Left eye exhibits no discharge. No scleral icterus.   Neck: Normal range of motion. No JVD present. No tracheal deviation present. No thyromegaly present.   Wear soft collar   Cardiovascular: Normal rate, regular rhythm, normal heart sounds and intact distal pulses.  Exam reveals no gallop and no friction rub.    No murmur heard.  Pulmonary/Chest: Effort normal and breath sounds normal. No stridor. No respiratory distress. She has no wheezes. She has no rales. She exhibits no tenderness.   Abdominal: Soft. Bowel sounds are normal. She exhibits no distension. There is no tenderness. There is no guarding.   Genitourinary:   Genitourinary Comments: deferred   Musculoskeletal: Normal range of motion. She exhibits no edema, tenderness or deformity.   Neurological: She is alert and oriented to person, place, and time. She exhibits normal muscle tone.   quadrapresis   Skin: No rash noted. She is not diaphoretic. No erythema. No pallor.   Psychiatric: She has a normal mood and affect. Her behavior is normal. Judgment and thought content normal.     NEUROLOGICAL EXAMINATION:     MENTAL STATUS   Oriented to person, place, and time.     CRANIAL NERVES     CN III, IV, VI   Pupils are equal, round, and reactive to light.  Extraocular motions are normal.       Assessment/Plan:      Zaira Jensen is a 42 y.o. female admitted to inpatient rehabilitation on 10/5/2017 for <principal problem not specified> with impaired mobility and ADLs. Patient remains appropriate for PT, OT, and as required Speech therapy. Patient continues to require 24 hour nursing care as well as daily Physician assessment.    Active Diagnoses:    Diagnosis Date Noted POA    Quadriplegia, C5-C7 complete [G82.53] 10/05/2017 Yes      Problems Resolved During this Admission:    Diagnosis Date Noted Date Resolved POA     Quadriparesis, cont PT, OT, ST    DVT prophylaxis, cont SCD     DISCHARGE PLANNING:  Tentative Discharge Date:     No future appointments.    Anirudh Rowley MD  Department of Physical Medicine & Rehab  [facility]

## 2017-10-13 NOTE — PLAN OF CARE
Problem: Nutrition Imbalance: Excess Oral Intake (Adult)  Intervention: Promote Healthy Nutritional Intake/Lifestyle  Recommendation/Intervention: Recommend: 1) continue regular diet 2) monitor weight   Goal: 1) Meal intake at least 75% 2) Stable weight or slow weight loss of no more than 1-2 lbs per week  Nutrition Goal Status: new  Communication of RD Recs:  (care plan)

## 2017-10-13 NOTE — PT/OT/SLP PROGRESS
Occupational Therapy  Treatment    Zaira Jensen   MRN: [id number]   Admitting Diagnosis: Spinal stenosis s/p ACDF C5-6 10/3/17    OT Date of Treatment: 10/13/17   Total Time (min): 98 min    Billable Minutes:  Self Care/Home Management 60, Therapeutic Exercise 15 and Neuromuscular Re-education 15 Therapeutic activity 8  Total Minutes: 98    General Precautions: Standard, fall  Orthopedic Precautions:  (C-spinal precautions)  Braces: Cervical collar    Do you have any cultural, spiritual, Amish conflicts, given your current situation?: None    Subjective:  Communicated with nurse prior to session.    Pain/Comfort  Pain Rating 1: 6/10  Location - Side 1: Left  Location - Orientation 1: upper  Location 1: shoulder  Pain Addressed 1: Distraction, Nurse notified (Jalen GARZA, providing pain medication at start of session)  Pain Rating Post-Intervention 1: 0/10    Objective:  Patient found with: cervical collar, bed alarm    Functional Mobility:  Bed Mobility:   Supine to sit: Moderate Assistance    Transfer Training:   Bed <> Chair:  Stand Pivot with Moderate Assistance with 2nd staff member standing by/contact guard for pt/staff safety verbal cues for hand placement    Feeding:  Patient performed feeding with Stand-by Assistance with built up utensil set-up of items, opening of packages.    Grooming:  Patient performed oral hygiene, washed face, and applied face lotion/vaseline to lips with SBA/Min (A)    LE Dressing:  Total (A) to don/doff velcro shoes    Balance:   Static Sit: Supervision to SBA    Additional Treatment:  - AAROM shoulder/elbow flexion/extension & shoulder ABD/ADD 90* or less - B pierre un-weighted ; pt able to maintain grasp of handles bilaterally throughout all exercises with Supervision and no VC   - OTR reinforced importance of staying hydrated, bilateral UE integration    Patient left up in chair with call button in reach, chair alarm on, nurse notified and pt left with cell phone and pt able  to turn it on and insert pass code with Supervision    ASSESSMENT:  Zaira Jensen is a 42 y.o. female with a medical diagnosis of spinal stenosis s/p ACDF C5-6 on 10/3/17 and presents with daily improvements towards OT goals. Patient requires significantly extended time to complete all tasks. Patient should continue to benefit from skilled OT services to increase functional independence, mobility, and safety.     Rehab potential is good    Activity tolerance: Good    Discharge recommendations: home     Equipment recommendations:  (tbd)     GOALS:    Occupational Therapy Goals        Problem: Occupational Therapy Goal    Goal Priority Disciplines Outcome Interventions   Occupational Therapy Goal     OT, PT/OT Ongoing (interventions implemented as appropriate)    Description:  Goals to be met by: 10/26/17     Patient will increase functional independence with ADLs by performing:    Feeding with Contact Guard Assistance.  UE Dressing with Minimal Assistance.  LE Dressing with Minimal Assistance.  Grooming while seated with Minimal Assistance.  Toileting from toilet with Moderate Assistance for hygiene and clothing management.   Bathing from most appropriate location with Minimal Assistance.  Toilet transfer to toilet with Minimal Assistance.                      Plan:  Patient to be seen 6 x/week to address the above listed problems via self-care/home management, community/work re-entry, therapeutic exercises, therapeutic activities, therapeutic groups, neuromuscular re-education, sensory integration, wheelchair management/training  Plan of Care expires: 10/26/17  Plan of Care reviewed with: patient    HUGH Perkins LOTR  10/13/2017

## 2017-10-13 NOTE — PLAN OF CARE
Problem: Patient Care Overview  Goal: Plan of Care Review  Outcome: Ongoing (interventions implemented as appropriate)  Awake, alert and oriented in no distress. Denies pain or discomfort. Max assist with adl's and transfers. Free of falls this shift. Safety ongoing with alarms in use.

## 2017-10-13 NOTE — PLAN OF CARE
Problem: Patient Care Overview  Goal: Plan of Care Review  Outcome: Ongoing (interventions implemented as appropriate)  AAOx3 vss tolerating therapy well no acute distress  Noted at this

## 2017-10-14 PROCEDURE — 25000003 PHARM REV CODE 250: Performed by: INTERNAL MEDICINE

## 2017-10-14 PROCEDURE — 12800000 HC REHAB SEMI-PRIVATE ROOM

## 2017-10-14 PROCEDURE — 97116 GAIT TRAINING THERAPY: CPT

## 2017-10-14 PROCEDURE — 97110 THERAPEUTIC EXERCISES: CPT

## 2017-10-14 RX ADMIN — Medication 1 TABLET: at 08:10

## 2017-10-14 RX ADMIN — DOCUSATE SODIUM 100 MG: 100 CAPSULE, LIQUID FILLED ORAL at 08:10

## 2017-10-14 NOTE — PLAN OF CARE
Problem: Physical Therapy Goal  Goal: Physical Therapy Goal  Goals to be met by: 2017     Patient will increase functional independence with mobility by performin). Supine to sit with Stand-by Assistance  2). Sit to supine with Stand-by Assistance  3). Rolling to Left and Right with Stand-by Assistance.  4). Sit to stand transfer with Minimal Assistance  5). Gait  x  > 25 feet with Contact Guard Assistance using Rolling Walker.   6). Wheelchair propulsion x > 150 feet with Stand-by Assistance      Outcome: Ongoing (interventions implemented as appropriate)  Patient performed therex, WC mobility, and GT to increase strength and achieve set goals.

## 2017-10-14 NOTE — PT/OT/SLP PROGRESS
Physical Therapy         Treatment        Zaira Jensen   MRN: [id number]     PT Received On: 10/14/17  Total Time (min): 37        Billable Minutes:  Gait [id number] and Therapeutic Exercise 27  Total Minutes: 37    Treatment Type: Treatment  PT/PTA: PTA     PTA Visit Number: 1       General Precautions: Standard, fall  Orthopedic Precautions:     Braces: Braces: Cervical collar         Subjective:  Communicated with patient and nursing prior to session.    Pain/Comfort  Pain Rating 1: 3/10  Location - Side 1: Right  Location - Orientation 1: lower  Location 1: back  Pain Addressed 1: Reposition, Distraction    Objective:  Patient found sitting in WC waiting for PT, with Patient found with: cervical collar    Functional Mobility:  Bed Mobility:   Supine to sit: Activity did not occur   Sit to supine: Activity did not occur   Rolling: Activity did not occur   Scooting: Activity did not occur    Balance:   Static Stand: POOR+: Needs MINIMAL assist to maintain  Dynamic stand: FAIR: Needs CONTACT GUARD during gait    Transfer Training:  Sit to stand:Minimal Assistance with Rolling Walker x1 to ambulate    Wheelchair Training:  Pt propelled Standard wheelchair x 75 feet on Level tile with  Bilateral lower extremity with Supervision or Set-up Assistance. Significant increased time required.    Gait Training:  Patient gait trained FWB/WBAT: bilateral lower extremity 175  feet on level tile with Rolling Walker with Contact Guard Assistance.  Pt with demonstarting a  2-point gait with decreased orquidea, increased time in double stance, decreased velocity of limb motion, decreased step length, decreased stride length, decreased swing-to-stance ratio and decreased toe-to-floor clearance.Impairments contributing to gait deviations include impaired balance, impaired coordination, impaired motor control, pain, impaired postural control, decreased sensation, impaired sensory feedback and decreased strength. Difficulty advancing L  LE, knees became weak with ambulation towards end of gait. WC follow.    Additional Treatment:  Sci Fit B UE/LE 7    Activity Tolerance:  Patient tolerated treatment well    Patient left up in chair with all lines intact, call button in reach, chair alarm on and nursing notified.    Assessment:  Zaira Jensen is a 42 y.o. female with a medical diagnosis of <principal problem not specified>. She presents with B UE/LE decreased functional strength and endurance.    Rehab potential is good.    Activity tolerance: Good    Discharge recommendations:       Equipment recommendations:       GOALS:    Physical Therapy Goals        Problem: Physical Therapy Goal    Goal Priority Disciplines Outcome Goal Variances Interventions   Physical Therapy Goal     PT/OT, PT Ongoing (interventions implemented as appropriate)     Description:  Goals to be met by: 2017     Patient will increase functional independence with mobility by performin). Supine to sit with Stand-by Assistance  2). Sit to supine with Stand-by Assistance  3). Rolling to Left and Right with Stand-by Assistance.  4). Sit to stand transfer with Minimal Assistance  5). Gait  x  > 25 feet with Contact Guard Assistance using Rolling Walker.   6). Wheelchair propulsion x > 150 feet with Stand-by Assistance                       PLAN:    Patient to be seen daily  to address the above listed problems via gait training, therapeutic activities, therapeutic exercises, wheelchair management/training  Plan of Care expires: 17  Plan of Care reviewed with: patient         Katherine Silva, PTA 10/14/2017

## 2017-10-14 NOTE — PLAN OF CARE
Problem: Self-Care Deficit (Adult,Obstetrics,Pediatric)  Goal: Identify Related Risk Factors and Signs and Symptoms  Related risk factors and signs and symptoms are identified upon initiation of Human Response Clinical Practice Guideline (CPG)   Outcome: Ongoing (interventions implemented as appropriate)  Pt alert and oriented. Min assist with transfers. Cont of b and b. Weak upper extremities , needs assistance with setting up food trays. Call light in reach

## 2017-10-15 PROCEDURE — 25000003 PHARM REV CODE 250: Performed by: INTERNAL MEDICINE

## 2017-10-15 PROCEDURE — 97116 GAIT TRAINING THERAPY: CPT

## 2017-10-15 PROCEDURE — 97530 THERAPEUTIC ACTIVITIES: CPT

## 2017-10-15 PROCEDURE — 12800000 HC REHAB SEMI-PRIVATE ROOM

## 2017-10-15 RX ADMIN — Medication 1 TABLET: at 09:10

## 2017-10-15 RX ADMIN — DOCUSATE SODIUM 100 MG: 100 CAPSULE, LIQUID FILLED ORAL at 09:10

## 2017-10-15 NOTE — PLAN OF CARE
Problem: Physical Therapy Goal  Goal: Physical Therapy Goal  Goals to be met by: 2017     Patient will increase functional independence with mobility by performin). Supine to sit with Stand-by Assistance  2). Sit to supine with Stand-by Assistance  3). Rolling to Left and Right with Stand-by Assistance.  4). Sit to stand transfer with Minimal Assistance  5). Gait  x  > 25 feet with Contact Guard Assistance using Rolling Walker.   6). Wheelchair propulsion x > 150 feet with Stand-by Assistance      Outcome: Ongoing (interventions implemented as appropriate)    PT for gait and stepper.

## 2017-10-15 NOTE — PROGRESS NOTES
[facility]  Physical Medicine & Rehab  Progress Note    Patient Name: Zaira Jensen  MRN: [id number]  Patient Class: IP- Rehab   Admission Date: 10/5/2017  Length of Stay: 9 days  Attending Physician: Anirudh Rowley MD  Primary Care Provider: Cuate Alvarez MD    Subjective:     Principal Problem: quadrapresis    Interval History: pt is 42 y.o female  With dx of cervical myelomalacia of cervical cord, s/p ant cervical discectomy & fusion, participating with therapy & making progress    Scheduled Medications:    docusate sodium  100 mg Oral Daily    folic acid-vit B6-vit B12 2.5-25-2 mg  1 tablet Oral Daily       PRN Medications: acetaminophen, aluminum-magnesium hydroxide-simethicone, bisacodyl, bisacodyl, calcium carbonate, lactulose, ondansetron, oxycodone, oxycodone, oxycodone, ramelteon, senna-docusate 8.6-50 mg    Review of Systems   Constitutional: Negative.    HENT: Negative.    Eyes: Negative.    Respiratory: Negative.    Cardiovascular: Negative.    Gastrointestinal: Negative.    Endocrine: Negative.    Genitourinary: Negative.    Musculoskeletal: Negative.    Allergic/Immunologic: Negative.    Neurological: Negative.    Hematological: Negative.    Psychiatric/Behavioral: Negative.      Objective:     Vital Signs (Most Recent):  Temp: 97.9 °F (36.6 °C) (10/14/17 0849)  Pulse: 99 (10/14/17 0849)  Resp: 16 (10/14/17 0849)  BP: 115/81 (10/14/17 0849)  SpO2: 99 % (10/14/17 0849)    Vital Signs (24h Range):  Temp:  [97.7 °F (36.5 °C)-97.9 °F (36.6 °C)] 97.9 °F (36.6 °C)  Pulse:  [89-99] 99  Resp:  [16] 16  SpO2:  [97 %-99 %] 99 %  BP: (107-115)/(60-81) 115/81     Physical Exam   Constitutional: She is oriented to person, place, and time. She appears well-developed and well-nourished. No distress.   HENT:   Head: Normocephalic and atraumatic.   Right Ear: External ear normal.   Left Ear: External ear normal.   Nose: Nose normal.   Mouth/Throat: Oropharynx is clear and moist. No  oropharyngeal exudate.   Eyes: Conjunctivae and EOM are normal. Pupils are equal, round, and reactive to light. Right eye exhibits no discharge. Left eye exhibits no discharge. No scleral icterus.   Neck: Normal range of motion. Neck supple. No JVD present. No tracheal deviation present. No thyromegaly present.   Cardiovascular: Normal rate, regular rhythm, normal heart sounds and intact distal pulses.  Exam reveals no gallop and no friction rub.    No murmur heard.  Pulmonary/Chest: Effort normal and breath sounds normal. No stridor. No respiratory distress. She has no wheezes. She has no rales. She exhibits no tenderness.   Abdominal: Soft. Bowel sounds are normal. She exhibits no distension and no mass. There is no tenderness. There is no rebound and no guarding. No hernia.   Genitourinary:   Genitourinary Comments: deferred   Musculoskeletal: Normal range of motion. She exhibits no edema, tenderness or deformity.   Lymphadenopathy:     She has no cervical adenopathy.   Neurological: She is alert and oriented to person, place, and time. She exhibits normal muscle tone.   Skin: No rash noted. She is not diaphoretic. No erythema. No pallor.   Psychiatric: She has a normal mood and affect. Her behavior is normal. Judgment and thought content normal.     NEUROLOGICAL EXAMINATION:     MENTAL STATUS   Oriented to person, place, and time.     CRANIAL NERVES     CN III, IV, VI   Pupils are equal, round, and reactive to light.  Extraocular motions are normal.       Assessment/Plan:      Zaira Jensen is a 42 y.o. female admitted to inpatient rehabilitation on 10/5/2017 for <principal problem not specified> with impaired mobility and ADLs. Patient remains appropriate for PT, OT, and as required Speech therapy. Patient continues to require 24 hour nursing care as well as daily Physician assessment.    Active Diagnoses:    Diagnosis Date Noted POA    Obesity [E66.9] 10/13/2017 Yes    Quadriplegia, C5-C7 complete [G82.53]  10/05/2017 Yes      Problems Resolved During this Admission:    Diagnosis Date Noted Date Resolved POA   quadriparesis, cont PT, OT, ST  DVT prophylaxis, cont SCD    DISCHARGE PLANNING:  Tentative Discharge Date:     No future appointments.    Anirudh Rowley MD  Department of Physical Medicine & Rehab  [facility]

## 2017-10-15 NOTE — PT/OT/SLP PROGRESS
Physical Therapy         Treatment        Zaira Jensen   MRN: [id number]     PT Received On: 10/15/17           Billable Minutes:  Gait [id number] and Therapeutic Activity 15  Total Minutes: 40    Treatment Type: Treatment  PT/PTA: PT     PTA Visit Number: 1       General Precautions: Standard, fall  Orthopedic Precautions:     Braces: Braces: Cervical collar         Subjective:  Communicated with nurse Jalen prior to session.    Pain/Comfort  Pain Rating 1: 2/10  Location 1: neck  Pain Addressed 1: Reposition, Cessation of Activity  Pain Rating Post-Intervention 1: 2/10    Objective:  Patient found sitting up in w/c in room.   Patient found with: cervical collar    Gait Training:  On level tile with Rolling Walker x 250 feet with CGA for steadying & frequent vc for upright posture and increased heel strike > toe off pattern instead of short shuffling steps. Pt took entire 25 min from start to finish, stopping to take a standing break occasionally to rest her shoulders.    Additional Treatment:  SciFit stepper x 15 min on level 2    Activity Tolerance:  Patient tolerated treatment well    Patient left up in chair with call button in reach, chair alarm on and nurse notified.    Assessment:  Zaira Jensen is a 42 y.o. female with a medical diagnosis of <principal problem not specified>. She presents with impaired functional mobility, gait disturbance, ataxia, impaired activity tolerance, and pain.  Pt able to demonstrate improved gait pattern for short periods of time throughout gait trial.    Rehab potential is good.    Activity tolerance: Fair    Discharge recommendations:       Equipment recommendations:       GOALS:    Physical Therapy Goals        Problem: Physical Therapy Goal    Goal Priority Disciplines Outcome Goal Variances Interventions   Physical Therapy Goal     PT/OT, PT Ongoing (interventions implemented as appropriate)     Description:  Goals to be met by: 11/18/2017     Patient will increase  functional independence with mobility by performin). Supine to sit with Stand-by Assistance  2). Sit to supine with Stand-by Assistance  3). Rolling to Left and Right with Stand-by Assistance.  4). Sit to stand transfer with Minimal Assistance  5). Gait  x  > 25 feet with Contact Guard Assistance using Rolling Walker.   6). Wheelchair propulsion x > 150 feet with Stand-by Assistance                       PLAN:    Patient to be seen daily  to address the above listed problems via gait training, therapeutic activities, therapeutic exercises, neuromuscular re-education, wheelchair management/training  Plan of Care expires: 10/18/17  Plan of Care reviewed with: patient         Zayra Blake, PT 10/15/2017

## 2017-10-15 NOTE — PLAN OF CARE
Problem: Patient Care Overview  Goal: Plan of Care Review  Outcome: Ongoing (interventions implemented as appropriate)  AAOx3 tolerating therapy well no acute distress noted

## 2017-10-15 NOTE — PLAN OF CARE
Problem: Patient Care Overview  Goal: Plan of Care Review  Outcome: Ongoing (interventions implemented as appropriate)  Patient minimal to moderate assist with turning, transfers, and toileting.  Balance and gait weak and unsteady.  Noted tremors while performing activities.  NAD noted.

## 2017-10-15 NOTE — PROGRESS NOTES
[facility]  Physical Medicine & Rehab  Progress Note    Patient Name: Zaira Jensen  MRN: [id number]  Patient Class: IP- Rehab   Admission Date: 10/5/2017  Length of Stay: 10 days  Attending Physician: Anirudh Rowley MD  Primary Care Provider: Cuate Alvarez MD    Subjective:     Principal Problem: quadrapresis    Interval History: pt is 42 y.o female with dx of quadriparesis., s/p ACDF. Participating with therapy & making progress      Scheduled Medications:    docusate sodium  100 mg Oral Daily    folic acid-vit B6-vit B12 2.5-25-2 mg  1 tablet Oral Daily       PRN Medications: acetaminophen, aluminum-magnesium hydroxide-simethicone, bisacodyl, bisacodyl, calcium carbonate, lactulose, ondansetron, oxycodone, oxycodone, oxycodone, ramelteon, senna-docusate 8.6-50 mg    Review of Systems   Constitutional: Negative.    HENT: Negative.    Eyes: Negative.    Respiratory: Negative.    Cardiovascular: Negative.    Gastrointestinal: Negative.    Endocrine: Negative.    Genitourinary: Negative.    Musculoskeletal: Negative.    Skin: Negative.    Allergic/Immunologic: Negative.    Neurological: Negative.    Hematological: Negative.    Psychiatric/Behavioral: Negative.      Objective:     Vital Signs (Most Recent):  Temp: 97.6 °F (36.4 °C) (10/15/17 0729)  Pulse: 107 (10/15/17 0729)  Resp: 17 (10/15/17 0729)  BP: 125/81 (10/15/17 0729)  SpO2: 96 % (10/15/17 0729)    Vital Signs (24h Range):  Temp:  [97.5 °F (36.4 °C)-97.6 °F (36.4 °C)] 97.6 °F (36.4 °C)  Pulse:  [] 107  Resp:  [17-18] 17  SpO2:  [96 %-97 %] 96 %  BP: (117-125)/(56-81) 125/81     Physical Exam   Constitutional: She is oriented to person, place, and time. She appears well-developed and well-nourished. No distress.   HENT:   Head: Normocephalic and atraumatic.   Right Ear: External ear normal.   Left Ear: External ear normal.   Nose: Nose normal.   Mouth/Throat: Oropharynx is clear and moist. No oropharyngeal exudate.   Eyes:  Conjunctivae and EOM are normal. Pupils are equal, round, and reactive to light. Right eye exhibits no discharge. Left eye exhibits no discharge. No scleral icterus.   Neck: Normal range of motion. Neck supple. No JVD present. No tracheal deviation present. No thyromegaly present.   Cardiovascular: Normal rate, regular rhythm, normal heart sounds and intact distal pulses.  Exam reveals no gallop and no friction rub.    No murmur heard.  Pulmonary/Chest: Effort normal and breath sounds normal. No stridor. No respiratory distress. She has no wheezes. She has no rales. She exhibits no tenderness.   Abdominal: Soft. Bowel sounds are normal. She exhibits no distension and no mass. There is no tenderness. There is no rebound and no guarding. No hernia.   Genitourinary:   Genitourinary Comments: deferred   Musculoskeletal: Normal range of motion. She exhibits no edema, tenderness or deformity.   Lymphadenopathy:     She has no cervical adenopathy.   Neurological: She is alert and oriented to person, place, and time. She exhibits normal muscle tone.   Skin: No rash noted. She is not diaphoretic. No erythema. No pallor.   Incisions well healed, removed all staples from right groin   Psychiatric: She has a normal mood and affect. Her behavior is normal. Judgment and thought content normal.     NEUROLOGICAL EXAMINATION:     MENTAL STATUS   Oriented to person, place, and time.     CRANIAL NERVES     CN III, IV, VI   Pupils are equal, round, and reactive to light.  Extraocular motions are normal.       Assessment/Plan:      Zaira Jensen is a 42 y.o. female admitted to inpatient rehabilitation on 10/5/2017 for <principal problem not specified> with impaired mobility and ADLs. Patient remains appropriate for PT, OT, and as required Speech therapy. Patient continues to require 24 hour nursing care as well as daily Physician assessment.    Active Diagnoses:    Diagnosis Date Noted POA    Obesity [E66.9] 10/13/2017 Yes     Quadriplegia, C5-C7 complete [G82.53] 10/05/2017 Yes      Problems Resolved During this Admission:    Diagnosis Date Noted Date Resolved POA     Quadriparesis, cont PT, OT  Pain, managed with current meds  DVT prophylaxis, cont SCD   DISCHARGE PLANNING:  Tentative Discharge Date:     No future appointments.    Anirudh Rowley MD  Department of Physical Medicine & Rehab  [facility]

## 2017-10-16 PROCEDURE — 25000003 PHARM REV CODE 250: Performed by: INTERNAL MEDICINE

## 2017-10-16 PROCEDURE — 97530 THERAPEUTIC ACTIVITIES: CPT

## 2017-10-16 PROCEDURE — 97116 GAIT TRAINING THERAPY: CPT

## 2017-10-16 PROCEDURE — 12800000 HC REHAB SEMI-PRIVATE ROOM

## 2017-10-16 PROCEDURE — 97110 THERAPEUTIC EXERCISES: CPT

## 2017-10-16 PROCEDURE — 97112 NEUROMUSCULAR REEDUCATION: CPT

## 2017-10-16 PROCEDURE — 97535 SELF CARE MNGMENT TRAINING: CPT

## 2017-10-16 PROCEDURE — 97542 WHEELCHAIR MNGMENT TRAINING: CPT

## 2017-10-16 RX ADMIN — Medication 1 TABLET: at 09:10

## 2017-10-16 RX ADMIN — DOCUSATE SODIUM 100 MG: 100 CAPSULE, LIQUID FILLED ORAL at 09:10

## 2017-10-16 NOTE — PROGRESS NOTES
[facility]  Physical Medicine & Rehab  Progress Note    Patient Name: Zaira Jensen  MRN: [id number]  Patient Class: IP- Rehab   Admission Date: 10/5/2017  Length of Stay: 11 days  Attending Physician: Anirudh Rowley MD  Primary Care Provider: Cuate Alvarez MD    Subjective:     Principal Problem: quadrapresis    Interval History: pt is 42 y.o female with dx of quadriparesis, s/p ACDF  , participating with therapy .    Scheduled Medications:    docusate sodium  100 mg Oral Daily    folic acid-vit B6-vit B12 2.5-25-2 mg  1 tablet Oral Daily       PRN Medications: acetaminophen, aluminum-magnesium hydroxide-simethicone, bisacodyl, bisacodyl, calcium carbonate, lactulose, ondansetron, oxycodone, oxycodone, oxycodone, ramelteon, senna-docusate 8.6-50 mg    Review of Systems   Constitutional: Negative.    HENT: Negative.    Eyes: Negative.    Respiratory: Negative.    Cardiovascular: Negative.    Gastrointestinal: Negative.    Endocrine: Negative.    Genitourinary: Negative.    Musculoskeletal: Negative.    Skin: Negative.    Allergic/Immunologic: Negative.    Neurological: Negative.    Hematological: Negative.    Psychiatric/Behavioral: Negative.      Objective:     Vital Signs (Most Recent):  Temp: 96 °F (35.6 °C) (10/16/17 0748)  Pulse: 104 (10/16/17 0748)  Resp: 16 (10/16/17 0748)  BP: 126/84 (10/16/17 0748)  SpO2: 97 % (10/16/17 0748)    Vital Signs (24h Range):  Temp:  [96 °F (35.6 °C)-97.8 °F (36.6 °C)] 96 °F (35.6 °C)  Pulse:  [] 104  Resp:  [16-18] 16  SpO2:  [97 %-100 %] 97 %  BP: (126-141)/(65-84) 126/84     Physical Exam   Constitutional: She is oriented to person, place, and time. She appears well-developed and well-nourished. No distress.   HENT:   Head: Normocephalic and atraumatic.   Right Ear: External ear normal.   Left Ear: External ear normal.   Nose: Nose normal.   Mouth/Throat: Oropharynx is clear and moist. No oropharyngeal exudate.   Eyes: Conjunctivae and EOM are  normal. Pupils are equal, round, and reactive to light. Right eye exhibits no discharge. Left eye exhibits no discharge. No scleral icterus.   Cardiovascular: Normal rate, regular rhythm, normal heart sounds and intact distal pulses.  Exam reveals no gallop and no friction rub.    No murmur heard.  Pulmonary/Chest: Breath sounds normal. No respiratory distress. She has no wheezes. She has no rales. She exhibits no tenderness.   Abdominal: Soft. Bowel sounds are normal. She exhibits no distension and no mass. There is no tenderness. There is no rebound and no guarding. No hernia.   Genitourinary:   Genitourinary Comments: deferred   Musculoskeletal: Normal range of motion. She exhibits no edema, tenderness or deformity.   Neurological: She is alert and oriented to person, place, and time. She exhibits normal muscle tone.   Skin: No rash noted. She is not diaphoretic. No erythema. No pallor.   Psychiatric: She has a normal mood and affect. Thought content normal.     NEUROLOGICAL EXAMINATION:     MENTAL STATUS   Oriented to person, place, and time.     CRANIAL NERVES     CN III, IV, VI   Pupils are equal, round, and reactive to light.  Extraocular motions are normal.       Assessment/Plan:      Zaira Jensen is a 42 y.o. female admitted to inpatient rehabilitation on 10/5/2017 for <principal problem not specified> with impaired mobility and ADLs. Patient remains appropriate for PT, OT, and as required Speech therapy. Patient continues to require 24 hour nursing care as well as daily Physician assessment.    Active Diagnoses:    Diagnosis Date Noted POA    Obesity [E66.9] 10/13/2017 Yes    Quadriplegia, C5-C7 complete [G82.53] 10/05/2017 Yes      Problems Resolved During this Admission:    Diagnosis Date Noted Date Resolved POA     Quadriparesis, cont PT, OT  Pain, managed with current meds  DVT prophylaxis, cont SCD     DISCHARGE PLANNING:  Tentative Discharge Date:     No future appointments.    Anirudh Rowley  MD  Department of Physical Medicine & Rehab  [facility]

## 2017-10-16 NOTE — PT/OT/SLP PROGRESS
Physical Therapy         Treatment        Zaira Jensen   MRN: [id number]     PT Received On: 10/13/17  Total Time (min): 100    Billable Minutes: 100  Gait Training 30, Therapeutic Activity 10, Therapeutic Exercise 40 and Train/Wheelchair Management 20  Total Minutes: 100 (8:35-10:20)    Treatment Type: Treatment  PT/PTA: PT     PTA Visit Number: 1       General Precautions: Standard, fall  Orthopedic Precautions:     Braces: cervical collar         Subjective:  Communicated with patient prior to session.    Pain/Comfort  Pain Rating 1: 3/10  Location - Side 1: Right  Location - Orientation 1: lower  Location 1: back  Pain Addressed 1: Reposition, Distraction, Cessation of Activity  Pain Rating Post-Intervention 1: 3/10  Pain Rating 2: 4/10  Location - Side 2: Left  Location - Orientation 2: generalized  Location 2: shoulder  Pain Addressed 2: Reposition, Distraction, Cessation of Activity  Pain Rating Post-Intervention 2: 3/10    Objective:  Patient found up in w/c    Functional Mobility:  Bed Mobility:   Supine to sit: Moderate Assistance for BLE on bed; x2   Sit to supine: Moderate Assistance for BLE on bed; x2   Rolling: Moderate Assistance using bed rail   Scooting: Minimal Assistance    Balance:     Transfer Training:  Sit to stand:Contact Guard Assistance and Minimal Assistance with Rolling Walker and // bars x6  Bed <> Chair:  Stand Pivot with Minimal Assistance with Rolling Walker x1    Wheelchair Training:  Pt propelled Standard wheelchair x 99 feet on Level tile with  Bilateral upper extremity and Bilateral lower extremity with Minimal Assistance.  Increased time required; slow orquidea    Gait Training:  Ambulated 401 ft using Rolling Walker and CGA; provided internal focus of attention to promote heel strike; provided external focus of attention to increase step length  Ambulated 12 feet in // bars using no hand rails for support with CGA; cueing for postural corrections.    Stair Training: not  performed     Additional Treatment:  SEATED: 30x LAQ 3#  STANDING: in // bars 25x Heel raises, 5x single leg heel raises, hip abd, heel flexion marches w/ DF; cueing for posture  SUPINE: hip abd/add 5x    Reassessed function and LE strength.    Patient tolerated treatment well slightly limited by fatigue    Patient left up in chair with call button in reach.    Assessment:  Zaira Jensen is a 42 y.o. female with a medical diagnosis of s/p C5-C6 ACDF. Pt able to increase ambulation distance and orquidea. Pt still limited in performing bed mobility so will increase PT focus on that to optimize independence.     Rehab potential is fair.    Activity tolerance: Good    Discharge recommendations: Discharge Facility/Level Of Care Needs: home health PT, outpatient PT     Equipment recommendations: Equipment Needed After Discharge:  (TBD)     GOALS:    Physical Therapy Goals        Problem: Physical Therapy Goal    Goal Priority Disciplines Outcome Goal Variances Interventions   Physical Therapy Goal     PT/OT, PT Ongoing (interventions implemented as appropriate)     Description:  Goals to be met by: 2017     Patient will increase functional independence with mobility by performin). Supine to sit with Stand-by Assistance  2). Sit to supine with Stand-by Assistance  3). Rolling to Left and Right with Stand-by Assistance.  4). Sit to stand transfer with Minimal Assistance  5). Gait  x  > 25 feet with Contact Guard Assistance using Rolling Walker.   6). Wheelchair propulsion x > 150 feet with Stand-by Assistance                       PLAN:    Patient to be seen daily  to address the above listed problems via gait training, therapeutic exercises, therapeutic activities, wheelchair management/training  Plan of Care expires: 17  Plan of Care reviewed with: patient         Richie T Allan, PT 10/16/2017

## 2017-10-16 NOTE — PLAN OF CARE
Problem: Patient Care Overview  Goal: Plan of Care Review  Outcome: Ongoing (interventions implemented as appropriate)  Patient slowly gaining strength with transfers and toileting.  Resting well, NAD noted.

## 2017-10-16 NOTE — PT/OT/SLP PROGRESS
"Occupational Therapy  Treatment    Zaira Jensen   MRN: [id number]   Admitting Diagnosis: 80    OT Date of Treatment: 10/16/17   Total Time (min): 80 min    Billable Minutes:  Self Care/Home Management 30, Therapeutic Exercise 30 and Neuromuscular Re-education 20  Total Minutes: 80    General Precautions: Standard, fall  Orthopedic Precautions:  (C spinal )  Braces: Cervical collar    Do you have any cultural, spiritual, Adventism conflicts, given your current situation?: None    Subjective:  Communicated with nurse prior to session.    Pain/Comfort  Pain Rating 1: 0/10    Objective:  Patient found with: cervical collar    Functional Mobility:  Transfer Training:   Toilet Transfer:  Pt Stand Pivot with Moderate Assistance with Grab bars      Grooming:  Min (A)     Toilet Training:  Max (A)    Additional Treatment:  - B pierre with 4# for shoulder/elbow flexion/extension 2 x 5 each motion with rest as needed  - Calibrated gripper with 15# for bilateral  strengthening 2 x 5 each hand  - Ring Tree: pt performing unilateral graded reach, grasp, place, release x 16 rings alternating UE - pt instructed on use of hand that is not reach/placing to be used as stabilizer of ring container - pt demonstrates B integration and stabilization of each hand/UE when appropriate  - keyhole hook & string board >> to facilitate fine motor coordination, pinch strength, pt removing 4 separate 12" strings from keyhole hooks graded in size - pt able to remove string from all sizes with difficulty removing x 1 out of 15+ keyhole hooks     Patient left seated with call button in reach, nurse notified and rehab tech present    ASSESSMENT:  Zaira Jensen is a 42 y.o. female with a medical diagnosis of  and presents with daily improvements towards OT goals. Patient should continue to benefit from skilled OT services per est POC to increase functional independence, mobility, and safety.     Rehab potential is good    Activity tolerance: " Good    Discharge recommendations: home     Equipment recommendations:  (TBD)     GOALS:    Occupational Therapy Goals        Problem: Occupational Therapy Goal    Goal Priority Disciplines Outcome Interventions   Occupational Therapy Goal     OT, PT/OT Ongoing (interventions implemented as appropriate)    Description:  Goals to be met by: 10/26/17     Patient will increase functional independence with ADLs by performing:    Feeding with Contact Guard Assistance.  UE Dressing with Minimal Assistance.  LE Dressing with Minimal Assistance.  Grooming while seated with Minimal Assistance.  Toileting from toilet with Moderate Assistance for hygiene and clothing management.   Bathing from most appropriate location with Minimal Assistance.  Toilet transfer to toilet with Minimal Assistance.                      Plan:  Patient to be seen 6 x/week to address the above listed problems via self-care/home management, community/work re-entry, therapeutic activities, therapeutic exercises, therapeutic groups, neuromuscular re-education, sensory integration, wheelchair management/training  Plan of Care expires: 10/26/17  Plan of Care reviewed with: patient    Suzanne HUGH Perry LOTR  10/16/2017

## 2017-10-17 PROCEDURE — 97542 WHEELCHAIR MNGMENT TRAINING: CPT

## 2017-10-17 PROCEDURE — 12800000 HC REHAB SEMI-PRIVATE ROOM

## 2017-10-17 PROCEDURE — 25000003 PHARM REV CODE 250: Performed by: PHYSICAL MEDICINE & REHABILITATION

## 2017-10-17 PROCEDURE — 97110 THERAPEUTIC EXERCISES: CPT

## 2017-10-17 PROCEDURE — 97116 GAIT TRAINING THERAPY: CPT

## 2017-10-17 PROCEDURE — 25000003 PHARM REV CODE 250: Performed by: INTERNAL MEDICINE

## 2017-10-17 RX ORDER — SYRING-NEEDL,DISP,INSUL,0.3 ML 29 G X1/2"
296 SYRINGE, EMPTY DISPOSABLE MISCELLANEOUS ONCE
Status: COMPLETED | OUTPATIENT
Start: 2017-10-17 | End: 2017-10-17

## 2017-10-17 RX ADMIN — DOCUSATE SODIUM 100 MG: 100 CAPSULE, LIQUID FILLED ORAL at 09:10

## 2017-10-17 RX ADMIN — MAGNESIUM CITRATE 296 ML: 1.75 LIQUID ORAL at 10:10

## 2017-10-17 RX ADMIN — Medication 1 TABLET: at 09:10

## 2017-10-17 NOTE — PT/OT/SLP PROGRESS
Occupational Therapy  Treatment    Zaira Jensen   MRN: [id number]   Admitting Diagnosis:     OT Date of Treatment: 10/17/17   Total Time (min): 90 min    Billable Minutes:  Self Care/Home Management 10, Therapeutic Exercise 40 and Neuromuscular Re-education 40  Total Minutes: 90    General Precautions: Standard, fall  Orthopedic Precautions:  (C spinal )  Braces: Cervical collar    Do you have any cultural, spiritual, Quaker conflicts, given your current situation?: None    Subjective:  Communicated with nurse prior to session.    Pain/Comfort  Pain Rating 1: 0/10    Objective:  Patient found with: cervical collar    Functional Mobility:  Transfer Training:   Toilet Transfer:  Pt Stand Pivot with Contact Guard Assistance with Grab bars      Toilet Training:  Max (A) to manage clothing and perform hygiene with bedside commode positioned over toilet; after urinating and receiving (A) for hygiene, pt standing with R UE support of grab bar but able to let go of grab bar with CGA to maintain midline to assist in adjusting clothing between legs     Balance:   Static Stand: SBA with UE support and CGA for safety without UE support    Additional Treatment:  - AAROM/stretch shoulder flex/exten  - Graded clothespins removal with L hand and re-applying with R hand - yellow, red, green, and blue  - Keyhole hook & string board to facilitate fine motor coordination, in-hand manipulation bilaterally - completing with Supervision  - 5# dowel for biceps strengthening 2 sets x 10 reps with short rest break between sets  - Ring Tree to apply/[id number] rings to first 3 rungs and 2 rings to 4th (and highest rung) CONTRA-laterally except for 4th rung, pt applying rings unilaterally   >> pt requires increased time to complete all activities/exercises     Patient left up in chair with call button in reach, chair alarm on and nurse notified    ASSESSMENT:  Zaira Jensen is a 42 y.o. female with a medical diagnosis of and presents with  daily improvements towards OT goals. Patient should continue to benefit from skilled OT services per est POC to increase functional independence, mobility, functional use of B UE/strength & coordination, and safety .    Rehab potential is good    Activity tolerance: Good    Discharge recommendations: home     Equipment recommendations:  (TBD)     GOALS:    Occupational Therapy Goals        Problem: Occupational Therapy Goal    Goal Priority Disciplines Outcome Interventions   Occupational Therapy Goal     OT, PT/OT Revised    Description:  Goals to be met by: 11/16/17     Patient will increase functional independence with ADLs by performing:    Feeding with Contact Guard Assistance.  UE Dressing with Minimal Assistance.  LE Dressing with Minimal Assistance.  Grooming while seated with Minimal Assistance.  Toileting from toilet with Moderate Assistance for hygiene and clothing management.   Bathing from most appropriate location with Minimal Assistance.  Toilet transfer to toilet with Minimal Assistance.                       Plan:  Patient to be seen 6 x/week to address the above listed problems via self-care/home management, community/work re-entry, therapeutic activities, therapeutic exercises, therapeutic groups, neuromuscular re-education, sensory integration, wheelchair management/training  Plan of Care expires: 11/16/17  Plan of Care reviewed with: patient    HUGH Perkins LOTR  10/17/2017

## 2017-10-17 NOTE — PROGRESS NOTES
[facility]  Adult Nutrition  Progress Note    SUMMARY     Recommendations  Recommendation/Intervention:   1.) Continue with adult regular diet 2.) recommend beneprotein TID   Goals: 1.) patient will consume 75% of meals 2.) patient will tolerate diet   Nutrition Goal Status: progressing towards goal  Communication of RD Recs: reviewed with RN    1. Quadriplegia, C5-C7 complete      Past Medical History:   Diagnosis Date    Allergy     Anemia     Arthritis     Chronic back pain     Chronic neck pain     Functional ovarian cysts     GERD (gastroesophageal reflux disease)     Headache(784.0)     Radiculopathy of arm     Rash     Respiratory distress     bronchospasm at emergence years ago       Reason for Assessment  Reason for Assessment: RD follow-up   Interdisciplinary Rounds: attended  General Information Comments: Admits to rehab with disablity. Patient with adequate intake at meals (~70% at most meals). Denies nausea/vomiting. Interested in beneprotein with meals. Reports acide reflux. Rd offered to take food preferences but patient refused. No complaints at this time.       Nutrition Prescription Ordered  Current Diet Order: Adult regular diet   Nutrition Order Comments: Add bottled water to each tray;  Pt notes she is concerned about her weight.  Offered calorie control or low fat option and patient declined noting she would monitor intake.         Evaluation of Received Nutrients/Fluid Intake  Oral Fluid (mL): 1560 (per 24 hr i/os)  Energy Calories Required: meeting needs  Protein Required: meeting needs  % Intake of Estimated Energy Needs: 50 - 75 %  % Meal Intake: 75%     Nutrition Risk Screen  Nutrition Risk Screen: no indicators present    Nutrition/Diet History  Patient Reported Diet/Restrictions/Preferences: general (avoids foods that have a lot of acid)  Food Preferences: No cultural or religous food preferences identified.   Supplemental Drinks or Food Habits:   "(none)    Labs/Tests/Procedures/Meds  Diagnostic Test/Procedure Review: reviewed, pertinent  Pertinent Labs Reviewed: reviewed, pertinent  BMP  Lab Results   Component Value Date     10/12/2017    K 4.1 10/12/2017     10/12/2017    CO2 26 10/12/2017    BUN 10 10/12/2017    CREATININE 0.8 10/12/2017    CALCIUM 9.8 10/12/2017    ANIONGAP 9 10/12/2017    ESTGFRAFRICA >60 10/12/2017    EGFRNONAA >60 10/12/2017     Lab Results   Component Value Date    ALBUMIN 3.5 10/12/2017     Lab Results   Component Value Date    CALCIUM 9.8 10/12/2017     No results for input(s): POCTGLUCOSE in the last 24 hours.    Pertinent Medications Reviewed: reviewed, pertinent  Scheduled Meds:   docusate sodium  100 mg Oral Daily    folic acid-vit B6-vit B12 2.5-25-2 mg  1 tablet Oral Daily         Physical Findings  Overall Physical Appearance: nourished  Oral/Mouth Cavity: WDL  Skin: incision (Jc score 18)    Anthropometrics  Temp: 98.3 °F (36.8 °C)  Height: 5' 5"  Weight Method: Bed Scale  Weight: 83.7 kg (184 lb 8.4 oz)  Ideal Body Weight (IBW), Female: 125 lb  % Ideal Body Weight, Female (lb): 147.62 lb  BMI (Calculated): 30.8  BMI Grade: 30 - 34.9- obesity - grade I  Usual Body Weight (UBW), k.9 kg  % Usual Body Weight: 99.97  % Weight Change From Usual Weight: -0.24 %      Estimated/Assessed Needs  Weight Used For Calorie Calculations: 83.7 kg (184 lb 8.4 oz)   Height (cm): 165.1 cm  Energy Need Method: Reading-St Jeor  RMR (Reading-St. Jeor Equation): 1487.88 x1.2=1784 kcals/day  Weight Used For Protein Calculations: 83.7 kg (184 lb 8.4 oz)  0.8 gm Protein (gm): 67.1 and 1.0 gm Protein (gm): 83.88  Fluid Need Method: RDA Method (or per MD rec)  CHO Requirement: na       Assessment and Plan    Obesity    Related to (etiology):   Excessive energy intake    Signs and Symptoms (as evidenced by):   1) BMI 30    Interventions/Recommendations (treatment strategy):  1) After pt noted a desire to lose weight offered " limited calorie or low fat diet to patient and she declined in favor of monitoring her food herself. 2) Monitor weight     Nutrition Diagnosis Status:   Continues               Monitor and Evaluation  Food and Nutrient Intake: energy intake  Food and Nutrient Adminstration: diet order  Anthropometric Measurements: weight, weight change  Biochemical Data, Medical Tests and Procedures: electrolyte and renal panel, inflammatory profile  Nutrition-Focused Physical Findings: overall appearance, skin    Nutrition Risk  Level of Risk:  (x1 weekly)    Nutrition Follow-Up  RD Follow-up?: Yes     Discharge Planning: discharge on adult regular diet.

## 2017-10-17 NOTE — PROGRESS NOTES
[facility]  Physical Medicine & Rehab  Progress Note    Patient Name: Zaira Jensen  MRN: [id number]  Patient Class: IP- Rehab   Admission Date: 10/5/2017  Length of Stay: 12 days  Attending Physician: Anirudh Rowley MD  Primary Care Provider: Cuate Alvarez MD    Subjective:     Principal Problem: quadrapresis    Interval History: pt is 42 y.o female with dx of quadriparesis & s/p ACDF, participating with therapy & making progress      Scheduled Medications:    docusate sodium  100 mg Oral Daily    folic acid-vit B6-vit B12 2.5-25-2 mg  1 tablet Oral Daily       PRN Medications: acetaminophen, aluminum-magnesium hydroxide-simethicone, bisacodyl, bisacodyl, calcium carbonate, lactulose, ondansetron, oxycodone, oxycodone, oxycodone, ramelteon, senna-docusate 8.6-50 mg    Review of Systems   Constitutional: Negative.    HENT: Negative.    Eyes: Negative.    Respiratory: Negative.    Cardiovascular: Negative.    Gastrointestinal: Negative.    Endocrine: Negative.    Genitourinary: Negative.    Musculoskeletal: Negative.    Skin: Negative.    Allergic/Immunologic: Negative.    Neurological: Negative.    Hematological: Negative.    Psychiatric/Behavioral: Negative.      Objective:     Vital Signs (Most Recent):  Temp: 98.8 °F (37.1 °C) (10/17/17 1505)  Pulse: 99 (10/17/17 1505)  Resp: 16 (10/17/17 1505)  BP: 108/74 (10/17/17 1505)  SpO2: 99 % (10/17/17 1505)    Vital Signs (24h Range):  Temp:  [98.3 °F (36.8 °C)-99.2 °F (37.3 °C)] 98.8 °F (37.1 °C)  Pulse:  [] 99  Resp:  [16-18] 16  SpO2:  [99 %-100 %] 99 %  BP: (108-137)/(65-77) 108/74     Physical Exam   Constitutional: She is oriented to person, place, and time. She appears well-developed and well-nourished. No distress.   HENT:   Head: Normocephalic and atraumatic.   Right Ear: External ear normal.   Left Ear: External ear normal.   Nose: Nose normal.   Mouth/Throat: Oropharynx is clear and moist. No oropharyngeal exudate.   Eyes:  Conjunctivae and EOM are normal. Pupils are equal, round, and reactive to light. Right eye exhibits no discharge. Left eye exhibits no discharge. No scleral icterus.   Neck: Normal range of motion. Neck supple. No JVD present. No tracheal deviation present. No thyromegaly present.   Cardiovascular: Normal rate, regular rhythm, normal heart sounds and intact distal pulses.  Exam reveals no gallop and no friction rub.    No murmur heard.  Pulmonary/Chest: Effort normal and breath sounds normal. No stridor. No respiratory distress. She has no wheezes. She has no rales. She exhibits no tenderness.   Abdominal: Soft. Bowel sounds are normal. She exhibits no distension and no mass. There is no tenderness. There is no rebound and no guarding. No hernia.   Genitourinary:   Genitourinary Comments: deferred   Musculoskeletal: Normal range of motion. She exhibits no edema, tenderness or deformity.   Lymphadenopathy:     She has no cervical adenopathy.   Neurological: She is alert and oriented to person, place, and time. No sensory deficit. She exhibits normal muscle tone.   Skin: No rash noted. She is not diaphoretic. No erythema. No pallor.   Psychiatric: She has a normal mood and affect. Her behavior is normal. Judgment and thought content normal.     NEUROLOGICAL EXAMINATION:     MENTAL STATUS   Oriented to person, place, and time.     CRANIAL NERVES     CN III, IV, VI   Pupils are equal, round, and reactive to light.  Extraocular motions are normal.       Assessment/Plan:      Zaira Jensen is a 42 y.o. female admitted to inpatient rehabilitation on 10/5/2017 for <principal problem not specified> with impaired mobility and ADLs. Patient remains appropriate for PT, OT, and as required Speech therapy. Patient continues to require 24 hour nursing care as well as daily Physician assessment.    Active Diagnoses:    Diagnosis Date Noted POA    Obesity [E66.9] 10/13/2017 Yes    Quadriplegia, C5-C7 complete [G82.53] 10/05/2017  Yes      Problems Resolved During this Admission:    Diagnosis Date Noted Date Resolved POA     S/p ACDF , quadriparesis,  Cont PT, OT  Pain, managed with current meds  DVT prophylaxis, cont SCD     DISCHARGE PLANNING:  Tentative Discharge Date:     No future appointments.    Anirudh Rowley MD  Department of Physical Medicine & Rehab  [facility]

## 2017-10-17 NOTE — PT/OT/SLP PROGRESS
Physical Therapy         Treatment        Zaira Jensen   MRN: [id number]     PT Received On: 10/17/17  Total Time (min): 95        Billable Minutes: 95  Gait Training 30, Therapeutic Exercise 45, and Train/Wheelchair Management 20  Total Minutes: 95 (8:, 1:45-2:45)    Treatment Type: Treatment  PT/PTA: PT     PTA Visit Number: 0       General Precautions: Standard, fall  Orthopedic Precautions:     Braces: Braces: Cervical collar      Subjective:  Communicated with patient prior to session.    Pain/Comfort  Pain Rating 1: 2/10  Location - Side 1: Right  Location - Orientation 1: lower  Location 1: back  Pain Addressed 1: Reposition, Distraction, Cessation of Activity  Pain Rating Post-Intervention 1: 2/10  Pain Rating 2: 2/10  Location - Side 2: Left  Location 2: shoulder  Pain Addressed 2: Reposition, Distraction, Cessation of Activity  Pain Rating Post-Intervention 2: 4/10    Objective:  Patient found in w/c after breakfastPatient found with: cervical collar    Functional Mobility:  Bed Mobility: n/p    Transfer Training:   W/C <> stand: Minimal Assistance with Rolling Walker x6    Wheelchair Training:  Pt propelled Standard wheelchair x 75 feet on Level tile with Bilateral lower extremity (occasionally using Bilateral upper extremity) with Stand-by Assistance. ~20 min; required encouragement   Gait Training:   Ambulated 255 ft with Rolling Walker and CGA ~20 minutes; c/o L shoulder throbbing with several standing rest breaks for relief; 90 ft using Rollator and CGA. Slow orquidea; increased time needed. cueing for upright posture    Stair Training: next session    Additional Treatment:  SEATED: 30x LAQ 3#  STANDING with RW: mini squats,  heel raises, hip flexion marches, hamstring curls 2x30 with CGA  Standing In // bars: side steps, hip abd/add with CGA  SciFit StepOne: L 1.0 x 15 min, LEs only    Activity Tolerance:  Patient limited by pain    Patient left up in chair with call button in reach and chair  alarm on.    Assessment:  Zaira Jensen is a 42 y.o. female with a medical diagnosis of cervical myelomalacia s/p C5-C6 ACDF. She presents with L shoulder pain that required frequent rest breaks during gait training. Pt performed ambulation trial with Rollator, but had trouble locking breaks with RUE. Pt still requires encouragement to perform PT activities.  Pt's mobility still not functional due to speed of movement, but is progressing. Pt remains appropriate for PT POC.     Rehab potential is fair.    Activity tolerance: Fair    Discharge recommendations: Discharge Facility/Level Of Care Needs: outpatient PT, home with home health (TBD)     Equipment recommendations: Equipment Needed After Discharge:  (TBD)     GOALS:    Physical Therapy Goals        Problem: Physical Therapy Goal    Goal Priority Disciplines Outcome Goal Variances Interventions   Physical Therapy Goal     PT/OT, PT Ongoing (interventions implemented as appropriate)     Description:  Goals to be met by: 2017     Patient will increase functional independence with mobility by performin). Supine to sit with Stand-by Assistance  2). Sit to supine with Stand-by Assistance  3). Rolling to Left and Right with Stand-by Assistance.  4). Sit to stand transfer with Minimal Assistance  5). Gait  x  > 25 feet with Contact Guard Assistance using Rolling Walker.   6). Wheelchair propulsion x > 150 feet with Stand-by Assistance                       PLAN:    Patient to be seen daily  to address the above listed problems via gait training, therapeutic activities, therapeutic exercises, wheelchair management/training  Plan of Care expires: 17  Plan of Care reviewed with: patient      Richie MENDEZ Allan, PT 10/17/2017

## 2017-10-17 NOTE — ASSESSMENT & PLAN NOTE
Related to (etiology):   Excessive energy intake    Signs and Symptoms (as evidenced by):   1) BMI 30    Interventions/Recommendations (treatment strategy):  1) After pt noted a desire to lose weight offered limited calorie or low fat diet to patient and she declined in favor of monitoring her food herself. 2) Monitor weight     Nutrition Diagnosis Status:   Continues

## 2017-10-17 NOTE — PROGRESS NOTES
Team conference attended and patient discussed. Spoke with patient to discuss treatment plan, goals and ELOS.  SW provided supportive care and encouragement.  SW will follow and assist with discharge planning as needed.

## 2017-10-17 NOTE — PATIENT CARE CONFERENCE
Weekly Staffing Report      Date Admitted: 10/5/2017 :   Staffing Date: 10/17/2017     Patient Active Problem List   Diagnosis    Lumbago    Chronic neck pain    Cervical spondylosis without myelopathy    Degeneration of cervical intervertebral disc    Brachial neuritis or radiculitis NOS    Acquired spondylolisthesis    Biliary colic    Cervical spondylosis with myelopathy    Intervertebral disc disorder of cervical region with myelopathy    Spondylosis with myelopathy, lumbar region    Degeneration of lumbar or lumbosacral intervertebral disc    Atypical chest pain    Normocytic normochromic anemia    Abnormal CT scan    Cervical myelopathy    Stiffness of right shoulder joint    Stiffness of right wrist joint    Bilateral arm weakness    Fibroadenoma    Intervertebral disc stenosis of neural canal    Myelomalacia    Other specified symptoms and signs involving the circulatory and respiratory systems    Brief situational non-psychotic disorder    Goiter    Abnormality of gait    Coarse tremors    Cervical spinal stenosis s/p C5-6 ACDF    GERD (gastroesophageal reflux disease)    Chronic back pain    Quadriplegia, C5-C7 complete    Obesity          Team Members Present:  Physician Team Member: Dr Rowley  Nursing Team Member: Hitesh Ayala RN  Case Management Team Member: Missy Bailey CM/SW  PT Team Member: Richie Lemus PT  OT Team Member: Suzanne Perry OT  SLP Team Member: Julien CASTANEDA    Nursing  Skin: Intact, incisions healing well   Bowel: Continent  Bladder:continent  Last BM: 10-14-17  Diet: Regular  Appetite: good   Pain Level: 3/10    Physical Therapy  Supine to Sit:  moderate assist  Sit to Stand: minimal assist  Gait: 300 feet contact guard Rolling walker  Wheelchair Mobility: 0-25 feet minimal assist  ROM: within normal limits  Stairs:pending  pending   Strength: left hip 3- to 3+/5, knee ext 4+/5, ankle 3+/5, right hip 3-/5, knee ext4/5, ankle 3/5    Occupational  Therapy  Feeding: supervision   Grooming:minimal assist  To supervision  UED: maximal assist  LED: maximal assist  Bathing:maximal assist   Toileting:maximal assist  Toilet Transfer:  moderate assist  Tub Transfer:  moderate assist  Strength: right 2- to 3+/5, left 2+ to 3_/5          Summary of Progress:  Good     Barriers to Progress/Discharge:     Tolerates 3 hours of therapy: Yes    Comments:     Estimated Length of Stay: 11-16-17

## 2017-10-17 NOTE — PLAN OF CARE
Problem: Occupational Therapy Goal  Goal: Occupational Therapy Goal  Goals to be met by: 10/26/17     Patient will increase functional independence with ADLs by performing:    Feeding with Contact Guard Assistance.  UE Dressing with Minimal Assistance.  LE Dressing with Minimal Assistance.  Grooming while seated with Minimal Assistance.  Toileting from toilet with Moderate Assistance for hygiene and clothing management.   Bathing from most appropriate location with Minimal Assistance.  Toilet transfer to toilet with Minimal Assistance.     Outcome: Revised  OT goals remain appropriate; POC date of expiration changed on this date by this OT

## 2017-10-17 NOTE — PLAN OF CARE
Problem: Patient Care Overview  Goal: Plan of Care Review  Outcome: Ongoing (interventions implemented as appropriate)  Patient transfers appears stronger.  NAD noted.

## 2017-10-17 NOTE — PLAN OF CARE
Problem: Nutrition Imbalance: Excess Oral Intake (Adult)  Intervention: Promote Healthy Nutritional Intake/Lifestyle  Recommendations  Recommendation/Intervention:   1.) Continue with adult regular diet 2.) recommend beneprotein TID   Goals: 1.) patient will consume 75% of meals 2.) patient will tolerate diet   Nutrition Goal Status: progressing towards goal  Communication of RD Recs: reviewed with RN

## 2017-10-18 PROCEDURE — 97530 THERAPEUTIC ACTIVITIES: CPT

## 2017-10-18 PROCEDURE — 97110 THERAPEUTIC EXERCISES: CPT

## 2017-10-18 PROCEDURE — 25000003 PHARM REV CODE 250: Performed by: INTERNAL MEDICINE

## 2017-10-18 PROCEDURE — 97116 GAIT TRAINING THERAPY: CPT

## 2017-10-18 PROCEDURE — 12800000 HC REHAB SEMI-PRIVATE ROOM

## 2017-10-18 PROCEDURE — 97535 SELF CARE MNGMENT TRAINING: CPT

## 2017-10-18 RX ADMIN — Medication 1 TABLET: at 10:10

## 2017-10-18 RX ADMIN — OXYCODONE HYDROCHLORIDE 5 MG: 5 TABLET ORAL at 05:10

## 2017-10-18 NOTE — PLAN OF CARE
Problem: Patient Care Overview  Goal: Plan of Care Review  Outcome: Ongoing (interventions implemented as appropriate)  Patient stronger with transfers, balance improving.  Remains continent of bladder, complains of need to have bowel movement, no results from mag citrate.

## 2017-10-18 NOTE — PT/OT/SLP PROGRESS
"Physical Therapy         Treatment        Zaira Jensen   MRN: [id number]     PT Received On: 10/18/17  Total Time (min): 95    Billable Minutes: 95  Gait Training 30, Therapeutic Activity 20, Therapeutic Exercise 45  Total Minutes: 95(11:05-11:55, 2:20-3:05)    Treatment Type: Treatment  PT/PTA: PT     PTA Visit Number: 0       General Precautions: Standard, fall  Orthopedic Precautions:     Braces: Braces: Cervical collar       Subjective:  Communicated with patient prior to session.    Pain/Comfort  Pain Rating 1: 4/10  Location - Side 1: Right  Location - Orientation 1: lower  Location 1: back  Pain Addressed 1: Reposition, Distraction  Pain Rating Post-Intervention 1: 4/10  Location - Side 2: Left  Location - Orientation 2: generalized  Location 2: shoulder  Pain Addressed 2: Reposition, Distraction, Cessation of Activity  Pain Rating Post-Intervention 2: 4/10    Objective:  Patient found in w/c; Patient found with: cervical collar    Functional Mobility:  Bed Mobility: not performed    Transfer Training:  W/c <> stand: Contact Guard Assistance and Minimal Assistance with Rolling Walker 6x    Wheelchair Training:  Propelled with bilateral LE in pt room with 180 turn and Min A    Gait Training:  Ambulated ft using 330 ft; 200 ft; 110 ft using Rollator and CGA; postural cueing. Slow orquidea, but faster than prior PT session; increased time needed.     Stair Training:  Pt ascended/descend 6 (4") stair(s) with bilateral with Contact Guard Assistance. Cueing for sequence; slow    Additional Treatment:  SEATED: 30x LAQ 3#, hip abduction with Red T-band  STANDING with Rollator: 30x mini squats with CGA  Standing In // bars: 2x30 heel raises, hip flexion marches, hamstring curls, hip abd/add with CGA      Activity Tolerance:  Patient limited by fatigue and Patient limited by pain    Patient left up in chair with call button in reach.    Assessment:  Zaira Jensen is a 42 y.o. female with a medical diagnosis of " cervical myelomalacia s/p C5-C6 ACDF. She presents with L shoulder pain.  Pt performed stairs today using BHR with CGA. Pt required Min A for 2 sit>stand transfers but was able to decrease assistance to only Contact Guard (for safety) for the remaining d/t pt adhering to correct biomechanical technique. Reviewed pt's PT goal (to walk independently) and discussed PT progress to reinforce PT POC.     Rehab potential is fair.    Activity tolerance: Good    Discharge recommendations: Discharge Facility/Level Of Care Needs: outpatient OT, home with home health (tbd)     Equipment recommendations: Equipment Needed After Discharge:  (TBD)     GOALS:    Physical Therapy Goals        Problem: Physical Therapy Goal    Goal Priority Disciplines Outcome Goal Variances Interventions   Physical Therapy Goal     PT/OT, PT Ongoing (interventions implemented as appropriate)     Description:  Goals to be met by: 2017     Patient will increase functional independence with mobility by performin). Supine to sit with Stand-by Assistance  2). Sit to supine with Stand-by Assistance  3). Rolling to Left and Right with Stand-by Assistance.  4). Sit to stand transfer with Minimal Assistance  5). Gait  x  > 25 feet with Contact Guard Assistance using Rolling Walker.   6). Wheelchair propulsion x > 150 feet with Stand-by Assistance                       PLAN:    Patient to be seen daily  to address the above listed problems via therapeutic exercises, therapeutic activities, gait training, wheelchair management/training  Plan of Care expires: 17  Plan of Care reviewed with: patient      Richie MENDEZ Allan, PT 10/18/2017

## 2017-10-18 NOTE — PLAN OF CARE
Problem: Pressure Ulcer Risk (Jc Scale) (Adult,Obstetrics,Pediatric)  Intervention: Promote/Optimize Nutrition  Tolerating diet eating >50-75 of meals

## 2017-10-18 NOTE — PLAN OF CARE
Problem: Patient Care Overview  Goal: Plan of Care Review  Outcome: Ongoing (interventions implemented as appropriate)  Alert and oriented, medicated x 1 for pain to right low back, continent of B&B, anterior cervical incision w/o s/s infection, dressing changed this shift, right groin incision CLAYTON no s/s infection, soft cervical in use

## 2017-10-18 NOTE — PLAN OF CARE
Problem: Physical Therapy Goal  Goal: Physical Therapy Goal  Goals to be met by: 2017     Patient will increase functional independence with mobility by performin). Supine to sit with Stand-by Assistance  2). Sit to supine with Stand-by Assistance  3). Rolling to Left and Right with Stand-by Assistance.  4). Sit to stand transfer with Minimal Assistance   5). Gait  x  > 25 feet with Contact Guard Assistance using Rolling Walker = MET  6). Wheelchair propulsion x > 150 feet with Stand-by Assistance      NEW: 10/18/2017    7). Gait  x  > 25 feet with Stand-by Assistance using Rolling Walker or Rollator   Outcome: Ongoing (interventions implemented as appropriate)  Updated gait goal 10/18/2017

## 2017-10-18 NOTE — PT/OT/SLP PROGRESS
"Occupational Therapy  Treatment    Zaira Jensen   MRN: [id number]   Admitting Diagnosis: <principal problem not specified>    OT Date of Treatment: 10/18/17   Total Time (min): 90 min      Billable Minutes:  Self Care/Home Management 30 and Therapeutic Activity 60  Total Minutes: 90    General Precautions: Standard, fall  Orthopedic Precautions: N/A  Braces: Cervical collar    Do you have any cultural, spiritual, Anglican conflicts, given your current situation?: None    Subjective:  Communicated with nurse prior to session.    Pain/Comfort  Pain Rating 1: 0/10  Pain Rating Post-Intervention 1: 0/10    Objective:  Patient found with: cervical collar    Functional Mobility:  Bed Mobility:   Supine to sit: Activity did not occur   Sit to supine: Activity did not occur   Rolling: Activity did not occur   Scooting: Activity did not occur    Transfer Training:   Toilet Transfer:  Pt Stand Pivot with Contact Guard Assistance with Grab bars.      Toilet Training:  Pt performed toileting with Minimal Assistance with Drop arm commode over toilet. Assistance for clothing management and CG while standing for hygiene.  Balance:   Static Sit: GOOD: Takes MODERATE challenges from all directions  Dynamic Sit:  GOOD+: Maintains balance through MAXIMAL excursions of active trunk motion  Static Stand: GOOD: Takes MODERATE challenges from all directions  Dynamic stand: FAIR: Needs CONTACT GUARD during gait    Additional Treatment:  Session included toilting as described above. Propelled w/c to gym to work on fine motor activites/coordination B UE. Reach, place, turn and stack items 1-2" size. Discussed options DME to assist with hygiene after toileting, use of computer.    Patient left up in chair with chair alarm on    ASSESSMENT:  Zaira Jensen is a 42 y.o. female with a medical diagnosis of <principal problem not specified> and presents with performance deficits of physical skills including impaired balance, mobility, strength, " dexterity, fine motor coordination, gross motor coordination and endurance.  These performance deficits have resulted in activity limitations including, but not limited to: bed mobility, transfers, ambulating short distances, navigating around obstacles during ambulation, transitional movement patterns ( kneeling, bending, reaching), upper body dressing, lower body dressing, grooming, toileting, bathing and carrying objects.   Pt's role as mod I - min assist adult has been significantly affected.  Patient will benefit from skilled OT services to maximize level of independence with deficits listed above.  .    Rehab potential is good    Activity tolerance: Good    Discharge recommendations: home     Equipment recommendations:  (tbd)     GOALS:    Occupational Therapy Goals        Problem: Occupational Therapy Goal    Goal Priority Disciplines Outcome Interventions   Occupational Therapy Goal     OT, PT/OT Ongoing (interventions implemented as appropriate)    Description:  Goals to be met by: 11/16/17     Patient will increase functional independence with ADLs by performing:    Feeding with Contact Guard Assistance.  UE Dressing with Minimal Assistance.  LE Dressing with Minimal Assistance.  Grooming while seated with Minimal Assistance.  Toileting from toilet with Moderate Assistance for hygiene and clothing management.   Bathing from most appropriate location with Minimal Assistance.  Toilet transfer to toilet with Minimal Assistance.                       Plan:  Patient to be seen 6 x/week to address the above listed problems via self-care/home management, community/work re-entry, therapeutic activities, therapeutic exercises, therapeutic groups, neuromuscular re-education, sensory integration, wheelchair management/training  Plan of Care expires: 11/16/17  Plan of Care reviewed with: patient, family         BREANA Recinos  10/18/2017

## 2017-10-18 NOTE — PLAN OF CARE
Problem: Patient Care Overview  Goal: Plan of Care Review  Outcome: Ongoing (interventions implemented as appropriate)  Patient stable, without complaint, cooperative with all care, therapies per schedule.

## 2017-10-18 NOTE — PROGRESS NOTES
[facility]  Physical Medicine & Rehab  Progress Note    Patient Name: Zaira Jensen  MRN: [id number]  Patient Class: IP- Rehab   Admission Date: 10/5/2017  Length of Stay: 13 days  Attending Physician: Anirudh Rowley MD  Primary Care Provider: Cuate Alvarez MD    Subjective:     Principal Problem: quadrapresis  Interval History: pt is 42 y.o female with dx of quadriparesis , s/p ACDF, participating with therapy & making progress      Scheduled Medications:    docusate sodium  100 mg Oral Daily    folic acid-vit B6-vit B12 2.5-25-2 mg  1 tablet Oral Daily       PRN Medications: acetaminophen, aluminum-magnesium hydroxide-simethicone, bisacodyl, bisacodyl, calcium carbonate, lactulose, ondansetron, oxycodone, oxycodone, oxycodone, ramelteon, senna-docusate 8.6-50 mg    Review of Systems   Constitutional: Negative.    HENT: Negative.    Eyes: Negative.    Respiratory: Negative.    Cardiovascular: Negative.    Gastrointestinal: Negative.    Endocrine: Negative.    Genitourinary: Negative.    Musculoskeletal: Negative.    Skin: Negative.    Allergic/Immunologic: Negative.    Neurological: Negative.    Hematological: Negative.    Psychiatric/Behavioral: Negative.      Objective:     Vital Signs (Most Recent):  Temp: 96.9 °F (36.1 °C) (10/18/17 0447)  Pulse: 94 (10/18/17 0447)  Resp: 18 (10/18/17 0447)  BP: (!) 106/58 (10/18/17 0447)  SpO2: 99 % (10/18/17 0447)    Vital Signs (24h Range):  Temp:  [96.9 °F (36.1 °C)-98.8 °F (37.1 °C)] 96.9 °F (36.1 °C)  Pulse:  [] 94  Resp:  [16-20] 18  SpO2:  [96 %-99 %] 99 %  BP: (106-138)/(58-83) 106/58     Physical Exam   Constitutional: She is oriented to person, place, and time. She appears well-developed and well-nourished. No distress.   HENT:   Head: Normocephalic and atraumatic.   Right Ear: External ear normal.   Left Ear: External ear normal.   Nose: Nose normal.   Mouth/Throat: Oropharynx is clear and moist. No oropharyngeal exudate.   Eyes:  Conjunctivae and EOM are normal. Pupils are equal, round, and reactive to light. Right eye exhibits no discharge. Left eye exhibits no discharge. No scleral icterus.   Neck: Normal range of motion. Neck supple. No JVD present. No tracheal deviation present. No thyromegaly present.   Cardiovascular: Normal rate, regular rhythm, normal heart sounds and intact distal pulses.  Exam reveals no gallop and no friction rub.    No murmur heard.  Pulmonary/Chest: Effort normal. No stridor. No respiratory distress. She has wheezes. She has no rales. She exhibits no tenderness.   Mild inspiratory wheeze    Abdominal: Soft. Bowel sounds are normal. She exhibits no distension and no mass. There is no tenderness. There is no rebound and no guarding. No hernia.   Genitourinary:   Genitourinary Comments: deferred   Musculoskeletal: Normal range of motion. She exhibits no edema, tenderness or deformity.   Lymphadenopathy:     She has no cervical adenopathy.   Neurological: She is alert and oriented to person, place, and time. She exhibits normal muscle tone.   Skin: No rash noted. She is not diaphoretic. No erythema. No pallor.   Psychiatric: She has a normal mood and affect. Her behavior is normal. Judgment and thought content normal.     NEUROLOGICAL EXAMINATION:     MENTAL STATUS   Oriented to person, place, and time.     CRANIAL NERVES     CN III, IV, VI   Pupils are equal, round, and reactive to light.  Extraocular motions are normal.       Assessment/Plan:      Zaira Jensen is a 42 y.o. female admitted to inpatient rehabilitation on 10/5/2017 for <principal problem not specified> with impaired mobility and ADLs. Patient remains appropriate for PT, OT, and as required Speech therapy. Patient continues to require 24 hour nursing care as well as daily Physician assessment.    Active Diagnoses:    Diagnosis Date Noted POA    Obesity [E66.9] 10/13/2017 Yes    Quadriplegia, C5-C7 complete [G82.53] 10/05/2017 Yes      Problems  Resolved During this Admission:    Diagnosis Date Noted Date Resolved POA     Quadriparesis, cont PT, OT, ST   Wheezing, encouraged pt to use her incentive spirometer  DVT prophylaxis, cont SCD   Check labs     DISCHARGE PLANNING:  Tentative Discharge Date:     No future appointments.    Anirudh Rowley MD  Department of Physical Medicine & Rehab  [facility]

## 2017-10-18 NOTE — PLAN OF CARE
Problem: Fall Risk (Adult)  Goal: Absence of Falls  Patient will demonstrate the desired outcomes by discharge/transition of care.   Outcome: Ongoing (interventions implemented as appropriate)  Free from falls, aware of need to call for assist.

## 2017-10-18 NOTE — PLAN OF CARE
Problem: Fall Risk (Adult)  Intervention: Safety Promotion/Fall Prevention  Safety maintained, reviewed fall precautions w/pt, pt verbalized understanding and calls for assist as needed

## 2017-10-18 NOTE — PLAN OF CARE
Problem: Occupational Therapy Goal  Goal: Occupational Therapy Goal  Goals to be met by: 11/16/17     Patient will increase functional independence with ADLs by performing:    Feeding with Contact Guard Assistance.  UE Dressing with Minimal Assistance.  LE Dressing with Minimal Assistance.  Grooming while seated with Minimal Assistance.  Toileting from toilet with Moderate Assistance for hygiene and clothing management.   Bathing from most appropriate location with Minimal Assistance.  Toilet transfer to toilet with Minimal Assistance.      Outcome: Ongoing (interventions implemented as appropriate)  OT goals remain appropriate.  BREANA Recinos

## 2017-10-19 LAB
ALBUMIN SERPL BCP-MCNC: 3.1 G/DL
ALP SERPL-CCNC: 45 U/L
ALT SERPL W/O P-5'-P-CCNC: 15 U/L
ANION GAP SERPL CALC-SCNC: 7 MMOL/L
AST SERPL-CCNC: 13 U/L
BASOPHILS # BLD AUTO: 0 K/UL
BASOPHILS NFR BLD: 0.4 %
BILIRUB SERPL-MCNC: 0.5 MG/DL
BUN SERPL-MCNC: 10 MG/DL
CALCIUM SERPL-MCNC: 9 MG/DL
CHLORIDE SERPL-SCNC: 105 MMOL/L
CO2 SERPL-SCNC: 26 MMOL/L
CREAT SERPL-MCNC: 0.7 MG/DL
DIFFERENTIAL METHOD: ABNORMAL
EOSINOPHIL # BLD AUTO: 0.2 K/UL
EOSINOPHIL NFR BLD: 3.9 %
ERYTHROCYTE [DISTWIDTH] IN BLOOD BY AUTOMATED COUNT: 14 %
EST. GFR  (AFRICAN AMERICAN): >60 ML/MIN/1.73 M^2
EST. GFR  (NON AFRICAN AMERICAN): >60 ML/MIN/1.73 M^2
GLUCOSE SERPL-MCNC: 90 MG/DL
HCT VFR BLD AUTO: 34.8 %
HGB BLD-MCNC: 11.6 G/DL
LYMPHOCYTES # BLD AUTO: 1.1 K/UL
LYMPHOCYTES NFR BLD: 27.9 %
MCH RBC QN AUTO: 30.6 PG
MCHC RBC AUTO-ENTMCNC: 33.3 G/DL
MCV RBC AUTO: 92 FL
MONOCYTES # BLD AUTO: 0.3 K/UL
MONOCYTES NFR BLD: 7.2 %
NEUTROPHILS # BLD AUTO: 2.5 K/UL
NEUTROPHILS NFR BLD: 60.6 %
PLATELET # BLD AUTO: 272 K/UL
PMV BLD AUTO: 8 FL
POTASSIUM SERPL-SCNC: 4 MMOL/L
PROT SERPL-MCNC: 6.7 G/DL
RBC # BLD AUTO: 3.79 M/UL
SODIUM SERPL-SCNC: 138 MMOL/L
WBC # BLD AUTO: 4.1 K/UL

## 2017-10-19 PROCEDURE — 97535 SELF CARE MNGMENT TRAINING: CPT

## 2017-10-19 PROCEDURE — 36415 COLL VENOUS BLD VENIPUNCTURE: CPT

## 2017-10-19 PROCEDURE — 94799 UNLISTED PULMONARY SVC/PX: CPT

## 2017-10-19 PROCEDURE — 85025 COMPLETE CBC W/AUTO DIFF WBC: CPT

## 2017-10-19 PROCEDURE — 97116 GAIT TRAINING THERAPY: CPT

## 2017-10-19 PROCEDURE — 97110 THERAPEUTIC EXERCISES: CPT

## 2017-10-19 PROCEDURE — 97530 THERAPEUTIC ACTIVITIES: CPT

## 2017-10-19 PROCEDURE — 12800000 HC REHAB SEMI-PRIVATE ROOM

## 2017-10-19 PROCEDURE — 25000003 PHARM REV CODE 250: Performed by: INTERNAL MEDICINE

## 2017-10-19 PROCEDURE — 80053 COMPREHEN METABOLIC PANEL: CPT

## 2017-10-19 RX ADMIN — DOCUSATE SODIUM 100 MG: 100 CAPSULE, LIQUID FILLED ORAL at 09:10

## 2017-10-19 RX ADMIN — Medication 1 TABLET: at 09:10

## 2017-10-19 NOTE — PLAN OF CARE
Problem: Mobility, Physical Impaired (Adult)  Goal: Identify Related Risk Factors and Signs and Symptoms  Related risk factors and signs and symptoms are identified upon initiation of Human Response Clinical Practice Guideline (CPG)   Outcome: Ongoing (interventions implemented as appropriate)  Pt alert and oriented. Pt min assist with transfers. No c/o at this time

## 2017-10-19 NOTE — PT/OT/SLP PROGRESS
"Physical Therapy         Treatment        Zaira Jensen   MRN: [id number]     PT Received On: 10/19/17  Total Time (min): 95        Billable Minutes: 95  Gait Training 30, Therapeutic Activity 25 and Therapeutic Exercise 40  Total Minutes: 95     Treatment Type: Treatment  PT/PTA: PT     PTA Visit Number: 0       General Precautions: Standard, fall  Orthopedic Precautions:     Braces: Braces: Cervical collar         Subjective:  Communicated with patient prior to session.    Pain/Comfort  Pain Rating 1: 3/10  Location - Side 1: Left  Location 1: shoulder  Pain Addressed 1: Reposition, Distraction, Cessation of Activity  Pain Rating Post-Intervention 1: 3/10  Pain Rating 2: 3/10  Location - Side 2: Right  Location - Orientation 2: lower  Location 2: back  Pain Addressed 2: Reposition, Distraction, Cessation of Activity  Pain Rating Post-Intervention 2: 3/10    Objective:  Patient found in w/c, with cousin present. Patient found with: cervical collar    Functional Mobility:  Bed Mobility: n/p    Balance:   Static Sit: FAIR+: Able to take MINIMAL challenges from all directions  Dynamic Sit:  FAIR+: Maintains balance through MINIMAL excursions of active trunk motion  Static Stand: FAIR: Maintains without assist but unable to take challenges  Dynamic stand: FAIR+: Needs CLOSE SUPERVISION during gait and is able to right self with minor LOB    Transfer Training:  Sit to stand:Minimal Assistance with Rollator and // bars x10    Wheelchair Training: not performed today    Gait Training:  Ambulated 290 ft x 2; using Rollator and CGA; postural cueing required. Decreased orquidea, increased time required. Pt concentrating on heel contact to ensure clearance but may be inhibiting gait speed.       Additional Treatment:  Standing in // bars: 30x mini squats, heel raises, hip flexion marches, hamstring curls, hip abd/add; step ups on 4" step with CGA x ~ 5 min.  StepOne: L1, LE only, 15 min    Activity Tolerance:  Patient limited " by fatigue    Patient left up in chair with call button in reach, chair alarm on and pt's cousin present.     Assessment:  Zaira Jensen is a 42 y.o. female with a medical diagnosis of cervical myelomalacia s/p C5-C6 ACDF. Pt has capability to perform transfers with SBA. Pt continues to require postural cueing to correct forward flexed posture and rounded shoulders throughout PT session. Pt remains appropriate for PT POC.     Rehab potential is fair.    Activity tolerance: Fair    Discharge recommendations: Discharge Facility/Level Of Care Needs: home with home health, outpatient OT (TBD)     Equipment recommendations: Equipment Needed After Discharge:  (TBD)     GOALS:    Physical Therapy Goals        Problem: Physical Therapy Goal    Goal Priority Disciplines Outcome Goal Variances Interventions   Physical Therapy Goal     PT/OT, PT Ongoing (interventions implemented as appropriate)     Description:  Goals to be met by: 2017     Patient will increase functional independence with mobility by performin). Supine to sit with Stand-by Assistance  2). Sit to supine with Stand-by Assistance  3). Rolling to Left and Right with Stand-by Assistance.  4). Sit to stand transfer with Minimal Assistance   5). Gait  x  > 25 feet with Contact Guard Assistance using Rolling Walker = MET  6). Wheelchair propulsion x > 150 feet with Stand-by Assistance      NEW: 10/18/2017    7). Gait  x  > 25 feet with Stand-by Assistance using Rolling Walker or Rollator                     PLAN:    Patient to be seen daily  to address the above listed problems via gait training, therapeutic activities, therapeutic exercises, wheelchair management/training  Plan of Care expires: 17  Plan of Care reviewed with: patient         Richie T Allan, PT 10/19/2017

## 2017-10-19 NOTE — PLAN OF CARE
Problem: Patient Care Overview  Goal: Plan of Care Review  Outcome: Ongoing (interventions implemented as appropriate)  Was up in chair until 2200 playing a game to improve fingers dexterity. Pt has ataxic movements at times with ambulation. But much stronger compared to last week. Almost stand by assist with transfers from w/c to toilet and vice versa. Able to position self in bed. Encouraged to change positioned in bed. scd's applied. wearing soft neck brace. Having menses and pericare given by nurse. No c/o pain. Family in room. Bed alarm on. Cont poc.

## 2017-10-19 NOTE — PT/OT/SLP PROGRESS
Occupational Therapy  Treatment    Zaira Jensen   MRN: [id number]   Admitting Diagnosis:     OT Date of Treatment: 10/19/17   Total Time (min): 90 min    Billable Minutes:  Self Care/Home Management 30, Therapeutic Activity 30 and Therapeutic Exercise 30  Total Minutes: 90    General Precautions: Standard, fall  Orthopedic Precautions: N/A  Braces: Cervical collar    Do you have any cultural, spiritual, Spiritism conflicts, given your current situation?: None    Subjective:  Communicated with nurse prior to session.    Pain/Comfort  Pain Rating 1: 0/10    Objective:  Patient found with: cervical collar    Functional Mobility:  Transfer Training:   Sit to stand:Contact Guard Assistance with L UE support of grab bar     Toilet Transfer:  Pt Stand Pivot with Contact Guard Assistance with grab bar and drop arm commode positioned over toilet    Grooming:  Supervision sitting sink side    Toilet Training:  Max (A) to manage clothing over hips and hygiene of buttocks after BM; pt able to perform hygiene of rfan area with CGA in standing with unilateral UE support of grab bar; with MAX encouragement from OTR, pt attempting to perform hygiene after BM and able to reach buttocks; however, unable to apply enough pressure to toilet paper to actually wipe     Balance:   Static Stand:CGA to SBA    Additional Treatment:  - Un-weighted pulleys for shoulder/elbow AAROM flexion/extension   - 3# free wt for biceps strengthening 2 sets x 15 reps   - Wrist/digit flexor stretch with 20 second hold at end range x 2 each hand - OTR re- instructing patient on self PROM/stretch and reinforcing importance of performing outside of scheduled therapy time in order to receive max benefit   - Yellow putty for digit strengthening and  strengthening - OT providing yellow putty and instructing pt on HEP to strengthen digits &     Patient left up in chair with call button in reach, chair alarm on and nurse notified    ASSESSMENT:  Zaira  Mikey is a 42 y.o. female with a medical diagnosis of  and presents with daily improvements towards OT goals; however, pt somewhat anxious and requires encouragement to attempt new self care tasks - ie hygiene after BM - pt should cont to benefit from skilled therapy services to increase functional independence, mobility, and safety.     Rehab potential is good    Activity tolerance: Good    Discharge recommendations: home     Equipment recommendations:  (TBD)     GOALS:    Occupational Therapy Goals        Problem: Occupational Therapy Goal    Goal Priority Disciplines Outcome Interventions   Occupational Therapy Goal     OT, PT/OT Ongoing (interventions implemented as appropriate)    Description:  Goals to be met by: 11/16/17     Patient will increase functional independence with ADLs by performing:    Feeding with Contact Guard Assistance.  UE Dressing with Minimal Assistance.  LE Dressing with Minimal Assistance.  Grooming while seated with Minimal Assistance.  Toileting from toilet with Moderate Assistance for hygiene and clothing management.   Bathing from most appropriate location with Minimal Assistance.  Toilet transfer to toilet with Minimal Assistance.                       Plan:  Patient to be seen 6 x/week to address the above listed problems via self-care/home management, community/work re-entry, therapeutic activities, therapeutic exercises, therapeutic groups, neuromuscular re-education, sensory integration, wheelchair management/training  Plan of Care expires: 11/16/17  Plan of Care reviewed with: patient     JESSIKA Perkins LOTR  10/19/2017

## 2017-10-19 NOTE — PROGRESS NOTES
[facility]  Physical Medicine & Rehab  Progress Note    Patient Name: Zaira Jensen  MRN: [id number]  Patient Class: IP- Rehab   Admission Date: 10/5/2017  Length of Stay: 14 days  Attending Physician: Anirudh Rowley MD  Primary Care Provider: Cuate Alvarez MD    Subjective:     Principal Problem:  quadrapresis    Interval History: pt is 42 y.o female s/p ACDF,  Quadriparesis, participating with therapy     Scheduled Medications:    docusate sodium  100 mg Oral Daily    folic acid-vit B6-vit B12 2.5-25-2 mg  1 tablet Oral Daily       PRN Medications: acetaminophen, aluminum-magnesium hydroxide-simethicone, bisacodyl, bisacodyl, calcium carbonate, lactulose, ondansetron, oxycodone, oxycodone, oxycodone, ramelteon, senna-docusate 8.6-50 mg    Review of Systems   Constitutional: Negative.    HENT: Negative.    Eyes: Negative.    Respiratory: Negative.    Cardiovascular: Negative.    Gastrointestinal: Negative.    Endocrine: Negative.    Genitourinary: Negative.    Musculoskeletal: Negative.    Skin: Negative.    Allergic/Immunologic: Negative.    Neurological: Negative.    Hematological: Negative.    Psychiatric/Behavioral: Negative.      Objective:     Vital Signs (Most Recent):  Temp: 96.2 °F (35.7 °C) (10/19/17 0436)  Pulse: 97 (10/19/17 0436)  Resp: 20 (10/19/17 0436)  BP: (!) 114/59 (10/19/17 0436)  SpO2: 99 % (10/19/17 0436)    Vital Signs (24h Range):  Temp:  [96.2 °F (35.7 °C)-98 °F (36.7 °C)] 96.2 °F (35.7 °C)  Pulse:  [] 97  Resp:  [17-20] 20  SpO2:  [98 %-99 %] 99 %  BP: (111-137)/(59-81) 114/59     Physical Exam   Constitutional: She is oriented to person, place, and time. She appears well-developed and well-nourished. No distress.   HENT:   Head: Normocephalic and atraumatic.   Right Ear: External ear normal.   Left Ear: External ear normal.   Nose: Nose normal.   Mouth/Throat: Oropharynx is clear and moist. No oropharyngeal exudate.   Eyes: Conjunctivae and EOM are normal.  Pupils are equal, round, and reactive to light. Right eye exhibits no discharge. Left eye exhibits no discharge. No scleral icterus.   Neck: Normal range of motion. Neck supple. No JVD present. No tracheal deviation present. No thyromegaly present.   Cardiovascular: Normal rate, regular rhythm, normal heart sounds and intact distal pulses.  Exam reveals no gallop and no friction rub.    No murmur heard.  Pulmonary/Chest: Effort normal and breath sounds normal. No stridor. No respiratory distress. She has no wheezes. She has no rales. She exhibits no tenderness.   Abdominal: Soft. Bowel sounds are normal. She exhibits no distension and no mass. There is no tenderness. There is no rebound and no guarding. No hernia.   Genitourinary:   Genitourinary Comments: deferred   Musculoskeletal: Normal range of motion. She exhibits no edema, tenderness or deformity.   Lymphadenopathy:     She has no cervical adenopathy.   Neurological: She is oriented to person, place, and time. She exhibits normal muscle tone.   Skin: No rash noted. She is not diaphoretic. No erythema. No pallor.   Psychiatric: She has a normal mood and affect. Her behavior is normal. Judgment and thought content normal.     NEUROLOGICAL EXAMINATION:     MENTAL STATUS   Oriented to person, place, and time.     CRANIAL NERVES     CN III, IV, VI   Pupils are equal, round, and reactive to light.  Extraocular motions are normal.       Assessment/Plan:      Zaira Jensen is a 42 y.o. female admitted to inpatient rehabilitation on 10/5/2017 for <principal problem not specified> with impaired mobility and ADLs. Patient remains appropriate for PT, OT, and as required Speech therapy. Patient continues to require 24 hour nursing care as well as daily Physician assessment.    Active Diagnoses:    Diagnosis Date Noted POA    Obesity [E66.9] 10/13/2017 Yes    Quadriplegia, C5-C7 complete [G82.53] 10/05/2017 Yes      Problems Resolved During this Admission:    Diagnosis  Date Noted Date Resolved POA     Quadriparesis, cont PT, OT  Pain, managed with current med  DVT prophylaxis, cont SCD     DISCHARGE PLANNING:  Tentative Discharge Date:     No future appointments.    Anirudh Rowley MD  Department of Physical Medicine & Rehab  [facility]

## 2017-10-20 PROCEDURE — 97112 NEUROMUSCULAR REEDUCATION: CPT

## 2017-10-20 PROCEDURE — 97110 THERAPEUTIC EXERCISES: CPT

## 2017-10-20 PROCEDURE — 97116 GAIT TRAINING THERAPY: CPT

## 2017-10-20 PROCEDURE — 12800000 HC REHAB SEMI-PRIVATE ROOM

## 2017-10-20 PROCEDURE — 97530 THERAPEUTIC ACTIVITIES: CPT

## 2017-10-20 PROCEDURE — 25000003 PHARM REV CODE 250: Performed by: INTERNAL MEDICINE

## 2017-10-20 RX ADMIN — DOCUSATE SODIUM 100 MG: 100 CAPSULE, LIQUID FILLED ORAL at 08:10

## 2017-10-20 RX ADMIN — Medication 1 TABLET: at 08:10

## 2017-10-20 NOTE — PROGRESS NOTES
[facility]  Physical Medicine & Rehab  Progress Note    Patient Name: Zaira Jensen  MRN: [id number]  Patient Class: IP- Rehab   Admission Date: 10/5/2017  Length of Stay: 15 days  Attending Physician: Anirudh Rowley MD  Primary Care Provider: Cuate Alvarez MD    Subjective:     Principal Problem:  quadrapresis  Interval History: pt is 42 y.o female s/p ACDF, quadriparesis, participating with therapy & making progress      Scheduled Medications:    docusate sodium  100 mg Oral Daily    folic acid-vit B6-vit B12 2.5-25-2 mg  1 tablet Oral Daily       PRN Medications: acetaminophen, aluminum-magnesium hydroxide-simethicone, bisacodyl, bisacodyl, calcium carbonate, lactulose, ondansetron, oxycodone, oxycodone, oxycodone, ramelteon, senna-docusate 8.6-50 mg    Review of Systems   Constitutional: Negative.    HENT: Negative.    Eyes: Negative.    Respiratory: Negative.    Cardiovascular: Negative.    Gastrointestinal: Negative.    Endocrine: Negative.    Genitourinary: Negative.    Musculoskeletal: Negative.    Skin: Negative.    Allergic/Immunologic: Negative.    Neurological: Negative.    Hematological: Negative.    Psychiatric/Behavioral: Negative.      Objective:     Vital Signs (Most Recent):  Temp: 98.5 °F (36.9 °C) (10/20/17 0831)  Pulse: (!) 111 (10/20/17 0831)  Resp: 16 (10/20/17 0831)  BP: 122/60 (10/20/17 0831)  SpO2: 100 % (10/20/17 0831)    Vital Signs (24h Range):  Temp:  [97.5 °F (36.4 °C)-98.7 °F (37.1 °C)] 98.5 °F (36.9 °C)  Pulse:  [] 111  Resp:  [16-20] 16  SpO2:  [98 %-100 %] 100 %  BP: (108-122)/(55-70) 122/60     Physical Exam   Constitutional: She is oriented to person, place, and time. She appears well-developed and well-nourished. No distress.   HENT:   Head: Normocephalic and atraumatic.   Right Ear: External ear normal.   Left Ear: External ear normal.   Nose: Nose normal.   Mouth/Throat: Oropharynx is clear and moist. No oropharyngeal exudate.   Eyes: Conjunctivae  and EOM are normal. Pupils are equal, round, and reactive to light. Right eye exhibits no discharge. Left eye exhibits no discharge. No scleral icterus.   Neck: Normal range of motion. Neck supple. No JVD present. No tracheal deviation present. No thyromegaly present.   Cardiovascular: Normal rate, regular rhythm, normal heart sounds and intact distal pulses.  Exam reveals no gallop and no friction rub.    No murmur heard.  Pulmonary/Chest: Effort normal and breath sounds normal. No stridor. No respiratory distress. She has no wheezes. She has no rales. She exhibits no tenderness.   Abdominal: Soft. Bowel sounds are normal. She exhibits no distension and no mass. There is no tenderness. There is no rebound and no guarding. No hernia.   Genitourinary:   Genitourinary Comments: deferred   Musculoskeletal: Normal range of motion. She exhibits no edema, tenderness or deformity.   Lymphadenopathy:     She has no cervical adenopathy.   Neurological: She is alert and oriented to person, place, and time. No cranial nerve deficit. She exhibits normal muscle tone.   Skin: No rash noted. She is not diaphoretic. No erythema. No pallor.   Psychiatric: She has a normal mood and affect. Her behavior is normal. Judgment and thought content normal.     NEUROLOGICAL EXAMINATION:     MENTAL STATUS   Oriented to person, place, and time.     CRANIAL NERVES     CN III, IV, VI   Pupils are equal, round, and reactive to light.  Extraocular motions are normal.       Assessment/Plan:      Zaira Jensen is a 42 y.o. female admitted to inpatient rehabilitation on 10/5/2017 for <principal problem not specified> with impaired mobility and ADLs. Patient remains appropriate for PT, OT, and as required Speech therapy. Patient continues to require 24 hour nursing care as well as daily Physician assessment.    Active Diagnoses:    Diagnosis Date Noted POA    Obesity [E66.9] 10/13/2017 Yes    Quadriplegia, C5-C7 complete [G82.53] 10/05/2017 Yes       Problems Resolved During this Admission:    Diagnosis Date Noted Date Resolved POA     S/p ACDF, quadriparesis, cont PT, OT  DVT prophylaxis, cont SCD     DISCHARGE PLANNING:  Tentative Discharge Date:     No future appointments.    Anirudh Rowley MD  Department of Physical Medicine & Rehab  [facility]

## 2017-10-20 NOTE — PROGRESS NOTES
10/19/17 2019   Incentive Spirometer   $ Incentive Spirometer Charges done with encouragement   Administration (Incentive Spirometer) done with encouragement   Number of Repetitions (Incentive Spirometer) 10   Level (mL) (Incentive Spirometer) 1250   Patient Tolerance good

## 2017-10-20 NOTE — PLAN OF CARE
Problem: Patient Care Overview  Goal: Plan of Care Review  Outcome: Ongoing (interventions implemented as appropriate)  Pt alert and oriented. Min assist with transfers. Does as much as she can with self care. Good appetite. Plan of care continued.

## 2017-10-20 NOTE — PT/OT/SLP PROGRESS
"Occupational Therapy  Treatment    Zaira Jensen   MRN: [id number]   Admitting Diagnosis: Spinal stenosis    OT Date of Treatment: 10/20/17   Total Time (min): 95 min    Billable Minutes:  Therapeutic Activity 30, Therapeutic Exercise 30 and Neuromuscular Re-education 35  Total Minutes: 95    General Precautions: Standard, fall  Orthopedic Precautions: N/A  Braces: Cervical collar    Do you have any cultural, spiritual, Hoahaoism conflicts, given your current situation?: None    Subjective:  Communicated with nurse prior to session.  "I can't believe I can do some of the things I did today. I haven't been able to do them in so long."    Pain/Comfort  Pain Rating 1: 0/10    Objective:  Patient found with: cervical collar    Functional mobility:  Transfers: Sit<>stand with Min (A) with no AD with verbal cues for proper hand placement   Wheelchair propulsion approximately 10 ft with increased time and verbal instruction for UE/LE coordination     Additional Treatment:  - In standing with SBA x 5 mins total to perform bilateral wrist/digit flexor stretches - 20 sec hold end range x 5   - B UE: facilitation of fine/gross motor coordination, in hand manipulation, functional movement patterns by performing the following: grasp/release and reach/place x 10 gold coins (size of silver dollar) each UE with emphasis on composite mvts to create functional mvt patterns (full digit extension with release, forearm pronation/supination, etc), then coin retrieval from container filled with glass beads - pt required to "rake" or perform digit flex/exten through beads to retrieve 5 coins x 4 with ea hand; then removal of 5+ beads and transferring to separate container x 5 with each hand, then using cards, pt retrieving cards from stack with R hand and placing in L hand - B coordination to position cards in "fanning" then bringing all cards together using B UE; to facilitate intrinsics of R hand, pt retrieving card then transferring to " second pile while maintaining flat pinch   - PROM and gentle Stretch of bilateral wrist/digit flexors, biceps with 20+ second hold end range; OTR instructing patient's cousin on wrist extension PROM and gentle Stretch with emphasis on gently stretching and correct stabilization of joint - cousin V&D understanding and in agreement   - Throughout session, OTR emphasizing importance of taking short rest breaks when UE specifically hands are fatigued - instruction on identification of muscle fatigue as well as emphasizing importance of exaggerated movements to perform full ROM each joint mvt especially wrist/digit extension & forearm supination/pronation - QUALITY of movement not quantity; OTR also instructing on correct UE positioning when in bed/wheelchair and encouraging patient to perform elbow extension/stretch throughout the day - Pt pushing dress down past knees with verbal instruction/cues to exaggerate digit movements to achieve effective pinch of material - pt successfully completing with SBA; pt also instructed attempt to reach call button approximately 2 ft forward from patient on her bed - pt leaning slightly forward at hips and fully extending R elbow/wrist/hand to retrieve call bell and place next to patient's w/c with SBA and VC as needed      Patient left up in chair with all lines intact, call button in reach, chair alarm on and nurse notified    ASSESSMENT:  Zaira Jensen is a 42 y.o. female with a medical diagnosis of spinal stenosis S/p ACDF C5-6 on 10/3/17 and presents with tightness of R UE biceps and bilateral wrist flexors. Patient demonstrates improved in hand manipulation and fine motor control during today's session.  Patient should continue to benefit from skilled OT services per est POC to increase (I) with self care tasks, B UE fine/gross motor coordination, ROM, strength and safety.     Rehab potential is good    Activity tolerance: Good    Discharge recommendations: home     Equipment  recommendations:  (tbd)     GOALS:    Occupational Therapy Goals        Problem: Occupational Therapy Goal    Goal Priority Disciplines Outcome Interventions   Occupational Therapy Goal     OT, PT/OT Ongoing (interventions implemented as appropriate)    Description:  Goals to be met by: 11/16/17     Patient will increase functional independence with ADLs by performing:    Feeding with Contact Guard Assistance.  UE Dressing with Minimal Assistance.  LE Dressing with Minimal Assistance.  Grooming while seated with Minimal Assistance.  Toileting from toilet with Moderate Assistance for hygiene and clothing management.   Bathing from most appropriate location with Minimal Assistance.  Toilet transfer to toilet with Minimal Assistance.                       Plan:  Patient to be seen 6 x/week to address the above listed problems via self-care/home management, community/work re-entry, therapeutic activities, therapeutic exercises, therapeutic groups, neuromuscular re-education, sensory integration, wheelchair management/training  Plan of Care expires: 11/16/17  Plan of Care reviewed with: patient    HUGH Perkins LOTR  10/20/2017

## 2017-10-20 NOTE — PT/OT/SLP PROGRESS
"Physical Therapy         Treatment        Zaira Jensen   MRN: [id number]     PT Received On: 10/20/17  Total Time (min): 90        Billable Minutes: 90  Gait Training 35, Therapeutic Activity 10 and Therapeutic Exercise 45  Total Minutes: 90    Treatment Type: Treatment  PT/PTA: PT     PTA Visit Number: 0       General Precautions: Standard, fall  Orthopedic Precautions:     Braces: Braces: Cervical collar (in place throughout session)    Subjective:  Communicated with nurse prior to session. Pt c/o R hip OA pain and explained medical history of SI joint.     Pain/Comfort  Pain Rating 1: 3/10  Location - Side 1: Right  Location 1: hip  Pain Addressed 1: Reposition, Distraction, Cessation of Activity  Pain Rating Post-Intervention 1: 3/10    Objective:  Patient found seated in w/c. Patient found with: cervical collar    Functional Mobility:  Bed Mobility: n/p    Transfer Training:  Sit to stand: Minimal Assistance with rollator x8    Wheelchair Training: n/p    Gait Training:  Ambulated >450 ft (including stair training between); 350 ft using Rollator and SBA with no seated rest breaks. Slow orquidea, increased time needed.    Stair Training: ascend/descend 12 4" steps with rails with CGA and cues    Additional Treatment:  SEATED: 30x LAQ 3#, hip abduction with Red T-band  Standing using // bars: 30x mini squats with CGA, 2x30 heel raises, hip flexion marches, hamstring curls, hip abd/add with CGA  SciFit StepOne: L1, 10 min, LE only, 0 Mendez; ceased d/t pt complaints; max encouragement provided      Activity Tolerance:  Patient limited by pain    Patient left up in chair with call button in reach, chair alarm on and pt's cousin present.    Assessment:  Zaira Jensen is a 42 y.o. female with a medical diagnosis of cervical myelomalacia s/p C5-C6 ACDF. Pt able to ambulate with SBA only requiring occasional CGA per pt request for turns. Pt's gait speed is still functionally limited but gait impairments continue to " slowly progress. Patient remains appropriate for PT POC.       Rehab potential is fair.    Activity tolerance: Fair    Discharge recommendations: Discharge Facility/Level Of Care Needs: home with home health, outpatient OT (TBD)     Equipment recommendations: Equipment Needed After Discharge:  (TBD)     GOALS:    Physical Therapy Goals        Problem: Physical Therapy Goal    Goal Priority Disciplines Outcome Goal Variances Interventions   Physical Therapy Goal     PT/OT, PT Ongoing (interventions implemented as appropriate)     Description:  Goals to be met by: 2017     Patient will increase functional independence with mobility by performin). Supine to sit with Stand-by Assistance  2). Sit to supine with Stand-by Assistance  3). Rolling to Left and Right with Stand-by Assistance.  4). Sit to stand transfer with Minimal Assistance   5). Gait  x  > 25 feet with Contact Guard Assistance using Rolling Walker = MET  6). Wheelchair propulsion x > 150 feet with Stand-by Assistance      NEW: 10/18/2017    7). Gait  x  > 25 feet with Stand-by Assistance using Rolling Walker or Rollator                     PLAN:    Patient to be seen daily  to address the above listed problems via gait training, therapeutic activities, therapeutic exercises, neuromuscular re-education, wheelchair management/training  Plan of Care expires: 17  Plan of Care reviewed with: patient         Richie T Allan, PT 10/20/2017

## 2017-10-21 PROCEDURE — 99900035 HC TECH TIME PER 15 MIN (STAT)

## 2017-10-21 PROCEDURE — 12800000 HC REHAB SEMI-PRIVATE ROOM

## 2017-10-21 PROCEDURE — 94761 N-INVAS EAR/PLS OXIMETRY MLT: CPT

## 2017-10-21 PROCEDURE — 97116 GAIT TRAINING THERAPY: CPT

## 2017-10-21 PROCEDURE — 25000003 PHARM REV CODE 250: Performed by: INTERNAL MEDICINE

## 2017-10-21 PROCEDURE — 97110 THERAPEUTIC EXERCISES: CPT

## 2017-10-21 RX ADMIN — DOCUSATE SODIUM 100 MG: 100 CAPSULE, LIQUID FILLED ORAL at 08:10

## 2017-10-21 RX ADMIN — Medication 1 TABLET: at 08:10

## 2017-10-21 NOTE — PLAN OF CARE
10/21/17 0806   Patient Assessment/Suction   Level of Consciousness (AVPU) alert   Respiratory Effort Normal;Unlabored   Expansion/Accessory Muscles/Retractions no retractions;no use of accessory muscles   All Lung Fields Breath Sounds clear   Rhythm/Pattern, Respiratory depth regular;pattern regular;unlabored   Is this patient in SNF or Inpatient Rehab? Yes   Therapy Start Time 0806   Therapy End Time 0808   Therapy Total Time (in minutes) 2   PRE-TX-O2-ETCO2   O2 Device (Oxygen Therapy) room air   SpO2 98 %   Pulse Oximetry Type Intermittent   $ Pulse Oximetry - Multiple Charge Pulse Oximetry - Multiple   Incentive Spirometer   $ Incentive Spirometer Charges done independently per patient   Administration (Incentive Spirometer) done independently per patient

## 2017-10-21 NOTE — PLAN OF CARE
Problem: Patient Care Overview  Goal: Plan of Care Review  Outcome: Ongoing (interventions implemented as appropriate)  Bed alarm on at all times, side rails up for safety, call light is within easy reach , instructed patient to call at any time for assistance.Patient will be kept pain free, during shift.Resp are even and unlabored, lungs sound clear, no s & s of distress noted at this time.  Will continue to monitor and observe during HS hours.Patient is able to turn and reposition as needed during HS hoursPeri care done as needed, patient cleaned and dried and clean brief applied, patient tolerated procedure well.

## 2017-10-21 NOTE — PROGRESS NOTES
"REHAB FOLLOWUP NOTE    Zaira Jensen is a 42 y.o. female patient.    Chief Complaint: Quadriparesis post cervical revision decompression fusion.    History: 42-year-old female with history of chronic neck pain had cervical decompression fusion done ×2 in the past has been progressively getting weaker in all 4 extremities. Neuro workup consistent with myelomalacia of the cervical spine and did undergo revision cervical decompression fusion done on 10/3/2017 by Dr. Neal. Patient presents with quadriparesis for acute inpatient rehabilitation.    Past Medical History:   Diagnosis Date    Allergy     Anemia     Arthritis     Chronic back pain     Chronic neck pain     Functional ovarian cysts     GERD (gastroesophageal reflux disease)     Headache(784.0)     Radiculopathy of arm     Rash     Respiratory distress     bronchospasm at emergence years ago       Temp: 98.2 °F (36.8 °C) (10/21/17 0600)  Pulse: 94 (10/21/17 0600)  Resp: 18 (10/21/17 0600)  BP: 120/66 (10/21/17 0600)  SpO2: 98 % (10/21/17 0600)  Weight: 83.1 kg (183 lb 1.6 oz) (10/21/17 0600)  Height: 5' 5" (165.1 cm) (10/17/17 1437)    Lab Results   Component Value Date    WBC 4.10 10/19/2017    HGB 11.6 (L) 10/19/2017    HCT 34.8 (L) 10/19/2017     10/19/2017    ALT 15 10/19/2017    AST 13 10/19/2017     10/19/2017    K 4.0 10/19/2017     10/19/2017    CREATININE 0.7 10/19/2017    BUN 10 10/19/2017    CO2 26 10/19/2017    TSH 0.830 08/18/2017    INR 1.0 09/21/2017       Physical Examination:  Alert, voices no complaints. Denies any dysphagia problems.  Anterior cervical incision clean and dry.  Lungs with inspiratory wheeze.  Heart with a regular rate and rhythm.  States she is continent of bladder.  Extremities without any edema or calf tenderness noted. Presents with decreased active to passive range of motion and decreased motor strength in all 4 extremities. Presents with some tremors in upper extremities.  Anemia " stable.    Impression:  42-year-old female status post revision cervical decompression and fusion for cervical spinal stenosis, cervical myelomalacia with quadriparesis for inpatient rehabilitation.  History of DJD.  History of cervical decompression fusion ×2 in the past.  Postoperative acute blood loss anemia.    Recommendations: Continue current PT, OT, nursing care.    Hamilton Drummond MD  10/21/2017

## 2017-10-21 NOTE — PLAN OF CARE
Problem: Patient Care Overview  Goal: Plan of Care Review  Alert oriented appetite is good fall prevention ongoing, med teaching ongoing. Transfers one person min assist

## 2017-10-21 NOTE — PT/OT/SLP PROGRESS
Physical Therapy         Treatment        Zaira Jensen   MRN: [id number]     PT Received On: 10/21/17           Billable Minutes:  Gait training 10, TE 10.  Total Minutes: 30       PT/PTA: PT     PTA Visit Number: 0       General Precautions: Standard, fall  Orthopedic Precautions:     Braces: Braces: Cervical collar         Subjective:  Communicated with RN prior to session.OK for PT.    Pain/Comfort  Pain Rating 1: 3/10 (neck)  Pain Addressed 1: Distraction  Pain Rating Post-Intervention 1: 3/10    Objective:  Patient found up in WC, with Patient found with: cervical collar    Functional Mobility:  Bed Mobility:   Supine to sit: Activity did not occur   Sit to supine: Activity did not occur   Rolling: Activity did not occur   Scooting: Activity did not occur    Balance:   Static Sit: GOOD: Takes MODERATE challenges from all directions  Dynamic Sit:  GOOD: Maintains balance through MODERATE excursions of active trunk movement  Static Stand: FAIR+: Takes MINIMAL challenges from all directions  Dynamic stand: FAIR+: Needs CLOSE SUPERVISION during gait and is able to right self with minor LOB    Transfer Training:  Sit to stand:Minimal Assistance with Rolling Walker X 6    Wheelchair Training:  Pt propelled Standard wheelchair x 200 feet on Level tile with  Bilateral lower extremity with Moderate Assistance.     Gait Training:  Patient gait trained NA 2X200  feet on level tile with Rollator with Contact Guard Assistance.  Pt with demonstarting a  2-point gait with decreased orquidea, decreased step length and decreased stride length.Impairments contributing to gait deviations include impaired coordination, impaired motor control and impaired postural control    Stair Training:        Additional Treatment:  NUSTEP X 15'    Activity Tolerance:  Patient tolerated treatment well    Patient left up in chair with all lines intact and call button in reach.    Assessment:  Zaira Jensen is a 42 y.o. female with a medical  diagnosis of <principal problem not specified>. She presents with ROM and strength deficits,gait abnormality..    Rehab potential is good.    Activity tolerance: Good    Discharge recommendations: Discharge Facility/Level Of Care Needs: home health PT     Equipment recommendations: Equipment Needed After Discharge:  (TBD)     GOALS:    Physical Therapy Goals        Problem: Physical Therapy Goal    Goal Priority Disciplines Outcome Goal Variances Interventions   Physical Therapy Goal     PT/OT, PT Ongoing (interventions implemented as appropriate)     Description:  Goals to be met by: 2017     Patient will increase functional independence with mobility by performin). Supine to sit with Stand-by Assistance  2). Sit to supine with Stand-by Assistance  3). Rolling to Left and Right with Stand-by Assistance.  4). Sit to stand transfer with Minimal Assistance   5). Gait  x  > 25 feet with Contact Guard Assistance using Rolling Walker = MET  6). Wheelchair propulsion x > 150 feet with Stand-by Assistance      NEW: 10/18/2017    7). Gait  x  > 25 feet with Stand-by Assistance using Rolling Walker or Rollator            Problem: Physical Therapy Goal    Goal Priority Disciplines Outcome Goal Variances Interventions   Physical Therapy Goal     PT/OT, PT                      PLAN:    Patient to be seen daily  to address the above listed problems via gait training, therapeutic activities, therapeutic exercises, therapeutic groups  Plan of Care expires: 17  Plan of Care reviewed with: patient         Shant Ish, PT 10/21/2017

## 2017-10-22 PROCEDURE — 97110 THERAPEUTIC EXERCISES: CPT

## 2017-10-22 PROCEDURE — 25000003 PHARM REV CODE 250: Performed by: INTERNAL MEDICINE

## 2017-10-22 PROCEDURE — 97116 GAIT TRAINING THERAPY: CPT

## 2017-10-22 PROCEDURE — 12800000 HC REHAB SEMI-PRIVATE ROOM

## 2017-10-22 PROCEDURE — 99900035 HC TECH TIME PER 15 MIN (STAT)

## 2017-10-22 RX ADMIN — Medication 1 TABLET: at 08:10

## 2017-10-22 RX ADMIN — DOCUSATE SODIUM 100 MG: 100 CAPSULE, LIQUID FILLED ORAL at 08:10

## 2017-10-22 NOTE — PLAN OF CARE
Problem: Patient Care Overview  Goal: Plan of Care Review  Outcome: Ongoing (interventions implemented as appropriate)  Patient improving daily with her current plan of care.  Will continue to monitor and observe during HS hours

## 2017-10-22 NOTE — PT/OT/SLP PROGRESS
Physical Therapy         Treatment        Zaira Jensen   MRN: [id number]     PT Received On: 10/22/17  Total Time (min): 30        Billable Minutes:  Gait [id number] and Therapeutic Exercise 15  Total Minutes: 30    Treatment Type: Treatment  PT/PTA: PT     PTA Visit Number: 0       General Precautions: Standard, fall  Orthopedic Precautions:     Braces:           Subjective:  Communicated with Melina prior to session.    Pain/Comfort  Pain Rating 1: 0/10    Objective:  Patient found sitting in w/c upon arrival.      Functional Mobility:  Bed Mobility:   Supine to sit: Activity did not occur   Sit to supine: Activity did not occur   Rolling: Activity did not occur   Scooting: Activity did not occur    Balance:   Static Sit: GOOD-: Takes MODERATE challenges from all directions but inconsistently  Dynamic Sit:  FAIR+: Maintains balance through MINIMAL excursions of active trunk motion  Static Stand: FAIR+: Takes MINIMAL challenges from all directions  Dynamic stand: FAIR: Needs CONTACT GUARD during gait    Transfer Training:  Min assist    Wheelchair Training:  NT    Gait Trainin ft w/ FWW and CGA    Stair Training:  NT      Additional Treatment:  X 15-20 standing alila. LE there ex = mini squats, hip ABD, calf raises, marching    Activity Tolerance:  Patient tolerated treatment well    Patient left sitting in w/c with family present.    Assessment:  Zaira Jensen is a 42 y.o. female with a medical diagnosis of <principal problem not specified>. She presents with strength/balance/mobility deficits and would benefit from further PT visits to cont. addressing these areas.    Rehab potential is good.    Activity tolerance: Good    Discharge recommendations:       Equipment recommendations:       GOALS:    Physical Therapy Goals        Problem: Physical Therapy Goal    Goal Priority Disciplines Outcome Goal Variances Interventions   Physical Therapy Goal     PT/OT, PT Ongoing (interventions implemented as  appropriate)     Description:  Goals to be met by: 2017     Patient will increase functional independence with mobility by performin). Supine to sit with Stand-by Assistance  2). Sit to supine with Stand-by Assistance  3). Rolling to Left and Right with Stand-by Assistance.  4). Sit to stand transfer with Minimal Assistance   5). Gait  x  > 25 feet with Contact Guard Assistance using Rolling Walker = MET  6). Wheelchair propulsion x > 150 feet with Stand-by Assistance      NEW: 10/18/2017    7). Gait  x  > 25 feet with Stand-by Assistance using Rolling Walker or Rollator            Problem: Physical Therapy Goal    Goal Priority Disciplines Outcome Goal Variances Interventions   Physical Therapy Goal     PT/OT, PT Ongoing (interventions implemented as appropriate)                     PLAN:    Patient to be seen daily  to address the above listed problems via gait training, therapeutic activities, therapeutic exercises, therapeutic groups  Plan of Care expires: 17  Plan of Care reviewed with: patient         Elijah Walsh, PT 10/22/2017

## 2017-10-22 NOTE — PLAN OF CARE
"Problem: Patient Care Overview  Goal: Plan of Care Review  Alert, oriented appetite is good fall prevention ongoing. Transfers one person min assist . Cont of b/b. Refuses laxative of choice. States"it is normal for my body to go up to one week with no bm" patient instructed on importance of prevention constipation. Instructed laxatives are available. Safety reinforced. Patient has family member staying at the bedside. No c/o pain wears soft neck collar for own comfort measures.      "

## 2017-10-23 PROCEDURE — 97110 THERAPEUTIC EXERCISES: CPT

## 2017-10-23 PROCEDURE — 97530 THERAPEUTIC ACTIVITIES: CPT

## 2017-10-23 PROCEDURE — 97542 WHEELCHAIR MNGMENT TRAINING: CPT

## 2017-10-23 PROCEDURE — 94799 UNLISTED PULMONARY SVC/PX: CPT

## 2017-10-23 PROCEDURE — 12800000 HC REHAB SEMI-PRIVATE ROOM

## 2017-10-23 PROCEDURE — 97112 NEUROMUSCULAR REEDUCATION: CPT

## 2017-10-23 PROCEDURE — 97116 GAIT TRAINING THERAPY: CPT

## 2017-10-23 PROCEDURE — 25000003 PHARM REV CODE 250: Performed by: INTERNAL MEDICINE

## 2017-10-23 RX ADMIN — DOCUSATE SODIUM 100 MG: 100 CAPSULE, LIQUID FILLED ORAL at 08:10

## 2017-10-23 RX ADMIN — Medication 1 TABLET: at 08:10

## 2017-10-23 NOTE — PLAN OF CARE
Problem: Physical Therapy Goal  Goal: Physical Therapy Goal  Goals to be met by: 2017     Patient will increase functional independence with mobility by performin). Supine to sit with Stand-by Assistance  2). Sit to supine with Stand-by Assistance  3). Rolling to Left and Right with Stand-by Assistance.  4). Sit to stand transfer with Minimal Assistance = MET  5). Gait  x  > 25 feet with Contact Guard Assistance using Rolling Walker = MET  6). Wheelchair propulsion x > 150 feet with Stand-by Assistance      NEW 10/18/2017:    7). Gait  x  > 25 feet with Stand-by Assistance using Rolling Walker or Rollator    NEW 10/23/2017:  8). Sit to stand transfer with Contact Guard Assistance    Outcome: Ongoing (interventions implemented as appropriate)  New Goal added. PT goals remain appropriate.

## 2017-10-23 NOTE — PT/OT/SLP PROGRESS
Occupational Therapy  Treatment    Zaira Jensen   MRN: [id number]   Admitting Diagnosis: Cervical sponylosis S/p ACDF C5-6 on 10/3/17    OT Date of Treatment: 10/23/17   Total Time (min): 110 min    Billable Minutes:  Therapeutic Activity 45, Therapeutic Exercise 45 and Neuromuscular Re-education 20  Total Minutes: 110    General Precautions: Standard, fall  Orthopedic Precautions: N/A  Braces: Cervical collar    Do you have any cultural, spiritual, Yarsani conflicts, given your current situation?: None    Subjective:  Communicated with nurse prior to session.    Pain/Comfort  Pain Rating 1:  (unrated pain of L shoulder)  Pain Addressed 1: Distraction, Nurse notified    Objective:  Patient found with: cervical collar    Additional Treatment:  - Un-weighted pulleys for shoulder AAROM/stretch   - ROM arc alternating UE to facilitate shoulder/elbow flex/exten and pinch   - 3# free wt for biceps strengthening x 15 reps, rest breaks, then 10 reps   - Green hand gripper x 10 reps each hand for  strengthening bilaterally   - Wrist/digit flexor stretch with 20 second hold at end range x 4 each hand   B UE: facilitation of fine/gross motor coordination, in hand manipulation, functional movement patterns by performing the following: grasp/release and reach/place x total of 15 coins each hand - alternating UE to prevent muscle fatigue; then removal of 5+ beads and transferring to separate container x 5 with each hand  - Open close x 5 containers of graded size and closure difficulty - pop top, screw top etc - pt able to open/close all containers with SBA and verbal instruction as needed (ie alternating UE use as stabilizer etc)  >>>> pt requires increased time to complete all activities     Patient left up in chair with call button in reach, chair alarm on and nurse notified    ASSESSMENT:  Zaira Jensen is a 42 y.o. female with a medical diagnosis of cervical spondylosis s/p ACDF C5-6 on 10/3/17and presents with daily  improvements towards OT goals.    Rehab potential is good    Activity tolerance: Good    Discharge recommendations: home     Equipment recommendations:  (TBD)     GOALS:    Occupational Therapy Goals        Problem: Occupational Therapy Goal    Goal Priority Disciplines Outcome Interventions   Occupational Therapy Goal     OT, PT/OT Ongoing (interventions implemented as appropriate)    Description:  Goals to be met by: 11/16/17     Patient will increase functional independence with ADLs by performing:    Feeding with Contact Guard Assistance.  UE Dressing with Minimal Assistance.  LE Dressing with Minimal Assistance.  Grooming while seated with Minimal Assistance.  Toileting from toilet with Moderate Assistance for hygiene and clothing management.   Bathing from most appropriate location with Minimal Assistance.  Toilet transfer to toilet with Minimal Assistance.                       Plan:  Patient to be seen 6 x/week to address the above listed problems via self-care/home management, community/work re-entry, therapeutic activities, therapeutic exercises, therapeutic groups, neuromuscular re-education, sensory integration, wheelchair management/training  Plan of Care expires: 11/16/17  Plan of Care reviewed with: patient    HUGH Perkins LOTR  10/23/2017

## 2017-10-23 NOTE — PROGRESS NOTES
[facility]  Physical Medicine & Rehab  Progress Note    Patient Name: Zaira Jensen  MRN: [id number]  Patient Class: IP- Rehab   Admission Date: 10/5/2017  Length of Stay: 18 days  Attending Physician: Anirudh Rowley MD  Primary Care Provider: Cuate Alvarez MD    Subjective:     Principal Problem: quadrapresis    Interval History: pt is 42 y.o  Female s/p ACDF  , quadriparesis , participating with therapy .     Scheduled Medications:    docusate sodium  100 mg Oral Daily    folic acid-vit B6-vit B12 2.5-25-2 mg  1 tablet Oral Daily       PRN Medications: acetaminophen, aluminum-magnesium hydroxide-simethicone, bisacodyl, bisacodyl, calcium carbonate, lactulose, ondansetron, oxycodone, oxycodone, oxycodone, ramelteon, senna-docusate 8.6-50 mg    Review of Systems   Constitutional: Negative.    HENT: Negative.    Eyes: Negative.    Respiratory: Negative.    Cardiovascular: Negative.    Gastrointestinal: Negative.    Endocrine: Negative.    Genitourinary: Negative.    Musculoskeletal: Negative.    Skin: Negative.    Allergic/Immunologic: Negative.    Neurological: Negative.    Hematological: Negative.    Psychiatric/Behavioral: Negative.      Objective:     Vital Signs (Most Recent):  Temp: 97.7 °F (36.5 °C) (10/23/17 0443)  Pulse: 83 (10/23/17 0443)  Resp: 18 (10/23/17 0443)  BP: 113/69 (10/23/17 0443)  SpO2: 100 % (10/23/17 0443)    Vital Signs (24h Range):  Temp:  [97.7 °F (36.5 °C)-98.4 °F (36.9 °C)] 97.7 °F (36.5 °C)  Pulse:  [] 83  Resp:  [18-20] 18  SpO2:  [98 %-100 %] 100 %  BP: (106-113)/(65-69) 113/69     Physical Exam   Constitutional: She is oriented to person, place, and time. She appears well-developed and well-nourished. No distress.   HENT:   Head: Normocephalic and atraumatic.   Right Ear: External ear normal.   Left Ear: External ear normal.   Nose: Nose normal.   Mouth/Throat: Oropharynx is clear and moist. No oropharyngeal exudate.   Eyes: Conjunctivae and EOM are  normal. Pupils are equal, round, and reactive to light. Right eye exhibits no discharge. Left eye exhibits no discharge. No scleral icterus.   Neck: Normal range of motion. Neck supple. No JVD present. No tracheal deviation present. No thyromegaly present.   Cardiovascular: Normal rate, regular rhythm, normal heart sounds and intact distal pulses.  Exam reveals no gallop and no friction rub.    No murmur heard.  Pulmonary/Chest: Effort normal and breath sounds normal. No stridor. No respiratory distress. She has no wheezes. She has no rales. She exhibits no tenderness.   Abdominal: Soft. Bowel sounds are normal. She exhibits no distension and no mass. There is no tenderness. There is no rebound and no guarding. No hernia.   Genitourinary:   Genitourinary Comments: deferred   Musculoskeletal: Normal range of motion. She exhibits no edema, tenderness or deformity.   Lymphadenopathy:     She has no cervical adenopathy.   Neurological: She is alert and oriented to person, place, and time. She exhibits normal muscle tone.   Skin: No rash noted. She is not diaphoretic. No erythema. No pallor.   Psychiatric: She has a normal mood and affect. Her behavior is normal. Judgment and thought content normal.     NEUROLOGICAL EXAMINATION:     MENTAL STATUS   Oriented to person, place, and time.     CRANIAL NERVES     CN III, IV, VI   Pupils are equal, round, and reactive to light.  Extraocular motions are normal.       Assessment/Plan:      Zaira Jensen is a 42 y.o. female admitted to inpatient rehabilitation on 10/5/2017 for <principal problem not specified> with impaired mobility and ADLs. Patient remains appropriate for PT, OT, and as required Speech therapy. Patient continues to require 24 hour nursing care as well as daily Physician assessment.    Active Diagnoses:    Diagnosis Date Noted POA    Obesity [E66.9] 10/13/2017 Yes    Quadriplegia, C5-C7 complete [G82.53] 10/05/2017 Yes      Problems Resolved During this  Admission:    Diagnosis Date Noted Date Resolved POA     Quadriparesis, cont PT, OT  DVT prophylaxis, cont SCD     DISCHARGE PLANNING:  Tentative Discharge Date:     No future appointments.    Anirudh Rowley MD  Department of Physical Medicine & Rehab  [facility]

## 2017-10-23 NOTE — PLAN OF CARE
Problem: Patient Care Overview  Goal: Plan of Care Review  Outcome: Ongoing (interventions implemented as appropriate)  Bed alarm on at all times, side rails up for safety, call light is within easy reach , instructed patient to call at any time for assistance.Resp are even and unlabored, lungs sound clear, no s & s of distress noted at this time.  Will continue to monitor and observe during HS hours.Tracie care done as needed, patient cleaned and dried and clean brief applied, patient tolerated procedure well.Patient turned and repositioned often during HS hours.

## 2017-10-23 NOTE — PLAN OF CARE
Problem: Patient Care Overview  Goal: Plan of Care Review  Outcome: Ongoing (interventions implemented as appropriate)  Pt on room air with IS, pt achievers 1250 on IS

## 2017-10-23 NOTE — PLAN OF CARE
Problem: Mobility, Physical Impaired (Adult)  Goal: Identify Related Risk Factors and Signs and Symptoms  Related risk factors and signs and symptoms are identified upon initiation of Human Response Clinical Practice Guideline (CPG)   Outcome: Ongoing (interventions implemented as appropriate)  Pt standby assist. Uses walker to walk. Call light in reach

## 2017-10-23 NOTE — PT/OT/SLP PROGRESS
"Physical Therapy         Treatment        Zaira Jensen   MRN: [id number]     PT Received On: 10/23/17  Total Time (min): 90        Billable Minutes: 90  Gait Training 30, Therapeutic Activity 15, Therapeutic Exercise 30 and Train/Wheelchair Management 15  Total Minutes: 95    Treatment Type: Treatment  PT/PTA: PT     PTA Visit Number: 0       General Precautions: Standard, fall  Orthopedic Precautions: Orthopedic Precautions : N/A   Braces: Braces: Cervical collar         Subjective:  Communicated with patient prior to session. Pt reports she is "having a bad day," and explain how even prior to surgery she had good days and bad days, clarifying that her body worked better on the good days.    Pain/Comfort  Pain Rating 1: 0/10 (6/10 during OT treatment but 0/10 at start of PT)  Location - Side 1: Left  Location - Orientation 1: generalized  Location 1: shoulder  Pain Addressed 1: Reposition, Distraction, Cessation of Activity  Pain Rating Post-Intervention 1: 4/10    Objective:  Patient found in w/c, with pt's cousin present. Patient found with: cervical collar    Functional Mobility:  Bed Mobility: Required increased time to perform, 10 minutes   Supine to sit: Moderate Assistance   Sit to supine: Minimal Assistance   Rolling right: Minimal Assistance   Scooting: Standby Assistance  Able to bridge up to align lower body with upper body    Balance:   Static Sit: FAIR: Maintains without assist, but unable to take any challenges   Dynamic Sit:  FAIR+: Maintains balance through MINIMAL excursions of active trunk motion  Static Stand: FAIR+: Takes MINIMAL challenges from all directions  Dynamic stand: FAIR+: Needs CLOSE SUPERVISION during gait and is able to right self with minor LOB with walker    Transfer Training:  Sit to stand:Minimal Assistance with Rolling Walker and 4 wheeled walker x8  Bed <> Chair:  Stand Pivot with Contact Guard Assistance with Rolling Walker x1    Wheelchair Training:  Pt propelled Standard " wheelchair x 50 feet on Level tile with Bilateral lower extremity and occasionally used Bilateral upper extremity to gain momentum with Stand-by Assistance. Slow orquidea, 15 minutes    Gait Training:  Ambulated 400 ft x 2 trials using Rollator and SBA and 1# ankle weights to limit spasticity and strengthen hip and knee flexors dynamically. 1 LoB as pt tripped over feet which required CGA but pt was able to self-correct. Slow Orquidea, 15 minutes per trial    Stair Training: not performed today      Additional Treatment:   STANDING using // bars: (1# ankle weights) 2x30 heel raises, hip flexion marches, hamstring curls, hip abd/add with CGA       Reassessed function and LE strength.    Activity Tolerance:  Patient limited by fatigue/L shoulder pain    Patient left up in chair with call button in reach and pt's cousin present.    Assessment:  Zaira Jensen is a 42 y.o. female with a medical diagnosis of cervical myelomalacia s/p C5-C6 ACDF. She presents with L shoulder pain at onset of weight bearing exercises in which she depends on her LUE for support more than R. Pt still requiring Min-Mod A for bed mobility and is functionally limited in wheelchair mobility d/t coordination and orquidea.  However, pt ambulated 400' with Rollator and SBA, correcting her own posture and focusing on heel-to-toe sequence. Pt's gait speed remains functionally limited but gait impairments continue to slowly progress. Patient remains appropriate for PT POC.     Rehab potential is fair.    Activity tolerance: Fair    Discharge recommendations: Discharge Facility/Level Of Care Needs:  (Today, pt's cousin inquired about Assisted Living Facilities;  met with pt's cousin during pt's PT session.)     Equipment recommendations: Equipment Needed After Discharge:  (TBD)     GOALS:    Physical Therapy Goals        Problem: Physical Therapy Goal    Goal Priority Disciplines Outcome Goal Variances Interventions   Physical Therapy Goal      PT/OT, PT Ongoing (interventions implemented as appropriate)     Description:  Goals to be met by: 2017     Patient will increase functional independence with mobility by performin). Supine to sit with Stand-by Assistance  2). Sit to supine with Stand-by Assistance  3). Rolling to Left and Right with Stand-by Assistance.  4). Sit to stand transfer with Minimal Assistance = MET  5). Gait  x  > 25 feet with Contact Guard Assistance using Rolling Walker = MET  6). Wheelchair propulsion x > 150 feet with Stand-by Assistance      NEW 10/18/2017:    7). Gait  x  > 25 feet with Stand-by Assistance using Rolling Walker or Rollator    NEW 10/23/2017:  8). Sit to stand transfer with Contact Guard Assistance             Problem: Physical Therapy Goal    Goal Priority Disciplines Outcome Goal Variances Interventions   Physical Therapy Goal     PT/OT, PT Ongoing (interventions implemented as appropriate)                     PLAN:    Patient to be seen daily  to address the above listed problems via therapeutic exercises, therapeutic activities, gait training, wheelchair management/training  Plan of Care expires: 17  Plan of Care reviewed with: patient, family         Richie Lemus, PT 10/23/2017

## 2017-10-24 PROCEDURE — 25000003 PHARM REV CODE 250: Performed by: INTERNAL MEDICINE

## 2017-10-24 PROCEDURE — 97530 THERAPEUTIC ACTIVITIES: CPT

## 2017-10-24 PROCEDURE — 99900035 HC TECH TIME PER 15 MIN (STAT)

## 2017-10-24 PROCEDURE — 94761 N-INVAS EAR/PLS OXIMETRY MLT: CPT

## 2017-10-24 PROCEDURE — 12800000 HC REHAB SEMI-PRIVATE ROOM

## 2017-10-24 PROCEDURE — 97116 GAIT TRAINING THERAPY: CPT

## 2017-10-24 PROCEDURE — 97803 MED NUTRITION INDIV SUBSEQ: CPT

## 2017-10-24 PROCEDURE — 97110 THERAPEUTIC EXERCISES: CPT

## 2017-10-24 RX ADMIN — Medication 1 TABLET: at 08:10

## 2017-10-24 RX ADMIN — DOCUSATE SODIUM 100 MG: 100 CAPSULE, LIQUID FILLED ORAL at 08:10

## 2017-10-24 NOTE — PT/OT/SLP PROGRESS
"Physical Therapy         Treatment        Zaira Jensen   MRN: [id number]     PT Received On: 10/24/17  Total Time (min): 90        Billable Minutes: 90  Gait Training 30, Therapeutic Activity 45 and Therapeutic Exercise 15  Total Minutes: 95    Treatment Type: Treatment  PT/PTA: PT     PTA Visit Number: 0       General Precautions: Standard, fall  Orthopedic Precautions: Orthopedic Precautions : N/A   Braces: Braces: Cervical collar (in place throughout session)       Subjective:  Communicated with OT, , and patient prior to session. During PT session, pt reports feeling "unstable" today, particularly in gait and reports feeling her "feet feel stuck to the ground at times."    Pain/Comfort  Pain Rating 1: 3/10  Location - Side 1: Left  Location 1: shoulder  Pain Addressed 1: Reposition, Distraction, Cessation of Activity  Pain Rating Post-Intervention 1: 1/10    Objective:  Patient found in w/c, with tech and pt's cousin. Patient found with: cervical collar    Functional Mobility:  Bed Mobility: n/p    Balance:   Static Sit: FAIR: Maintains without assist, but unable to take any challenges   Dynamic Sit:  FAIR+: Maintains balance through MINIMAL excursions of active trunk motion  Static Stand: FAIR: Maintains without assist but unable to take challenges with walker  Dynamic stand: FAIR+: Needs CLOSE SUPERVISION during gait and is able to right self with minor LOB with walker    Transfer Training:  Sit <> stand: Minimal Assistance with Rolling Walker and // bars x6; verbal cueing for technique    Wheelchair Training: n/p    Gait Training:  Ambulated 180 ft using Rollator and SBA; multiple times pt exhibited difficulty advancing LLE which was observed in earlier PT sessions; slow orquidea, extra time required  Ambulated in // bars with CGA: forward/backward walking (2 trials w/ 2# ankle weights, 2 trials w/o); forward walking x2 fast trials (Directions: "Go as fast as you can"); side steps with 2# ankle " weights x4 trials - verbal cueing for wide steps    Stair Training: n/p    Additional Treatment:  SEATED: 30x LAQ with 3#, hip abduction w/ Red T-band; Manual Left pectoral stretching [id number] hold; and 1x10 scapular retraction/resetting throughout PT session - tactile/verbal cueing  STANDING in // bars: (2# ankle weights) 2x20 heel raises, hip flexion marches (verbal cueing - focusing on slow eccentric lowering with accentuated DF to position foot for heel strike);   New Step: 15 minutes, L1; UE and LE      Patient left up in chair with call button in reach, chair alarm on and tech notified per pt request for bathroom; pt declined PT assistance for bathroom.     Assessment:  Zaira Jensen is a 42 y.o. female with a medical diagnosis of cervical myelomalacia s/p C5-C6 ACDF. She presents with L shoulder pain, limiting her ability to use/bear weight on LUE for sit<>stand transfers, standing therex, and gait training. L shoulder pain was relieved by manual pectoral stretching and frequent scapular resetting. Patient remains appropriate for PT POC.      Rehab potential is fair.    Activity tolerance: Fair Patient limited by L shoulder pain    Discharge recommendations: Discharge Facility/Level Of Care Needs:      Equipment recommendations: Equipment Needed After Discharge:  (TBD)     GOALS:    Physical Therapy Goals        Problem: Physical Therapy Goal    Goal Priority Disciplines Outcome Goal Variances Interventions   Physical Therapy Goal     PT/OT, PT Ongoing (interventions implemented as appropriate)     Description:  Goals to be met by: 2017     Patient will increase functional independence with mobility by performin). Supine to sit with Stand-by Assistance  2). Sit to supine with Stand-by Assistance  3). Rolling to Left and Right with Stand-by Assistance.  4). Sit to stand transfer with Minimal Assistance = MET  5). Gait  x  > 25 feet with Contact Guard Assistance using Rolling Walker = MET  6).  Wheelchair propulsion x > 150 feet with Stand-by Assistance      NEW 10/18/2017:    7). Gait  x  > 25 feet with Stand-by Assistance using Rolling Walker or Rollator    NEW 10/23/2017:  8). Sit to stand transfer with Contact Guard Assistance             Problem: Physical Therapy Goal    Goal Priority Disciplines Outcome Goal Variances Interventions   Physical Therapy Goal     PT/OT, PT Ongoing (interventions implemented as appropriate)                     PLAN:    Patient to be seen daily  to address the above listed problems via gait training, therapeutic activities, therapeutic exercises, neuromuscular re-education, wheelchair management/training  Plan of Care expires: 11/16/17  Plan of Care reviewed with: patient         Richie MENDEZ Allan, PT 10/24/2017

## 2017-10-24 NOTE — PLAN OF CARE
Problem: Nutrition Imbalance: Excess Oral Intake (Adult)  Intervention: Promote Healthy Nutritional Intake/Lifestyle  Recommendation/Intervention:   1.) Continue with adult regular diet 2.) continue Boost Glucose Control and  beneprotein TID   Goals: 1.) patient will consume 75% of meals 2.) patient will tolerate diet   Nutrition Goal Status: 1) met/ongoing 2) met/ongoing  Communication of RD Recs: discussed on round, reviewed with RD, care plan

## 2017-10-24 NOTE — PLAN OF CARE
10/24/17 0839   Patient Assessment/Suction   Level of Consciousness (AVPU) alert   Respiratory Effort Normal;Unlabored   Expansion/Accessory Muscles/Retractions no retractions;no use of accessory muscles   All Lung Fields Breath Sounds clear   Rhythm/Pattern, Respiratory depth regular;pattern regular;unlabored   Is this patient in SNF or Inpatient Rehab? Yes   Therapy Start Time 0839   Therapy End Time 0841   Therapy Total Time (in minutes) 2   PRE-TX-O2-ETCO2   O2 Device (Oxygen Therapy) room air   SpO2 100 %   Pulse Oximetry Type Intermittent   $ Pulse Oximetry - Multiple Charge Pulse Oximetry - Multiple   Incentive Spirometer   $ Incentive Spirometer Charges done independently per patient

## 2017-10-24 NOTE — PROGRESS NOTES
[facility]  Adult Nutrition  Progress Note    SUMMARY     Recommendations  Recommendation/Intervention:   1.) Continue with adult regular diet 2.) continue Boost Glucose Control and  beneprotein TID   Goals: 1.) patient will consume 75% of meals 2.) patient will tolerate diet   Nutrition Goal Status: 1)  Met/ongoing 2) met/ongoing  Communication of RD Recs: discussed on round, reviewed with RD, care plan    1. Quadriplegia, C5-C7 complete      Past Medical History:   Diagnosis Date    Allergy     Anemia     Arthritis     Chronic back pain     Chronic neck pain     Functional ovarian cysts     GERD (gastroesophageal reflux disease)     Headache(784.0)     Radiculopathy of arm     Rash     Respiratory distress     bronchospasm at emergence years ago       Reason for Assessment  Reason for Assessment: RD follow-up   Interdisciplinary Rounds: attended  General Information Comments: Admits to rehab with disablity. Patient with adequate intake at meals (~70% at most meals). Denies nausea/vomiting. Interested in beneprotein with meals. Reports acide reflux. Rd offered to take food preferences but patient refused. No complaints at this time.   10/24/17 Pt is slowly losing weight per her desire.  Spoke with RD to clarify ONS, who reported pt requested both Boost Glucose Control and Beneprotein.  Meal intake continues at ~70%.     Nutrition Prescription Ordered  Current Diet Order: Adult regular diet   Nutrition Order Comments: Add bottled water to each tray;  Pt notes she is concerned about her weight.  Offered calorie control or low fat option and patient declined noting she would monitor intake.         Evaluation of Received Nutrients/Fluid Intake  Oral Fluid (mL): 1560 (per 24 hr i/os)  Energy Calories Required: meeting needs  Protein Required: meeting needs  % Intake of Estimated Energy Needs:  %  % Meal Intake: 75% average for last 6 documented meals     Nutrition Risk  "Screen  Nutrition Risk Screen: no indicators present    Nutrition/Diet History  Patient Reported Diet/Restrictions/Preferences: general (avoids foods that have a lot of acid)  Food Preferences: No cultural or religous food preferences identified.   Supplemental Drinks or Food Habits:  (none)    Labs/Tests/Procedures/Meds  Diagnostic Test/Procedure Review: reviewed, pertinent  Pertinent Labs Reviewed: reviewed, pertinent  BMP  Lab Results   Component Value Date     10/19/2017    K 4.0 10/19/2017     10/19/2017    CO2 26 10/19/2017    BUN 10 10/19/2017    CREATININE 0.7 10/19/2017    CALCIUM 9.0 10/19/2017    ANIONGAP 7 (L) 10/19/2017    ESTGFRAFRICA >60 10/19/2017    EGFRNONAA >60 10/19/2017     Lab Results   Component Value Date    ALBUMIN 3.1 (L) 10/19/2017     Lab Results   Component Value Date    CALCIUM 9.0 10/19/2017     No results for input(s): POCTGLUCOSE in the last 24 hours.    Pertinent Medications Reviewed: reviewed, pertinent  Scheduled Meds:   docusate sodium  100 mg Oral Daily    folic acid-vit B6-vit B12 2.5-25-2 mg  1 tablet Oral Daily         Physical Findings  Overall Physical Appearance: nourished  Oral/Mouth Cavity: WDL  Skin: incision (Jc score 18)    Anthropometrics  Temp: 98.4 °F (36.9 °C)  Height: 5' 5"  Weight Method: Bed Scale  Weight: 83.1 kg (183 lb 1.6 oz)  Ideal Body Weight (IBW), Female: 125 lb  % Ideal Body Weight, Female (lb): 147.62 lb  BMI (Calculated): 30.8  BMI Grade: 30 - 34.9- obesity - grade I  Usual Body Weight (UBW), k.9 kg  % Usual Body Weight: 99.97  % Weight Change From Usual Weight: -0.24 %      Estimated/Assessed Needs  Weight Used For Calorie Calculations: 83.7 kg (184 lb 8.4 oz)   Height (cm): 165.1 cm  Energy Need Method: Lincoln-St Jeor  RMR (Lincoln-St. Jeor Equation): 1487.88 x1.2=1784 kcals/day  Weight Used For Protein Calculations: 83.7 kg (184 lb 8.4 oz)  0.8 gm Protein (gm): 67.1 and 1.0 gm Protein (gm): 83.88  Fluid Need Method: RDA " Method (or per MD rec)  CHO Requirement: na       Assessment and Plan    Obesity    Related to (etiology):   Excessive energy intake    Signs and Symptoms (as evidenced by):   1) BMI 30    Interventions/Recommendations (treatment strategy):  1) After pt noted a desire to lose weight offered limited calorie or low fat diet to patient and she declined in favor of monitoring her food herself. 2) Monitor weight     Nutrition Diagnosis Status:   progressing              Monitor and Evaluation  Food and Nutrient Intake: energy intake  Food and Nutrient Adminstration: diet order  Anthropometric Measurements: weight, weight change  Biochemical Data, Medical Tests and Procedures: electrolyte and renal panel, inflammatory profile  Nutrition-Focused Physical Findings: overall appearance, skin    Nutrition Risk  Level of Risk:  (x1 weekly)    Nutrition Follow-Up  RD Follow-up?: Yes     Discharge Planning: discharge on adult regular diet.

## 2017-10-24 NOTE — ASSESSMENT & PLAN NOTE
Related to (etiology):   Excessive energy intake    Signs and Symptoms (as evidenced by):   1) BMI 30    Interventions/Recommendations (treatment strategy):  1) After pt noted a desire to lose weight offered limited calorie or low fat diet to patient and she declined in favor of monitoring her food herself. 2) Monitor weight     Nutrition Diagnosis Status:   progressing

## 2017-10-24 NOTE — PLAN OF CARE
Problem: Physical Therapy Goal  Goal: Physical Therapy Goal  Goals to be met by: 2017     Patient will increase functional independence with mobility by performin). Supine to sit with Stand-by Assistance  2). Sit to supine with Stand-by Assistance  3). Rolling to Left and Right with Stand-by Assistance.  4). Sit to stand transfer with Minimal Assistance = MET  5). Gait  x  > 25 feet with Contact Guard Assistance using Rolling Walker = MET  6). Wheelchair propulsion x > 150 feet with Stand-by Assistance      NEW 10/18/2017:    7). Gait  x  > 25 feet with Stand-by Assistance using Rolling Walker or Rollator    NEW 10/23/2017:  8). Sit to stand transfer with Contact Guard Assistance     Outcome: Ongoing (interventions implemented as appropriate)  Pt goals remains appropriate.

## 2017-10-24 NOTE — PLAN OF CARE
Problem: Patient Care Overview  Goal: Plan of Care Review  Outcome: Ongoing (interventions implemented as appropriate)  Patient awake/alert. No c/o pain noted this shift. Generalized weakness noted. Free from falls. Plan of care continued

## 2017-10-24 NOTE — PROGRESS NOTES
Team conference attended and patient discussed.  Spoke with patient to discuss treatment plan, goals and ELOS.  Also spoke with her about discharge plans to home.

## 2017-10-24 NOTE — PT/OT/SLP PROGRESS
Occupational Therapy  Treatment    Zaira Jensen   MRN: [id number]   Admitting Diagnosis: Cervical sponylosis S/p ACDF C5-6 on 10/3/17    OT Date of Treatment: 10/24/17   Total Time (min): 90 min      Billable Minutes:  Therapeutic Activity 60 and Therapeutic Exercise 30  Total Minutes: 90    General Precautions: Standard, fall  Orthopedic Precautions:    Braces: Cervical collar    Do you have any cultural, spiritual, Mosque conflicts, given your current situation?: None    Subjective:  Communicated with nursing prior to session.    Pain/Comfort  Pain Rating 1: 2/10  Location - Side 1: Left  Location 1: shoulder  Pain Addressed 1: Nurse notified, Reposition  Pain Rating Post-Intervention 1: 2/10    Objective:  Patient found with: cervical collar, seated in her wheelchair with family present in her room.  Patient decline to self propel herself this session.  Educated patient on gentle table top based exercising to increase strength.  Bilat Hans completed for: 1 set with 4 lbs, 1 set with 1lbs, and 2 more with no added weight, rest breaks taken between sets.  Green (low resist) spring hand exerciser used to strengthen bilat forearms by doing 75 squeezes each hand.  Shoulder arch completed on lowest setting for several rounds alternating arms.  Fine motor activities completed at table top.    Patient left up in chair with all lines intact, call button in reach, chair alarm on and nursing notified, family present in room.    ASSESSMENT:  Zaira Jensen is a 42 y.o. female with a medical diagnosis of Cervical sponylosis S/p ACDF C5-6 on 10/3/17.    Rehab potential is good    Activity tolerance: Good        GOALS:    Occupational Therapy Goals        Problem: Occupational Therapy Goal    Goal Priority Disciplines Outcome Interventions   Occupational Therapy Goal     OT, PT/OT Ongoing (interventions implemented as appropriate)    Description:  Goals to be met by: 11/16/17     Patient will increase functional  independence with ADLs by performing:    Feeding with Contact Guard Assistance.  UE Dressing with Minimal Assistance.  LE Dressing with Minimal Assistance.  Grooming while seated with Minimal Assistance.  Toileting from toilet with Moderate Assistance for hygiene and clothing management.   Bathing from most appropriate location with Minimal Assistance.  Toilet transfer to toilet with Minimal Assistance.                       Plan:  Cont POC.  Patient to be seen 6 x/week to address the above listed problems via self-care/home management, community/work re-entry, therapeutic activities, therapeutic exercises, therapeutic groups, neuromuscular re-education, sensory integration, wheelchair management/training  Plan of Care expires: 11/16/17  Plan of Care reviewed with: patient         Brian CollinsCLAYTON sweet  10/24/2017

## 2017-10-25 PROCEDURE — 12800000 HC REHAB SEMI-PRIVATE ROOM

## 2017-10-25 PROCEDURE — 97530 THERAPEUTIC ACTIVITIES: CPT

## 2017-10-25 PROCEDURE — 97116 GAIT TRAINING THERAPY: CPT

## 2017-10-25 PROCEDURE — 25000003 PHARM REV CODE 250: Performed by: INTERNAL MEDICINE

## 2017-10-25 PROCEDURE — 97110 THERAPEUTIC EXERCISES: CPT

## 2017-10-25 PROCEDURE — 97535 SELF CARE MNGMENT TRAINING: CPT

## 2017-10-25 PROCEDURE — 97112 NEUROMUSCULAR REEDUCATION: CPT

## 2017-10-25 RX ADMIN — OXYCODONE HYDROCHLORIDE 5 MG: 5 TABLET ORAL at 04:10

## 2017-10-25 RX ADMIN — Medication 1 TABLET: at 08:10

## 2017-10-25 RX ADMIN — OXYCODONE HYDROCHLORIDE 5 MG: 5 TABLET ORAL at 08:10

## 2017-10-25 RX ADMIN — DOCUSATE SODIUM 100 MG: 100 CAPSULE, LIQUID FILLED ORAL at 08:10

## 2017-10-25 NOTE — PT/OT/SLP PROGRESS
Occupational Therapy  Treatment    Zaira Jensen   MRN: [id number]   Admitting Diagnosis: cervical myelomalacia s/p C5-C6 ACDF    OT Date of Treatment: 10/25/17   Total Time (min): 90 min    Billable Minutes:  Self Care/Home Management 60, Therapeutic Activity 15 and Neuromuscular Re-education 15  Total Minutes: 90    General Precautions: Standard, fall  Orthopedic Precautions: N/A  Braces: Cervical collar    Do you have any cultural, spiritual, [employer]aritan conflicts, given your current situation?: None    Subjective:  Communicated with nurse prior to session.    Pain/Comfort  Pain Rating 1: 0/10  Pain Addressed 1: Pre-medicate for activity (pt provided with pain medication for L shoulder pain prior to session)    Objective:  Patient found with: cervical collar    Functional Mobility:  Transfer Training:   Sit to stand:Stand-by Assistance and Contact Guard Assistance with Grab bars with verbal instruction to push down on arm rests as pt attempts to pull on grab bars  Toilet Transfer:  Pt Stand Pivot with Contact Guard Assistance with Grab bars once standing - verbal cues for proper hand placement  Patient performed shower transfer Stand Pivot with Contact Guard Assistance with Grab bars.verbal instruction for transfer sequence and proper hand placement    Bathing:  Patient performed bathing with Total Assistance with grab bar, Handheld shower head and tub bench at Shower.- verbal instruction/education regarding adaptive techniques to increase independence with bathing tasks (ie utilizing extra wash cloth to provided traction in 1 hand to hold breast up while washing beneath breast with the other hand - pt demonstrates with SBA, etc)    UE Dressing:  Pt don/doffed front clasp sports bra with Max (A) - pt requires (A) to bring bra around back & (A) to close clasps; however, pt able to unclasp with SBA  Pt doffed pull over long sleeved dress with Max (A); pt donned pull over long sleeved dress with Min (A) - OTR gathering  dress prior to giving to pt - pt then able to bring dress over head, then thread UEs; pt requires step by step verbal instruction and encouragement to fully open hand prior to pinching material to pull dress in needed direction, etc - pt requires extensive time to complete task    LE Dressing:  Pt don/doffed socks, velcro shoes, and underwear with Total (A)    Toilet Training:  Pt performed toileting with Max Assistance with drop arm commode positioned over toilet at grab bar - pt attempting to perform hygiene after urinating; however, pt unable to fully complete and requires (A)    Balance:   Static Sit: Supervision   Dynamic Sit: Supervision  Static Stand: CGA  Dynamic stand:     Additional Treatment:  - OTR reinforcing ed & instruction regarding adaptive techniques to increase safety and independence, performing component mvts & FULL or max ROM of all joints to maximize effectiveness of motor planning; reinforced ed regarding compensatory dressing techniques     Patient left up in chair with call button in reach, chair alarm on and nurse notified    ASSESSMENT:  Zaira Jensen is a 42 y.o. female with a medical diagnosis of cervical myelomalacia s/p C5-C6 ACDF on 10/3/17 and presents with daily improvements towards OT goals; OT demonstrates improvements with UE/hand strength as pt able to don/doff pull over dress with Min (A); however, pt continues to demonstrate impaired fine/gross motor coordination, severely decreased UE strength, increased pain of L shoulder, and requires extensive time to complete all tasks. Patient should continue to benefit from skilled OT services per est POC to address deficits and increase functional mobility and safety.    Rehab potential is fair    Activity tolerance: Good    Discharge recommendations: home     Equipment recommendations:  (TBD)     GOALS:    Occupational Therapy Goals        Problem: Occupational Therapy Goal    Goal Priority Disciplines Outcome Interventions    Occupational Therapy Goal     OT, PT/OT Ongoing (interventions implemented as appropriate)    Description:  Goals to be met by: 11/16/17     Patient will increase functional independence with ADLs by performing:    Feeding with Contact Guard Assistance.  UE Dressing with Minimal Assistance.  LE Dressing with Minimal Assistance.  Grooming while seated with Minimal Assistance.  Toileting from toilet with Moderate Assistance for hygiene and clothing management.   Bathing from most appropriate location with Minimal Assistance.  Toilet transfer to toilet with Minimal Assistance.                       Plan:  Patient to be seen 6 x/week to address the above listed problems via self-care/home management, community/work re-entry, therapeutic activities, therapeutic exercises, therapeutic groups, neuromuscular re-education, sensory integration, wheelchair management/training  Plan of Care expires: 11/16/17  Plan of Care reviewed with: patient    HUGH Perkins LOTR  10/25/2017

## 2017-10-25 NOTE — PLAN OF CARE
Problem: Patient Care Overview  Goal: Plan of Care Review  Outcome: Ongoing (interventions implemented as appropriate)  Patient awake/alert. No c/o pain noted this shift. Generalized weakness noted. Free from falls. Remains continent.Plan of care continued

## 2017-10-25 NOTE — PROGRESS NOTES
[facility]  Physical Medicine & Rehab  Progress Note    Patient Name: Zaira Jensen  MRN: [id number]  Patient Class: IP- Rehab   Admission Date: 10/5/2017  Length of Stay: 20 days  Attending Physician: Anirudh Rowley MD  Primary Care Provider: Cuate Alvarez MD    Subjective:     Principal Problem:  quadrapresis  Interval History: pt is 42 y.o female  S/p ACDF , quadriparesis, participating with therapy .     Scheduled Medications:    docusate sodium  100 mg Oral Daily    folic acid-vit B6-vit B12 2.5-25-2 mg  1 tablet Oral Daily       PRN Medications: acetaminophen, aluminum-magnesium hydroxide-simethicone, bisacodyl, bisacodyl, calcium carbonate, lactulose, ondansetron, oxyCODONE, oxyCODONE, oxyCODONE, ramelteon, senna-docusate 8.6-50 mg    Review of Systems   Constitutional: Negative.    HENT: Negative.    Eyes: Negative.    Respiratory: Negative.    Cardiovascular: Negative.    Gastrointestinal: Negative.    Endocrine: Negative.    Genitourinary: Negative.    Musculoskeletal: Negative.    Skin: Negative.    Allergic/Immunologic: Negative.    Neurological: Negative.    Hematological: Negative.    Psychiatric/Behavioral: Negative.      Objective:     Vital Signs (Most Recent):  Temp: 98.1 °F (36.7 °C) (10/25/17 0809)  Pulse: 95 (10/25/17 0809)  Resp: 16 (10/25/17 0809)  BP: (!) 111/59 (10/25/17 0809)  SpO2: 96 % (10/25/17 0809)    Vital Signs (24h Range):  Temp:  [98.1 °F (36.7 °C)-98.8 °F (37.1 °C)] 98.1 °F (36.7 °C)  Pulse:  [] 95  Resp:  [16-20] 16  SpO2:  [96 %-100 %] 96 %  BP: (111-149)/(59-81) 111/59     Physical Exam   Constitutional: She is oriented to person, place, and time. She appears well-developed and well-nourished. No distress.   HENT:   Head: Normocephalic and atraumatic.   Right Ear: External ear normal.   Left Ear: External ear normal.   Nose: Nose normal.   Mouth/Throat: Oropharynx is clear and moist. No oropharyngeal exudate.   Eyes: Conjunctivae and EOM are  normal. Pupils are equal, round, and reactive to light. Right eye exhibits no discharge. Left eye exhibits no discharge. No scleral icterus.   Neck: Normal range of motion. Neck supple. No JVD present. No tracheal deviation present. No thyromegaly present.   + cervical collar / soft    Cardiovascular: Normal rate, regular rhythm, normal heart sounds and intact distal pulses.  Exam reveals no gallop and no friction rub.    No murmur heard.  Pulmonary/Chest: Effort normal and breath sounds normal. No stridor. No respiratory distress. She has no wheezes. She has no rales. She exhibits no tenderness.   Abdominal: Soft. Bowel sounds are normal. She exhibits no distension and no mass. There is no tenderness. There is no rebound and no guarding. No hernia.   Genitourinary:   Genitourinary Comments: deferred   Musculoskeletal: Normal range of motion. She exhibits no edema, tenderness or deformity.   Lymphadenopathy:     She has no cervical adenopathy.   Neurological: She is alert and oriented to person, place, and time. No cranial nerve deficit. She exhibits normal muscle tone.   Skin: No rash noted. She is not diaphoretic. No erythema. No pallor.   Psychiatric: She has a normal mood and affect. Her behavior is normal. Judgment and thought content normal.     NEUROLOGICAL EXAMINATION:     MENTAL STATUS   Oriented to person, place, and time.     CRANIAL NERVES     CN III, IV, VI   Pupils are equal, round, and reactive to light.  Extraocular motions are normal.       Assessment/Plan:      Zaira Jensen is a 42 y.o. female admitted to inpatient rehabilitation on 10/5/2017 for <principal problem not specified> with impaired mobility and ADLs. Patient remains appropriate for PT, OT, and as required Speech therapy. Patient continues to require 24 hour nursing care as well as daily Physician assessment.    Active Diagnoses:    Diagnosis Date Noted POA    Obesity [E66.9] 10/13/2017 Yes    Quadriplegia, C5-C7 complete [G82.53]  10/05/2017 Yes      Problems Resolved During this Admission:    Diagnosis Date Noted Date Resolved POA   quadriparesis, s/p ACDF,  Cont PT, OT  Pain, managed with current interventions  DVT prophylaxis, cont SCD     DISCHARGE PLANNING:  Tentative Discharge Date:     No future appointments.    Anirudh Rowley MD  Department of Physical Medicine & Rehab  [facility]

## 2017-10-25 NOTE — PLAN OF CARE
Problem: Fall Risk (Adult)  Goal: Identify Related Risk Factors and Signs and Symptoms  Related risk factors and signs and symptoms are identified upon initiation of Human Response Clinical Practice Guideline (CPG)   Outcome: Ongoing (interventions implemented as appropriate)  Pt alert and oriented. Min assist with transfers. walks  with walker. Pt free of falls. Call light in reach

## 2017-10-25 NOTE — PLAN OF CARE
Problem: Physical Therapy Goal  Goal: Physical Therapy Goal  Goals to be met by: 2017     Patient will increase functional independence with mobility by performin). Supine to sit with Stand-by Assistance  2). Sit to supine with Stand-by Assistance  3). Rolling to Left and Right with Stand-by Assistance.  4). Sit to stand transfer with Minimal Assistance = MET  5). Gait  x  > 25 feet with Contact Guard Assistance using Rolling Walker = MET  6). Wheelchair propulsion x > 150 feet with Stand-by Assistance      NEW 10/18/2017:    7). Gait  x  > 25 feet with Stand-by Assistance using Rolling Walker or Rollator    NEW 10/23/2017:  8). Sit to stand transfer with Contact Guard Assistance     Outcome: Ongoing (interventions implemented as appropriate)  PT goals remain appropriate.

## 2017-10-25 NOTE — PT/OT/SLP PROGRESS
"Physical Therapy         Treatment        Zaira Jensen   MRN: [id number]     PT Received On: 10/25/17  Total Time (min): 65    Billable Minutes:  Gait Training 25, Therapeutic Activity 8 and Therapeutic Exercise 32  Total Minutes: 65    Treatment Type: Treatment  PT/PTA: PT     PTA Visit Number: 0       General Precautions: Standard, fall  Orthopedic Precautions: Orthopedic Precautions : N/A   Braces: Braces: Cervical collar (in place throughout session)      Subjective:  Communicated with patient prior to session.     Pain/Comfort  Pain Rating 1: 3/10  Location - Side 1: Left  Location 1: shoulder  Pain Addressed 1: Reposition, Distraction, Cessation of Activity, Pre-medicated (pain pill @ 9:30am)  Pain Rating Post-Intervention 1: 3/10    Objective:  Patient found in w.c, with pt's cousin present Patient found with: cervical collar    Functional Mobility:  Bed Mobility: n/p    Transfer Training:  Sit to stand:Minimal Assistance with Rollator and in // bars 4x    Wheelchair Training: n/p    Gait Training:  Ambulated 240 ft x 2 using Rollator and SBA-CGA (1st trial distance does not include the stair training and // bar training performed without rest breaks). Pt ambulated with an increased orquidea today; LLE advancing better today - observed only 3x   In // bars: 8 trials (~48ft) of forward walking with CGA and no arm-rail support, except during turns. Pt exhibits the most trouble initiating and coordinating turns.    Stair Training:  Pt ascended/descend 12 (4") stairs with No Assistive Device using bilateral handrails with Contact Guard Assistance.       Additional Treatment:  STANDING in // bars: 2x20 hip flexion marches (verbal cueing - focusing on slow eccentric lowering with accentuated DF to position foot for heel strike), hip abd/add, 24ft side steps, 40x step ups on 6" step    Activity Tolerance:  Patient limited by pain L shoulder pain but overall tolerated treatment better than prior PT " sessions.    Patient left up in chair with call button in reach, chair alarm on and pt's cousin present.    Assessment:  Zaira Jensen is a 42 y.o. female with a medical diagnosis of cervical myelomalacia s/p C5-C6 ACDF. Pt ambulated with an increased orquidea today with correct upright posture, a functional improvement if pt maintains pace in future PT sessions. Pt exhibits the most difficulty initiating and coordinating turns during gait. Patient remains appropriate for PT POC.     Rehab potential is fair.    Activity tolerance: Good    Discharge recommendations: Discharge Facility/Level Of Care Needs:  (Today, pt's cousin inquired about Assisted Living Facilities;  met with pt's cousin during pt's PT session.)     Equipment recommendations: Equipment Needed After Discharge:  (TBD)     GOALS:    Physical Therapy Goals        Problem: Physical Therapy Goal    Goal Priority Disciplines Outcome Goal Variances Interventions   Physical Therapy Goal     PT/OT, PT Ongoing (interventions implemented as appropriate)     Description:  Goals to be met by: 2017     Patient will increase functional independence with mobility by performin). Supine to sit with Stand-by Assistance  2). Sit to supine with Stand-by Assistance  3). Rolling to Left and Right with Stand-by Assistance.  4). Sit to stand transfer with Minimal Assistance = MET  5). Gait  x  > 25 feet with Contact Guard Assistance using Rolling Walker = MET  6). Wheelchair propulsion x > 150 feet with Stand-by Assistance      NEW 10/18/2017:    7). Gait  x  > 25 feet with Stand-by Assistance using Rolling Walker or Rollator    NEW 10/23/2017:  8). Sit to stand transfer with Contact Guard Assistance             Problem: Physical Therapy Goal    Goal Priority Disciplines Outcome Goal Variances Interventions   Physical Therapy Goal     PT/OT, PT Ongoing (interventions implemented as appropriate)                     PLAN:    Patient to be seen daily   to address the above listed problems via gait training, therapeutic activities, therapeutic exercises, neuromuscular re-education, wheelchair management/training  Plan of Care expires: 11/16/17  Plan of Care reviewed with: patient         Richie MENDEZ Allan, PT 10/25/2017

## 2017-10-25 NOTE — PROGRESS NOTES
[facility]  Physical Medicine & Rehab  Progress Note    Patient Name: Zaira Jensen  MRN: [id number]  Patient Class: IP- Rehab   Admission Date: 10/5/2017  Length of Stay: 19 days  Attending Physician: Anirudh Rowley MD  Primary Care Provider: Cuate Alvarez MD    Subjective:     Principal Problem:  quadrapresis  Interval History: pt is 42 y.o  Female  S/p ACDF, quadriparesis, participating with therapy & making progress    Scheduled Medications:    docusate sodium  100 mg Oral Daily    folic acid-vit B6-vit B12 2.5-25-2 mg  1 tablet Oral Daily       PRN Medications: acetaminophen, aluminum-magnesium hydroxide-simethicone, bisacodyl, bisacodyl, calcium carbonate, lactulose, ondansetron, oxyCODONE, oxyCODONE, oxyCODONE, ramelteon, senna-docusate 8.6-50 mg    Review of Systems   Constitutional: Negative.    HENT: Negative.    Eyes: Negative.    Respiratory: Negative.    Cardiovascular: Negative.    Gastrointestinal: Negative.    Endocrine: Negative.    Genitourinary: Negative.    Musculoskeletal: Negative.    Skin: Negative.    Allergic/Immunologic: Negative.    Neurological: Negative.    Hematological: Negative.    Psychiatric/Behavioral: Negative.      Objective:     Vital Signs (Most Recent):  Temp: 98.4 °F (36.9 °C) (10/24/17 0443)  Pulse: 91 (10/24/17 0443)  Resp: 18 (10/24/17 0443)  BP: 108/68 (10/24/17 0443)  SpO2: 100 % (10/24/17 0839)    Vital Signs (24h Range):  Temp:  [98.4 °F (36.9 °C)] 98.4 °F (36.9 °C)  Pulse:  [91] 91  Resp:  [18] 18  SpO2:  [100 %] 100 %  BP: (108)/(68) 108/68     Physical Exam   Constitutional: She is oriented to person, place, and time. She appears well-developed and well-nourished. No distress.   HENT:   Head: Normocephalic and atraumatic.   Right Ear: External ear normal.   Left Ear: External ear normal.   Nose: Nose normal.   Mouth/Throat: Oropharynx is clear and moist. No oropharyngeal exudate.   Eyes: Conjunctivae and EOM are normal. Pupils are equal,  round, and reactive to light. Right eye exhibits no discharge. Left eye exhibits no discharge. No scleral icterus.   Neck: Normal range of motion. Neck supple. No JVD present. No tracheal deviation present. No thyromegaly present.   Cardiovascular: Normal rate, regular rhythm, normal heart sounds and intact distal pulses.  Exam reveals no gallop and no friction rub.    No murmur heard.  Pulmonary/Chest: Effort normal and breath sounds normal. No stridor. No respiratory distress. She has no wheezes. She has no rales. She exhibits no tenderness.   Abdominal: Soft. Bowel sounds are normal. She exhibits no distension and no mass. There is no tenderness. There is no rebound and no guarding. No hernia.   Genitourinary:   Genitourinary Comments: deferred   Musculoskeletal: Normal range of motion. She exhibits no edema, tenderness or deformity.   Lymphadenopathy:     She has no cervical adenopathy.   Neurological: She is alert and oriented to person, place, and time. No sensory deficit. She exhibits normal muscle tone.   Skin: No rash noted. She is not diaphoretic. No erythema. No pallor.   Psychiatric: She has a normal mood and affect. Her behavior is normal. Judgment and thought content normal.     NEUROLOGICAL EXAMINATION:     MENTAL STATUS   Oriented to person, place, and time.     CRANIAL NERVES     CN III, IV, VI   Pupils are equal, round, and reactive to light.  Extraocular motions are normal.       Assessment/Plan:      Zaira Jensen is a 42 y.o. female admitted to inpatient rehabilitation on 10/5/2017 for <principal problem not specified> with impaired mobility and ADLs. Patient remains appropriate for PT, OT, and as required Speech therapy. Patient continues to require 24 hour nursing care as well as daily Physician assessment.    Active Diagnoses:    Diagnosis Date Noted POA    Obesity [E66.9] 10/13/2017 Yes    Quadriplegia, C5-C7 complete [G82.53] 10/05/2017 Yes      Problems Resolved During this Admission:     Diagnosis Date Noted Date Resolved POA     Quadriparesis, s/p ACDF, cont PT, OT  DVT prophylaxis, cont SQ SCD    DISCHARGE PLANNING:  Tentative Discharge Date:     No future appointments.    Anirudh Rowley MD  Department of Physical Medicine & Rehab  [facility]

## 2017-10-25 NOTE — PATIENT CARE CONFERENCE
Weekly Staffing Report      Date Admitted: 10/5/2017 :   Staffing Date: 10/24/2017     Patient Active Problem List   Diagnosis    Lumbago    Chronic neck pain    Cervical spondylosis without myelopathy    Degeneration of cervical intervertebral disc    Brachial neuritis or radiculitis NOS    Acquired spondylolisthesis    Biliary colic    Cervical spondylosis with myelopathy    Intervertebral disc disorder of cervical region with myelopathy    Spondylosis with myelopathy, lumbar region    Degeneration of lumbar or lumbosacral intervertebral disc    Atypical chest pain    Normocytic normochromic anemia    Abnormal CT scan    Cervical myelopathy    Stiffness of right shoulder joint    Stiffness of right wrist joint    Bilateral arm weakness    Fibroadenoma    Intervertebral disc stenosis of neural canal    Myelomalacia    Other specified symptoms and signs involving the circulatory and respiratory systems    Brief situational non-psychotic disorder    Goiter    Abnormality of gait    Coarse tremors    Cervical spinal stenosis s/p C5-6 ACDF    GERD (gastroesophageal reflux disease)    Chronic back pain    Quadriplegia, C5-C7 complete    Obesity          Team Members Present:  Physician Team Member: Dr Rowley  Nursing Team Member: Hitesh Ayala RN  Case Management Team Member: Missy Bailey CM/SW  PT Team Member: Richie Lemus PT  OT Team Member: Suzanne Perry OT  SLP Team Member: Julien CASTANEDA    Nursing  Skin: Intact  Bowel: Continent  Bladder:continent  Last BM: 10-22-17  Diet: Regular  Appetite: good   Pain Level: 3/10    Physical Therapy  Supine to Sit:  moderate assist  Sit to Stand: minimal assist  Gait: 400 feet standy by assistance Rollator  Wheelchair Mobility: 50 feet standy by assistance  ROM: wfl  Stairs:pending    Strength: left ankle 3+/5, hip 3-/5 to 3+/5, knee 4+/5, right ankle 3/5, hip 3-/5 to 3+/5, knee 4/5    Occupational Therapy  Feeding: supervision  Grooming:standy by  assistance  UED: maximal assist  LED: maximal assist  Bathing:max a   Toileting: max a   Toilet Transfer:  contact guard  Tub Transfer:  minimal assist  Strength: right 2+ to 3+/5 left 3- to 3+/5            Summary of Progress:  good    Barriers to Progress/Discharge:     Tolerates 3 hours of therapy: Yes    Comments:     Estimated Length of Stay: 11-16-17

## 2017-10-26 LAB
ALBUMIN SERPL BCP-MCNC: 3.1 G/DL
ALP SERPL-CCNC: 43 U/L
ALT SERPL W/O P-5'-P-CCNC: 15 U/L
ANION GAP SERPL CALC-SCNC: 8 MMOL/L
AST SERPL-CCNC: 15 U/L
BASOPHILS # BLD AUTO: 0 K/UL
BASOPHILS NFR BLD: 0.5 %
BILIRUB SERPL-MCNC: 0.4 MG/DL
BUN SERPL-MCNC: 11 MG/DL
CALCIUM SERPL-MCNC: 9.1 MG/DL
CHLORIDE SERPL-SCNC: 107 MMOL/L
CO2 SERPL-SCNC: 24 MMOL/L
CREAT SERPL-MCNC: 0.7 MG/DL
DIFFERENTIAL METHOD: ABNORMAL
EOSINOPHIL # BLD AUTO: 0.2 K/UL
EOSINOPHIL NFR BLD: 3.9 %
ERYTHROCYTE [DISTWIDTH] IN BLOOD BY AUTOMATED COUNT: 14.1 %
EST. GFR  (AFRICAN AMERICAN): >60 ML/MIN/1.73 M^2
EST. GFR  (NON AFRICAN AMERICAN): >60 ML/MIN/1.73 M^2
GLUCOSE SERPL-MCNC: 85 MG/DL
HCT VFR BLD AUTO: 33.1 %
HGB BLD-MCNC: 11.2 G/DL
LYMPHOCYTES # BLD AUTO: 1.1 K/UL
LYMPHOCYTES NFR BLD: 24.8 %
MCH RBC QN AUTO: 30.5 PG
MCHC RBC AUTO-ENTMCNC: 33.8 G/DL
MCV RBC AUTO: 90 FL
MONOCYTES # BLD AUTO: 0.3 K/UL
MONOCYTES NFR BLD: 6.9 %
NEUTROPHILS # BLD AUTO: 3 K/UL
NEUTROPHILS NFR BLD: 63.9 %
PLATELET # BLD AUTO: 267 K/UL
PMV BLD AUTO: 8 FL
POTASSIUM SERPL-SCNC: 3.9 MMOL/L
PROT SERPL-MCNC: 6.7 G/DL
RBC # BLD AUTO: 3.66 M/UL
SODIUM SERPL-SCNC: 139 MMOL/L
WBC # BLD AUTO: 4.6 K/UL

## 2017-10-26 PROCEDURE — 25000003 PHARM REV CODE 250: Performed by: PHYSICAL MEDICINE & REHABILITATION

## 2017-10-26 PROCEDURE — 85025 COMPLETE CBC W/AUTO DIFF WBC: CPT

## 2017-10-26 PROCEDURE — 25000003 PHARM REV CODE 250: Performed by: INTERNAL MEDICINE

## 2017-10-26 PROCEDURE — 97530 THERAPEUTIC ACTIVITIES: CPT

## 2017-10-26 PROCEDURE — 80053 COMPREHEN METABOLIC PANEL: CPT

## 2017-10-26 PROCEDURE — 12800000 HC REHAB SEMI-PRIVATE ROOM

## 2017-10-26 PROCEDURE — 36415 COLL VENOUS BLD VENIPUNCTURE: CPT

## 2017-10-26 PROCEDURE — 97116 GAIT TRAINING THERAPY: CPT

## 2017-10-26 PROCEDURE — 97110 THERAPEUTIC EXERCISES: CPT

## 2017-10-26 RX ORDER — TRAMADOL HYDROCHLORIDE 50 MG/1
50 TABLET ORAL EVERY 6 HOURS PRN
Status: DISCONTINUED | OUTPATIENT
Start: 2017-10-26 | End: 2017-11-16 | Stop reason: HOSPADM

## 2017-10-26 RX ORDER — ACETAMINOPHEN 325 MG/1
650 TABLET ORAL EVERY 6 HOURS PRN
Status: DISCONTINUED | OUTPATIENT
Start: 2017-10-26 | End: 2017-11-16

## 2017-10-26 RX ORDER — ACETAMINOPHEN 325 MG/1
650 TABLET ORAL EVERY 8 HOURS PRN
Status: DISCONTINUED | OUTPATIENT
Start: 2017-10-26 | End: 2017-10-26

## 2017-10-26 RX ORDER — SYRING-NEEDL,DISP,INSUL,0.3 ML 29 G X1/2"
296 SYRINGE, EMPTY DISPOSABLE MISCELLANEOUS ONCE
Status: COMPLETED | OUTPATIENT
Start: 2017-10-26 | End: 2017-10-26

## 2017-10-26 RX ADMIN — DOCUSATE SODIUM 100 MG: 100 CAPSULE, LIQUID FILLED ORAL at 08:10

## 2017-10-26 RX ADMIN — MAGNESIUM CITRATE 296 ML: 1.75 LIQUID ORAL at 09:10

## 2017-10-26 RX ADMIN — TRAMADOL HYDROCHLORIDE 50 MG: 50 TABLET, FILM COATED ORAL at 10:10

## 2017-10-26 RX ADMIN — Medication 1 TABLET: at 08:10

## 2017-10-26 NOTE — PLAN OF CARE
Problem: Mobility, Physical Impaired (Adult)  Goal: Identify Related Risk Factors and Signs and Symptoms  Related risk factors and signs and symptoms are identified upon initiation of Human Response Clinical Practice Guideline (CPG)   Outcome: Ongoing (interventions implemented as appropriate)  Pt min assist with transfers. Call light in reach.

## 2017-10-26 NOTE — PT/OT/SLP PROGRESS
Occupational Therapy  Treatment    Zaira Jensen   MRN: [id number]   Admitting Diagnosis: cervical myelomalacia s/p C5-C6 ACDF    OT Date of Treatment: 10/26/17   Total Time (min): 90 min      Billable Minutes:  Therapeutic Activity 45 and Therapeutic Exercise 45  Total Minutes: 90    General Precautions: Standard, fall  Braces: Cervical collar (in place throughout session)    Do you have any cultural, spiritual, Scientology conflicts, given your current situation?: None    Subjective:  Communicated with nursing prior to session.    Pain/Comfort  Pain Rating 1: 0/10    Objective:  Patient educated on use of the U-Find It for BUE movement and . She was tasked to shake container/ move items around, locate 7 different items, then in her own hand, write the name of each item as located down.  All items found with min to mod assistance from RADER.  Purdue Peg Board completed to work on improving fine motor skills in bilat hand.  Bilat pierre completed for 2 sets of 5 mins each with 4 lbs used.  Wall pulleys used to help stretch BUE for ABD / FF.    Patient left up in chair with call button in reach, chair alarm on and nursing notified    ASSESSMENT:  Zaira Jensen is a 42 y.o. female with a medical diagnosis of Purdue Peg Board completed to work on improving fine motor skills in bilat hand.     Activity tolerance: Good      GOALS:    Occupational Therapy Goals        Problem: Occupational Therapy Goal    Goal Priority Disciplines Outcome Interventions   Occupational Therapy Goal     OT, PT/OT Ongoing (interventions implemented as appropriate)    Description:  Goals to be met by: 11/16/17     Patient will increase functional independence with ADLs by performing:    Feeding with Contact Guard Assistance.  UE Dressing with Minimal Assistance.  LE Dressing with Minimal Assistance.  Grooming while seated with Minimal Assistance.  Toileting from toilet with Moderate Assistance for hygiene and clothing management.   Bathing  from most appropriate location with Minimal Assistance.  Toilet transfer to toilet with Minimal Assistance.                       Plan:  Cont POC.  Patient to be seen 6 x/week to address the above listed problems via self-care/home management, community/work re-entry, therapeutic activities, therapeutic exercises, therapeutic groups, neuromuscular re-education, sensory integration, wheelchair management/training  Plan of Care expires: 11/16/17  Plan of Care reviewed with: patient         Brian CLAYTON Allen  10/26/2017

## 2017-10-26 NOTE — PROGRESS NOTES
[facility]  Physical Medicine & Rehab  Progress Note    Patient Name: Zaira Jensen  MRN: [id number]  Patient Class: IP- Rehab   Admission Date: 10/5/2017  Length of Stay: 21 days  Attending Physician: Anirudh Rowley MD  Primary Care Provider: Cuate Alvarez MD    Subjective:     Principal Problem: quadrapresis  Interval History: pt is 42 y.o female s/o ACDF, quadriparesis, participating with therapy     Scheduled Medications:    docusate sodium  100 mg Oral Daily    folic acid-vit B6-vit B12 2.5-25-2 mg  1 tablet Oral Daily       PRN Medications: acetaminophen, aluminum-magnesium hydroxide-simethicone, bisacodyl, bisacodyl, calcium carbonate, lactulose, ondansetron, ramelteon, senna-docusate 8.6-50 mg, traMADol    Review of Systems   Constitutional: Negative.    HENT: Negative.    Eyes: Negative.    Respiratory: Negative.    Cardiovascular: Negative.    Gastrointestinal: Negative.    Endocrine: Negative.    Genitourinary: Negative.    Musculoskeletal: Negative.    Skin: Negative.    Allergic/Immunologic: Negative.    Neurological: Negative.    Hematological: Negative.    Psychiatric/Behavioral: Negative.      Objective:     Vital Signs (Most Recent):  Temp: 97.2 °F (36.2 °C) (10/26/17 0831)  Pulse: 101 (10/26/17 0831)  Resp: 16 (10/26/17 0831)  BP: 130/62 (10/26/17 0831)  SpO2: 100 % (10/26/17 0831)    Vital Signs (24h Range):  Temp:  [97.2 °F (36.2 °C)-98.2 °F (36.8 °C)] 97.2 °F (36.2 °C)  Pulse:  [] 101  Resp:  [16-18] 16  SpO2:  [100 %] 100 %  BP: (106-134)/(62-86) 130/62     Physical Exam   Constitutional: She is oriented to person, place, and time. She appears well-developed and well-nourished. No distress.   HENT:   Head: Normocephalic and atraumatic.   Right Ear: External ear normal.   Left Ear: External ear normal.   Nose: Nose normal.   Mouth/Throat: Oropharynx is clear and moist. No oropharyngeal exudate.   Eyes: Conjunctivae and EOM are normal. Pupils are equal, round, and  reactive to light. Right eye exhibits no discharge. Left eye exhibits no discharge. No scleral icterus.   Neck: Normal range of motion. Neck supple. No JVD present. No tracheal deviation present. No thyromegaly present.   Cardiovascular: Normal rate, regular rhythm, normal heart sounds and intact distal pulses.  Exam reveals no gallop and no friction rub.    No murmur heard.  Pulmonary/Chest: Effort normal and breath sounds normal. No stridor. No respiratory distress. She has no wheezes. She has no rales. She exhibits no tenderness.   Abdominal: Soft. Bowel sounds are normal. She exhibits no distension. There is no tenderness. There is no guarding.   Genitourinary:   Genitourinary Comments: deferred   Musculoskeletal: Normal range of motion. She exhibits no edema, tenderness or deformity.   Neurological: She is alert and oriented to person, place, and time. She exhibits normal muscle tone.   Skin: No rash noted. She is not diaphoretic. No erythema. No pallor.   Psychiatric: She has a normal mood and affect. Her behavior is normal. Judgment and thought content normal.     NEUROLOGICAL EXAMINATION:     MENTAL STATUS   Oriented to person, place, and time.     CRANIAL NERVES     CN III, IV, VI   Pupils are equal, round, and reactive to light.  Extraocular motions are normal.       Assessment/Plan:      Zaira Jenesn is a 42 y.o. female admitted to inpatient rehabilitation on 10/5/2017 for <principal problem not specified> with impaired mobility and ADLs. Patient remains appropriate for PT, OT, and as required Speech therapy. Patient continues to require 24 hour nursing care as well as daily Physician assessment.    Active Diagnoses:    Diagnosis Date Noted POA    Obesity [E66.9] 10/13/2017 Yes    Quadriplegia, C5-C7 complete [G82.53] 10/05/2017 Yes      Problems Resolved During this Admission:    Diagnosis Date Noted Date Resolved POA   s/p ACDF , quadriparesis, cont PT, OT  Pain, initiate  Ultram   DVT prophylaxis,  cont SCD   DISCHARGE PLANNING:  Tentative Discharge Date:     No future appointments.    Anirudh Rowley MD  Department of Physical Medicine & Rehab  [facility]

## 2017-10-26 NOTE — PLAN OF CARE
Problem: Patient Care Overview  Goal: Plan of Care Review  Outcome: Ongoing (interventions implemented as appropriate)  Awake, alert and oriented in no distress. Denies pain or discomfort. Mod assist with transfers. Advancing well with poc. Safety ongoing with alarms in use.

## 2017-10-26 NOTE — PLAN OF CARE
Problem: Physical Therapy Goal  Goal: Physical Therapy Goal  Goals to be met by: 2017     Patient will increase functional independence with mobility by performin). Supine to sit with Stand-by Assistance  2). Sit to supine with Stand-by Assistance  3). Rolling to Left and Right with Stand-by Assistance.  4). Sit to stand transfer with Minimal Assistance = MET  5). Gait  x  > 25 feet with Contact Guard Assistance using Rolling Walker = MET  6). Wheelchair propulsion x > 150 feet with Stand-by Assistance      NEW 10/18/2017:    7). Gait  x  > 25 feet with Stand-by Assistance using Rolling Walker or Rollator    NEW 10/23/2017:  8). Sit to stand transfer with Contact Guard Assistance     Outcome: Ongoing (interventions implemented as appropriate)  Pt is progressing well toward goals.

## 2017-10-26 NOTE — PT/OT/SLP PROGRESS
Physical Therapy         Treatment        Zaira Jensen   MRN: [id number]     PT Received On: 10/26/17  Total Time (min): 53        Billable Minutes:  Gait Training 38 and Therapeutic Exercise 15  Total Minutes: 53    Treatment Type: Treatment  PT/PTA: PT     PTA Visit Number: 0       General Precautions: Standard, fall  Orthopedic Precautions: Orthopedic Precautions : N/A   Braces: Braces: Cervical collar (in place throughout session)    Objective:  Patient found seated in w/c, pleasant and cooperative. Patient found with: cervical collar    Functional Mobility:    Transfer Training:  Sit to stand:Stand-by Assistance with 4 wheeled walker  x 2    Gait Trainin' x 1,  460' x 1, with 4ww with SBA and cues    Additional Treatment:  SciFit StepOne: L 1.0 x 15 min, LEs only    Activity Tolerance:  Patient tolerated treatment well    Patient left up in chair with call button in reach and cousin present.    Assessment:  Zaira Jensen is a 42 y.o. female    Rehab potential is good.    Activity tolerance: Good    Discharge recommendations: [place] vs NH    Equipment recommendations: Equipment Needed After Discharge:  (TBD)     GOALS:    Physical Therapy Goals        Problem: Physical Therapy Goal    Goal Priority Disciplines Outcome Goal Variances Interventions   Physical Therapy Goal     PT/OT, PT Ongoing (interventions implemented as appropriate)     Description:  Goals to be met by: 2017     Patient will increase functional independence with mobility by performin). Supine to sit with Stand-by Assistance  2). Sit to supine with Stand-by Assistance  3). Rolling to Left and Right with Stand-by Assistance.  4). Sit to stand transfer with Minimal Assistance = MET  5). Gait  x  > 25 feet with Contact Guard Assistance using Rolling Walker = MET  6). Wheelchair propulsion x > 150 feet with Stand-by Assistance      NEW 10/18/2017:    7). Gait  x  > 25 feet with Stand-by Assistance using Rolling Walker or Rollator     NEW 10/23/2017:  8). Sit to stand transfer with Contact Guard Assistance             Problem: Physical Therapy Goal    Goal Priority Disciplines Outcome Goal Variances Interventions   Physical Therapy Goal     PT/OT, PT Ongoing (interventions implemented as appropriate)                     PLAN:    Patient to be seen daily  to address the above listed problems via gait training, therapeutic activities, therapeutic exercises, neuromuscular re-education, wheelchair management/training  Plan of Care expires: 11/16/17  Plan of Care reviewed with: patient         Richie VANESSA Lemus, PT 10/26/2017

## 2017-10-26 NOTE — PT/OT/SLP PROGRESS
Physical Therapy         Treatment        Zaira Jensen   MRN: [id number]     PT Received On: 10/26/17  Total Time (min): 41        Billable Minutes:  Therapeutic Activity 15 and Therapeutic Exercise 26  Total Minutes: 41    Treatment Type: Treatment  PT/PTA: PT        General Precautions: Standard, fall  Orthopedic Precautions: Orthopedic Precautions : N/A   Braces: Braces: Cervical collar    Subjective:  Communicated with RN prior to session.    Pain/Comfort  Pain Rating 1: 4/10  Location - Side 1: Left  Location - Orientation 1: generalized  Location 1: shoulder  Pain Addressed 1: Reposition, Distraction, Cessation of Activity  Pain Rating Post-Intervention 1: 2/10    Objective:  Patient found sitting up in chair, with cervical collar. Pt reported she just received laxative and preferred to stay in room near bathroom for treatment. Pt agreeable to break up session with in room exercises this morning and gait later in the day.     Functional Mobility:  Bed Mobility:  Not performed    Balance:   Static Sit: FAIR+: Able to take MINIMAL challenges from all directions  Dynamic Sit:  FAIR+: Maintains balance through MINIMAL excursions of active trunk motion  Static Stand: FAIR+: Takes MINIMAL challenges from all directions  Dynamic stand: FAIR: Needs CONTACT GUARD during gait    Transfer Training:  Sit to stand:Contact Guard Assistance with use of bedrail for support. Pt cued to scoot to edge of chair and to push on w/c armrest through LUE.   Performed x 3 from chair.     Wheelchair Training:  Not performed    Gait Training:  Patient gait trained 4 steps forward and back x 2 from chair to bed on level tile with bed rail for support with Contact Guard Assistance.  Pt with demonstarting a  2-point gait with increased time in double stance, decreased velocity of limb motion and decreased weight-shifting ability.Impairments contributing to gait deviations include impaired balance, impaired coordination and impaired postural  control    Additional Treatment:  Pt performed standing strengthening and balance exercises including:  - Marching in place x 20 each LE with BUE support  - Hip abduction x 20 each LE with BUE support, cues for increased excursion on LLE  - Heel raises with 1 UE support x  20  - Scapular retraction with no UE support 10 x 2  - Punches with trunk rotation and no UE support 10 x 2    Contact guard assist provided for all exercises. Pt required a seated rest break 2/2 shoulder pain and fatigue.   Soft tissue massage provided to pt's L upper trap during her seated rest break to facilitate relaxation  Pt educated on postural awareness and control including relaxation of upper trap through breathing exercises, scapular retraction and decreasing forward  head    Activity Tolerance:  Patient tolerated treatment well and Patient limited by fatigue    Patient left up in chair with all lines intact and call button in reach.    Assessment:  Zaira Jensen is a 42 y.o. female with a medical diagnosis of ACDF. She presents with improving static standing balance and activity tolerance. She continues to have limited safety and independence with functional mobility and would benefit from continued PT.    Rehab potential is good.    Activity tolerance: Fair    Discharge recommendations: Discharge Facility/Level Of Care Needs: home     Equipment recommendations: Equipment Needed After Discharge:  (TBD)     GOALS:    Physical Therapy Goals        Problem: Physical Therapy Goal    Goal Priority Disciplines Outcome Goal Variances Interventions   Physical Therapy Goal     PT/OT, PT Ongoing (interventions implemented as appropriate)     Description:  Goals to be met by: 2017     Patient will increase functional independence with mobility by performin). Supine to sit with Stand-by Assistance  2). Sit to supine with Stand-by Assistance  3). Rolling to Left and Right with Stand-by Assistance.  4). Sit to stand transfer with  Minimal Assistance = MET  5). Gait  x  > 25 feet with Contact Guard Assistance using Rolling Walker = MET  6). Wheelchair propulsion x > 150 feet with Stand-by Assistance      NEW 10/18/2017:    7). Gait  x  > 25 feet with Stand-by Assistance using Rolling Walker or Rollator    NEW 10/23/2017:  8). Sit to stand transfer with Contact Guard Assistance             Problem: Physical Therapy Goal    Goal Priority Disciplines Outcome Goal Variances Interventions   Physical Therapy Goal     PT/OT, PT Ongoing (interventions implemented as appropriate)                     PLAN:    Patient to be seen daily  to address the above listed problems via therapeutic exercises, therapeutic activities, gait training, neuromuscular re-education, wheelchair management/training  Plan of Care expires: 11/16/17  Plan of Care reviewed with: patient    Jessica LeJeune, PT, DPT

## 2017-10-27 PROCEDURE — 25000003 PHARM REV CODE 250: Performed by: PHYSICAL MEDICINE & REHABILITATION

## 2017-10-27 PROCEDURE — 97110 THERAPEUTIC EXERCISES: CPT

## 2017-10-27 PROCEDURE — 12800000 HC REHAB SEMI-PRIVATE ROOM

## 2017-10-27 PROCEDURE — 97116 GAIT TRAINING THERAPY: CPT

## 2017-10-27 PROCEDURE — 97530 THERAPEUTIC ACTIVITIES: CPT

## 2017-10-27 PROCEDURE — 25000003 PHARM REV CODE 250: Performed by: INTERNAL MEDICINE

## 2017-10-27 RX ADMIN — TRAMADOL HYDROCHLORIDE 50 MG: 50 TABLET, FILM COATED ORAL at 08:10

## 2017-10-27 RX ADMIN — Medication 1 TABLET: at 08:10

## 2017-10-27 NOTE — PT/OT/SLP PROGRESS
Occupational Therapy  Treatment    Zaira Jensen   MRN: [id number]   Admitting Diagnosis: <principal problem not specified>    OT Date of Treatment: 10/27/17   Total Time (min): 90 min      Billable Minutes:  Therapeutic Exercise 90  Total Minutes: 90    General Precautions: Standard, fall  Orthopedic Precautions: N/A  Braces: N/A    Do you have any cultural, spiritual, Christian conflicts, given your current situation?: None    Subjective:  Communicated with nurse prior to session.    Pain/Comfort  Pain Rating 1:  (expressed pain during activity; did not quanitfy)  Location - Side 1: Left  Location - Orientation 1: generalized  Location 1: shoulder  Pain Addressed 1: Distraction, Cessation of Activity      Additional Treatment:  Pt performed the following while seated in w/c:  Bilateral resistive fine motor pinch and release activities in all planes.  Bilateral gross motor activities using free weights to perform elbow flexion exercises - 3 sets of 5x with 3lbs and 4 sets of 5x with 5lbs.  Bilateral shoulder flexion exercises with emphasis on sustained shoulder flexion.        Patient left in her room seated in w/c with family member present    ASSESSMENT:  Zaira Jensen is a 42 y.o. female with a medical diagnosis of quadriparesis  and presents with limited bilateral ROM in UE and limited activity tolerance during fine/gross motor activities. Pt demonstrated limited sustained shoulder flexion, requiring frequent breaks. Weakness and limited ROM continues to impair pt's ability to perform bathing, dressing, and grooming.    Rehab potential is good    Activity tolerance: Poor    Discharge recommendations: home     Equipment recommendations:  (TBD)     GOALS:    Occupational Therapy Goals        Problem: Occupational Therapy Goal    Goal Priority Disciplines Outcome Interventions   Occupational Therapy Goal     OT, PT/OT Ongoing (interventions implemented as appropriate)    Description:  Goals to be met by: 11/16/17      Patient will increase functional independence with ADLs by performing:    Feeding with Contact Guard Assistance.  UE Dressing with Minimal Assistance.  LE Dressing with Minimal Assistance.  Grooming while seated with Minimal Assistance.  Toileting from toilet with Moderate Assistance for hygiene and clothing management.   Bathing from most appropriate location with Minimal Assistance.  Toilet transfer to toilet with Minimal Assistance.                       Plan:  Patient to be seen 6 x/week to address the above listed problems via self-care/home management, community/work re-entry, therapeutic activities, therapeutic exercises, therapeutic groups, neuromuscular re-education, sensory integration, wheelchair management/training  Plan of Care expires: 11/16/17  Plan of Care reviewed with: patient         Jaydon MELENDREZ Neo, OT  10/27/2017

## 2017-10-27 NOTE — PLAN OF CARE
Problem: Physical Therapy Goal  Goal: Physical Therapy Goal  Goals to be met by: 2017     Patient will increase functional independence with mobility by performin). Supine to sit with Stand-by Assistance  2). Sit to supine with Stand-by Assistance  3). Rolling to Left and Right with Stand-by Assistance.  4). Sit to stand transfer with Minimal Assistance = MET  5). Gait  x  > 25 feet with Contact Guard Assistance using Rolling Walker = MET  6). Wheelchair propulsion x > 150 feet with Stand-by Assistance      NEW 10/18/2017:    7). Gait  x  > 25 feet with Stand-by Assistance using Rolling Walker or Rollator    NEW 10/23/2017:  8). Sit to stand transfer with Contact Guard Assistance     Outcome: Ongoing (interventions implemented as appropriate)  PT for gait training, therapeutic activity and therex

## 2017-10-27 NOTE — PLAN OF CARE
Problem: Patient Care Overview  Goal: Plan of Care Review  Outcome: Ongoing (interventions implemented as appropriate)  Awake, alert and oriented in no distress. Denies pain or discomfort. Advancing with poc. Free of falls this shift.

## 2017-10-27 NOTE — PT/OT/SLP PROGRESS
Physical Therapy         Treatment        Zaira Jensen   MRN: [id number]     PT Received On: 10/27/17  Total Time (min): 90        Billable Minutes:  Gait [id number], Therapeutic Activity 25, Therapeutic Exercise 30  Total Minutes: 90    Treatment Type: Treatment  PT/PTA: PTA     PTA Visit Number: 1       General Precautions: Standard, fall  Orthopedic Precautions: Orthopedic Precautions : N/A   Braces: Braces: Cervical collar         Subjective:  Communicated with nursing prior to session.  Pt agreeable to session.  Pain/Comfort  Pain Rating 1:  (did not rate)  Location - Side 1: Left  Location 1: shoulder (to neck)  Pain Addressed 1: Pre-medicate for activity, Reposition, Distraction, Cessation of Activity (STM to L trap region)    Objective:  Patient found seated in w/c, with Patient found with: cervical collar    Functional Mobility:  Bed Mobility: did not occur    Balance:   Static Stand: FAIR: Maintains without assist but unable to take challenges  Dynamic stand: FAIR: Needs CONTACT GUARD during gait    Transfer Training:  Sit to stand:Minimal Assistance with 4 wheeled walker x multiple trials throughout session, increased time to complete and initiate    Gait Trainin' x 2 with CGA using rollator. Slow pace with brief standing breaks for energy conservation and 2' reported L shoulder pain.    Additional Treatment:  At beginning of session, pt required A to eleuterio socks and shoes while seated in w/c    Seated B ankle df with red tb 3 x 10 reps each (slow pace)    Standing scap retractions with CGA w/out UE support 3 x 10 reps each, slow pace with rest breaks required  Standing calf raises and marching with UE support on rollator 3 x 10 reps each with brief rest break  STM to L upper trap region for relaxation purposes, pt able to report pain relief. Pt instructed and edu on scapular/trap relaxation methods.     SciFit using LE's only: lv 1 x 10'    Activity Tolerance:  Patient tolerated treatment well,  Patient limited by fatigue and Patient limited by pain    Patient left up in chair with all lines intact, call button in reach and nursing notified.    Assessment:  Zaira Jensen is a 42 y.o. female with a medical diagnosis of <principal problem not specified>. She presents with pain, weakness, limited ROM and impaired postural control limiting functional mobility and activity tolerance. Pt requires increased time and rest breaks to complete session.     Rehab potential is fair.    Activity tolerance: Fair        GOALS:    Physical Therapy Goals        Problem: Physical Therapy Goal    Goal Priority Disciplines Outcome Goal Variances Interventions   Physical Therapy Goal     PT/OT, PT Ongoing (interventions implemented as appropriate)     Description:  Goals to be met by: 2017     Patient will increase functional independence with mobility by performin). Supine to sit with Stand-by Assistance  2). Sit to supine with Stand-by Assistance  3). Rolling to Left and Right with Stand-by Assistance.  4). Sit to stand transfer with Minimal Assistance = MET  5). Gait  x  > 25 feet with Contact Guard Assistance using Rolling Walker = MET  6). Wheelchair propulsion x > 150 feet with Stand-by Assistance      NEW 10/18/2017:    7). Gait  x  > 25 feet with Stand-by Assistance using Rolling Walker or Rollator    NEW 10/23/2017:  8). Sit to stand transfer with Contact Guard Assistance             Problem: Physical Therapy Goal    Goal Priority Disciplines Outcome Goal Variances Interventions   Physical Therapy Goal     PT/OT, PT Ongoing (interventions implemented as appropriate)                     PLAN:    Patient to be seen daily  to address the above listed problems via gait training, therapeutic activities, therapeutic exercises, neuromuscular re-education, wheelchair management/training  Plan of Care expires: 17  Plan of Care reviewed with: patient         Briseida Murillo, PTA 10/27/2017

## 2017-10-27 NOTE — PLAN OF CARE
Problem: Occupational Therapy Goal  Goal: Occupational Therapy Goal  Goals to be met by: 11/16/17     Patient will increase functional independence with ADLs by performing:    Feeding with Contact Guard Assistance.  UE Dressing with Minimal Assistance.  LE Dressing with Minimal Assistance.  Grooming while seated with Minimal Assistance.  Toileting from toilet with Moderate Assistance for hygiene and clothing management.   Bathing from most appropriate location with Minimal Assistance.  Toilet transfer to toilet with Minimal Assistance.      Outcome: Ongoing (interventions implemented as appropriate)  Pt continue to progress towards OT goals.  Jaydon Larson, OT

## 2017-10-28 PROCEDURE — 25000003 PHARM REV CODE 250: Performed by: INTERNAL MEDICINE

## 2017-10-28 PROCEDURE — 97116 GAIT TRAINING THERAPY: CPT

## 2017-10-28 PROCEDURE — 97110 THERAPEUTIC EXERCISES: CPT

## 2017-10-28 PROCEDURE — 12800000 HC REHAB SEMI-PRIVATE ROOM

## 2017-10-28 RX ADMIN — Medication 1 TABLET: at 08:10

## 2017-10-28 RX ADMIN — DOCUSATE SODIUM 100 MG: 100 CAPSULE, LIQUID FILLED ORAL at 08:10

## 2017-10-28 NOTE — PT/OT/SLP PROGRESS
Physical Therapy         Treatment        Zaira Jensen   MRN: [id number]     PT Received On: 10/28/17           Billable Minutes:  Gait Training 15 and Therapeutic Exercise 15  Total Minutes: 30    Treatment Type: Treatment  PT/PTA: PTA     PTA Visit Number: 2       General Precautions: Standard, fall  Orthopedic Precautions: Orthopedic Precautions : N/A   Braces:           Subjective:  Communicated with nurse prior to session.    Pain/Comfort  Pain Rating 1: 3/10  Location - Side 1: Left  Location - Orientation 1: generalized  Location 1: shoulder    Objective:  Patient found sitting at bedside in WC, with Patient found with: cervical collar    Gait Training:  Patient gait trained FWB/WBAT: bilateral lower extremity 300  feet on level tile with Rollator with Stand-by Assistance.  Pt with demonstarting a  swing-through gait with decreased orquidea, decreased step length and decreased stride length.Impairments contributing to gait deviations include impaired balance, impaired postural control and decreased strength    Additional Treatment:  SciFit Lv1 15'    Activity Tolerance:  Patient limited by fatigue    Patient left up in chair with call button in reach, chair alarm on and nurse notified.    Assessment:  Zaira Jensen is a 42 y.o. female with a medical diagnosis of <principal problem not specified>. She presents with self correction with forward lean and shortened step length with ambulation.    Rehab potential is .    Activity tolerance: Good    Discharge recommendations:       Equipment recommendations:       GOALS:    Physical Therapy Goals        Problem: Physical Therapy Goal    Goal Priority Disciplines Outcome Goal Variances Interventions   Physical Therapy Goal     PT/OT, PT Ongoing (interventions implemented as appropriate)     Description:  Goals to be met by: 2017     Patient will increase functional independence with mobility by performin). Supine to sit with Stand-by Assistance  2).  Sit to supine with Stand-by Assistance  3). Rolling to Left and Right with Stand-by Assistance.  4). Sit to stand transfer with Minimal Assistance = MET  5). Gait  x  > 25 feet with Contact Guard Assistance using Rolling Walker = MET  6). Wheelchair propulsion x > 150 feet with Stand-by Assistance      NEW 10/18/2017:    7). Gait  x  > 25 feet with Stand-by Assistance using Rolling Walker or Rollator    NEW 10/23/2017:  8). Sit to stand transfer with Contact Guard Assistance             Problem: Physical Therapy Goal    Goal Priority Disciplines Outcome Goal Variances Interventions   Physical Therapy Goal     PT/OT, PT Ongoing (interventions implemented as appropriate)                     PLAN:    Patient to be seen daily  to address the above listed problems via gait training, therapeutic activities, therapeutic exercises, neuromuscular re-education, wheelchair management/training  Plan of Care expires: 11/16/17  Plan of Care reviewed with: patient         Mary Hurtado, PTA 10/28/2017

## 2017-10-28 NOTE — PLAN OF CARE
Problem: Physical Therapy Goal  Goal: Physical Therapy Goal  Goals to be met by: 2017     Patient will increase functional independence with mobility by performin). Supine to sit with Stand-by Assistance  2). Sit to supine with Stand-by Assistance  3). Rolling to Left and Right with Stand-by Assistance.  4). Sit to stand transfer with Minimal Assistance = MET  5). Gait  x  > 25 feet with Contact Guard Assistance using Rolling Walker = MET  6). Wheelchair propulsion x > 150 feet with Stand-by Assistance      NEW 10/18/2017:    7). Gait  x  > 25 feet with Stand-by Assistance using Rolling Walker or Rollator    NEW 10/23/2017:  8). Sit to stand transfer with Contact Guard Assistance     Rollator '

## 2017-10-28 NOTE — PROGRESS NOTES
[facility]  Physical Medicine & Rehab  Progress Note    Patient Name: Zaira Jensen  MRN: [id number]  Patient Class: IP- Rehab   Admission Date: 10/5/2017  Length of Stay: 22 days  Attending Physician: Anirudh Rowley MD  Primary Care Provider: Cuate Alvarez MD    Subjective:     Principal Problem:  Quadriparesis, s/p ACDF    Interval History: pt is 42 y.o female quadriparesis, participating with therapy & making progress.    Scheduled Medications:    docusate sodium  100 mg Oral Daily    folic acid-vit B6-vit B12 2.5-25-2 mg  1 tablet Oral Daily       PRN Medications: acetaminophen, aluminum-magnesium hydroxide-simethicone, bisacodyl, bisacodyl, calcium carbonate, lactulose, loratadine-pseudoephedrine 5-120 mg, ondansetron, ramelteon, senna-docusate 8.6-50 mg, traMADol    Review of Systems   Constitutional: Negative.    HENT: Negative.    Eyes: Negative.    Respiratory: Negative.    Cardiovascular: Negative.    Gastrointestinal: Negative.    Endocrine: Negative.    Genitourinary: Negative.    Musculoskeletal: Negative.    Skin: Negative.    Allergic/Immunologic: Negative.    Neurological: Negative.    Hematological: Negative.    Psychiatric/Behavioral: Negative.      Objective:     Vital Signs (Most Recent):  Temp: 98 °F (36.7 °C) (10/27/17 0509)  Pulse: 96 (10/27/17 0509)  Resp: 16 (10/27/17 0509)  BP: (!) 103/57 (10/27/17 0509)  SpO2: 97 % (10/27/17 0509)    Vital Signs (24h Range):  Temp:  [98 °F (36.7 °C)] 98 °F (36.7 °C)  Pulse:  [96] 96  Resp:  [16] 16  SpO2:  [97 %] 97 %  BP: (103)/(57) 103/57     Physical Exam   Constitutional: She is oriented to person, place, and time. She appears well-developed and well-nourished. No distress.   HENT:   Head: Normocephalic and atraumatic.   Right Ear: External ear normal.   Left Ear: External ear normal.   Nose: Nose normal.   Mouth/Throat: Oropharynx is clear and moist. No oropharyngeal exudate.   Eyes: Conjunctivae and EOM are normal. Pupils are  equal, round, and reactive to light. Right eye exhibits no discharge. Left eye exhibits no discharge. No scleral icterus.   Neck: Normal range of motion. Neck supple. No JVD present. No tracheal deviation present. No thyromegaly present.   Cardiovascular: Normal rate, regular rhythm, normal heart sounds and intact distal pulses.  Exam reveals no gallop and no friction rub.    No murmur heard.  Pulmonary/Chest: Effort normal and breath sounds normal. No stridor. No respiratory distress. She has no wheezes. She has no rales. She exhibits no tenderness.   Abdominal: Soft. Bowel sounds are normal. She exhibits no distension and no mass. There is no tenderness. There is no rebound and no guarding. No hernia.   Genitourinary:   Genitourinary Comments: deferred   Musculoskeletal: Normal range of motion. She exhibits no edema, tenderness or deformity.   Lymphadenopathy:     She has no cervical adenopathy.   Neurological: She is alert and oriented to person, place, and time. No cranial nerve deficit. She exhibits normal muscle tone.   Skin: No rash noted. She is not diaphoretic. No erythema. No pallor.   Psychiatric: She has a normal mood and affect. Her behavior is normal. Judgment and thought content normal.     NEUROLOGICAL EXAMINATION:     MENTAL STATUS   Oriented to person, place, and time.     CRANIAL NERVES     CN III, IV, VI   Pupils are equal, round, and reactive to light.  Extraocular motions are normal.       Assessment/Plan:      Zaira Jensen is a 42 y.o. female admitted to inpatient rehabilitation on 10/5/2017 for <principal problem not specified> with impaired mobility and ADLs. Patient remains appropriate for PT, OT, and as required Speech therapy. Patient continues to require 24 hour nursing care as well as daily Physician assessment.    Active Diagnoses:    Diagnosis Date Noted POA    Obesity [E66.9] 10/13/2017 Yes    Quadriplegia, C5-C7 complete [G82.53] 10/05/2017 Yes      Problems Resolved During  this Admission:    Diagnosis Date Noted Date Resolved POA   quadriparesis, cont PT, OT  Pain, managed with current meds  DVT prophylaxis, cont SCD    DISCHARGE PLANNING:  Tentative Discharge Date:     No future appointments.    Anirudh Rowley MD  Department of Physical Medicine & Rehab  [facility]

## 2017-10-28 NOTE — PLAN OF CARE
Problem: Patient Care Overview  Goal: Plan of Care Review  Outcome: Ongoing (interventions implemented as appropriate)  Bed alarm on at all times, side rails up for safety, call light is within easy reach , instructed patient to call at any time for assistance.Resp are even and unlabored, lungs sound clear, no s & s of distress noted at this time.  Will continue to monitor and observe during HS hours.Patient is able to ambulate to the bathroom during H.S. Hours using the walker and SBA, with a staff member.  Encouraged patient to continue active ROM, but also reminded patient to call with use of the call light at all times before attempting to get out of bed.   Patient stated that she understood instructions given.

## 2017-10-28 NOTE — PROGRESS NOTES
[facility]  Physical Medicine & Rehab  Progress Note    Patient Name: Zaira Jensen  MRN: [id number]  Patient Class: IP- Rehab   Admission Date: 10/5/2017  Length of Stay: 23 days  Attending Physician: Anirudh Rowley MD  Primary Care Provider: Cuate Alvarez MD    Subjective:     Principal Problem: quadriparesis     Interval History: pt is 42 y.o female  S/p ACDF, quadripareses , participating with therapy     Scheduled Medications:    docusate sodium  100 mg Oral Daily    folic acid-vit B6-vit B12 2.5-25-2 mg  1 tablet Oral Daily       PRN Medications: acetaminophen, aluminum-magnesium hydroxide-simethicone, bisacodyl, bisacodyl, calcium carbonate, lactulose, loratadine-pseudoephedrine 5-120 mg, ondansetron, ramelteon, senna-docusate 8.6-50 mg, traMADol    Review of Systems   Constitutional: Negative.    HENT: Negative.    Eyes: Negative.    Respiratory: Negative.    Cardiovascular: Negative.    Gastrointestinal: Negative.    Endocrine: Negative.    Genitourinary: Negative.    Musculoskeletal: Negative.    Skin: Negative.    Allergic/Immunologic: Negative.    Neurological: Negative.    Hematological: Negative.    Psychiatric/Behavioral: Negative.      Objective:     Vital Signs (Most Recent):  Temp: 97.9 °F (36.6 °C) (10/27/17 2055)  Pulse: 97 (10/27/17 2055)  Resp: 16 (10/27/17 2055)  BP: 127/73 (10/27/17 2055)  SpO2: 98 % (10/27/17 2055)    Vital Signs (24h Range):  Temp:  [97.9 °F (36.6 °C)] 97.9 °F (36.6 °C)  Pulse:  [97] 97  Resp:  [16] 16  SpO2:  [98 %] 98 %  BP: (127)/(73) 127/73     Physical Exam   Constitutional: She is oriented to person, place, and time. She appears well-developed and well-nourished. No distress.   HENT:   Head: Normocephalic and atraumatic.   Right Ear: External ear normal.   Left Ear: External ear normal.   Nose: Nose normal.   Mouth/Throat: Oropharynx is clear and moist. No oropharyngeal exudate.   Eyes: Conjunctivae and EOM are normal. Pupils are equal, round,  and reactive to light. Right eye exhibits no discharge. Left eye exhibits no discharge. No scleral icterus.   Neck: Normal range of motion. Neck supple. No JVD present. No tracheal deviation present. No thyromegaly present.   Cardiovascular: Normal rate, regular rhythm, normal heart sounds and intact distal pulses.  Exam reveals no gallop and no friction rub.    No murmur heard.  Pulmonary/Chest: Effort normal and breath sounds normal. No stridor. No respiratory distress. She has no wheezes. She has no rales. She exhibits no tenderness.   Abdominal: Soft. Bowel sounds are normal. She exhibits no distension and no mass. There is no tenderness. There is no rebound and no guarding. No hernia.   Genitourinary:   Genitourinary Comments: deferred   Musculoskeletal: Normal range of motion. She exhibits no edema, tenderness or deformity.   Lymphadenopathy:     She has no cervical adenopathy.   Neurological: She is alert and oriented to person, place, and time. She exhibits normal muscle tone.   Skin: No rash noted. She is not diaphoretic. No erythema. No pallor.   Psychiatric: She has a normal mood and affect. Her behavior is normal. Thought content normal.     NEUROLOGICAL EXAMINATION:     MENTAL STATUS   Oriented to person, place, and time.     CRANIAL NERVES     CN III, IV, VI   Pupils are equal, round, and reactive to light.  Extraocular motions are normal.       Assessment/Plan:      Zaira Jensen is a 42 y.o. female admitted to inpatient rehabilitation on 10/5/2017 for <principal problem not specified> with impaired mobility and ADLs. Patient remains appropriate for PT, OT, and as required Speech therapy. Patient continues to require 24 hour nursing care as well as daily Physician assessment.    Active Diagnoses:    Diagnosis Date Noted POA    Obesity [E66.9] 10/13/2017 Yes    Quadriplegia, C5-C7 complete [G82.53] 10/05/2017 Yes      Problems Resolved During this Admission:    Diagnosis Date Noted Date Resolved  POA   s/p ACDF, quadriparesis, cont PT, OT, ST   DVT prophylaxis, cont SCD     DISCHARGE PLANNING:  Tentative Discharge Date:     No future appointments.    Anirudh Rowley MD  Department of Physical Medicine & Rehab  [facility]

## 2017-10-29 PROCEDURE — 97110 THERAPEUTIC EXERCISES: CPT

## 2017-10-29 PROCEDURE — 25000003 PHARM REV CODE 250: Performed by: INTERNAL MEDICINE

## 2017-10-29 PROCEDURE — 97116 GAIT TRAINING THERAPY: CPT

## 2017-10-29 PROCEDURE — 12800000 HC REHAB SEMI-PRIVATE ROOM

## 2017-10-29 PROCEDURE — 25000003 PHARM REV CODE 250: Performed by: PHYSICAL MEDICINE & REHABILITATION

## 2017-10-29 RX ADMIN — TRAMADOL HYDROCHLORIDE 50 MG: 50 TABLET, FILM COATED ORAL at 04:10

## 2017-10-29 RX ADMIN — DOCUSATE SODIUM 100 MG: 100 CAPSULE, LIQUID FILLED ORAL at 08:10

## 2017-10-29 RX ADMIN — Medication 1 TABLET: at 08:10

## 2017-10-29 NOTE — PLAN OF CARE
Problem: Patient Care Overview  Goal: Plan of Care Review  Outcome: Ongoing (interventions implemented as appropriate)  Pt alert and oriented, pleasant, improving daily with her current plan of care, although patient has refused to wear the SCD's at night time because she said the machine mechanism is too loud and it wakes her up at night.

## 2017-10-29 NOTE — PT/OT/SLP PROGRESS
Physical Therapy         Treatment        Zaira Jensen   MRN: [id number]     PT Received On: 10/29/17  Total Time (min): 30        Billable Minutes:  Gait [id number] and Therapeutic Exercise 20  Total Minutes: 30    Treatment Type: Treatment  PT/PTA: PTA     PTA Visit Number: 3       General Precautions: Standard, fall  Orthopedic Precautions: Orthopedic Precautions : N/A   Braces:           Subjective:           Objective:  Patient found seated up in w/c in room, with Patient found with: cervical collar    Functional Mobility:  Bed Mobility:   Supine to sit: Activity did not occur   Sit to supine: Activity did not occur   Rolling: Activity did not occur   Scooting: Activity did not occur          Transfer Training:  Sit to stand:Minimal Assistance with 4 wheeled walker verbal cues    Wheelchair Training:  Pt propelled Standard wheelchair x 75' feet on Level tile with  Bilateral upper extremity and Bilateral lower extremity with Moderate Assistance.      W/c mobility really slow.  Pt has difficulty with using B UE to push w/c.    Gait Training:  Patient gait trained FWB/WBAT: bilateral upper and lower extremity 250  feet on level tile with 4 wheeled walker with Stand-by Assistance and Contact Guard Assistance.  Pt with demonstarting a  swing through with decreased orquidea, increased time in double stance, decreased velocity of limb motion, decreased step length, decreased stride length, decreased swing-to-stance ratio, decreased toe-to-floor clearance and decreased weight-shifting ability.Impairments contributing to gait deviations include impaired balance, impaired coordination, decreased flexibility, impaired motor control, pain, impaired postural control, decreased ROM and decreased strength      Additional Treatment:  scifit x 15' L-1    Activity Tolerance:  Patient tolerated treatment well    Patient left up in chair with call button in reach and chair alarm on.    Assessment:  Zaira Jensen is a 42 y.o.  female with a medical diagnosis of <principal problem not specified>.    Rehab potential is fair.    Activity tolerance: Fair    Discharge recommendations:       Equipment recommendations:       GOALS:    Physical Therapy Goals        Problem: Physical Therapy Goal    Goal Priority Disciplines Outcome Goal Variances Interventions   Physical Therapy Goal     PT/OT, PT Ongoing (interventions implemented as appropriate)     Description:  Goals to be met by: 2017     Patient will increase functional independence with mobility by performin). Supine to sit with Stand-by Assistance  2). Sit to supine with Stand-by Assistance  3). Rolling to Left and Right with Stand-by Assistance.  4). Sit to stand transfer with Minimal Assistance = MET  5). Gait  x  > 25 feet with Contact Guard Assistance using Rolling Walker = MET  6). Wheelchair propulsion x > 150 feet with Stand-by Assistance      NEW 10/18/2017:    7). Gait  x  > 25 feet with Stand-by Assistance using Rolling Walker or Rollator    NEW 10/23/2017:  8). Sit to stand transfer with Contact Guard Assistance             Problem: Physical Therapy Goal    Goal Priority Disciplines Outcome Goal Variances Interventions   Physical Therapy Goal     PT/OT, PT Ongoing (interventions implemented as appropriate)                     PLAN:    Patient to be seen daily  to address the above listed problems via gait training, therapeutic activities, therapeutic exercises, neuromuscular re-education, wheelchair management/training  Plan of Care expires: 17  Plan of Care reviewed with: patient         Mica Church, LIBAN 10/29/2017

## 2017-10-29 NOTE — PROGRESS NOTES
[facility]  Physical Medicine & Rehab  Progress Note    Patient Name: Zaira Jensen  MRN: [id number]  Patient Class: IP- Rehab   Admission Date: 10/5/2017  Length of Stay: 24 days  Attending Physician: Anirudh Rowley MD  Primary Care Provider: Cuaet Alvarez MD    Subjective:     Principal Problem:  quadriparesis   Interval History: pt is 42 y.o female s/p ACDF, quadriparesis, remain motivated & participating with therapy     Scheduled Medications:    docusate sodium  100 mg Oral Daily    folic acid-vit B6-vit B12 2.5-25-2 mg  1 tablet Oral Daily       PRN Medications: acetaminophen, aluminum-magnesium hydroxide-simethicone, bisacodyl, bisacodyl, calcium carbonate, lactulose, loratadine-pseudoephedrine 5-120 mg, ondansetron, ramelteon, senna-docusate 8.6-50 mg, traMADol    Review of Systems   Constitutional: Negative.    HENT: Negative.    Eyes: Negative.    Respiratory: Negative.    Cardiovascular: Negative.    Gastrointestinal: Negative.    Endocrine: Negative.    Genitourinary: Negative.    Musculoskeletal: Negative.    Skin: Negative.    Allergic/Immunologic: Negative.    Neurological: Negative.    Hematological: Negative.    Psychiatric/Behavioral: Negative.      Objective:     Vital Signs (Most Recent):  Temp: 97.9 °F (36.6 °C) (10/29/17 0500)  Pulse: 95 (10/29/17 0500)  Resp: 18 (10/29/17 0500)  BP: 98/62 (10/29/17 0500)  SpO2: 100 % (10/29/17 0500)    Vital Signs (24h Range):  Temp:  [97.9 °F (36.6 °C)-98.4 °F (36.9 °C)] 97.9 °F (36.6 °C)  Pulse:  [95-99] 95  Resp:  [18] 18  SpO2:  [98 %-100 %] 100 %  BP: ()/(62-73) 98/62     Physical Exam   Constitutional: She is oriented to person, place, and time. She appears well-developed and well-nourished. No distress.   HENT:   Head: Normocephalic and atraumatic.   Right Ear: External ear normal.   Left Ear: External ear normal.   Nose: Nose normal.   Mouth/Throat: Oropharynx is clear and moist. No oropharyngeal exudate.   Eyes:  Conjunctivae and EOM are normal. Pupils are equal, round, and reactive to light. Right eye exhibits no discharge. Left eye exhibits no discharge. No scleral icterus.   Neck: Normal range of motion. Neck supple. No JVD present. No tracheal deviation present. No thyromegaly present.   Cardiovascular: Normal rate, regular rhythm and normal heart sounds.  Exam reveals no gallop and no friction rub.    No murmur heard.  Pulmonary/Chest: Effort normal and breath sounds normal. No stridor. No respiratory distress. She has no wheezes. She has no rales. She exhibits no tenderness.   Abdominal: Soft. Bowel sounds are normal. She exhibits no distension and no mass. There is no tenderness. There is no rebound and no guarding. No hernia.   Genitourinary:   Genitourinary Comments: deferred   Musculoskeletal: Normal range of motion. She exhibits no edema, tenderness or deformity.   Lymphadenopathy:     She has no cervical adenopathy.   Neurological: She is alert and oriented to person, place, and time. She displays normal reflexes. No cranial nerve deficit or sensory deficit. She exhibits normal muscle tone. Coordination normal.   Skin: No rash noted. She is not diaphoretic. No erythema. No pallor.   Psychiatric: She has a normal mood and affect. Her behavior is normal. Thought content normal.     NEUROLOGICAL EXAMINATION:     MENTAL STATUS   Oriented to person, place, and time.     CRANIAL NERVES     CN III, IV, VI   Pupils are equal, round, and reactive to light.  Extraocular motions are normal.       Assessment/Plan:      Zaira Jensen is a 42 y.o. female admitted to inpatient rehabilitation on 10/5/2017 for <principal problem not specified> with impaired mobility and ADLs. Patient remains appropriate for PT, OT, and as required Speech therapy. Patient continues to require 24 hour nursing care as well as daily Physician assessment.    Active Diagnoses:    Diagnosis Date Noted POA    Obesity [E66.9] 10/13/2017 Yes     Quadriplegia, C5-C7 complete [G82.53] 10/05/2017 Yes      Problems Resolved During this Admission:    Diagnosis Date Noted Date Resolved POA     Quadriparesis, cont PT, OT  Pain, managed with current emeds  DVT prophylaxis, cont SCD     DISCHARGE PLANNING:  Tentative Discharge Date:     No future appointments.    Anirudh Rowley MD  Department of Physical Medicine & Rehab  [facility]

## 2017-10-30 PROCEDURE — 25000003 PHARM REV CODE 250: Performed by: INTERNAL MEDICINE

## 2017-10-30 PROCEDURE — 97116 GAIT TRAINING THERAPY: CPT

## 2017-10-30 PROCEDURE — 12800000 HC REHAB SEMI-PRIVATE ROOM

## 2017-10-30 PROCEDURE — 97110 THERAPEUTIC EXERCISES: CPT

## 2017-10-30 PROCEDURE — 97530 THERAPEUTIC ACTIVITIES: CPT

## 2017-10-30 RX ADMIN — DOCUSATE SODIUM 100 MG: 100 CAPSULE, LIQUID FILLED ORAL at 10:10

## 2017-10-30 RX ADMIN — Medication 1 TABLET: at 10:10

## 2017-10-30 NOTE — PT/OT/SLP PROGRESS
Physical Therapy         Treatment        Zaira Jensen   MRN: [id number]     PT Received On: 10/30/17  Total Time (min): 30        Billable Minutes:  Gait Training 20 and Therapeutic Exercise 10  Total Minutes: 30    Treatment Type: Treatment  PT/PTA: PTA     PTA Visit Number: 0       General Precautions: Standard, fall  Orthopedic Precautions: Orthopedic Precautions : N/A   Braces: Braces: Cervical collar         Subjective:  Communicated with nurse prior to session.         Objective:  Patient found at bedside in WC    Transfer Training:  Sit to stand:Stand-by Assistance with 4 wheeled walker x2    Gait Training:  Patient gait trained FWB/WBAT: bilateral lower extremity 400  feet on level tile with Rollator with Stand-by Assistance.  Pt with demonstarting a  swing-through gait with decreased step length, decreased stride length, decreased swing-to-stance ratio, decreased toe-to-floor clearance and decreased weight-shifting ability.Impairments contributing to gait deviations include impaired balance, impaired motor control, impaired postural control, impaired sensory feedback and decreased strength    Additional Treatment:  Sit ex 3x10: Heel Raises, Toe Raises, long arc quad, march in sit    Activity Tolerance:  Patient tolerated treatment well    Patient left up in chair with call button in reach, chair alarm on and nurse notified.    Assessment:  Zaira Jensen is a 42 y.o. female with a medical diagnosis of <principal problem not specified>. She presents with decreased assist for sit>stand.    Rehab potential is .    Activity tolerance: Good    Discharge recommendations:       Equipment recommendations:       GOALS:    Physical Therapy Goals        Problem: Physical Therapy Goal    Goal Priority Disciplines Outcome Goal Variances Interventions   Physical Therapy Goal     PT/OT, PT Ongoing (interventions implemented as appropriate)     Description:  Goals to be met by: 11/18/2017     Patient will increase  functional independence with mobility by performin). Supine to sit with Stand-by Assistance  2). Sit to supine with Stand-by Assistance  3). Rolling to Left and Right with Stand-by Assistance.  4). Sit to stand transfer with Minimal Assistance = MET  5). Gait  x  > 25 feet with Contact Guard Assistance using Rolling Walker = MET  6). Wheelchair propulsion x > 150 feet with Stand-by Assistance      NEW 10/18/2017:    7). Gait  x  > 25 feet with Stand-by Assistance using Rolling Walker or Rollator    NEW 10/23/2017:  8). Sit to stand transfer with Contact Guard Assistance             Problem: Physical Therapy Goal    Goal Priority Disciplines Outcome Goal Variances Interventions   Physical Therapy Goal     PT/OT, PT Ongoing (interventions implemented as appropriate)                     PLAN:    Patient to be seen daily  to address the above listed problems via gait training, therapeutic activities, therapeutic exercises, neuromuscular re-education, wheelchair management/training  Plan of Care expires: 17  Plan of Care reviewed with: patient         Mary L Bryant, PTA 10/30/2017

## 2017-10-30 NOTE — PT/OT/SLP PROGRESS
"Physical Therapy         Treatment        Zaira Jensen   MRN: [id number]     PT Received On: 10/30/17  Total Time (min): 60        Billable Minutes:  Gait Training 20, Therapeutic Activity 15, Therapeutic Exercise 25  Total Minutes: 60    Treatment Type: Treatment  PT/PTA: PT     PTA Visit Number: 0       General Precautions: Standard, fall  Orthopedic Precautions: Orthopedic Precautions : N/A   Braces: Braces: Cervical collar         Subjective:  Communicated with patient prior to session.    Pain/Comfort  Pain Rating 1: 1/10  Location - Side 1: Left  Location 1: shoulder  Pain Addressed 1: Reposition, Distraction, Cessation of Activity  Pain Rating Post-Intervention 1: 1/10    Objective:  Patient found in w/cPatient found with: cervical collar    Functional Mobility:  Bed Mobility: with cueing   Supine to sit: Standby Assistance   Sit to supine: Contact Guard Assistance   Rolling: Contact Guard Assistance   Scooting: Contact Guard Assistance    Transfer Training:  W/c <> Stand:  Minimal Assistance and Rolling Walker x4  Bed > Stand:  Stand-by Assistance with No Assistive Device     Wheelchair Training:  Pt propelled Standard wheelchair x 38 feet on Level tile with Bilateral lower extremity and encouraged to use BUE with Stand-by Assistance and Minimal Assistance. Slow Kae    Gait Training:  Ambulated 360 ft; 160 ft using Rollator and SBA; decreased foot clearance bilaterally, slight R knee extension thrust in Terminal Stance limiting LLE advancement.    Stair Training:   Pt ascended/descend 12 (4") stairs with bilateral handrails with Contact Guard Assistance.       Additional Treatment:  STANDING in // bars: 2x20 hip flexion marches (verbal cueing - focusing on slow eccentric lowering with accentuated DF to position foot for heel strike), hip abd/add, mini squats, heel raises, 40x step ups on 6" step    Reassessed function and LE strength.    Activity Tolerance:  Patient tolerated treatment well    Patient " left up in chair with call button in reach and chair alarm on.    Assessment:  Zaira Jensen is a 42 y.o. female with a medical diagnosis of cervical myelomalacia s/p C5-C6 ACDF. She presents with decreased pain today and increased orquidea during ambulation.  Pt also requried less assistance (but still extra time) to perform bed mobility. Pt was also able to perform normal timed stand from low bed height SBA without AD, a notable improvement. Pt most limited in using BUE to propel wheelchair. Patient remains appropriate for PT POC.     Rehab potential is fair.    Activity tolerance: Good    Discharge recommendations: Discharge Facility/Level Of Care Needs:  (Today, pt's cousin inquired about Assisted Living Facilities;  met with pt's cousin during pt's PT session.)     Equipment recommendations: Equipment Needed After Discharge:  (TBD)     GOALS:    Physical Therapy Goals        Problem: Physical Therapy Goal    Goal Priority Disciplines Outcome Goal Variances Interventions   Physical Therapy Goal     PT/OT, PT Ongoing (interventions implemented as appropriate)     Description:  Goals to be met by: 2017     Patient will increase functional independence with mobility by performin). Supine to sit with Stand-by Assistance  2). Sit to supine with Stand-by Assistance  3). Rolling to Left and Right with Stand-by Assistance.  4). Sit to stand transfer with Minimal Assistance = MET  5). Gait  x  > 25 feet with Contact Guard Assistance using Rolling Walker = MET  6). Wheelchair propulsion x > 150 feet with Stand-by Assistance      NEW 10/18/2017:    7). Gait  x  > 25 feet with Stand-by Assistance using Rolling Walker or Rollator    NEW 10/23/2017:  8). Sit to stand transfer with Contact Guard Assistance             Problem: Physical Therapy Goal    Goal Priority Disciplines Outcome Goal Variances Interventions   Physical Therapy Goal     PT/OT, PT Ongoing (interventions implemented as appropriate)                      PLAN:    Patient to be seen daily  to address the above listed problems via gait training, therapeutic activities, therapeutic exercises, neuromuscular re-education, wheelchair management/training  Plan of Care expires: 11/16/17  Plan of Care reviewed with: patient         Richie MENDEZ Allan, PT 10/30/2017

## 2017-10-30 NOTE — PLAN OF CARE
Problem: Patient Care Overview  Goal: Plan of Care Review  Outcome: Ongoing (interventions implemented as appropriate)  Alert and oriented, denies pain, anterior cervical incision healing CLAYTON scabbing along incision, continent of B&B

## 2017-10-30 NOTE — PROGRESS NOTES
[facility]  Physical Medicine & Rehab  Progress Note    Patient Name: Zaira Jensen  MRN: [id number]  Patient Class: IP- Rehab   Admission Date: 10/5/2017  Length of Stay: 25 days  Attending Physician: Anirudh Rowley MD  Primary Care Provider: Cuate Alvarez MD    Subjective:     Principal Problem: quadriparesis    Interval History: pt is 42 y.o female s/p ACDF , quadriparesis , participating with therapy & making progress      Scheduled Medications:    docusate sodium  100 mg Oral Daily    folic acid-vit B6-vit B12 2.5-25-2 mg  1 tablet Oral Daily       PRN Medications: acetaminophen, aluminum-magnesium hydroxide-simethicone, bisacodyl, bisacodyl, calcium carbonate, lactulose, loratadine-pseudoephedrine 5-120 mg, ondansetron, ramelteon, senna-docusate 8.6-50 mg, traMADol    Review of Systems   Constitutional: Negative.    HENT: Negative.    Eyes: Negative.    Respiratory: Negative.    Cardiovascular: Negative.    Gastrointestinal: Negative.    Endocrine: Negative.    Genitourinary: Negative.    Musculoskeletal: Negative.    Skin: Negative.    Allergic/Immunologic: Negative.    Neurological: Negative.    Hematological: Negative.    Psychiatric/Behavioral: Negative.      Objective:     Vital Signs (Most Recent):  Temp: 97.6 °F (36.4 °C) (10/30/17 0453)  Pulse: 85 (10/30/17 0453)  Resp: 16 (10/30/17 0453)  BP: 107/67 (10/30/17 0453)  SpO2: 100 % (10/30/17 0453)    Vital Signs (24h Range):  Temp:  [97.6 °F (36.4 °C)-98.5 °F (36.9 °C)] 97.6 °F (36.4 °C)  Pulse:  [] 85  Resp:  [16-18] 16  SpO2:  [100 %] 100 %  BP: (107-130)/(67-81) 107/67     Physical Exam   Constitutional: She is oriented to person, place, and time. She appears well-developed and well-nourished. No distress.   HENT:   Head: Normocephalic and atraumatic.   Right Ear: External ear normal.   Left Ear: External ear normal.   Nose: Nose normal.   Mouth/Throat: Oropharynx is clear and moist. No oropharyngeal exudate.   Eyes:  Conjunctivae and EOM are normal. Pupils are equal, round, and reactive to light. Right eye exhibits no discharge. Left eye exhibits no discharge. No scleral icterus.   Neck: Normal range of motion. Neck supple. No JVD present. No tracheal deviation present. No thyromegaly present.   Cardiovascular: Normal rate, regular rhythm, normal heart sounds and intact distal pulses.  Exam reveals no gallop and no friction rub.    No murmur heard.  Pulmonary/Chest: Effort normal and breath sounds normal. No stridor. No respiratory distress. She has no wheezes. She has no rales. She exhibits no tenderness.   Abdominal: Soft. Bowel sounds are normal. She exhibits no distension and no mass. There is no tenderness. There is no rebound and no guarding. No hernia.   Genitourinary:   Genitourinary Comments: deferred   Musculoskeletal: Normal range of motion. She exhibits no edema, tenderness or deformity.   Lymphadenopathy:     She has no cervical adenopathy.   Neurological: She is alert and oriented to person, place, and time. She exhibits normal muscle tone.   Skin: No rash noted. She is not diaphoretic. No erythema. No pallor.   Psychiatric: She has a normal mood and affect. Her behavior is normal. Judgment and thought content normal.     NEUROLOGICAL EXAMINATION:     MENTAL STATUS   Oriented to person, place, and time.     CRANIAL NERVES     CN III, IV, VI   Pupils are equal, round, and reactive to light.  Extraocular motions are normal.       Assessment/Plan:      Zaira Jensen is a 42 y.o. female admitted to inpatient rehabilitation on 10/5/2017 for <principal problem not specified> with impaired mobility and ADLs. Patient remains appropriate for PT, OT, and as required Speech therapy. Patient continues to require 24 hour nursing care as well as daily Physician assessment.    Active Diagnoses:    Diagnosis Date Noted POA    Obesity [E66.9] 10/13/2017 Yes    Quadriplegia, C5-C7 complete [G82.53] 10/05/2017 Yes      Problems  Resolved During this Admission:    Diagnosis Date Noted Date Resolved POA     S/p ACDF & quadriparesis, cont PT, OT  Pain, managed with current meds  DVT prophylaxis, cont SCD    DISCHARGE PLANNING:  Tentative Discharge Date:     No future appointments.    Anirudh Rowley MD  Department of Physical Medicine & Rehab  [facility]

## 2017-10-30 NOTE — PLAN OF CARE
Problem: Patient Care Overview  Goal: Plan of Care Review  Outcome: Ongoing (interventions implemented as appropriate)  Bed alarm on at all times, side rails up for safety, call light is within easy reach , instructed patient to call at any time for assistance.Patient is able to ambulate to the bathroom in her wheelchair, with assistance during H.S. hours , with a staff member.  Encouraged patient to continue active ROM, but also reminded patient to call with use of the call light at all times before attempting to get out of bed.   Patient stated that she understood instructions given. Patient will be kept pain free, during shift.Resp are even and unlabored, lungs sound clear, no s & s of distress noted at this time.  Will continue to monitor and observe during HS hours.Tracie care done as needed, patient cleaned and dried and clean brief applied, patient tolerated procedure well.Patient turned and repositioned often during HS hours.

## 2017-10-31 PROCEDURE — 97530 THERAPEUTIC ACTIVITIES: CPT

## 2017-10-31 PROCEDURE — 97803 MED NUTRITION INDIV SUBSEQ: CPT

## 2017-10-31 PROCEDURE — 97542 WHEELCHAIR MNGMENT TRAINING: CPT

## 2017-10-31 PROCEDURE — 12800000 HC REHAB SEMI-PRIVATE ROOM

## 2017-10-31 PROCEDURE — 97535 SELF CARE MNGMENT TRAINING: CPT

## 2017-10-31 PROCEDURE — 97110 THERAPEUTIC EXERCISES: CPT

## 2017-10-31 PROCEDURE — 25000003 PHARM REV CODE 250: Performed by: PHYSICAL MEDICINE & REHABILITATION

## 2017-10-31 PROCEDURE — 97116 GAIT TRAINING THERAPY: CPT

## 2017-10-31 PROCEDURE — 25000003 PHARM REV CODE 250: Performed by: INTERNAL MEDICINE

## 2017-10-31 RX ADMIN — DOCUSATE SODIUM 100 MG: 100 CAPSULE, LIQUID FILLED ORAL at 08:10

## 2017-10-31 RX ADMIN — TRAMADOL HYDROCHLORIDE 50 MG: 50 TABLET, FILM COATED ORAL at 08:10

## 2017-10-31 RX ADMIN — Medication 1 TABLET: at 08:10

## 2017-10-31 NOTE — PROGRESS NOTES
[facility]  Adult Nutrition  Progress Note    SUMMARY     Recommendations  Recommendation/Intervention:   1.) Continue with adult regular diet 2.) continue Boost Glucose Control and  beneprotein TID   Goals: 1.) patient will consume 75% of meals/ONS 2.) patient will tolerate diet   Nutrition Goal Status: 1)met/ongoing 2) met/ongoing  Communication of RD Recs: discussed on round, care plan, sticky note    1. Quadriplegia, C5-C7 complete      Past Medical History:   Diagnosis Date    Allergy     Anemia     Arthritis     Chronic back pain     Chronic neck pain     Functional ovarian cysts     GERD (gastroesophageal reflux disease)     Headache(784.0)     Radiculopathy of arm     Rash     Respiratory distress     bronchospasm at emergence years ago       Reason for Assessment  Reason for Assessment: RD follow-up   Interdisciplinary Rounds: attended  General Information Comments: Admits to rehab with disablity. Patient with adequate intake at meals (~70% at most meals). Denies nausea/vomiting. Interested in beneprotein with meals. Reports acide reflux. Rd offered to take food preferences but patient refused. No complaints at this time.   10/24/17 Pt is slowly losing weight per her desire.  Spoke with RD to clarify ONS, who reported pt requested both Boost Glucose Control and Beneprotein.  Meal intake continues at ~70%.   10/31/17 Pt continues slow weight loss. Pt reports good appetite and use of ONS, both Boost Glucose Control and Beneprotein.  Requests milk in the mornings to mix the Beneprotein in.   Nutrition Prescription Ordered  Current Diet Order: Adult regular diet   Nutrition Order Comments: Add bottled water to each tray;  Pt notes she is concerned about her weight.  Offered calorie control or low fat option and patient declined noting she would monitor intake.         Evaluation of Received Nutrients/Fluid Intake  Oral Fluid (mL): 1560 (per 24 hr i/os)  Energy Calories Required:  "meeting needs  Protein Required: meeting needs  % Intake of Estimated Energy Needs: %  % Meal Intake: 62% average for last 6 documented meals plus Boost Glucose Control 1 x daily and Beneprotein     Nutrition Risk Screen  Nutrition Risk Screen: no indicators present    Nutrition/Diet History  Patient Reported Diet/Restrictions/Preferences: general (avoids foods that have a lot of acid)  Food Preferences: No cultural or religous food preferences identified.   Supplemental Drinks or Food Habits:  (none)    Labs/Tests/Procedures/Meds  Diagnostic Test/Procedure Review: reviewed, pertinent  Pertinent Labs Reviewed: reviewed, pertinent  BMP  Lab Results   Component Value Date     10/26/2017    K 3.9 10/26/2017     10/26/2017    CO2 24 10/26/2017    BUN 11 10/26/2017    CREATININE 0.7 10/26/2017    CALCIUM 9.1 10/26/2017    ANIONGAP 8 10/26/2017    ESTGFRAFRICA >60 10/26/2017    EGFRNONAA >60 10/26/2017     Lab Results   Component Value Date    ALBUMIN 3.1 (L) 10/26/2017     Lab Results   Component Value Date    CALCIUM 9.1 10/26/2017     No results for input(s): POCTGLUCOSE in the last 24 hours.    Pertinent Medications Reviewed: reviewed, pertinent  Scheduled Meds:   docusate sodium  100 mg Oral Daily    folic acid-vit B6-vit B12 2.5-25-2 mg  1 tablet Oral Daily         Physical Findings  Overall Physical Appearance: nourished  Oral/Mouth Cavity: WDL  Skin: incision (Jc score 18)    Anthropometrics  Temp: 97.5 °F (36.4 °C)  Height: 5' 5"  Weight Method: Bed Scale  Weight: 83.1 kg (183 lb 3.2 oz)  Ideal Body Weight (IBW), Female: 125 lb  % Ideal Body Weight, Female (lb): 147.62 lb  BMI (Calculated): 30.8  BMI Grade: 30 - 34.9- obesity - grade I  Usual Body Weight (UBW), k.9 kg  % Usual Body Weight: 99.97  % Weight Change From Usual Weight: -0.24 %      Estimated/Assessed Needs  Weight Used For Calorie Calculations: 83.7 kg (184 lb 8.4 oz)   Height (cm): 165.1 cm  Energy Need Method: [place]-St " Charu  RMR ([facility] Equation): 1487.88 x1.2=1784 kcals/day  Weight Used For Protein Calculations: 83.7 kg (184 lb 8.4 oz)  0.8 gm Protein (gm): 67.1 and 1.0 gm Protein (gm): 83.88  Fluid Need Method: RDA Method (or per MD rec)  CHO Requirement: na       Assessment and Plan    Obesity    Related to (etiology):   Excessive energy intake    Signs and Symptoms (as evidenced by):   1) BMI 30    Interventions/Recommendations (treatment strategy):  1) After pt noted a desire to lose weight offered limited calorie or low fat diet to patient and she declined in favor of monitoring her food herself. 2) Monitor weight     Nutrition Diagnosis Status:   progressing              Monitor and Evaluation  Food and Nutrient Intake: energy intake  Food and Nutrient Adminstration: diet order  Anthropometric Measurements: weight, weight change  Biochemical Data, Medical Tests and Procedures: electrolyte and renal panel, inflammatory profile  Nutrition-Focused Physical Findings: overall appearance, skin    Nutrition Risk  Level of Risk:  (x1 weekly)    Nutrition Follow-Up  RD Follow-up?: Yes     Discharge Planning: discharge on adult regular diet.

## 2017-10-31 NOTE — PT/OT/SLP PROGRESS
"Physical Therapy         Treatment        Zaira Jensen   MRN: [id number]     PT Received On: 10/31/17  Total Time (min): 90        Billable Minutes:  Gait [id number], Therapeutic Activity 20min, Therapeutic Exercise 30min and Train/Wheelchair Management 20min  Total Minutes: 90min    Treatment Type: Treatment  PT/PTA: PTA     PTA Visit Number: 0       General Precautions: Standard, fall  Orthopedic Precautions: Orthopedic Precautions : N/A   Braces: Braces: Cervical collar         Subjective:  Communicated with nurse Hilliard prior to session.    Pain/Comfort  Pain Rating 1: 1/10  Location - Side 1: Left  Location 1: shoulder  Pain Addressed 1: Reposition    Objective:  Patient found seated in w/c , with Patient found with: cervical collar    Functional Mobility:  Bed Mobility:   Supine to sit:    Sit to supine:    Rolling:    Scooting:     Balance:   Static Sit: FAIR+: Able to take MINIMAL challenges from all directions  Dynamic Sit:  FAIR+: Maintains balance through MINIMAL excursions of active trunk motion  Static Stand: FAIR: Maintains without assist but unable to take challenges  Dynamic stand: FAIR: Needs CONTACT GUARD during gait    Transfer Training:  Stood with CGA.    Wheelchair Training:  Propelled  40' with CGA  using BLE's and occasional UE's with cues for technique.    Gait Training:  Ambulated  150' with rollator and CG /SBA. 300' additional with rollator and CGA.     Stair Training:        Additional Treatment:  Performed LE exercises in standing at 2 sets 20 reps in // bars: Hip flexion, abd /add, mini squats, heel raises, step up 40 x 6" step with BUE support.    Activity Tolerance:  Patient tolerated treatment well    Patient left up in chair with call button in reach, chair alarm on and nurse Hilliard notified.    Assessment:  Zaira Jensen is a 42 y.o. female with a medical diagnosis of <principal problem not specified>. She presents with L shoulder pain, tremors L side.    Rehab potential is " good.    Activity tolerance: Good    Discharge recommendations: Discharge Facility/Level Of Care Needs: home     Equipment recommendations:       GOALS:    Physical Therapy Goals        Problem: Physical Therapy Goal    Goal Priority Disciplines Outcome Goal Variances Interventions   Physical Therapy Goal     PT/OT, PT Ongoing (interventions implemented as appropriate)     Description:  Goals to be met by: 2017     Patient will increase functional independence with mobility by performin). Supine to sit with Stand-by Assistance  2). Sit to supine with Stand-by Assistance  3). Rolling to Left and Right with Stand-by Assistance.  4). Sit to stand transfer with Minimal Assistance = MET  5). Gait  x  > 25 feet with Contact Guard Assistance using Rolling Walker = MET  6). Wheelchair propulsion x > 150 feet with Stand-by Assistance      NEW 10/18/2017:    7). Gait  x  > 25 feet with Stand-by Assistance using Rolling Walker or Rollator    NEW 10/23/2017:  8). Sit to stand transfer with Contact Guard Assistance             Problem: Physical Therapy Goal    Goal Priority Disciplines Outcome Goal Variances Interventions   Physical Therapy Goal     PT/OT, PT Ongoing (interventions implemented as appropriate)                     PLAN:    Patient to be seen daily  to address the above listed problems via gait training, therapeutic activities, therapeutic exercises, neuromuscular re-education, wheelchair management/training  Plan of Care expires: 17  Plan of Care reviewed with: patient         Rody Jeffrey, PTA 10/31/2017

## 2017-10-31 NOTE — PLAN OF CARE
Problem: Nutrition Imbalance: Excess Oral Intake (Adult)  Intervention: Promote Healthy Nutritional Intake/Lifestyle  Recommendation/Intervention:   1.) Continue with adult regular diet 2.) continue Boost Glucose Control and  beneprotein TID   Goals: 1.) patient will consume 75% of meals/ONS 2.) patient will tolerate diet   Nutrition Goal Status: 1)met/ongoing 2) met/ongoing  Communication of RD Recs: discussed on round, care plan, sticky note

## 2017-10-31 NOTE — NURSING
Pt in bed AA&O x's three.  Offered to assist pt to restroom, states she will call, not ready at this time.  No c/o pain or discomfort at this time will continue to monitor pt's progress.

## 2017-10-31 NOTE — PLAN OF CARE
Problem: Physical Therapy Goal  Goal: Physical Therapy Goal  Goals to be met by: 2017     Patient will increase functional independence with mobility by performin). Supine to sit with Stand-by Assistance  2). Sit to supine with Stand-by Assistance  3). Rolling to Left and Right with Stand-by Assistance.  4). Sit to stand transfer with Minimal Assistance = MET  5). Gait  x  > 25 feet with Contact Guard Assistance using Rolling Walker = MET  6). Wheelchair propulsion x > 150 feet with Stand-by Assistance      NEW 10/18/2017:    7). Gait  x  > 25 feet with Stand-by Assistance using Rolling Walker or Rollator    NEW 10/23/2017:  8). Sit to stand transfer with Contact Guard Assistance     Outcome: Ongoing (interventions implemented as appropriate)  Propelled w/c 40' with BLE's, Ambulated with rollator and CG / SBA and performed therapeutic exercises in standing.

## 2017-10-31 NOTE — PT/OT/SLP PROGRESS
Occupational Therapy  Treatment    Zaira Jensen   MRN: [id number]   Admitting Diagnosis: <principal problem not specified>    OT Date of Treatment: 10/30/17   Total Time (min): 90 min      Billable Minutes:  Therapeutic Activity 90  Total Minutes: 90    General Precautions: Standard, fall  Orthopedic Precautions: N/A  Braces: Cervical collar    Do you have any cultural, spiritual, Yarsanism conflicts, given your current situation?: None    Subjective:  Communicated with nurse prior to session.    Pain/Comfort  Pain Rating 1: 0/10  Pain Rating Post-Intervention 1: 0/10    Objective:  Patient found with: cervical collar    Functional Mobility:  Bed Mobility:   Supine to sit: Activity did not occur   Sit to supine: Activity did not occur   Rolling: Activity did not occur   Scooting: Modified Independent    Balance:   Static Sit: NORMAL: No deviations seen in posture held statically  Dynamic Sit:  GOOD+: Maintains balance through MAXIMAL excursions of active trunk motion  Dynamic stand: Pt used rollator and ambulated from room to gym > 150 feet with SBA.    Additional Treatment:  Session included activities to improve fine motor skills, B shoulder ROM and strength. Reach and place activities, in hand manipulation of small items. [place] Peg Board, [place] and 9 hole peg board used as treatment, not assessment.    Patient left up in chair with call button in reach and chair alarm on    ASSESSMENT:  Zaira Jensen is a 42 y.o. female with a medical diagnosis of <principal problem not specified> and presents with impaired ADL and mobility. Long standing deficits worsened by recent surgery.    Rehab potential is fair    Activity tolerance: Fair    Discharge recommendations: home     Equipment recommendations:  (TBD)     GOALS:    Occupational Therapy Goals        Problem: Occupational Therapy Goal    Goal Priority Disciplines Outcome Interventions   Occupational Therapy Goal     OT, PT/OT Ongoing (interventions  implemented as appropriate)    Description:  Goals to be met by: 11/16/17     Patient will increase functional independence with ADLs by performing:    Feeding with Contact Guard Assistance.  UE Dressing with Minimal Assistance.  LE Dressing with Minimal Assistance.  Grooming while seated with Minimal Assistance.  Toileting from toilet with Moderate Assistance for hygiene and clothing management.   Bathing from most appropriate location with Minimal Assistance.  Toilet transfer to toilet with Minimal Assistance.                       Plan:  Patient to be seen 6 x/week to address the above listed problems via community/work re-entry, self-care/home management, therapeutic activities, therapeutic exercises, therapeutic groups, neuromuscular re-education, sensory integration, wheelchair management/training  Plan of Care expires: 11/16/17  Plan of Care reviewed with: patient         BREANA Recinos  10/31/2017

## 2017-10-31 NOTE — PLAN OF CARE
Problem: Self-Care Deficit (Adult,Obstetrics,Pediatric)  Intervention: Promote Patient Activity Focusing on Functional [place]  Pt in bed resting, experiencing weakness and tremors, no c/o pain or discomfort.  Minimal assistance required for transfer.  Will continue to monitor pt's progress.

## 2017-10-31 NOTE — PT/OT/SLP PROGRESS
"Occupational Therapy  Treatment    Zaira Jensen   MRN: [id number]   Admitting Diagnosis: cervical myelomalacia s/p C5-C6 ACDF    OT Date of Treatment: 10/31/17   Total Time (min): 75 min    Billable Minutes:  Self Care/Home Management 60 Therapeutic activity 15   Total Minutes: 75    General Precautions: Standard, fall  Orthopedic Precautions: N/A  Braces: N/A    Do you have any cultural, spiritual, Orthodox conflicts, given your current situation?: None    Subjective:  Communicated with nurse prior to session.  "I just went to the bathroom and did everything myself. I did my clothes and everything."    Pain/Comfort  Pain Rating 1: 0/10    Objective:  Patient found with: cervical collar    Additional Treatment:  - To facilitate fine and gross motor coordination and functional use of B UE: pt set up with shaving cream, razor, mirror, alcohol and wipes >> Patient opening all containers, applying shaving cream, and shaving with SBA; pt then applying alcohol wipes to shaved area to prevent bumps; pt then asked OTR to read today's menu; OTR handing menu to patient and pt opening menu, reading, then handing back to OTR - pt requires max verbal cues throughout all tasks to perform full range of each joint to maximize functional use of UE, specifically hands     Patient left up in chair with call button in reach, chair alarm on and nurse notified    ASSESSMENT:  Zaira Jensen is a 42 y.o. female with a medical diagnosis of cervical myelomalacia s/p C5-C6 ACDF and presents with improvements towards OT goals as pt performing all grooming tasks with Set-up/SBA. Patient continues to require max verbal encouragement to attempt to perform new challenges, perform full range of motion to maximize functional use of B UE, and requires extensive time to complete all tasks. Patient should continue to benefit from skilled OT services to address self care deficits, ROM, strength, endurance deficits and increase functional " independence/safety.     Rehab potential is good    Activity tolerance: Good    Discharge recommendations: home     Equipment recommendations:  (TBD)     GOALS:    Occupational Therapy Goals        Problem: Occupational Therapy Goal    Goal Priority Disciplines Outcome Interventions   Occupational Therapy Goal     OT, PT/OT Ongoing (interventions implemented as appropriate)    Description:  Goals to be met by: 11/16/17     Patient will increase functional independence with ADLs by performing:    Feeding with Contact Guard Assistance.  UE Dressing with Minimal Assistance.  LE Dressing with Minimal Assistance.  Grooming while seated with Minimal Assistance.  Toileting from toilet with Moderate Assistance for hygiene and clothing management.   Bathing from most appropriate location with Minimal Assistance.  Toilet transfer to toilet with Minimal Assistance.                       Plan:  Patient to be seen 6 x/week to address the above listed problems via self-care/home management, community/work re-entry, therapeutic activities, therapeutic exercises, therapeutic groups, neuromuscular re-education, cognitive retraining, wheelchair management/training  Plan of Care expires: 11/16/17  Plan of Care reviewed with: patient    HUGH Perkins LOTR  10/31/2017

## 2017-10-31 NOTE — PROGRESS NOTES
[facility]  Physical Medicine & Rehab  Progress Note    Patient Name: Zaira Jensen  MRN: [id number]  Patient Class: IP- Rehab   Admission Date: 10/5/2017  Length of Stay: 26 days  Attending Physician: Anirudh Rowley MD  Primary Care Provider: Cuate Alvarez MD    Subjective:     Principal Problem:  quadriparesis   Interval History: pt is 42 y.o female s/p ACDF, quadriparesis, participating with therapy & making progress    Scheduled Medications:    docusate sodium  100 mg Oral Daily    folic acid-vit B6-vit B12 2.5-25-2 mg  1 tablet Oral Daily       PRN Medications: acetaminophen, aluminum-magnesium hydroxide-simethicone, bisacodyl, bisacodyl, calcium carbonate, lactulose, loratadine-pseudoephedrine 5-120 mg, ondansetron, ramelteon, senna-docusate 8.6-50 mg, traMADol    Review of Systems   Constitutional: Negative.    HENT: Negative.    Eyes: Negative.    Respiratory: Negative.    Cardiovascular: Negative.    Gastrointestinal: Negative.    Endocrine: Negative.    Genitourinary: Negative.    Musculoskeletal: Negative.    Skin: Negative.    Allergic/Immunologic: Negative.    Neurological: Negative.    Hematological: Negative.    Psychiatric/Behavioral: Negative.      Objective:     Vital Signs (Most Recent):  Temp: 97.5 °F (36.4 °C) (10/31/17 0555)  Pulse: 99 (10/31/17 0555)  Resp: 16 (10/31/17 0555)  BP: 131/63 (10/31/17 0555)  SpO2: 97 % (10/31/17 0555)    Vital Signs (24h Range):  Temp:  [97.5 °F (36.4 °C)-98.3 °F (36.8 °C)] 97.5 °F (36.4 °C)  Pulse:  [85-99] 99  Resp:  [16] 16  SpO2:  [97 %-100 %] 97 %  BP: (131-146)/(58-82) 131/63     Physical Exam   Constitutional: She is oriented to person, place, and time. She appears well-developed and well-nourished. No distress.   HENT:   Head: Normocephalic and atraumatic.   Right Ear: External ear normal.   Left Ear: External ear normal.   Nose: Nose normal.   Mouth/Throat: Oropharynx is clear and moist. No oropharyngeal exudate.   Eyes:  Conjunctivae and EOM are normal. Pupils are equal, round, and reactive to light. Right eye exhibits no discharge. Left eye exhibits no discharge. No scleral icterus.   Neck: Normal range of motion. Neck supple. No JVD present. No tracheal deviation present. No thyromegaly present.   Cardiovascular: Normal rate, regular rhythm, normal heart sounds and intact distal pulses.  Exam reveals no gallop and no friction rub.    No murmur heard.  Pulmonary/Chest: Effort normal and breath sounds normal. No stridor. No respiratory distress. She has no wheezes. She has no rales. She exhibits no tenderness.   Abdominal: Soft. Bowel sounds are normal. She exhibits no distension and no mass. There is no tenderness. There is no rebound and no guarding. No hernia.   Genitourinary:   Genitourinary Comments: deferred   Musculoskeletal: Normal range of motion. She exhibits no edema, tenderness or deformity.   Lymphadenopathy:     She has no cervical adenopathy.   Neurological: She is alert and oriented to person, place, and time. No cranial nerve deficit or sensory deficit. She exhibits normal muscle tone.   Skin: No rash noted. She is not diaphoretic. No erythema. No pallor.   Psychiatric: She has a normal mood and affect. Her behavior is normal. Judgment and thought content normal.     NEUROLOGICAL EXAMINATION:     MENTAL STATUS   Oriented to person, place, and time.     CRANIAL NERVES     CN III, IV, VI   Pupils are equal, round, and reactive to light.  Extraocular motions are normal.       Assessment/Plan:      Zaira Jensen is a 42 y.o. female admitted to inpatient rehabilitation on 10/5/2017 for <principal problem not specified> with impaired mobility and ADLs. Patient remains appropriate for PT, OT, and as required Speech therapy. Patient continues to require 24 hour nursing care as well as daily Physician assessment.    Active Diagnoses:    Diagnosis Date Noted POA    Obesity [E66.9] 10/13/2017 Yes    Quadriplegia, C5-C7  complete [G82.53] 10/05/2017 Yes      Problems Resolved During this Admission:    Diagnosis Date Noted Date Resolved POA     Quadriparesis, s/p ACDF, cont PT, OT  Pain, managed with current meds  DVT prophylaxis, cont SCD     DISCHARGE PLANNING:  Tentative Discharge Date:     No future appointments.    Anirudh Rowley MD  Department of Physical Medicine & Rehab  [facility]

## 2017-10-31 NOTE — PT/OT/SLP PROGRESS
Occupational Therapy  Treatment    Zaira Jensen   MRN: [id number] cervical myelomalacia s/p C5-C6  Admitting Diagnosis:     OT Date of Treatment: 10/31/17   Total Time (min): 15 min      Billable Minutes:  Therapeutic Activity 15  Total Minutes: 15    General Precautions: Standard, fall  Braces: Cervical collar    Do you have any cultural, spiritual, Islam conflicts, given your current situation?: None    Subjective:  Communicated with nursing prior to session.    Pain/Comfort  Pain Rating 1: 0/10    Objective:   Directed patient with bilat hand and fine motor activities: Worked on slaving her [employer]s Cube and folding towels.      Patient left up in chair with call button in reach, chair alarm on and nursing notified    ASSESSMENT:  Zaira Jensen is a 42 y.o. female with a medical diagnosis of cervical myelomalacia s/p C5-C6.  GOALS:    Occupational Therapy Goals        Problem: Occupational Therapy Goal    Goal Priority Disciplines Outcome Interventions   Occupational Therapy Goal     OT, PT/OT Ongoing (interventions implemented as appropriate)    Description:  Goals to be met by: 11/16/17     Patient will increase functional independence with ADLs by performing:    Feeding with Contact Guard Assistance.  UE Dressing with Minimal Assistance.  LE Dressing with Minimal Assistance.  Grooming while seated with Minimal Assistance.  Toileting from toilet with Moderate Assistance for hygiene and clothing management.   Bathing from most appropriate location with Minimal Assistance.  Toilet transfer to toilet with Minimal Assistance.                       Plan:  Cont POC.  Patient to be seen 6 x/week to address the above listed problems via community/work re-entry, self-care/home management, therapeutic activities, therapeutic exercises, therapeutic groups, neuromuscular re-education, sensory integration, wheelchair management/training  Plan of Care expires: 11/16/17  Plan of Care reviewed with: patient         Brian  CLAYTON Allen  10/31/2017

## 2017-10-31 NOTE — PATIENT CARE CONFERENCE
Weekly Staffing Report      Date Admitted: 10/5/2017 :   Staffing Date: 10/31/2017     Patient Active Problem List   Diagnosis    Lumbago    Chronic neck pain    Cervical spondylosis without myelopathy    Degeneration of cervical intervertebral disc    Brachial neuritis or radiculitis NOS    Acquired spondylolisthesis    Biliary colic    Cervical spondylosis with myelopathy    Intervertebral disc disorder of cervical region with myelopathy    Spondylosis with myelopathy, lumbar region    Degeneration of lumbar or lumbosacral intervertebral disc    Atypical chest pain    Normocytic normochromic anemia    Abnormal CT scan    Cervical myelopathy    Stiffness of right shoulder joint    Stiffness of right wrist joint    Bilateral arm weakness    Fibroadenoma    Intervertebral disc stenosis of neural canal    Myelomalacia    Other specified symptoms and signs involving the circulatory and respiratory systems    Brief situational non-psychotic disorder    Goiter    Abnormality of gait    Coarse tremors    Cervical spinal stenosis s/p C5-6 ACDF    GERD (gastroesophageal reflux disease)    Chronic back pain    Quadriplegia, C5-C7 complete    Obesity          Team Members Present:  Physician Team Member: Dr Rwoley  Nursing Team Member: Hitesh Ayala RN  Case Management Team Member: Virgil Carey RN  PT Team Member: Richie Lemus PT  OT Team Member: Suzanne Perry OT  SLP Team Member: Julien CASTANEDA    Nursing  Skin: Intact  Bowel: Continent  Bladder:continent  Last BM: 10-29-17  Diet: Regular  Appetite: good   Pain Level: 1/10 left shoulder     Physical Therapy  Supine to Sit:  minimal assist  Sit to Stand: minimal assist  Gait: 400 feet  standy by assistance with Rolator   Wheelchair Mobility: 25-50 feet standy by assistance  ROM: wfl   Stairs:12 four inch steps  contact guard  Strength: left knee ext 4/5, otherwise 3 to 3+/5, right knee ext 4/5 otherwise 3- to 3+/5    Occupational  Therapy  Feeding: supervision  Grooming:supervision  UED: moderate assist  LED: maximal assist  Bathing:maximal assist  To mod a  Toileting:moderate assist  Toilet Transfer:  standy by assistance  Tub Transfer:  contact guard  Strength: right 2 to 3+/5, left 2+ to 4-/5            Summary of Progress:  good    Barriers to Progress/Discharge:      Tolerates 3 hours of therapy: Yes    Comments: 11-16-17    Estimated Length of Stay: 11-16-17

## 2017-10-31 NOTE — NURSING
Team conference attended and patient care discussed.  Met with patient in room. Updated her on plan of care and ELOS.  Verbalized understanding.  Informed me  Parents are coming tomorrow for family training.  No further questions or concerns voiced at this time.  Will continue to assist with discharge planning.

## 2017-11-01 PROCEDURE — 97535 SELF CARE MNGMENT TRAINING: CPT

## 2017-11-01 PROCEDURE — 97542 WHEELCHAIR MNGMENT TRAINING: CPT

## 2017-11-01 PROCEDURE — 97530 THERAPEUTIC ACTIVITIES: CPT

## 2017-11-01 PROCEDURE — 25000003 PHARM REV CODE 250: Performed by: INTERNAL MEDICINE

## 2017-11-01 PROCEDURE — 97110 THERAPEUTIC EXERCISES: CPT

## 2017-11-01 PROCEDURE — 97116 GAIT TRAINING THERAPY: CPT

## 2017-11-01 PROCEDURE — 12800000 HC REHAB SEMI-PRIVATE ROOM

## 2017-11-01 RX ADMIN — DOCUSATE SODIUM 100 MG: 100 CAPSULE, LIQUID FILLED ORAL at 08:11

## 2017-11-01 RX ADMIN — Medication 1 TABLET: at 08:11

## 2017-11-01 NOTE — PLAN OF CARE
Problem: Patient Care Overview  Goal: Plan of Care Review  Alert, oriented, appetite is good fall prevention in progress. Med teaching ongoing moves all extremities. No c/o voiced. Family training completed with mother and father here today. Training went well.

## 2017-11-01 NOTE — PLAN OF CARE
Problem: Occupational Therapy Goal  Goal: Occupational Therapy Goal  Goals to be met by: 11/16/17     Patient will increase functional independence with ADLs by performing:    Feeding with Contact Guard Assistance.  UE Dressing with Minimal Assistance.  LE Dressing with Minimal Assistance.  Grooming while seated with Minimal Assistance.  Toileting from toilet with Moderate Assistance for hygiene and clothing management.   Bathing from most appropriate location with Minimal Assistance.  Toilet transfer to toilet with Minimal Assistance.      Outcome: Ongoing (interventions implemented as appropriate)  Goals remain appropriate.  BREANA Recinos

## 2017-11-01 NOTE — PLAN OF CARE
Problem: Physical Therapy Goal  Goal: Physical Therapy Goal  Goals to be met by: 2017     Patient will increase functional independence with mobility by performin). Supine to sit with Stand-by Assistance  2). Sit to supine with Stand-by Assistance  3). Rolling to Left and Right with Stand-by Assistance.  4). Sit to stand transfer with Minimal Assistance = MET  5). Gait  x  > 25 feet with Contact Guard Assistance using Rolling Walker = MET  6). Wheelchair propulsion x > 150 feet with Stand-by Assistance      NEW 10/18/2017:    7). Gait  x  > 25 feet with Stand-by Assistance using Rolling Walker or Rollator    NEW 10/23/2017:  8). Sit to stand transfer with Contact Guard Assistance     Outcome: Ongoing (interventions implemented as appropriate)  Ambulated 300' with rollator and CGA.

## 2017-11-01 NOTE — PROGRESS NOTES
[facility]  Physical Medicine & Rehab  Progress Note    Patient Name: Zaira Jensen  MRN: [id number]  Patient Class: IP- Rehab   Admission Date: 10/5/2017  Length of Stay: 27 days  Attending Physician: Anirudh Rowley MD  Primary Care Provider: Cuate Alvarez MD    Subjective:     Principal Problem:  quadriparesis   Interval History: pt is 42 y.o female s/p ACDF, quadriparesis, participating with therapy     Scheduled Medications:    docusate sodium  100 mg Oral Daily    folic acid-vit B6-vit B12 2.5-25-2 mg  1 tablet Oral Daily       PRN Medications: acetaminophen, aluminum-magnesium hydroxide-simethicone, bisacodyl, bisacodyl, calcium carbonate, lactulose, loratadine-pseudoephedrine 5-120 mg, ondansetron, ramelteon, senna-docusate 8.6-50 mg, traMADol    Review of Systems   Constitutional: Negative.    HENT: Negative.    Eyes: Negative.    Respiratory: Negative.    Cardiovascular: Negative.    Gastrointestinal: Negative.    Endocrine: Negative.    Genitourinary: Negative.    Musculoskeletal: Negative.    Skin: Negative.    Allergic/Immunologic: Negative.    Neurological: Negative.    Hematological: Negative.    Psychiatric/Behavioral: Negative.      Objective:     Vital Signs (Most Recent):  Temp: 96.8 °F (36 °C) (11/01/17 0747)  Pulse: 103 (11/01/17 0747)  Resp: 18 (11/01/17 0747)  BP: 119/77 (11/01/17 0747)  SpO2: 99 % (11/01/17 0747)    Vital Signs (24h Range):  Temp:  [96.8 °F (36 °C)-98.5 °F (36.9 °C)] 96.8 °F (36 °C)  Pulse:  [] 103  Resp:  [18] 18  SpO2:  [97 %-99 %] 99 %  BP: (119-141)/(76-78) 119/77     Physical Exam   Constitutional: She is oriented to person, place, and time. She appears well-developed and well-nourished. No distress.   HENT:   Head: Normocephalic and atraumatic.   Right Ear: External ear normal.   Left Ear: External ear normal.   Nose: Nose normal.   Mouth/Throat: Oropharynx is clear and moist. No oropharyngeal exudate.   Eyes: Conjunctivae and EOM are  normal. Pupils are equal, round, and reactive to light. Right eye exhibits no discharge. Left eye exhibits no discharge. No scleral icterus.   Neck: Normal range of motion. Neck supple. No JVD present. No tracheal deviation present. No thyromegaly present.   Cardiovascular: Normal rate, regular rhythm, normal heart sounds and intact distal pulses.  Exam reveals no gallop and no friction rub.    No murmur heard.  Pulmonary/Chest: Effort normal and breath sounds normal. No stridor. No respiratory distress. She has no wheezes. She has no rales. She exhibits no tenderness.   Abdominal: Soft. Bowel sounds are normal. She exhibits no distension and no mass. There is no tenderness. There is no rebound and no guarding. No hernia.   Genitourinary:   Genitourinary Comments: deferred   Musculoskeletal: Normal range of motion. She exhibits no edema, tenderness or deformity.   Lymphadenopathy:     She has no cervical adenopathy.   Neurological: She is alert and oriented to person, place, and time. No cranial nerve deficit or sensory deficit. She exhibits normal muscle tone.   Skin: No rash noted. She is not diaphoretic. No erythema. No pallor.   Psychiatric: She has a normal mood and affect. Her behavior is normal. Judgment and thought content normal.     NEUROLOGICAL EXAMINATION:     MENTAL STATUS   Oriented to person, place, and time.     CRANIAL NERVES     CN III, IV, VI   Pupils are equal, round, and reactive to light.  Extraocular motions are normal.       Assessment/Plan:      Zaira Jensen is a 42 y.o. female admitted to inpatient rehabilitation on 10/5/2017 for <principal problem not specified> with impaired mobility and ADLs. Patient remains appropriate for PT, OT, and as required Speech therapy. Patient continues to require 24 hour nursing care as well as daily Physician assessment.    Active Diagnoses:    Diagnosis Date Noted POA    Obesity [E66.9] 10/13/2017 Yes    Quadriplegia, C5-C7 complete [G82.53] 10/05/2017  Yes      Problems Resolved During this Admission:    Diagnosis Date Noted Date Resolved POA   s/p ACDF, quadriparesis, cont PT, OT  DVT prophylaxis, cont sq Lovenox  Lab pending       DISCHARGE PLANNING:  Tentative Discharge Date:     No future appointments.    Anirudh Rowley MD  Department of Physical Medicine & Rehab  [facility]

## 2017-11-01 NOTE — PT/OT/SLP PROGRESS
Occupational Therapy  Treatment    Zaira Jensen   MRN: [id number]   Admitting Diagnosis: <principal problem not specified>    OT Date of Treatment: 11/01/17   Total Time (min): 90 min      Billable Minutes:  Self Care/Home Management 30 and Therapeutic Activity 60  Total Minutes: 90    General Precautions: Standard, fall  Orthopedic Precautions: N/A  Braces: Cervical collar    Do you have any cultural, spiritual, Sabianist conflicts, given your current situation?: None    Subjective:  Communicated with nurse prior to session.    Pain/Comfort  Pain Rating 1: 0/10  Pain Rating Post-Intervention 1: 0/10    Objective:  Patient found with: cervical collar    Functional Mobility:  Bed Mobility:   Supine to sit: Activity did not occur   Sit to supine: Activity did not occur   Rolling: Activity did not occur   Scooting: Modified Independent    Transfer Training:   Toilet Transfer:  Pt Stand Pivot with Supervision or Set-up Assistance with Grab bars with bsc over toilet.  Patient performed shower transfer Stand Pivot with Supervision or Set-up Assistance with Grab bars.      Toilet Training:  Pt performed toileting with Minimal Assistance to manage clothing (dress), pt able to complete hygiene from front and manage underwear.    Balance:   Static Sit: NORMAL: No deviations seen in posture held statically  Dynamic Sit:  NORMAL: No deviations seen in posture held dynamically  Static Stand: GOOD: Takes MODERATE challenges from all directions  Dynamic stand: GOOD: Needs SUPERVISION only during gait and able to self right with moderate     Additional Treatment:  Session included family training for progress made so far. Parents present and observed pt transferring, ambulating and toileting at levels mentioned above. Patient to gym to work on fine motor activities: in hand manipulation of small objects, supination and pronation activities.    Patient left up in chair with PT    ASSESSMENT:  Zaira Jensen is a 42 y.o. female with  a medical diagnosis of <principal problem not specified> and presents with impaired ADL, mobility and endurance.    Rehab potential is good    Activity tolerance: Good    Discharge recommendations: home     Equipment recommendations:  (TBD)     GOALS:    Occupational Therapy Goals        Problem: Occupational Therapy Goal    Goal Priority Disciplines Outcome Interventions   Occupational Therapy Goal     OT, PT/OT Ongoing (interventions implemented as appropriate)    Description:  Goals to be met by: 11/16/17     Patient will increase functional independence with ADLs by performing:    Feeding with Contact Guard Assistance.  UE Dressing with Minimal Assistance.  LE Dressing with Minimal Assistance.  Grooming while seated with Minimal Assistance.  Toileting from toilet with Moderate Assistance for hygiene and clothing management.   Bathing from most appropriate location with Minimal Assistance.  Toilet transfer to toilet with Minimal Assistance.                       Plan:  Patient to be seen 6 x/week to address the above listed problems via self-care/home management, community/work re-entry, therapeutic activities, therapeutic exercises, therapeutic groups, neuromuscular re-education, wheelchair management/training, sensory integration  Plan of Care expires: 11/16/17  Plan of Care reviewed with: patient         BREANA Recinos  11/01/2017

## 2017-11-01 NOTE — PLAN OF CARE
Problem: Patient Care Overview  Goal: Plan of Care Review  Outcome: Ongoing (interventions implemented as appropriate)  Patient awake/alert.No c/o pain this shift. Generalized weakness noted. Free from falls. Plan of care continued

## 2017-11-02 LAB
ALBUMIN SERPL BCP-MCNC: 3.5 G/DL
ALP SERPL-CCNC: 48 U/L
ALT SERPL W/O P-5'-P-CCNC: 15 U/L
ANION GAP SERPL CALC-SCNC: 11 MMOL/L
AST SERPL-CCNC: 17 U/L
BASOPHILS # BLD AUTO: 0 K/UL
BASOPHILS NFR BLD: 0.6 %
BILIRUB SERPL-MCNC: 0.4 MG/DL
BUN SERPL-MCNC: 10 MG/DL
CALCIUM SERPL-MCNC: 9.7 MG/DL
CHLORIDE SERPL-SCNC: 104 MMOL/L
CO2 SERPL-SCNC: 24 MMOL/L
CREAT SERPL-MCNC: 0.8 MG/DL
DIFFERENTIAL METHOD: ABNORMAL
EOSINOPHIL # BLD AUTO: 0.1 K/UL
EOSINOPHIL NFR BLD: 3.5 %
ERYTHROCYTE [DISTWIDTH] IN BLOOD BY AUTOMATED COUNT: 14.4 %
EST. GFR  (AFRICAN AMERICAN): >60 ML/MIN/1.73 M^2
EST. GFR  (NON AFRICAN AMERICAN): >60 ML/MIN/1.73 M^2
GLUCOSE SERPL-MCNC: 83 MG/DL
HCT VFR BLD AUTO: 35.6 %
HGB BLD-MCNC: 12.2 G/DL
LYMPHOCYTES # BLD AUTO: 1.2 K/UL
LYMPHOCYTES NFR BLD: 29 %
MCH RBC QN AUTO: 30.9 PG
MCHC RBC AUTO-ENTMCNC: 34.4 G/DL
MCV RBC AUTO: 90 FL
MONOCYTES # BLD AUTO: 0.3 K/UL
MONOCYTES NFR BLD: 7.6 %
NEUTROPHILS # BLD AUTO: 2.5 K/UL
NEUTROPHILS NFR BLD: 59.3 %
PLATELET # BLD AUTO: 293 K/UL
PMV BLD AUTO: 7.7 FL
POTASSIUM SERPL-SCNC: 4 MMOL/L
PROT SERPL-MCNC: 7.8 G/DL
RBC # BLD AUTO: 3.96 M/UL
SODIUM SERPL-SCNC: 139 MMOL/L
WBC # BLD AUTO: 4.1 K/UL

## 2017-11-02 PROCEDURE — 97542 WHEELCHAIR MNGMENT TRAINING: CPT

## 2017-11-02 PROCEDURE — 12800000 HC REHAB SEMI-PRIVATE ROOM

## 2017-11-02 PROCEDURE — 25000003 PHARM REV CODE 250: Performed by: INTERNAL MEDICINE

## 2017-11-02 PROCEDURE — 97110 THERAPEUTIC EXERCISES: CPT

## 2017-11-02 PROCEDURE — 97530 THERAPEUTIC ACTIVITIES: CPT

## 2017-11-02 PROCEDURE — 97535 SELF CARE MNGMENT TRAINING: CPT

## 2017-11-02 PROCEDURE — 36415 COLL VENOUS BLD VENIPUNCTURE: CPT

## 2017-11-02 PROCEDURE — 80053 COMPREHEN METABOLIC PANEL: CPT

## 2017-11-02 PROCEDURE — 97116 GAIT TRAINING THERAPY: CPT

## 2017-11-02 PROCEDURE — 85025 COMPLETE CBC W/AUTO DIFF WBC: CPT

## 2017-11-02 RX ADMIN — Medication 1 TABLET: at 08:11

## 2017-11-02 RX ADMIN — DOCUSATE SODIUM 100 MG: 100 CAPSULE, LIQUID FILLED ORAL at 08:11

## 2017-11-02 NOTE — PT/OT/SLP PROGRESS
Occupational Therapy  Treatment    Zaira Jensen   MRN: [id number]   Admitting Diagnosis: cervical myelomalacia s/p C5-C6 ACDF    OT Date of Treatment: 11/02/17   Total Time (min): 90 min      Billable Minutes:  Self Care/Home Management 20 and Therapeutic Activity 70  Total Minutes: 90    General Precautions: Standard, fall    Do you have any cultural, spiritual, Quaker conflicts, given your current situation?: None    Subjective:  Communicated with nursing prior to session.    Pain/Comfort  Pain Rating 1: 0/10    Objective:   Educated patient on BUE fine / gross motor activities to improve control and coordination:  Graded clothes pins, garfield peg board.  Forearm strengthening done via yellow flex bar for 200 reps total.  Patient was assisted in transfer to the toilet at end of session, she was left with nursing staff.      Transfer Training:   Patient tub bench transfer Stand Pivot with Stand-by Assistance with Grab bars.    Toilet Training:  Pt performed toileting with Stand-by Assistance with Grab  bar and Drop arm commode at toilet with RN present..      Patient left up in chair with all lines intact, call button in reach, chair alarm on and nursing notified    ASSESSMENT:  Zaira Jensen is a 42 y.o. female with a medical diagnosis of cervical myelomalacia s/p C5-C6 ACDF .    Rehab potential is good    Activity tolerance: Good      GOALS:    Occupational Therapy Goals        Problem: Occupational Therapy Goal    Goal Priority Disciplines Outcome Interventions   Occupational Therapy Goal     OT, PT/OT Ongoing (interventions implemented as appropriate)    Description:  Goals to be met by: 11/16/17     Patient will increase functional independence with ADLs by performing:    Feeding with Contact Guard Assistance.  UE Dressing with Minimal Assistance.  LE Dressing with Minimal Assistance.  Grooming while seated with Minimal Assistance.  Toileting from toilet with Moderate Assistance for hygiene and clothing  management.   Bathing from most appropriate location with Minimal Assistance.  Toilet transfer to toilet with Minimal Assistance.                       Plan:   Cont POC.  Patient to be seen 6 x/week to address the above listed problems via self-care/home management, community/work re-entry, therapeutic activities, therapeutic exercises, therapeutic groups, neuromuscular re-education, wheelchair management/training, sensory integration  Plan of Care expires: 11/16/17  Plan of Care reviewed with: patient         Brian Tiffany, CLAYTON  11/02/2017

## 2017-11-02 NOTE — PLAN OF CARE
Problem: Patient Care Overview  Goal: Plan of Care Review  Outcome: Ongoing (interventions implemented as appropriate)  Patient awake/alert. No c/o pain noted this shift. Generalized weakness present.Remains continent,. Free from falls. Plan of care continued

## 2017-11-02 NOTE — PT/OT/SLP PROGRESS
Physical Therapy         Treatment        Zaira Jensen   MRN: [id number]     PT Received On: 11/02/17  Total Time (min): 90 (1115 - 1210/ 1330- 1405)        Billable Minutes:  Gait [id number], Therapeutic Activity 20min, Therapeutic Exercise 20min and Train/Wheelchair Management 25min  Total Minutes: 90min    Treatment Type: Treatment  PT/PTA: PTA     PTA Visit Number: 3       General Precautions: Standard, fall  Orthopedic Precautions: Orthopedic Precautions : N/A   Braces: Braces: Cervical collar         Subjective:  Communicated with nurse Villeda prior to session.    Pain/Comfort  Pain Rating 1: 0/10    Objective:  Patient found seated in chair, with Patient found with: cervical collar    Functional Mobility:  Bed Mobility:   Supine to sit:    Sit to supine:    Rolling:    Scooting:     Balance:   Static Sit: FAIR+: Able to take MINIMAL challenges from all directions  Dynamic Sit:  FAIR+: Maintains balance through MINIMAL excursions of active trunk motion  Static Stand: FAIR: Maintains without assist but unable to take challenges  Dynamic stand: FAIR: Needs CONTACT GUARD during gait    Transfer Training:  Sit to stand:Contact Guard Assistance with Rolling Walker 3 trials    Wheelchair Training:  Pt propelled Standard wheelchair x 30' feet on Level tile with  Bilateral upper extremity and Bilateral lower extremity with Minimal Assistance.  x 3 trials.    Gait Training:  Ambulated 300' x 2 trials with CG / SBA . Verbal cues for posture and to increase step length.    Stair Training:        Additional Treatment:  LE  Therapeutic exercises seated EOM : TKE's, Hip abd /add/ flexion, AP's. 2 sets 20 reps each.   Scooting side to side and forward and back with using UE's for support.    Activity Tolerance:  Patient tolerated treatment well    Patient left up in chair with call button in reach, chair alarm on and nurse Villeda notified.    Assessment:  Zaira Jensen is a 42 y.o. female with a medical diagnosis of  <principal problem not specified>. She presents with weakness, limited UE ROM.    Rehab potential is good.    Activity tolerance: Good    Discharge recommendations: Discharge Facility/Level Of Care Needs: home     Equipment recommendations:       GOALS:    Physical Therapy Goals        Problem: Physical Therapy Goal    Goal Priority Disciplines Outcome Goal Variances Interventions   Physical Therapy Goal     PT/OT, PT Ongoing (interventions implemented as appropriate)     Description:  Goals to be met by: 2017     Patient will increase functional independence with mobility by performin). Supine to sit with Stand-by Assistance  2). Sit to supine with Stand-by Assistance  3). Rolling to Left and Right with Stand-by Assistance.  4). Sit to stand transfer with Minimal Assistance = MET  5). Gait  x  > 25 feet with Contact Guard Assistance using Rolling Walker = MET  6). Wheelchair propulsion x > 150 feet with Stand-by Assistance      NEW 10/18/2017:    7). Gait  x  > 25 feet with Stand-by Assistance using Rolling Walker or Rollator    NEW 10/23/2017:  8). Sit to stand transfer with Contact Guard Assistance             Problem: Physical Therapy Goal    Goal Priority Disciplines Outcome Goal Variances Interventions   Physical Therapy Goal     PT/OT, PT Ongoing (interventions implemented as appropriate)                     PLAN:    Patient to be seen daily  to address the above listed problems via gait training, therapeutic activities, therapeutic exercises, wheelchair management/training  Plan of Care expires: 17  Plan of Care reviewed with: patient         Rody Murphy, PTA 2017

## 2017-11-02 NOTE — PROGRESS NOTES
[facility]  Physical Medicine & Rehab  Progress Note    Patient Name: Zaira Jensen  MRN: [id number]  Patient Class: IP- Rehab   Admission Date: 10/5/2017  Length of Stay: 28 days  Attending Physician: Anirudh Rowley MD  Primary Care Provider: Cuate Alvarez MD    Subjective:     Principal Problem: quadriparesis    Interval History: pt is 42 y.o female s/p ACDF, quadriparesis, participating with therapy & making progress      Scheduled Medications:    docusate sodium  100 mg Oral Daily    folic acid-vit B6-vit B12 2.5-25-2 mg  1 tablet Oral Daily       PRN Medications: acetaminophen, aluminum-magnesium hydroxide-simethicone, bisacodyl, bisacodyl, calcium carbonate, lactulose, loratadine-pseudoephedrine 5-120 mg, ondansetron, ramelteon, senna-docusate 8.6-50 mg, traMADol    Review of Systems   Constitutional: Negative.    HENT: Negative.    Eyes: Negative.    Respiratory: Negative.    Cardiovascular: Negative.    Gastrointestinal: Negative.    Endocrine: Negative.    Genitourinary: Negative.    Musculoskeletal: Negative.    Skin: Negative.    Allergic/Immunologic: Negative.    Neurological: Negative.    Hematological: Negative.    Psychiatric/Behavioral: Negative.      Objective:     Vital Signs (Most Recent):  Temp: 97.2 °F (36.2 °C) (11/02/17 0745)  Pulse: 100 (11/02/17 0745)  Resp: 17 (11/02/17 0745)  BP: 115/77 (11/02/17 0745)  SpO2: 99 % (11/02/17 0745)    Vital Signs (24h Range):  Temp:  [97.1 °F (36.2 °C)-97.8 °F (36.6 °C)] 97.2 °F (36.2 °C)  Pulse:  [] 100  Resp:  [17-18] 17  SpO2:  [97 %-99 %] 99 %  BP: (100-133)/(50-89) 115/77     Physical Exam   Constitutional: She is oriented to person, place, and time. She appears well-developed and well-nourished. No distress.   HENT:   Head: Normocephalic and atraumatic.   Right Ear: External ear normal.   Left Ear: External ear normal.   Nose: Nose normal.   Mouth/Throat: Oropharynx is clear and moist. No oropharyngeal exudate.   Eyes:  Conjunctivae and EOM are normal. Pupils are equal, round, and reactive to light. Right eye exhibits no discharge. Left eye exhibits no discharge. No scleral icterus.   Neck: Normal range of motion. Neck supple. No JVD present. No tracheal deviation present. No thyromegaly present.   Cardiovascular: Normal rate, regular rhythm, normal heart sounds and intact distal pulses.  Exam reveals no gallop and no friction rub.    No murmur heard.  Pulmonary/Chest: Effort normal and breath sounds normal. No stridor. No respiratory distress. She has no wheezes. She has no rales. She exhibits no tenderness.   Abdominal: Soft. Bowel sounds are normal. She exhibits no distension and no mass. There is no tenderness. There is no rebound and no guarding. No hernia.   Genitourinary:   Genitourinary Comments: deferred   Musculoskeletal: Normal range of motion. She exhibits no edema, tenderness or deformity.   Lymphadenopathy:     She has no cervical adenopathy.   Neurological: She is alert and oriented to person, place, and time. No cranial nerve deficit or sensory deficit. She exhibits normal muscle tone.   Skin: No rash noted. She is not diaphoretic. No erythema. No pallor.   Psychiatric: She has a normal mood and affect. Her behavior is normal. Judgment and thought content normal.     NEUROLOGICAL EXAMINATION:     MENTAL STATUS   Oriented to person, place, and time.     CRANIAL NERVES     CN III, IV, VI   Pupils are equal, round, and reactive to light.  Extraocular motions are normal.       Assessment/Plan:      Zaira Jensen is a 42 y.o. female admitted to inpatient rehabilitation on 10/5/2017 for <principal problem not specified> with impaired mobility and ADLs. Patient remains appropriate for PT, OT, and as required Speech therapy. Patient continues to require 24 hour nursing care as well as daily Physician assessment.    Active Diagnoses:    Diagnosis Date Noted POA    Obesity [E66.9] 10/13/2017 Yes    Quadriplegia, C5-C7  complete [G82.53] 10/05/2017 Yes      Problems Resolved During this Admission:    Diagnosis Date Noted Date Resolved POA     S/p ACDF, quadriparesis, cont PT, OT  Pain, managed with current meds  DVT prophylaxis, cont SCD    DISCHARGE PLANNING:  Tentative Discharge Date:     No future appointments.    Anirudh Rowley MD  Department of Physical Medicine & Rehab  [facility]

## 2017-11-02 NOTE — PLAN OF CARE
Problem: Physical Therapy Goal  Goal: Physical Therapy Goal  Goals to be met by: 2017     Patient will increase functional independence with mobility by performin). Supine to sit with Stand-by Assistance  2). Sit to supine with Stand-by Assistance  3). Rolling to Left and Right with Stand-by Assistance.  4). Sit to stand transfer with Minimal Assistance = MET  5). Gait  x  > 25 feet with Contact Guard Assistance using Rolling Walker = MET  6). Wheelchair propulsion x > 150 feet with Stand-by Assistance      NEW 10/18/2017:    7). Gait  x  > 25 feet with Stand-by Assistance using Rolling Walker or Rollator    NEW 10/23/2017:  8). Sit to stand transfer with Contact Guard Assistance     Outcome: Ongoing (interventions implemented as appropriate)  Ambulated 300' with rollator and CGA. LE therapeutic exercises and w/c propulsion.

## 2017-11-03 PROCEDURE — 97110 THERAPEUTIC EXERCISES: CPT

## 2017-11-03 PROCEDURE — 97116 GAIT TRAINING THERAPY: CPT

## 2017-11-03 PROCEDURE — 12800000 HC REHAB SEMI-PRIVATE ROOM

## 2017-11-03 PROCEDURE — 25000003 PHARM REV CODE 250: Performed by: PHYSICAL MEDICINE & REHABILITATION

## 2017-11-03 PROCEDURE — 25000003 PHARM REV CODE 250: Performed by: INTERNAL MEDICINE

## 2017-11-03 RX ORDER — SYRING-NEEDL,DISP,INSUL,0.3 ML 29 G X1/2"
148 SYRINGE, EMPTY DISPOSABLE MISCELLANEOUS ONCE AS NEEDED
Status: ACTIVE | OUTPATIENT
Start: 2017-11-03 | End: 2017-11-03

## 2017-11-03 RX ORDER — SYRING-NEEDL,DISP,INSUL,0.3 ML 29 G X1/2"
148 SYRINGE, EMPTY DISPOSABLE MISCELLANEOUS ONCE
Status: COMPLETED | OUTPATIENT
Start: 2017-11-03 | End: 2017-11-03

## 2017-11-03 RX ADMIN — MAGNESIUM CITRATE 148 ML: 1.75 LIQUID ORAL at 11:11

## 2017-11-03 RX ADMIN — TRAMADOL HYDROCHLORIDE 50 MG: 50 TABLET, FILM COATED ORAL at 08:11

## 2017-11-03 RX ADMIN — Medication 1 TABLET: at 08:11

## 2017-11-03 RX ADMIN — DOCUSATE SODIUM 100 MG: 100 CAPSULE, LIQUID FILLED ORAL at 08:11

## 2017-11-03 NOTE — PT/OT/SLP PROGRESS
Occupational Therapy  Treatment    Zaira Jensen   MRN: [id number]   Admitting Diagnosis: <principal problem not specified>    OT Date of Treatment: 11/03/17   Total Time (min): 90 min      Billable Minutes:  Therapeutic Exercise 90  Total Minutes: 90    General Precautions: Standard, fall  Orthopedic Precautions: N/A  Braces: Cervical collar    Do you have any cultural, spiritual, Mu-ism conflicts, given your current situation?: None    Subjective:  Communicated with nurse (zohra) prior to session.  Pt agreeable to OT today.    Pain/Comfort  Pain Rating 1: 0/10    Objective:  Patient found with: cervical collar      Balance:   Static Sit: NORMAL: No deviations seen in posture held statically  Dynamic Sit:  NORMAL: No deviations seen in posture held dynamically    Additional Treatment:  Pt's session included the following while seated in wheelchair:    Bilateral fine motor resistive activities with low grade therapy band     Reach-in place activities requiring B UE shoulder flexion, shoulder adduction, and crossing midline.    Gross motor activities requiring B UE shoulder protraction and retraction (pierre - 3s of 10 with no weight; 3s of 10 with 4 lbs added).    Patient left in wheelchair with neck brace and  call button in reach.    ASSESSMENT:  Zaira Jensen is a 42 y.o. female with a medical diagnosis of <principal problem not specified> and presents with impaired B ROM, strength, and endurance. Pt was very cooperative and positive throughout session. Pt was able to tolerate increased weight during B UE gross motor exercises. However, pt's decreased dexterity limits her engagement in fine motor activities. Pt would continue to benefit from skilled OT services to address ROM, strength, and endurance to maximize independence in ADLs.    Rehab potential is good    Activity tolerance: Fair    Discharge recommendations: home     Equipment recommendations:  (TBD)     GOALS:    Occupational Therapy Goals         Problem: Occupational Therapy Goal    Goal Priority Disciplines Outcome Interventions   Occupational Therapy Goal     OT, PT/OT Ongoing (interventions implemented as appropriate)    Description:  Goals to be met by: 11/16/17     Patient will increase functional independence with ADLs by performing:    Feeding with Contact Guard Assistance.  UE Dressing with Minimal Assistance.  LE Dressing with Minimal Assistance.  Grooming while seated with Minimal Assistance.  Toileting from toilet with Moderate Assistance for hygiene and clothing management.   Bathing from most appropriate location with Minimal Assistance.  Toilet transfer to toilet with Minimal Assistance.                       Plan:  Patient to be seen 6 x/week to address the above listed problems via self-care/home management, therapeutic activities, therapeutic exercises, wheelchair management/training, neuromuscular re-education, sensory integration  Plan of Care expires: 11/16/17  Plan of Care reviewed with: patient         Jaydon MELENDREZ Neo, OT  11/03/2017

## 2017-11-03 NOTE — PT/OT/SLP PROGRESS
"Physical Therapy         Treatment        Zaira Jensen   MRN: [id number]     PT Received On: 17  Total Time (min): 90        Billable Minutes:  Gait Training 25 and Therapeutic Exercise 65  Total Minutes: 90    Treatment Type: Treatment  PT/PTA: PT     PTA Visit Number: 0       General Precautions: Standard, fall  Orthopedic Precautions: Orthopedic Precautions : N/A   Braces: Braces: Cervical collar (in place throughout session)       Subjective: "Will I be walking without the walker before I leave here?"     Objective:  Patient found seated in w/c, pleasant and cooperative.  Patient found with: cervical collar    Functional Mobility:  Bed Mobility: n/p    Transfer Training:  Sit to stand:Minimal Assistance with Rolling Walker and // bars x5    Wheelchair Training: n/p    Gait Trainin'; 400' with 4ww with CG/SBA and cues; pt able to increase orquidea for 50'    Stair Training: n/p      Additional Treatment:  STANDING: in // bars: ankle DF, ankle PF (on slope); mini squats, with 3# at ankles: sidekicks, high marching, knee flexion, hip extension, step ups on 6" step, x 20 reps each    Sci[employer] StepOne: L 1.0 x 15 min, LEs only    Activity Tolerance:  Patient tolerated treatment well and Patient limited by fatigue    Patient left up in chair with call button in reach and chair alarm on.    Assessment:  Zaira Jensen is a 42 y.o. female with a medical diagnosis of . Pt able to increase gait speed when asked. Pt also increased rate of step ups. Patient remains appropriate for PT POC.        Rehab potential is fair.    Activity tolerance: Good    Discharge recommendations: Discharge Facility/Level Of Care Needs:  (Today, pt's cousin inquired about Assisted Living Facilities;  met with pt's cousin during pt's PT session.)     Equipment recommendations: Equipment Needed After Discharge:  (TBD)     GOALS:    Physical Therapy Goals        Problem: Physical Therapy Goal    Goal Priority Disciplines " Outcome Goal Variances Interventions   Physical Therapy Goal     PT/OT, PT Ongoing (interventions implemented as appropriate)     Description:  Goals to be met by: 2017     Patient will increase functional independence with mobility by performin). Supine to sit with Stand-by Assistance  2). Sit to supine with Stand-by Assistance  3). Rolling to Left and Right with Stand-by Assistance.  4). Sit to stand transfer with Minimal Assistance = MET  5). Gait  x  > 25 feet with Contact Guard Assistance using Rolling Walker = MET  6). Wheelchair propulsion x > 150 feet with Stand-by Assistance      NEW 10/18/2017:    7). Gait  x  > 25 feet with Stand-by Assistance using Rolling Walker or Rollator    NEW 10/23/2017:  8). Sit to stand transfer with Contact Guard Assistance             Problem: Physical Therapy Goal    Goal Priority Disciplines Outcome Goal Variances Interventions   Physical Therapy Goal     PT/OT, PT Ongoing (interventions implemented as appropriate)                     PLAN:    Patient to be seen daily  to address the above listed problems via gait training, therapeutic activities, therapeutic exercises, neuromuscular re-education, wheelchair management/training  Plan of Care expires: 17  Plan of Care reviewed with: patient         Richie VANESSA Lemus, PT 11/3/2017

## 2017-11-03 NOTE — PLAN OF CARE
Problem: Occupational Therapy Goal  Goal: Occupational Therapy Goal  Goals to be met by: 11/16/17     Patient will increase functional independence with ADLs by performing:    Feeding with Contact Guard Assistance.  UE Dressing with Minimal Assistance.  LE Dressing with Minimal Assistance.  Grooming while seated with Minimal Assistance.  Toileting from toilet with Moderate Assistance for hygiene and clothing management.   Bathing from most appropriate location with Minimal Assistance.  Toilet transfer to toilet with Minimal Assistance.      Outcome: Ongoing (interventions implemented as appropriate)  Goals are appropriate. Progressing towards goals.

## 2017-11-03 NOTE — PLAN OF CARE
Problem: Patient Care Overview  Goal: Plan of Care Review  Outcome: Ongoing (interventions implemented as appropriate)  Patient awake/alert. No c/o pain or discomfort noted this shift. Generalized weakness present. Free from falls. Plan of care continued

## 2017-11-03 NOTE — PROGRESS NOTES
[facility]  Physical Medicine & Rehab  Progress Note    Patient Name: Zaira Jensen  MRN: [id number]  Patient Class: IP- Rehab   Admission Date: 10/5/2017  Length of Stay: 29 days  Attending Physician: Anirudh Rowley MD  Primary Care Provider: Cuate Alvarez MD    Subjective:     Principal Problem:  quadriparesis   Interval History: pt is 42 y.o female s/p ACDF , quadriparesis, participating with therapy & making progress      Scheduled Medications:    docusate sodium  100 mg Oral Daily    folic acid-vit B6-vit B12 2.5-25-2 mg  1 tablet Oral Daily       PRN Medications: acetaminophen, aluminum-magnesium hydroxide-simethicone, bisacodyl, bisacodyl, calcium carbonate, lactulose, loratadine-pseudoephedrine 5-120 mg, ondansetron, ramelteon, senna-docusate 8.6-50 mg, traMADol    Review of Systems   Constitutional: Negative.    HENT: Negative.    Eyes: Negative.    Respiratory: Negative.    Cardiovascular: Negative.    Gastrointestinal: Negative.    Endocrine: Negative.    Genitourinary: Negative.    Musculoskeletal: Negative.    Skin: Negative.    Allergic/Immunologic: Negative.    Neurological: Negative.    Hematological: Negative.    Psychiatric/Behavioral: Negative.      Objective:     Vital Signs (Most Recent):  Temp: 97.7 °F (36.5 °C) (11/03/17 0441)  Pulse: 83 (11/03/17 0441)  Resp: 18 (11/03/17 0441)  BP: (!) 105/56 (11/03/17 0441)  SpO2: 97 % (11/03/17 0441)    Vital Signs (24h Range):  Temp:  [97.5 °F (36.4 °C)-97.7 °F (36.5 °C)] 97.7 °F (36.5 °C)  Pulse:  [83-95] 83  Resp:  [18] 18  SpO2:  [97 %-99 %] 97 %  BP: (105-109)/(56-58) 105/56     Physical Exam   Constitutional: She is oriented to person, place, and time. She appears well-developed and well-nourished. No distress.   HENT:   Head: Normocephalic and atraumatic.   Right Ear: External ear normal.   Left Ear: External ear normal.   Nose: Nose normal.   Mouth/Throat: Oropharynx is clear and moist. No oropharyngeal exudate.   Eyes:  Conjunctivae and EOM are normal. Pupils are equal, round, and reactive to light. Right eye exhibits no discharge. Left eye exhibits no discharge. No scleral icterus.   Neck: Normal range of motion. Neck supple. No JVD present. No tracheal deviation present. No thyromegaly present.   Cardiovascular: Normal rate, regular rhythm, normal heart sounds and intact distal pulses.  Exam reveals no gallop and no friction rub.    No murmur heard.  Pulmonary/Chest: Effort normal and breath sounds normal. No stridor. No respiratory distress. She has no wheezes. She has no rales. She exhibits no tenderness.   Abdominal: Soft. Bowel sounds are normal. She exhibits no distension and no mass. There is no tenderness. There is no rebound and no guarding. No hernia.   Genitourinary:   Genitourinary Comments: deferred   Musculoskeletal: Normal range of motion. She exhibits no edema, tenderness or deformity.   Lymphadenopathy:     She has no cervical adenopathy.   Neurological: She is alert and oriented to person, place, and time. She exhibits normal muscle tone.   Skin: No rash noted. She is not diaphoretic. No erythema. No pallor.   Psychiatric: She has a normal mood and affect. Her behavior is normal. Thought content normal.     NEUROLOGICAL EXAMINATION:     MENTAL STATUS   Oriented to person, place, and time.     CRANIAL NERVES     CN III, IV, VI   Pupils are equal, round, and reactive to light.  Extraocular motions are normal.       Assessment/Plan:      Zaira Jensen is a 42 y.o. female admitted to inpatient rehabilitation on 10/5/2017 for <principal problem not specified> with impaired mobility and ADLs. Patient remains appropriate for PT, OT, and as required Speech therapy. Patient continues to require 24 hour nursing care as well as daily Physician assessment.    Active Diagnoses:    Diagnosis Date Noted POA    Obesity [E66.9] 10/13/2017 Yes    Quadriplegia, C5-C7 complete [G82.53] 10/05/2017 Yes      Problems Resolved  During this Admission:    Diagnosis Date Noted Date Resolved POA     Quadriparesis, cont PT, OT  Pain, managed with current meds  DVT prophylaxis, cont SCD    DISCHARGE PLANNING:  Tentative Discharge Date:     No future appointments.    Anirudh Rowley MD  Department of Physical Medicine & Rehab  [facility]

## 2017-11-04 PROCEDURE — 97116 GAIT TRAINING THERAPY: CPT

## 2017-11-04 PROCEDURE — 12800000 HC REHAB SEMI-PRIVATE ROOM

## 2017-11-04 PROCEDURE — 25000003 PHARM REV CODE 250: Performed by: INTERNAL MEDICINE

## 2017-11-04 RX ADMIN — Medication 1 TABLET: at 08:11

## 2017-11-04 RX ADMIN — DOCUSATE SODIUM 100 MG: 100 CAPSULE, LIQUID FILLED ORAL at 08:11

## 2017-11-04 NOTE — PT/OT/SLP PROGRESS
"Physical Therapy         Treatment        Zaira Jensen   MRN: [id number]     PT Received On: 17  Total Time (min): 40        Billable Minutes:  Gait [id number]  Total Minutes: 40    Treatment Type: Treatment  PT/PTA: PT     PTA Visit Number: 0       General Precautions: Standard, fall  Orthopedic Precautions: Orthopedic Precautions : N/A   Braces: Braces: Cervical collar (in place throughout session)     Subjective:  Communicated with patient prior to session.    Objective:  Patient found seated in w/c, pleasant and cooperative. Patient found with: cervical collar    Functional Mobility:    Transfer Training:  Sit to stand:Stand-by Assistance and Contact Guard Assistance with 4 wheeled walker  x 3    Gait Trainin' x 1, 150' x 2 with 4ww with CG/SBA and cues (including outside on concrete, mild ramp with CGA)    Stair Training: ascend/descend 64 6" steps with rails with CGA and cues    Ascend/descend 4" curb with 4ww with CGA and cues    Activity Tolerance:  Patient tolerated treatment well and but very apprehensive re gait outside    Patient left up in chair with call button in reach.    Assessment:  Zaira Jensen is a 42 y.o. female.    Rehab potential is good.    Activity tolerance: Good but moderately fatigued post rx      Discharge recommendations: Discharge Facility/Level Of Care Needs:  (Today, pt's cousin inquired about Assisted Living Facilities;  met with pt's cousin during pt's PT session.)     Equipment recommendations: Equipment Needed After Discharge:  (TBD)     GOALS:    Physical Therapy Goals        Problem: Physical Therapy Goal    Goal Priority Disciplines Outcome Goal Variances Interventions   Physical Therapy Goal     PT/OT, PT Ongoing (interventions implemented as appropriate)     Description:  Goals to be met by: 2017     Patient will increase functional independence with mobility by performin). Supine to sit with Stand-by Assistance  2). Sit to supine " with Stand-by Assistance  3). Rolling to Left and Right with Stand-by Assistance.  4). Sit to stand transfer with Minimal Assistance = MET  5). Gait  x  > 25 feet with Contact Guard Assistance using Rolling Walker = MET  6). Wheelchair propulsion x > 150 feet with Stand-by Assistance      NEW 10/18/2017:    7). Gait  x  > 25 feet with Stand-by Assistance using Rolling Walker or Rollator    NEW 10/23/2017:  8). Sit to stand transfer with Contact Guard Assistance             Problem: Physical Therapy Goal    Goal Priority Disciplines Outcome Goal Variances Interventions   Physical Therapy Goal     PT/OT, PT Ongoing (interventions implemented as appropriate)                     PLAN:    Patient to be seen daily  to address the above listed problems via gait training, therapeutic activities, therapeutic exercises, neuromuscular re-education, wheelchair management/training  Plan of Care expires: 11/16/17  Plan of Care reviewed with: patient         Richie T Allan, PT 11/4/2017

## 2017-11-04 NOTE — PLAN OF CARE
Problem: Patient Care Overview  Goal: Plan of Care Review  Outcome: Ongoing (interventions implemented as appropriate)  Patient improving daily with her current plan of care.

## 2017-11-04 NOTE — PLAN OF CARE
Problem: Patient Care Overview  Goal: Plan of Care Review  Med teaching ongoing, fall prevention ongoing. Transfers one person min assist, cont of c/c. Continues with fine motor tremors at times while doing bathroom task. Safety maintained

## 2017-11-05 PROCEDURE — 97116 GAIT TRAINING THERAPY: CPT

## 2017-11-05 PROCEDURE — 12800000 HC REHAB SEMI-PRIVATE ROOM

## 2017-11-05 PROCEDURE — 25000003 PHARM REV CODE 250: Performed by: PHYSICAL MEDICINE & REHABILITATION

## 2017-11-05 PROCEDURE — 25000003 PHARM REV CODE 250: Performed by: INTERNAL MEDICINE

## 2017-11-05 RX ADMIN — DOCUSATE SODIUM 100 MG: 100 CAPSULE, LIQUID FILLED ORAL at 08:11

## 2017-11-05 RX ADMIN — Medication 1 TABLET: at 08:11

## 2017-11-05 RX ADMIN — TRAMADOL HYDROCHLORIDE 50 MG: 50 TABLET, FILM COATED ORAL at 08:11

## 2017-11-05 NOTE — PLAN OF CARE
Problem: Patient Care Overview  Goal: Plan of Care Review  Outcome: Ongoing (interventions implemented as appropriate)  Patient improving daily with her current Plan of Care, will continue to monitor and observe.

## 2017-11-05 NOTE — PT/OT/SLP PROGRESS
"Physical Therapy         Treatment        Zaira Jensen   MRN: [id number]     PT Received On: 17  Total Time (min): 30        Billable Minutes:  Gait [id number]  Total Minutes: 30    Treatment Type: Treatment  PT/PTA: PT     PTA Visit Number: 0       General Precautions: Standard, fall  Orthopedic Precautions: Orthopedic Precautions : N/A   Braces: Braces: Cervical collar (in place throughout session)      Objective:  Patient found seated in w/c. Patient found with: cervical collar    Functional Mobility:  Transfer Training:  Sit to stand:Contact Guard Assistance with 4 wheeled walker  2    Gait Trainin' x 2 with 4ww with CG/SBA and cues (including outside on concrete, mild ramp)    Stair Training: ascend/descend 4" curb with 4ww with CGA and cues    Activity Tolerance:  Patient limited by fatigue    Patient left up in chair with call button in reach.    Assessment:  Zaira Jensen is a 42 y.o. female.    Rehab potential is good.    Activity tolerance: Fair    Equipment recommendations: Equipment Needed After Discharge:  (TBD)     GOALS:    Physical Therapy Goals        Problem: Physical Therapy Goal    Goal Priority Disciplines Outcome Goal Variances Interventions   Physical Therapy Goal     PT/OT, PT Ongoing (interventions implemented as appropriate)     Description:  Goals to be met by: 2017     Patient will increase functional independence with mobility by performin). Supine to sit with Stand-by Assistance  2). Sit to supine with Stand-by Assistance  3). Rolling to Left and Right with Stand-by Assistance.  4). Sit to stand transfer with Minimal Assistance = MET  5). Gait  x  > 25 feet with Contact Guard Assistance using Rolling Walker = MET  6). Wheelchair propulsion x > 150 feet with Stand-by Assistance      NEW 10/18/2017:    7). Gait  x  > 25 feet with Stand-by Assistance using Rolling Walker or Rollator    NEW 10/23/2017:  8). Sit to stand transfer with Contact Guard Assistance       "       Problem: Physical Therapy Goal    Goal Priority Disciplines Outcome Goal Variances Interventions   Physical Therapy Goal     PT/OT, PT Ongoing (interventions implemented as appropriate)                     PLAN:    Patient to be seen daily  to address the above listed problems via gait training, therapeutic activities, therapeutic exercises, neuromuscular re-education, wheelchair management/training  Plan of Care expires: 11/16/17  Plan of Care reviewed with: patient         Richie MENDEZ Allan, PT 11/5/2017

## 2017-11-05 NOTE — PROGRESS NOTES
[facility]  Physical Medicine & Rehab  Progress Note    Patient Name: Zaira Jensen  MRN: [id number]  Patient Class: IP- Rehab   Admission Date: 10/5/2017  Length of Stay: 30 days  Attending Physician: Anirudh Rowley MD  Primary Care Provider: Cuate Alvarez MD    Subjective:     Principal Problem: quadriparesis    Interval History: pt is 42 y.o female s/p ACDF, quadriparesis, participating with therapy & making progress    Scheduled Medications:    docusate sodium  100 mg Oral Daily    folic acid-vit B6-vit B12 2.5-25-2 mg  1 tablet Oral Daily       PRN Medications: acetaminophen, aluminum-magnesium hydroxide-simethicone, bisacodyl, bisacodyl, calcium carbonate, lactulose, loratadine-pseudoephedrine 5-120 mg, ondansetron, ramelteon, senna-docusate 8.6-50 mg, traMADol    Review of Systems   Constitutional: Negative.    HENT: Negative.    Eyes: Negative.    Respiratory: Negative.    Cardiovascular: Negative.    Gastrointestinal: Negative.    Endocrine: Negative.    Genitourinary: Negative.    Musculoskeletal: Negative.    Skin: Negative.    Allergic/Immunologic: Negative.    Neurological: Negative.    Hematological: Negative.    Psychiatric/Behavioral: Negative.      Objective:     Vital Signs (Most Recent):  Temp: 98.1 °F (36.7 °C) (11/04/17 1930)  Pulse: 96 (11/04/17 1930)  Resp: 20 (11/04/17 1930)  BP: 130/76 (11/04/17 1930)  SpO2: 100 % (11/04/17 1930)    Vital Signs (24h Range):  Temp:  [97.3 °F (36.3 °C)-98.1 °F (36.7 °C)] 98.1 °F (36.7 °C)  Pulse:  [] 96  Resp:  [16-20] 20  SpO2:  [98 %-100 %] 100 %  BP: ()/(62-76) 130/76     Physical Exam   Constitutional: She is oriented to person, place, and time. She appears well-developed and well-nourished. No distress.   HENT:   Head: Normocephalic and atraumatic.   Right Ear: External ear normal.   Left Ear: External ear normal.   Nose: Nose normal.   Mouth/Throat: Oropharynx is clear and moist. No oropharyngeal exudate.   Eyes:  Conjunctivae and EOM are normal. Pupils are equal, round, and reactive to light. Right eye exhibits no discharge. Left eye exhibits no discharge. No scleral icterus.   Neck: No JVD present. No tracheal deviation present. No thyromegaly present.   Cardiovascular: Normal rate, regular rhythm, normal heart sounds and intact distal pulses.  Exam reveals no gallop and no friction rub.    No murmur heard.  Pulmonary/Chest: Effort normal and breath sounds normal. No stridor. No respiratory distress. She has no wheezes. She has no rales. She exhibits no tenderness.   Abdominal: Soft. Bowel sounds are normal. She exhibits no distension and no mass. There is no tenderness. There is no rebound and no guarding. No hernia.   Genitourinary:   Genitourinary Comments: deferred   Musculoskeletal: Normal range of motion. She exhibits no edema, tenderness or deformity.   Lymphadenopathy:     She has no cervical adenopathy.   Neurological: She is alert and oriented to person, place, and time. No cranial nerve deficit or sensory deficit. She exhibits normal muscle tone.   Skin: No rash noted. She is not diaphoretic. No erythema. No pallor.   Psychiatric: She has a normal mood and affect. Her behavior is normal. Judgment and thought content normal.     NEUROLOGICAL EXAMINATION:     MENTAL STATUS   Oriented to person, place, and time.     CRANIAL NERVES     CN III, IV, VI   Pupils are equal, round, and reactive to light.  Extraocular motions are normal.       Assessment/Plan:      Zaira Jensen is a 42 y.o. female admitted to inpatient rehabilitation on 10/5/2017 for <principal problem not specified> with impaired mobility and ADLs. Patient remains appropriate for PT, OT, and as required Speech therapy. Patient continues to require 24 hour nursing care as well as daily Physician assessment.    Active Diagnoses:    Diagnosis Date Noted POA    Obesity [E66.9] 10/13/2017 Yes    Quadriplegia, C5-C7 complete [G82.53] 10/05/2017 Yes       Problems Resolved During this Admission:    Diagnosis Date Noted Date Resolved POA     S/p ACDF, quadriparesis, cont PT, OT  DVT prophylaxis, cont SCD    DISCHARGE PLANNING:  Tentative Discharge Date:     No future appointments.    Anirudh Rowley MD  Department of Physical Medicine & Rehab  [facility]

## 2017-11-06 PROCEDURE — 25000003 PHARM REV CODE 250: Performed by: INTERNAL MEDICINE

## 2017-11-06 PROCEDURE — 97116 GAIT TRAINING THERAPY: CPT

## 2017-11-06 PROCEDURE — 97535 SELF CARE MNGMENT TRAINING: CPT

## 2017-11-06 PROCEDURE — 12800000 HC REHAB SEMI-PRIVATE ROOM

## 2017-11-06 PROCEDURE — 97530 THERAPEUTIC ACTIVITIES: CPT

## 2017-11-06 PROCEDURE — 97110 THERAPEUTIC EXERCISES: CPT

## 2017-11-06 RX ADMIN — DOCUSATE SODIUM 100 MG: 100 CAPSULE, LIQUID FILLED ORAL at 08:11

## 2017-11-06 RX ADMIN — Medication 1 TABLET: at 08:11

## 2017-11-06 NOTE — PLAN OF CARE
Problem: Patient Care Overview  Goal: Plan of Care Review  Outcome: Ongoing (interventions implemented as appropriate)  Pt exhibits difficulty grasping objects with hands, pt continues to participate with therapy services to assist with functional, occupational and speech deficit, requires 1 person assist with self care, toileting and bathing, will continue to monitor, observe and note any changes, safety maintain

## 2017-11-06 NOTE — PLAN OF CARE
Problem: Physical Therapy Goal  Goal: Physical Therapy Goal  Goals to be met by: 2017     Patient will increase functional independence with mobility by performin). Supine to sit with Stand-by Assistance  2). Sit to supine with Stand-by Assistance  3). Rolling to Left and Right with Stand-by Assistance.  4). Sit to stand transfer with Minimal Assistance = MET  5). Gait  x  > 25 feet with Contact Guard Assistance using Rolling Walker = MET  6). Wheelchair propulsion x > 150 feet with Stand-by Assistance      NEW 10/18/2017:    7). Gait  x  > 25 feet with Stand-by Assistance using Rolling Walker or Rollator  = MET  NEW 10/23/2017:  8). Sit to stand transfer with Contact Guard Assistance  = MET with SBA    NEW: 2017   9. Decrease TUG score by 10 seconds (35 seconds) to demonstrate a decrease risk of fall  Outcome: Revised  Goals updated and TUG score added

## 2017-11-06 NOTE — PT/OT/SLP PROGRESS
"Physical Therapy         Treatment        Zaira Jensen   MRN: [id number]     PT Received On: 11/06/17  Total Time (min): 90        Billable Minutes:  Gait Training 25, Therapeutic Activity 15 and Therapeutic Exercise 50  Total Minutes: 90    Treatment Type: Treatment  PT/PTA: PT     PTA Visit Number: 0       General Precautions: Standard, fall  Orthopedic Precautions: Orthopedic Precautions : N/A   Braces: Braces: Cervical collar (in place throughout session)       Subjective:  Communicated with patient prior to session.     Pain/Comfort  Pain Rating 1: 3/10  Location - Side 1: Left  Location 1: shoulder  Pain Addressed 1: Reposition, Distraction, Cessation of Activity  Pain Rating Post-Intervention 1: 3/10    Objective:  Patient found in w/c, Patient found with: cervical collar    Functional Mobility:  Bed Mobility:   Supine to sit: Standby Assistance with cueing   Sit to supine: Minimal Assistance for BLE elevation; multiple attempts   Rolling: Standby Assistance    Scooting: Standby Assistance  With cueing    Balance:   // bars: 5 minutes; Walking the line, stepping fwd/backward/R/L over line to assist with functional turning in gait    Transfer Training:  Sit to stand:Stand-by Assistance with 4 wheeled walker x8  Bed <> Chair:  Stand Pivot with Stand-by Assistance with 4 wheeled walker x1    Wheelchair Training: n/p    Gait Training:  Ambulated 330 ft + stairs + 175 ft x 1; 175 ft x 1 using Rollator and SBA and cueing for posture. Overall slow orquidea. Pt able to increase orquidea on command but only in 20 ft spurts. Ambulated     Timed Up and Go (TUG):   Date   Trial  Time (seconds)    11/6/2017   1 45.85    11/6/2017  2 45.56     Stair Training:  Pt ascended/descend 4 (6") stairs with bilateral handrails with Contact Guard Assistance.       Additional Treatment:  STANDING in // bars w/ 2# ankle weights: 2x20 hip flexion marches (verbal cueing - focusing on slow eccentric lowering with accentuated DF to position " "foot for heel strike), hip abd/add, mini squats, heel raises, 40x step ups on 6" step      Activity Tolerance:  Patient tolerated treatment well    Patient left up in chair with call button in reach and chair alarm on.    Assessment:  Zaira Jensen is a 42 y.o. female with a medical diagnosis of cervical myelomalacia s/p C5-C6 ACDF. She presents with decreased pain today and increased orquidea during ambulation. Pt able to perform 100% sit<>stand transfers with SBA. Patient remains appropriate for PT POC.       Rehab potential is fair.    Activity tolerance: Good    Discharge recommendations: Discharge Facility/Level Of Care Needs:  (Today, pt's cousin inquired about Assisted Living Facilities;  met with pt's cousin during pt's PT session.)     Equipment recommendations: Equipment Needed After Discharge:  (TBD)     GOALS:    Physical Therapy Goals        Problem: Physical Therapy Goal    Goal Priority Disciplines Outcome Goal Variances Interventions   Physical Therapy Goal     PT/OT, PT Ongoing (interventions implemented as appropriate)     Description:  Goals to be met by: 2017     Patient will increase functional independence with mobility by performin). Supine to sit with Stand-by Assistance  2). Sit to supine with Stand-by Assistance  3). Rolling to Left and Right with Stand-by Assistance.  4). Sit to stand transfer with Minimal Assistance = MET  5). Gait  x  > 25 feet with Contact Guard Assistance using Rolling Walker = MET  6). Wheelchair propulsion x > 150 feet with Stand-by Assistance      NEW 10/18/2017:    7). Gait  x  > 25 feet with Stand-by Assistance using Rolling Walker or Rollator    NEW 10/23/2017:  8). Sit to stand transfer with Contact Guard Assistance             Problem: Physical Therapy Goal    Goal Priority Disciplines Outcome Goal Variances Interventions   Physical Therapy Goal     PT/OT, PT Ongoing (interventions implemented as appropriate)                     PLAN: "    Patient to be seen daily  to address the above listed problems via gait training, therapeutic activities, therapeutic exercises, neuromuscular re-education, wheelchair management/training  Plan of Care expires: 11/16/17  Plan of Care reviewed with: patient         Richie T Allan, PT 11/6/2017

## 2017-11-06 NOTE — PT/OT/SLP PROGRESS
Occupational Therapy  Treatment    Zaira Jensen   MRN: [id number]   Admitting Diagnosis: cervical myelomalacia s/p C5-C6 ACDF    OT Date of Treatment: 11/06/17   Total Time (min): 90 min    Billable Minutes:  Self Care/Home Management 90  Total Minutes: 90    General Precautions: Standard, fall  Orthopedic Precautions: N/A  Braces: Cervical collar    Do you have any cultural, spiritual, Restorationist conflicts, given your current situation?: None    Subjective:  Communicated with nurse prior to session.    Pain/Comfort  Pain Rating 1: 0/10 (none reported)    Objective:  Patient found with: cervical collar    Functional Mobility:  Transfer Training:   Sit <> Stand: Contact Guard Assist with grab bar or RW  Shower transfer: stand pivot with Contact Guard Assist with grab bar and 3-in-1 commode as shower chair     Grooming:  Set-up of items and SBA, seated     Bathing:  Patient performed bathing with Maximum Assistance with grab bar, Handheld shower head and tub bench at Shower    UE Dressing:  Patient performed UE Dressing with Maximum Assistance to don/doff back clasp bra and Moderate Assistance to don/doff pull over dress with max extended time  at Wheelchair.    LE Dressing:  Patient don/doffed socks/shoes with Total Assistance  Patient don/doffed underwear with Maximum Assistance    Additional Treatment:  - OTR reinforcing education regarding adaptive & compensatory techniques to increase independence with self care tasks, importance of performing full range of motion for all joints to maximize functional use of B UE and increase (I)     Patient left up in chair with call button in reach and nurse notified    ASSESSMENT:  Zaira Jensen is a 42 y.o. female with a medical diagnosis of cervical myelomalacia s/p C5-C6 ACDF and presents with impaired self care skills, impaired ROM and impaired strength of bilateral UE, impaired endurance, impaired safety, and requires increased time to complete ALL tasks.    Rehab  potential is good to fair    Activity tolerance: Good    Discharge recommendations: home     Equipment recommendations:  (TBD)     GOALS:    Occupational Therapy Goals        Problem: Occupational Therapy Goal    Goal Priority Disciplines Outcome Interventions   Occupational Therapy Goal     OT, PT/OT Ongoing (interventions implemented as appropriate)    Description:  Goals to be met by: 11/16/17     Patient will increase functional independence with ADLs by performing:    Feeding with Contact Guard Assistance.  UE Dressing with Minimal Assistance.  LE Dressing with Minimal Assistance.  Grooming while seated with Minimal Assistance.  Toileting from toilet with Moderate Assistance for hygiene and clothing management.   Bathing from most appropriate location with Minimal Assistance.  Toilet transfer to toilet with Minimal Assistance.      BREANA Canchola  11/3/2017                        Plan:  Patient to be seen 6 x/week to address the above listed problems via self-care/home management, community/work re-entry, therapeutic activities, therapeutic exercises, therapeutic groups, neuromuscular re-education, sensory integration  Plan of Care expires: 11/16/17  Plan of Care reviewed with: patient    HUGH Perkins LOTR  11/06/2017

## 2017-11-06 NOTE — PROGRESS NOTES
[facility]  Physical Medicine & Rehab  Progress Note    Patient Name: Zaira Jensen  MRN: [id number]  Patient Class: IP- Rehab   Admission Date: 10/5/2017  Length of Stay: 32 days  Attending Physician: Anirudh Rowley MD  Primary Care Provider: Cuate Alvarez MD    Subjective:     Principal Problem: quadriparesis  Interval History: pt is 42 y.o female s/p ACDF, quadriparesis, participating with therapy & making progress      Scheduled Medications:    docusate sodium  100 mg Oral Daily    folic acid-vit B6-vit B12 2.5-25-2 mg  1 tablet Oral Daily       PRN Medications: acetaminophen, aluminum-magnesium hydroxide-simethicone, bisacodyl, bisacodyl, calcium carbonate, lactulose, loratadine-pseudoephedrine 5-120 mg, ondansetron, ramelteon, senna-docusate 8.6-50 mg, traMADol    Review of Systems   Constitutional: Negative.    HENT: Negative.    Eyes: Negative.    Respiratory: Negative.    Cardiovascular: Negative.    Gastrointestinal: Negative.    Endocrine: Negative.    Genitourinary: Negative.    Musculoskeletal: Negative.    Skin: Negative.    Allergic/Immunologic: Negative.    Neurological: Negative.    Hematological: Negative.    Psychiatric/Behavioral: Negative.      Objective:     Vital Signs (Most Recent):  Temp: 98.3 °F (36.8 °C) (11/06/17 0431)  Pulse: 88 (11/06/17 0431)  Resp: 18 (11/06/17 0431)  BP: 101/62 (11/06/17 0431)  SpO2: 100 % (11/06/17 0431)    Vital Signs (24h Range):  Temp:  [98 °F (36.7 °C)-98.3 °F (36.8 °C)] 98.3 °F (36.8 °C)  Pulse:  [88-90] 88  Resp:  [18] 18  SpO2:  [98 %-100 %] 100 %  BP: (101-112)/(62-72) 101/62     Physical Exam   Constitutional: She is oriented to person, place, and time. She appears well-developed and well-nourished. No distress.   HENT:   Head: Normocephalic and atraumatic.   Right Ear: External ear normal.   Left Ear: External ear normal.   Nose: Nose normal.   Mouth/Throat: Oropharynx is clear and moist. No oropharyngeal exudate.   Eyes:  Conjunctivae and EOM are normal. Pupils are equal, round, and reactive to light. Right eye exhibits no discharge. Left eye exhibits no discharge. No scleral icterus.   Neck: Normal range of motion. Neck supple. No JVD present. No tracheal deviation present. No thyromegaly present.   Cardiovascular: Normal rate, regular rhythm, normal heart sounds and intact distal pulses.  Exam reveals no gallop and no friction rub.    No murmur heard.  Pulmonary/Chest: Effort normal and breath sounds normal. No stridor. No respiratory distress. She has no wheezes. She has no rales. She exhibits no tenderness.   Abdominal: Soft. Bowel sounds are normal. She exhibits no distension and no mass. There is no tenderness. There is no rebound and no guarding. No hernia.   Genitourinary:   Genitourinary Comments: deferred   Musculoskeletal: Normal range of motion. She exhibits no edema, tenderness or deformity.   Lymphadenopathy:     She has no cervical adenopathy.   Neurological: She is alert and oriented to person, place, and time. No cranial nerve deficit. She exhibits normal muscle tone.   Skin: No rash noted. She is not diaphoretic. No erythema. No pallor.   Psychiatric: She has a normal mood and affect. Her behavior is normal. Thought content normal.     NEUROLOGICAL EXAMINATION:     MENTAL STATUS   Oriented to person, place, and time.     CRANIAL NERVES     CN III, IV, VI   Pupils are equal, round, and reactive to light.  Extraocular motions are normal.       Assessment/Plan:      Zaira Jensen is a 42 y.o. female admitted to inpatient rehabilitation on 10/5/2017 for <principal problem not specified> with impaired mobility and ADLs. Patient remains appropriate for PT, OT, and as required Speech therapy. Patient continues to require 24 hour nursing care as well as daily Physician assessment.    Active Diagnoses:    Diagnosis Date Noted POA    Obesity [E66.9] 10/13/2017 Yes    Quadriplegia, C5-C7 complete [G82.53] 10/05/2017 Yes       Problems Resolved During this Admission:    Diagnosis Date Noted Date Resolved POA     S/p ACDF, quadriparesis, cont PT, OT  Pain, managed with current meds  DVT prophylaxis, cont SCD     DISCHARGE PLANNING:  Tentative Discharge Date:     No future appointments.    Anirudh Rowley MD  Department of Physical Medicine & Rehab  [facility]

## 2017-11-06 NOTE — PROGRESS NOTES
[facility]  Physical Medicine & Rehab  Progress Note    Patient Name: Zaira Jensen  MRN: [id number]  Patient Class: IP- Rehab   Admission Date: 10/5/2017  Length of Stay: 31 days  Attending Physician: Anirudh Rowley MD  Primary Care Provider: Cuate Alvarez MD    Subjective:     Principal Problem: quadriparesis   Interval History: pt is 42 y.o female s/p ACDF, quadriparesis, participating with therapy    Scheduled Medications:    docusate sodium  100 mg Oral Daily    folic acid-vit B6-vit B12 2.5-25-2 mg  1 tablet Oral Daily       PRN Medications: acetaminophen, aluminum-magnesium hydroxide-simethicone, bisacodyl, bisacodyl, calcium carbonate, lactulose, loratadine-pseudoephedrine 5-120 mg, ondansetron, ramelteon, senna-docusate 8.6-50 mg, traMADol    Review of Systems   Constitutional: Negative.    HENT: Negative.    Eyes: Negative.    Respiratory: Negative.    Cardiovascular: Negative.    Gastrointestinal: Negative.    Endocrine: Negative.    Genitourinary: Negative.    Musculoskeletal: Negative.    Skin: Negative.    Allergic/Immunologic: Negative.    Neurological: Negative.    Hematological: Negative.    Psychiatric/Behavioral: Negative.      Objective:     Vital Signs (Most Recent):  Temp: 97.4 °F (36.3 °C) (11/05/17 0720)  Pulse: 92 (11/05/17 0720)  Resp: 17 (11/05/17 0720)  BP: 114/77 (11/05/17 0720)  SpO2: 99 % (11/05/17 0720)    Vital Signs (24h Range):  Temp:  [97.4 °F (36.3 °C)-98.3 °F (36.8 °C)] 97.4 °F (36.3 °C)  Pulse:  [92-94] 92  Resp:  [17-18] 17  SpO2:  [99 %-100 %] 99 %  BP: (103-114)/(63-77) 114/77     Physical Exam   Constitutional: She is oriented to person, place, and time. She appears well-developed and well-nourished. No distress.   HENT:   Head: Normocephalic and atraumatic.   Right Ear: External ear normal.   Left Ear: External ear normal.   Nose: Nose normal.   Mouth/Throat: Oropharynx is clear and moist. No oropharyngeal exudate.   Eyes: Conjunctivae and EOM are  normal. Pupils are equal, round, and reactive to light. Right eye exhibits no discharge. Left eye exhibits no discharge. No scleral icterus.   Neck: Normal range of motion. Neck supple. No JVD present. No tracheal deviation present. No thyromegaly present.   Cardiovascular: Normal rate, regular rhythm, normal heart sounds and intact distal pulses.  Exam reveals no gallop and no friction rub.    No murmur heard.  Pulmonary/Chest: Effort normal and breath sounds normal. No stridor. No respiratory distress. She has no wheezes. She has no rales. She exhibits no tenderness.   Abdominal: Soft. Bowel sounds are normal. She exhibits no distension and no mass. There is no tenderness. There is no rebound and no guarding. No hernia.   Genitourinary:   Genitourinary Comments: deferred   Musculoskeletal: Normal range of motion. She exhibits no edema, tenderness or deformity.   Lymphadenopathy:     She has no cervical adenopathy.   Neurological: She is alert and oriented to person, place, and time. No cranial nerve deficit or sensory deficit. She exhibits normal muscle tone.   Skin: No rash noted. She is not diaphoretic. No erythema. No pallor.   Psychiatric: She has a normal mood and affect. Her behavior is normal. Thought content normal.     NEUROLOGICAL EXAMINATION:     MENTAL STATUS   Oriented to person, place, and time.     CRANIAL NERVES     CN III, IV, VI   Pupils are equal, round, and reactive to light.  Extraocular motions are normal.       Assessment/Plan:      Zaira Jensen is a 42 y.o. female admitted to inpatient rehabilitation on 10/5/2017 for <principal problem not specified> with impaired mobility and ADLs. Patient remains appropriate for PT, OT, and as required Speech therapy. Patient continues to require 24 hour nursing care as well as daily Physician assessment.    Active Diagnoses:    Diagnosis Date Noted POA    Obesity [E66.9] 10/13/2017 Yes    Quadriplegia, C5-C7 complete [G82.53] 10/05/2017 Yes       Problems Resolved During this Admission:    Diagnosis Date Noted Date Resolved POA     Quadriparesis, cont PT, OT  DVT prophylaxis, cont SCD    DISCHARGE PLANNING:  Tentative Discharge Date:     No future appointments.    Anirudh Rowley MD  Department of Physical Medicine & Rehab  [facility]

## 2017-11-06 NOTE — PLAN OF CARE
Problem: Patient Care Overview  Goal: Plan of Care Review  Outcome: Ongoing (interventions implemented as appropriate)  Bed alarm on at all times, side rails up for safety, call light is within easy reach , instructed patient to call at any time for assistance.Resp are even and unlabored, lungs sound clear, no s & s of distress noted at this time.  Will continue to monitor and observe during HS hours.

## 2017-11-07 PROCEDURE — 12800000 HC REHAB SEMI-PRIVATE ROOM

## 2017-11-07 PROCEDURE — 97530 THERAPEUTIC ACTIVITIES: CPT

## 2017-11-07 PROCEDURE — 97116 GAIT TRAINING THERAPY: CPT

## 2017-11-07 PROCEDURE — 97110 THERAPEUTIC EXERCISES: CPT

## 2017-11-07 PROCEDURE — 25000003 PHARM REV CODE 250: Performed by: INTERNAL MEDICINE

## 2017-11-07 PROCEDURE — 97803 MED NUTRITION INDIV SUBSEQ: CPT

## 2017-11-07 PROCEDURE — 97535 SELF CARE MNGMENT TRAINING: CPT

## 2017-11-07 RX ADMIN — DOCUSATE SODIUM 100 MG: 100 CAPSULE, LIQUID FILLED ORAL at 08:11

## 2017-11-07 RX ADMIN — Medication 1 TABLET: at 08:11

## 2017-11-07 NOTE — PLAN OF CARE
Problem: Patient Care Overview  Goal: Plan of Care Review  Plan of Care Review    Comments: A&OX4. Bed and chair alarms used at all times. Remained free from falls. Standard precautions maintained.

## 2017-11-07 NOTE — PLAN OF CARE
Problem: Patient Care Overview  Goal: Plan of Care Review  Outcome: Ongoing (interventions implemented as appropriate)  Patient awake/alert. No c/o pain or discomfort noted this shift. Generalized weakness. Free from falls. Plan of care continued

## 2017-11-07 NOTE — PT/OT/SLP PROGRESS
Occupational Therapy  Treatment    Zaira Jensen   MRN: [id number]   Admitting Diagnosis: cervical myelomalacia s/p C5-C6 ACDF    OT Date of Treatment: 11/07/17   Total Time (min): 15 min    Billable Minutes:  Self Care/Home Management 15  Total Minutes: 15    General Precautions: Standard, fall  Orthopedic Precautions: N/A  Braces: Cervical collar    Do you have any cultural, spiritual, Scientology conflicts, given your current situation?: None    Subjective:  Communicated with nurse prior to session.    Pain/Comfort  Pain Rating 1: 0/10    Objective:  Patient found with: cervical collar    Functional Mobility:  Transfer Training:   Sit to stand:Stand-by Assistance with Grab bars x 4 to complete dressing and hygiene tasks   Toilet Transfer:  Pt Stand Pivot with Stand-by Assistance with Grab bars and drop arm commode positioned over toilet     Grooming:  Sitting sink side, pt washing hands after toileting     Toilet Training:  Pt performed toileting with Stand-by Assistance and Contact Guard Assistance with drop arm commode positioned over toilet and grab bar - pt requires extended time & max encouragement to perform all tasks of toileting    Additional Treatment:  - OTR reinforcing importance of performing full range of motion of all joints to increase functional movement patterns and (I) with self care tasks     Patient left up in chair with call button in reach, chair alarm on and nurse notified    ASSESSMENT:  Zaira Jensen is a 42 y.o. female with a medical diagnosis of cervical myelomalacia s/p C5-C6 ACDF and presents with impaired self care skills, impaired functional mobility, pain and extended time to perform ALL UE movements.    Rehab potential is fair    Activity tolerance: Good    Discharge recommendations: home     Equipment recommendations:  (tbd)     GOALS:    Occupational Therapy Goals        Problem: Occupational Therapy Goal    Goal Priority Disciplines Outcome Interventions   Occupational Therapy  Goal     OT, PT/OT Ongoing (interventions implemented as appropriate)    Description:  Goals to be met by: 11/16/17     Patient will increase functional independence with ADLs by performing:    Feeding with Contact Guard Assistance.  UE Dressing with Minimal Assistance.  LE Dressing with Minimal Assistance.  Grooming while seated with Minimal Assistance.  Toileting from toilet with Moderate Assistance for hygiene and clothing management.   Bathing from most appropriate location with Minimal Assistance.  Toilet transfer to toilet with Minimal Assistance.      BREANA Canchola  11/3/2017                        Plan:  Patient to be seen 6 x/week to address the above listed problems via self-care/home management, community/work re-entry, therapeutic activities, therapeutic exercises, therapeutic groups, neuromuscular re-education, sensory integration  Plan of Care expires: 11/16/17  Plan of Care reviewed with: patient    HUGH Perkins LOTR  11/07/2017

## 2017-11-07 NOTE — PT/OT/SLP PROGRESS
Occupational Therapy  Treatment    Zaira Jensen   MRN: [id number]   Admitting Diagnosis:cervical myelomalacia s/p C5-C6 ACDF     OT Date of Treatment: 11/07/17   Total Time (min): 80 min      Billable Minutes:  Therapeutic Activity 50 and Therapeutic Exercise 30  Total Minutes: 80    General Precautions: Standard, fall  Orthopedic Precautions:    Braces: Cervical collar    Do you have any cultural, spiritual, Nondenominational conflicts, given your current situation?: None    Subjective:  Communicated with nursing prior to session.    Pain/Comfort  Pain Rating 1: 0/10    Objective:  Patient was found seated in her wheelchair in her room, she was directed with self propelling of her wheelchair, she completed around 150 feet without staff assistance.   BUE exercising completed at table top with the bilat pierre for 4 sets of 5 mins each with 2 lbs used and rest breaks taken between sets. Educated patient on table top BUE activities:  Graded clothes pins (yellow and red), transfered around from the griffin with wire levels to a vertical tree with wooden dowels as branches.  Shoulder arch completed for several rounds on a low level.  Near end of session patient requested female assistance to help her to the toilet in her room.  Patient was left with OTR in her room to use the bathroom.    Patient left on the toilet with call button in reach, nursing notified, OTR present and patient with cervical collar on.    ASSESSMENT:  Zaira Jensen is a 42 y.o. female with a medical diagnosis of cervical myelomalacia s/p C5-C6 ACDF.    Rehab potential is good    Activity tolerance: Good    GOALS:    Occupational Therapy Goals        Problem: Occupational Therapy Goal    Goal Priority Disciplines Outcome Interventions   Occupational Therapy Goal     OT, PT/OT Ongoing (interventions implemented as appropriate)    Description:  Goals to be met by: 11/16/17     Patient will increase functional independence with ADLs by performing:    Feeding  with Contact Guard Assistance.  UE Dressing with Minimal Assistance.  LE Dressing with Minimal Assistance.  Grooming while seated with Minimal Assistance.  Toileting from toilet with Moderate Assistance for hygiene and clothing management.   Bathing from most appropriate location with Minimal Assistance.  Toilet transfer to toilet with Minimal Assistance.      BREANA Canchola  11/3/2017                        Plan:  Cont POC.  Patient to be seen 6 x/week to address the above listed problems via self-care/home management, community/work re-entry, therapeutic activities, therapeutic exercises, therapeutic groups, neuromuscular re-education, sensory integration  Plan of Care expires: 11/16/17  Plan of Care reviewed with: patient         CLAYTON Aparicio  11/07/2017

## 2017-11-07 NOTE — PROGRESS NOTES
[facility]  Adult Nutrition  Progress Note    SUMMARY     Recommendations  Recommendation/Intervention:   1.) Continue with adult regular diet 2.) continue Boost Glucose Control and  beneprotein TID   Goals: 1.) patient will consume 75% of meals/ONS 2.) patient will tolerate diet   Nutrition Goal Status: 1)met/ongoing 2) met/ongoing  Communication of RD Recs: discussed on round, care plan, sticky note    1. Quadriplegia, C5-C7 complete      Past Medical History:   Diagnosis Date    Allergy     Anemia     Arthritis     Chronic back pain     Chronic neck pain     Functional ovarian cysts     GERD (gastroesophageal reflux disease)     Headache(784.0)     Radiculopathy of arm     Rash     Respiratory distress     bronchospasm at emergence years ago       Reason for Assessment  Reason for Assessment: RD follow-up   Interdisciplinary Rounds: attended  General Information Comments: Admits to rehab with disablity. Patient with adequate intake at meals (~70% at most meals). Denies nausea/vomiting. Interested in beneprotein with meals. Reports acide reflux. Rd offered to take food preferences but patient refused. No complaints at this time.   10/24/17 Pt is slowly losing weight per her desire.  Spoke with RD to clarify ONS, who reported pt requested both Boost Glucose Control and Beneprotein.  Meal intake continues at ~70%.   10/31/17 Pt continues slow weight loss. Pt reports good appetite and use of ONS, both Boost Glucose Control and Beneprotein.  Requests milk in the mornings to mix the Beneprotein in.   11/7/17 Pt's wt appears stable with slight loss. Meal intake reflects good intake.      Wt Readings from Last 1 Encounters:   11/05/17 1930 83.8 kg (184 lb 11.2 oz)   10/29/17 0600 83.1 kg (183 lb 3.2 oz)   10/21/17 0600 83.1 kg (183 lb 1.6 oz)   10/17/17 1437 83.7 kg (184 lb 8.4 oz)   10/13/17 1341 83.7 kg (184 lb 8.4 oz)   10/09/17 0523 83.7 kg (184 lb 9.6 oz)   10/08/17 2130 83.7 kg (184  "lb 9.6 oz)   10/05/17 1429 84.4 kg (186 lb)       Nutrition Prescription Ordered  Current Diet Order: Adult regular diet   Nutrition Order Comments: Add bottled water to each tray;  Pt notes she is concerned about her weight.  Offered calorie control or low fat option and patient declined noting she would monitor intake.         Evaluation of Received Nutrients/Fluid Intake  Oral Fluid (mL): 1560 (per 24 hr i/os)  Energy Calories Required: meeting needs  Protein Required: meeting needs  % Intake of Estimated Energy Needs: %  % Meal Intake: 62% average for last 6 documented meals plus Boost Glucose Control 1 x daily and Beneprotein     Nutrition Risk Screen  Nutrition Risk Screen: no indicators present    Nutrition/Diet History  Patient Reported Diet/Restrictions/Preferences: general (avoids foods that have a lot of acid)  Food Preferences: No cultural or religous food preferences identified.   Supplemental Drinks or Food Habits:  (none)    Labs/Tests/Procedures/Meds  Diagnostic Test/Procedure Review: reviewed, pertinent  Pertinent Labs Reviewed: reviewed, pertinent  BMP  Lab Results   Component Value Date     11/02/2017    K 4.0 11/02/2017     11/02/2017    CO2 24 11/02/2017    BUN 10 11/02/2017    CREATININE 0.8 11/02/2017    CALCIUM 9.7 11/02/2017    ANIONGAP 11 11/02/2017    ESTGFRAFRICA >60 11/02/2017    EGFRNONAA >60 11/02/2017     Lab Results   Component Value Date    ALBUMIN 3.5 11/02/2017     Lab Results   Component Value Date    CALCIUM 9.7 11/02/2017     No results for input(s): POCTGLUCOSE in the last 24 hours.    Pertinent Medications Reviewed: reviewed, pertinent  Scheduled Meds:   docusate sodium  100 mg Oral Daily    folic acid-vit B6-vit B12 2.5-25-2 mg  1 tablet Oral Daily         Physical Findings  Overall Physical Appearance: nourished  Oral/Mouth Cavity: WDL  Skin: incision (Jc score 18)    Anthropometrics  Temp: 98.3 °F (36.8 °C)  Height: 5' 5"  Weight Method: Bed " Scale  Weight: 83.8 kg (184 lb 11.2 oz) (weighed today by Christiana Matthew RN)  Ideal Body Weight (IBW), Female: 125 lb  % Ideal Body Weight, Female (lb): 147.62 lb  BMI (Calculated): 30.8  BMI Grade: 30 - 34.9- obesity - grade I  Usual Body Weight (UBW), k.9 kg  % Usual Body Weight: 99.97  % Weight Change From Usual Weight: -0.24 %      Estimated/Assessed Needs  Weight Used For Calorie Calculations: 83.7 kg (184 lb 8.4 oz)   Height (cm): 165.1 cm  Energy Need Method: [place]-St Jeor  RMR ([place]-St. Jeor Equation): 1487.88 x1.2=1784 kcals/day  Weight Used For Protein Calculations: 83.7 kg (184 lb 8.4 oz)  0.8 gm Protein (gm): 67.1 and 1.0 gm Protein (gm): 83.88  Fluid Need Method: RDA Method (or per MD rec)  CHO Requirement: na       Assessment and Plan    Obesity    Related to (etiology):   Excessive energy intake    Signs and Symptoms (as evidenced by):   1) BMI 30    Interventions/Recommendations (treatment strategy):  1) After pt noted a desire to lose weight offered limited calorie or low fat diet to patient and she declined in favor of monitoring her food herself. 2) Monitor weight     Nutrition Diagnosis Status:   progressing              Monitor and Evaluation  Food and Nutrient Intake: energy intake  Food and Nutrient Adminstration: diet order  Anthropometric Measurements: weight, weight change  Biochemical Data, Medical Tests and Procedures: electrolyte and renal panel, inflammatory profile  Nutrition-Focused Physical Findings: overall appearance, skin    Nutrition Risk  Level of Risk:  (x1 weekly)    Nutrition Follow-Up  RD Follow-up?: Yes     Discharge Planning: discharge on adult regular diet.

## 2017-11-07 NOTE — PATIENT CARE CONFERENCE
Weekly Staffing Report      Date Admitted: 10/5/2017 :   Staffing Date: 11/7/2017     Patient Active Problem List   Diagnosis    Lumbago    Chronic neck pain    Cervical spondylosis without myelopathy    Degeneration of cervical intervertebral disc    Brachial neuritis or radiculitis NOS    Acquired spondylolisthesis    Biliary colic    Cervical spondylosis with myelopathy    Intervertebral disc disorder of cervical region with myelopathy    Spondylosis with myelopathy, lumbar region    Degeneration of lumbar or lumbosacral intervertebral disc    Atypical chest pain    Normocytic normochromic anemia    Abnormal CT scan    Cervical myelopathy    Stiffness of right shoulder joint    Stiffness of right wrist joint    Bilateral arm weakness    Fibroadenoma    Intervertebral disc stenosis of neural canal    Myelomalacia    Other specified symptoms and signs involving the circulatory and respiratory systems    Brief situational non-psychotic disorder    Goiter    Abnormality of gait    Coarse tremors    Cervical spinal stenosis s/p C5-6 ACDF    GERD (gastroesophageal reflux disease)    Chronic back pain    Quadriplegia, C5-C7 complete    Obesity          Team Members Present:  Physician Team Member: Dr Rowley  Nursing Team Member: Hitesh Ayala RN  Case Management Team Member: Ijeoma Carey RN  PT Team Member: Richie Lemus PT  OT Team Member: Suzanne Perry OT  SLP Team Member: Julien CASTANEDA    Nursing  Skin: Intact  Bowel: Continent  Bladder:continent  Last BM: 11-6-17  Diet: Regular  Appetite: good   Pain Level: 3/10 left shoulder & bilateral knees & right lower back   Physical Therapy  Supine to Sit:  minimal assist with side rail  Sit to Stand: standy by assistance  Gait: 415 feet  with rolator  standy by assistance    Wheelchair Mobility: 25-50 feet standy by assistance  ROM: within normal limits  Stairs:4  Six inch steps  contact guard  Strength: bilateral ankle 3+/5, hip 4-/5,  knee 4/5    Occupational Therapy  Feeding: supervision  Grooming:supervision  UED: moderate assist  LED: maximal assist  Bathing:maximal assist   Toileting:minimal assist  Toilet Transfer:  standy by assistance  Tub Transfer:  contact guard  Strength: right 2 to 3+/5, left 2+ to 4-/5            Summary of Progress:  good    Barriers to Progress/Discharge:     Tolerates 3 hours of therapy: Yes    Comments:     Estimated Length of Stay: 11-16-17

## 2017-11-07 NOTE — PLAN OF CARE
Problem: Physical Therapy Goal  Goal: Physical Therapy Goal  Goals to be met by: 2017     Patient will increase functional independence with mobility by performin). Supine to sit with Stand-by Assistance  2). Sit to supine with Stand-by Assistance  3). Rolling to Left and Right with Stand-by Assistance.  4). Sit to stand transfer with Minimal Assistance = MET  5). Gait  x  > 25 feet with Contact Guard Assistance using Rolling Walker = MET  6). Wheelchair propulsion x > 150 feet with Stand-by Assistance      NEW 10/18/2017:    7). Gait  x  > 25 feet with Stand-by Assistance using Rolling Walker or Rollator  = MET  NEW 10/23/2017:  8). Sit to stand transfer with Contact Guard Assistance  = MET with SBA    NEW: 2017   9. Decrease TUG score by 10 seconds (35 seconds) to demonstrate a decrease risk of fall   Outcome: Ongoing (interventions implemented as appropriate)  Goals remain appropriate for patient's PT POC.

## 2017-11-07 NOTE — PT/OT/SLP PROGRESS
"Physical Therapy         Treatment        Zaira Jensen   MRN: [id number]     PT Received On: 11/07/17  Total Time (min): 90        Billable Minutes:  Gait Training 25, Therapeutic Activity 20 and Therapeutic Exercise 45  Total Minutes: 90    Treatment Type: Treatment  PT/PTA: PT     PTA Visit Number: 0       General Precautions: Standard, fall  Orthopedic Precautions: Orthopedic Precautions : N/A   Braces: Braces: Cervical collar       Subjective:  Communicated with patient prior to session. Pt reports she is "having a bad day" where her "muscles do not seem to be getting the message to move." Pt reports she is calling surgeon about cervical collar d/c today.    Pain/Comfort  Pain Rating 1: 3/10  Location - Side 1: Left  Location 1: shoulder  Pain Addressed 1: Reposition, Distraction, Cessation of Activity  Pain Rating Post-Intervention 1: 3/10  Pain Rating 2: 3/10  Location - Side 2: Right  Location - Orientation 2: lower  Location 2: back  Pain Addressed 2: Reposition, Distraction, Cessation of Activity  Pain Rating Post-Intervention 2: 3/10    Objective:  Patient found in w/c Patient found with: cervical collar    Functional Mobility:  Bed Mobility: n/p    Balance: Slow orquidea - increased time required.  // bars: 20 minutes; Walking the line, stepping fwd/backward/R/L over line to assist with functional turning in gait    Transfer Training:   Sit to stand:  Stand-by Assistance x 1, Contact Guard Assistance x 6 and Minimal Assistance x 3 with 4 wheeled walker    Wheelchair Training: n/p    Gait Training: Pt refused outdoor gait training on unlevel surfaces, carpet, ramps.   Ambulated 375 ft; 180 ft using Rollator and SBA with cueing for posture. Pt exhibiting R knee extension thrust in Terminal Stance, which a R AFO unable to correct. Slow orquidea - increased time required.    Stair Training: n/p    Additional Treatment: Slow orquidea - increased time required.  STANDING: in // bars: 20x ankle PF, mini squats; "   SEATED: 2x10 LAQ 4# at ankles   SciFit StepOne: L 3.0 x 15 min, LEs only     Activity Tolerance:  Patient limited by fatigue and Patient limited by pain    Patient left up in chair with call button in reach and chair alarm on.    Assessment:  Zaira Jensen is a 42 y.o. female with a medical diagnosis of cervical myelomalacia s/p C5-C6 ACDF. She presents with decreased orquidea during gait training, unable to perform fast trials. Pt also required Min A and CGA for sit<>stand transfers. Patient remains appropriate for PT POC.     Rehab potential is fair.    Activity tolerance: Fair    Discharge recommendations: Discharge Facility/Level Of Care Needs:  (Today, pt's cousin inquired about Assisted Living Facilities;  met with pt's cousin during pt's PT session.)     Equipment recommendations: Equipment Needed After Discharge:  (TBD)     GOALS:    Physical Therapy Goals        Problem: Physical Therapy Goal    Goal Priority Disciplines Outcome Goal Variances Interventions   Physical Therapy Goal     PT/OT, PT Revised     Description:  Goals to be met by: 2017     Patient will increase functional independence with mobility by performin). Supine to sit with Stand-by Assistance  2). Sit to supine with Stand-by Assistance  3). Rolling to Left and Right with Stand-by Assistance.  4). Sit to stand transfer with Minimal Assistance = MET  5). Gait  x  > 25 feet with Contact Guard Assistance using Rolling Walker = MET  6). Wheelchair propulsion x > 150 feet with Stand-by Assistance      NEW 10/18/2017:    7). Gait  x  > 25 feet with Stand-by Assistance using Rolling Walker or Rollator  = MET  NEW 10/23/2017:  8). Sit to stand transfer with Contact Guard Assistance  = MET with SBA    NEW: 2017   9. Decrease TUG score by 10 seconds (35 seconds) to demonstrate a decrease risk of fall           Problem: Physical Therapy Goal    Goal Priority Disciplines Outcome Goal Variances Interventions   Physical  Therapy Goal     PT/OT, PT Ongoing (interventions implemented as appropriate)                       PLAN:    Patient to be seen 6 x/week  to address the above listed problems via therapeutic exercises, therapeutic activities, gait training, wheelchair management/training  Plan of Care expires: 11/16/17  Plan of Care reviewed with: patient         Richie MENDEZ Allan, PT 11/7/2017

## 2017-11-07 NOTE — PROGRESS NOTES
[facility]  Physical Medicine & Rehab  Progress Note    Patient Name: Zaira Jensen  MRN: [id number]  Patient Class: IP- Rehab   Admission Date: 10/5/2017  Length of Stay: 33 days  Attending Physician: Anirudh Rowley MD  Primary Care Provider: Cuate Alvarez MD    Subjective:     Principal Problem: quadriparesis  Interval History: pt is 42 y.o female s/p ACDF, quadriparesis, participating with therapy, making progress.    Scheduled Medications:    docusate sodium  100 mg Oral Daily    folic acid-vit B6-vit B12 2.5-25-2 mg  1 tablet Oral Daily       PRN Medications: acetaminophen, aluminum-magnesium hydroxide-simethicone, bisacodyl, bisacodyl, calcium carbonate, lactulose, loratadine-pseudoephedrine 5-120 mg, ondansetron, ramelteon, senna-docusate 8.6-50 mg, traMADol    Review of Systems   Constitutional: Negative.    HENT: Negative.    Eyes: Negative.    Respiratory: Negative.    Cardiovascular: Negative.    Gastrointestinal: Negative.    Endocrine: Negative.    Genitourinary: Negative.    Musculoskeletal: Negative.    Skin: Negative.    Allergic/Immunologic: Negative.    Neurological: Negative.    Hematological: Negative.    Psychiatric/Behavioral: Negative.      Objective:     Vital Signs (Most Recent):  Temp: 98.3 °F (36.8 °C) (11/07/17 0730)  Pulse: 98 (11/07/17 0730)  Resp: 18 (11/07/17 0730)  BP: 114/68 (11/07/17 0730)  SpO2: 95 % (11/07/17 0730)    Vital Signs (24h Range):  Temp:  [97.9 °F (36.6 °C)-98.3 °F (36.8 °C)] 98.3 °F (36.8 °C)  Pulse:  [91-98] 98  Resp:  [16-18] 18  SpO2:  [95 %-100 %] 95 %  BP: ()/(58-82) 114/68     Physical Exam   Constitutional: She is oriented to person, place, and time. She appears well-developed and well-nourished. No distress.   HENT:   Head: Normocephalic and atraumatic.   Right Ear: External ear normal.   Left Ear: External ear normal.   Nose: Nose normal.   Mouth/Throat: Oropharynx is clear and moist. No oropharyngeal exudate.   Eyes:  Conjunctivae and EOM are normal. Pupils are equal, round, and reactive to light. Right eye exhibits no discharge. Left eye exhibits no discharge. No scleral icterus.   Neck: Normal range of motion. Neck supple. No JVD present. No tracheal deviation present. No thyromegaly present.   Cardiovascular: Normal rate, regular rhythm, normal heart sounds and intact distal pulses.  Exam reveals no gallop and no friction rub.    No murmur heard.  Pulmonary/Chest: Effort normal and breath sounds normal. No stridor. No respiratory distress. She has no wheezes. She has no rales. She exhibits no tenderness.   Abdominal: Soft. Bowel sounds are normal. She exhibits no distension and no mass. There is no tenderness. There is no rebound and no guarding. No hernia.   Genitourinary:   Genitourinary Comments: deferred   Musculoskeletal: Normal range of motion. She exhibits no edema, tenderness or deformity.   Lymphadenopathy:     She has no cervical adenopathy.   Neurological: She is alert and oriented to person, place, and time. No cranial nerve deficit or sensory deficit. She exhibits normal muscle tone.   Skin: No rash noted. She is not diaphoretic. No erythema. No pallor.   Psychiatric: She has a normal mood and affect. Her behavior is normal. Thought content normal.     NEUROLOGICAL EXAMINATION:     MENTAL STATUS   Oriented to person, place, and time.     CRANIAL NERVES     CN III, IV, VI   Pupils are equal, round, and reactive to light.  Extraocular motions are normal.       Assessment/Plan:      Zaira Jensen is a 42 y.o. female admitted to inpatient rehabilitation on 10/5/2017 for <principal problem not specified> with impaired mobility and ADLs. Patient remains appropriate for PT, OT, and as required Speech therapy. Patient continues to require 24 hour nursing care as well as daily Physician assessment.    Active Diagnoses:    Diagnosis Date Noted POA    Obesity [E66.9] 10/13/2017 Yes    Quadriplegia, C5-C7 complete  [G82.53] 10/05/2017 Yes      Problems Resolved During this Admission:    Diagnosis Date Noted Date Resolved POA     Quadriparesis, s/p ACDF, cont PT, OT  Pain, managed with current meds  DVT prophylaxis, cont SCD     DISCHARGE PLANNING:  Tentative Discharge Date:     No future appointments.    Anirudh Rowley MD  Department of Physical Medicine & Rehab  [facility]

## 2017-11-07 NOTE — NURSING
Team conference attended and patient discussed.  Met with patient at bedside and provided update on progress, plan of care and ELOS.  No concerns voiced at this time.  Will continue to assist with discharge planning as needed.

## 2017-11-08 PROCEDURE — 97110 THERAPEUTIC EXERCISES: CPT

## 2017-11-08 PROCEDURE — 25000003 PHARM REV CODE 250: Performed by: INTERNAL MEDICINE

## 2017-11-08 PROCEDURE — 97535 SELF CARE MNGMENT TRAINING: CPT

## 2017-11-08 PROCEDURE — 12800000 HC REHAB SEMI-PRIVATE ROOM

## 2017-11-08 PROCEDURE — 97530 THERAPEUTIC ACTIVITIES: CPT

## 2017-11-08 PROCEDURE — 97116 GAIT TRAINING THERAPY: CPT

## 2017-11-08 RX ADMIN — Medication 1 TABLET: at 08:11

## 2017-11-08 RX ADMIN — DOCUSATE SODIUM 100 MG: 100 CAPSULE, LIQUID FILLED ORAL at 08:11

## 2017-11-08 NOTE — PROGRESS NOTES
[facility]  Physical Medicine & Rehab  Progress Note    Patient Name: Zaira Jensen  MRN: [id number]  Patient Class: IP- Rehab   Admission Date: 10/5/2017  Length of Stay: 34 days  Attending Physician: Anirudh Rowley MD  Primary Care Provider: Cuate Alvarez MD    Subjective:     Principal Problem:  quadriparesis   Interval History: pt is 42 y.o female s/p ACDF, quadriparesis, particiapting with therapy & making progress      Scheduled Medications:    docusate sodium  100 mg Oral Daily    folic acid-vit B6-vit B12 2.5-25-2 mg  1 tablet Oral Daily       PRN Medications: acetaminophen, aluminum-magnesium hydroxide-simethicone, bisacodyl, bisacodyl, calcium carbonate, lactulose, loratadine-pseudoephedrine 5-120 mg, ondansetron, ramelteon, senna-docusate 8.6-50 mg, traMADol    Review of Systems   Constitutional: Negative.    HENT: Negative.    Eyes: Negative.    Respiratory: Negative.    Cardiovascular: Negative.    Gastrointestinal: Negative.    Endocrine: Negative.    Genitourinary: Negative.    Musculoskeletal: Negative.    Skin: Negative.    Allergic/Immunologic: Negative.    Neurological: Negative.    Hematological: Negative.    Psychiatric/Behavioral: Negative.      Objective:     Vital Signs (Most Recent):  Temp: 97.1 °F (36.2 °C) (11/08/17 0719)  Pulse: 94 (11/08/17 0719)  Resp: 16 (11/08/17 0719)  BP: 110/76 (11/08/17 0719)  SpO2: 98 % (11/08/17 0719)    Vital Signs (24h Range):  Temp:  [97.1 °F (36.2 °C)-98.5 °F (36.9 °C)] 97.1 °F (36.2 °C)  Pulse:  [] 94  Resp:  [16-20] 16  SpO2:  [97 %-100 %] 98 %  BP: (110-128)/(75-85) 110/76     Physical Exam   Constitutional: She is oriented to person, place, and time. She appears well-developed and well-nourished. No distress.   HENT:   Head: Normocephalic and atraumatic.   Right Ear: External ear normal.   Left Ear: External ear normal.   Nose: Nose normal.   Mouth/Throat: Oropharynx is clear and moist. No oropharyngeal exudate.   Eyes:  Conjunctivae and EOM are normal. Pupils are equal, round, and reactive to light. Right eye exhibits no discharge. Left eye exhibits no discharge. No scleral icterus.   Neck: Normal range of motion. Neck supple. No JVD present. No tracheal deviation present. No thyromegaly present.   Cardiovascular: Normal rate, regular rhythm, normal heart sounds and intact distal pulses.  Exam reveals no gallop and no friction rub.    No murmur heard.  Pulmonary/Chest: Effort normal and breath sounds normal. No stridor. No respiratory distress. She has no wheezes. She has no rales. She exhibits no tenderness.   Abdominal: Soft. Bowel sounds are normal. She exhibits no distension and no mass. There is no tenderness. There is no rebound and no guarding. No hernia.   Genitourinary:   Genitourinary Comments: deferred   Musculoskeletal: Normal range of motion. She exhibits no edema, tenderness or deformity.   Lymphadenopathy:     She has no cervical adenopathy.   Neurological: She is alert and oriented to person, place, and time. No cranial nerve deficit. She exhibits normal muscle tone.   Skin: No rash noted. She is not diaphoretic. No erythema. No pallor.   Psychiatric: She has a normal mood and affect. Her behavior is normal.     NEUROLOGICAL EXAMINATION:     MENTAL STATUS   Oriented to person, place, and time.     CRANIAL NERVES     CN III, IV, VI   Pupils are equal, round, and reactive to light.  Extraocular motions are normal.       Assessment/Plan:      Zaira Jensen is a 42 y.o. female admitted to inpatient rehabilitation on 10/5/2017 for <principal problem not specified> with impaired mobility and ADLs. Patient remains appropriate for PT, OT, and as required Speech therapy. Patient continues to require 24 hour nursing care as well as daily Physician assessment.    Active Diagnoses:    Diagnosis Date Noted POA    Obesity [E66.9] 10/13/2017 Yes    Quadriplegia, C5-C7 complete [G82.53] 10/05/2017 Yes      Problems Resolved  During this Admission:    Diagnosis Date Noted Date Resolved POA     Quadriparesis, s/p ACDF, cont PT, OT  Pain, managed with current meds  DVT prophylaxis, cont SCD      DISCHARGE PLANNING:  Tentative Discharge Date:     No future appointments.    Anirudh Rowley MD  Department of Physical Medicine & Rehab  [facility]

## 2017-11-08 NOTE — PT/OT/SLP PROGRESS
"Physical Therapy         Treatment        Zaira Jensen   MRN: [id number]     PT Received On: 11/08/17  Total Time (min): 85      Billable Minutes:  Gait Training 30, Therapeutic Activity 10 and Therapeutic Exercise 45  Total Minutes: 85    Treatment Type: Treatment  PT/PTA: PT     PTA Visit Number: 0       General Precautions: Standard, fall  Orthopedic Precautions: Orthopedic Precautions : N/A   Braces: Braces: Cervical collar (in place throughout session)     Subjective:  Communicated with patient prior to session, who is agreeable to working with PT. Pt reports "pressure" in low back, bilaterally.     Pain/Comfort  Pain Rating 1: 5/10  Location - Side 1: bilateral  Location 1: lower back  Pain Addressed 1: Reposition, Distraction, Cessation of Activity  Pain Rating Post-Intervention 1: 5/10    Objective:  Patient found seated in w/c, pleasant and cooperative. Patient found with: cervical collar    Functional Mobility:  Bed Mobility: n/p    Balance:  // bars: 10 minutes [facility] Training w/ unilateral LE;     Transfer Training:  Sit to stand:Stand-by Assistance with 4 wheeled walker x10    Wheelchair Training: n/p    Gait Training:  Pt gait trained 150 ft x 2; 350 ft using Rollator and SBA. Cueing for posture and heel-toe walking. Pt exhibiting R knee extension thrust in Terminal Stance.   Pt gait trained within // bars using handrail support and CGA: 24 ft forward/backward walking; 36 ft side stepping cueing to lift LLE    Stair Training: n/p    Additional Treatment: increased time required  STANDING in // bars using handrail support and CGA: 30x mini squats, heel raises, 40x step ups on 6" step w/ CGA; 40x R TKE with Red T-Band  SEATED: Manual R gastroc-soleus stretch 3x30 sec hold       Activity Tolerance:  Patient limited by pain    Patient left up in chair with OT present.    Assessment:  Zaira Jensen is a 42 y.o. female with a medical diagnosis of cervical myelomalacia s/p C5-C6 ACDF. She presents with low " back pain and tight R calf. Retest TUG next Monday. Pt Patient remains appropriate for PT POC.       Rehab potential is fair.    Activity tolerance: Good    Discharge recommendations: Discharge Facility/Level Of Care Needs: Assisted Living    Equipment recommendations: Equipment Needed After Discharge:  (TBD)     GOALS:    Physical Therapy Goals        Problem: Physical Therapy Goal    Goal Priority Disciplines Outcome Goal Variances Interventions   Physical Therapy Goal     PT/OT, PT Ongoing (interventions implemented as appropriate)     Description:  Goals to be met by: 2017     Patient will increase functional independence with mobility by performin). Supine to sit with Stand-by Assistance  2). Sit to supine with Stand-by Assistance  3). Rolling to Left and Right with Stand-by Assistance.  4). Sit to stand transfer with Minimal Assistance = MET  5). Gait  x  > 25 feet with Contact Guard Assistance using Rolling Walker = MET  6). Wheelchair propulsion x > 150 feet with Stand-by Assistance      NEW 10/18/2017:    7). Gait  x  > 25 feet with Stand-by Assistance using Rolling Walker or Rollator  = MET  NEW 10/23/2017:  8). Sit to stand transfer with Contact Guard Assistance  = MET with SBA    NEW: 2017   9. Decrease TUG score by 10 seconds (35 seconds) to demonstrate a decrease risk of fall           Problem: Physical Therapy Goal    Goal Priority Disciplines Outcome Goal Variances Interventions   Physical Therapy Goal     PT/OT, PT Ongoing (interventions implemented as appropriate)                     PLAN:    Patient to be seen daily  to address the above listed problems via gait training, therapeutic activities, therapeutic exercises, neuromuscular re-education, wheelchair management/training  Plan of Care expires: 17  Plan of Care reviewed with: patient         Richie VANESSA Lemus, PT 2017

## 2017-11-08 NOTE — PLAN OF CARE
Problem: Patient Care Overview  Goal: Plan of Care Review  Outcome: Ongoing (interventions implemented as appropriate)  Patient awake/alert.No c/o pain noted this shift. Free from falls. Remains continent. Generalized weakness noted. Plan of care continued

## 2017-11-09 PROCEDURE — 97116 GAIT TRAINING THERAPY: CPT

## 2017-11-09 PROCEDURE — 25000003 PHARM REV CODE 250: Performed by: PHYSICAL MEDICINE & REHABILITATION

## 2017-11-09 PROCEDURE — 25000003 PHARM REV CODE 250: Performed by: INTERNAL MEDICINE

## 2017-11-09 PROCEDURE — 97530 THERAPEUTIC ACTIVITIES: CPT

## 2017-11-09 PROCEDURE — 97535 SELF CARE MNGMENT TRAINING: CPT

## 2017-11-09 PROCEDURE — 12800000 HC REHAB SEMI-PRIVATE ROOM

## 2017-11-09 PROCEDURE — 97110 THERAPEUTIC EXERCISES: CPT

## 2017-11-09 RX ADMIN — Medication 1 TABLET: at 08:11

## 2017-11-09 RX ADMIN — DOCUSATE SODIUM 100 MG: 100 CAPSULE, LIQUID FILLED ORAL at 08:11

## 2017-11-09 RX ADMIN — ACETAMINOPHEN 650 MG: 325 TABLET, FILM COATED ORAL at 08:11

## 2017-11-09 NOTE — PT/OT/SLP PROGRESS
Physical Therapy         Treatment        Zaira Jensen   MRN: [id number]     PT Received On: 11/09/17  Total Time (min): 90        Billable Minutes:  Gait Training 35, Therapeutic Activity 23 and Therapeutic Exercise 32  Total Minutes: 90    Treatment Type: Treatment  PT/PTA: PT     PTA Visit Number: 0       General Precautions: Standard, fall  Orthopedic Precautions: Orthopedic Precautions : N/A   Braces: Braces: Cervical collar       Subjective:  Communicated with patient prior to session.    Pain/Comfort  Pain Rating 1: 3/10  Location - Side 1: Left  Location 1: shoulder  Pain Addressed 1: Reposition, Distraction, Cessation of Activity, Pre-medicate for activity  Pain Rating Post-Intervention 1: 3/10  Pain Rating 2: 3/10  Location - Side 2: Right  Location 2: hip  Pain Addressed 2: Reposition, Pre-medicate for activity, Distraction, Cessation of Activity  Pain Rating Post-Intervention 2: 3/10    Objective:  Patient found up in w/c, with Patient found with: cervical collar    Functional Mobility:  Bed Mobility: n/p    Transfer Training:  Sit to stand:Stand-by Assistance with No Assistive Device, Rolling Walker and Straight Cane x7    Wheelchair Training: n/p    Gait Training: Slow orquidea - increased time required.  Gait trained 450 ft with Rollator and SBA; 40 ft with SPC (on Right) and CGA and cueing for sequence; 100 ft using no AD and CGA; 48 ft in // bars with no handrail support and SBA    Stair Training: n/p    Additional Treatment: Slow orquidea - increased time required.  STANDING: in // bars: 20x ankle PF, mini squats, 2# ankle weights: hip abduction, hamstring curls, hip flexion marches   SEATED: 2x10 LAQ 4# at ankles     Activity Tolerance:  Patient limited by pain    Patient left up in chair with call button in reach and chair alarm on.    Assessment:  Zaira Jensen is a 42 y.o. female with a medical diagnosis of myelomalacia s/p C5-C6 ACDF. Today pt requested to gait train with SPC and then no AD.  Pt still exhibits decreased clearance 2° to decreased hip, knee, and ankle dorsiflexion bilaterally and also lacks normal reciprocal arm swing. Despite tolerating gait training, pt unable to coordinate advancing SPC (on R) and stepping with L foot simultaneously, to use a more functional gait sequence with SPC. Patient remains appropriate for PT POC.     Rehab potential is fair.    Activity tolerance: Good    Discharge recommendations: Discharge Facility/Level Of Care Needs:  (Today, pt's cousin inquired about Assisted Living Facilities;  met with pt's cousin during pt's PT session.)     Equipment recommendations: Equipment Needed After Discharge:  (TBD)     GOALS:    Physical Therapy Goals        Problem: Physical Therapy Goal    Goal Priority Disciplines Outcome Goal Variances Interventions   Physical Therapy Goal     PT/OT, PT Ongoing (interventions implemented as appropriate)     Description:  Goals to be met by: 2017     Patient will increase functional independence with mobility by performin). Supine to sit with Stand-by Assistance  2). Sit to supine with Stand-by Assistance  3). Rolling to Left and Right with Stand-by Assistance.  4). Sit to stand transfer with Minimal Assistance = MET  5). Gait  x  > 25 feet with Contact Guard Assistance using Rolling Walker = MET  6). Wheelchair propulsion x > 150 feet with Stand-by Assistance      NEW 10/18/2017:    7). Gait  x  > 25 feet with Stand-by Assistance using Rolling Walker or Rollator  = MET  NEW 10/23/2017:  8). Sit to stand transfer with Contact Guard Assistance  = MET with SBA    NEW: 2017   9. Decrease TUG score by 10 seconds (35 seconds) to demonstrate a decrease risk of fall           Problem: Physical Therapy Goal    Goal Priority Disciplines Outcome Goal Variances Interventions   Physical Therapy Goal     PT/OT, PT Ongoing (interventions implemented as appropriate)                     PLAN:    Patient to be seen 6 x/week   to address the above listed problems via gait training, therapeutic activities, therapeutic exercises, wheelchair management/training  Plan of Care expires: 11/16/17  Plan of Care reviewed with: patient         Richie MENDEZ Allan, PT 11/9/2017

## 2017-11-09 NOTE — PLAN OF CARE
Problem: Patient Care Overview  Goal: Plan of Care Review  Outcome: Ongoing (interventions implemented as appropriate)  Patient awake/alert. No c/o pain noted at this time. Generalized weakness noted. Plan of care continued

## 2017-11-09 NOTE — PT/OT/SLP PROGRESS
Occupational Therapy  Treatment    Zaira Jensen   MRN: [id number]   Admitting Diagnosis: cervical myelomalacia s/p C5-C6 ACDF    OT Date of Treatment: 11/09/17   Total Time (min): 100 min    Billable Minutes:  Self Care/Home Management 30, Therapeutic Activity 30 and Therapeutic Exercise 40  Total Minutes: 100    General Precautions: Standard, fall  Orthopedic Precautions: N/A  Braces: Cervical collar    Do you have any cultural, spiritual, [employer]ish conflicts, given your current situation?: None    Subjective:  Communicated with nurse prior to session.    Pain/Comfort  Pain Rating 1: 0/10    Objective:  Patient found with: cervical collar    Functional Mobility:  Transfer Training:   Sit to stand:Stand-by Assistance with rollator extra time needed prior to stand  Toilet Transfer:  Pt Stand Pivot with Stand-by Assistance with Rollator verbal instruction for safe management of rollator   >> Patient walking from w/c positioned next to bed > toilet > sink in bathroom > wheelchair positioned next to bed >> patient requires multiple verbal cues to LOCK rollator brakes prior to stepping away from it and requires multiple verbal cues to utilize more than 1 or 2 fingers to do so; however, pt did not demonstrate     Feeding:  Set-up of lunch tray     Grooming:  Standing sink side with SBA, pt washing hands after toileting; pt retrieving soap from dispenser on wall, managing faucet & handles, and retrieving paper towels from dispenser and drying hands    Toilet Training:  Pt performed toileting with Stand-by Assistance with rollator, grab bar, and drop arm commode positioned over toilet with increased time   >> pt requires 20+ minutes to transfer to/from toilet, manage clothing up/down, and perform hygiene; pt requires MAX verbal instruction to fully ABD thumb, extend digits, etc to facilitate increased independence retrieving toilet paper, performing hygiene, managing clothing etc     Additional Treatment:  - R UE -  Prolonged biceps stretch using air splint and with air splint still intact: pt standing to perform bilateral wrist flexor stretch 4 x 1 minute   - B intrinsic stretches as tolerated as well as R thumb ABD stretch 3 x 2 mins   - Un-weighted pulleys for bilateral shoulder stretches : flexion/ABD as tolerated 4 x 2 mins each stretch   - Bilaterally: AROM digit ABD/ADD, thumb ABD/ADD 2 x 5 reps each  - OTR reinforced importance of self PROM/stretches in order to regain functional movements and importance of attempting to perform full ROM of each joint during functional tasks to facilitate independence     Patient left up in chair with nurse notified and physical therapy student  present    ASSESSMENT:  Zaira Jensen is a 42 y.o. female with a medical diagnosis of cervical myelomalacia s/p C5-C6 ACDF and presents with some slight improvements as demonstrated by patient's ability to perform toileting tasks and grooming tasks with SBA with DME as needed; however, pt requires MAX verbal instruction/cues to perform full ROM during functional movements. Patient also requires EXTENDED time for ALL tasks.     Rehab potential is fair    Activity tolerance: Good to fair    Discharge recommendations: home     Equipment recommendations: bedside commode     GOALS:    Occupational Therapy Goals        Problem: Occupational Therapy Goal    Goal Priority Disciplines Outcome Interventions   Occupational Therapy Goal     OT, PT/OT Ongoing (interventions implemented as appropriate)    Description:  Goals to be met by: 11/16/17     Patient will increase functional independence with ADLs by performing:    Feeding with Contact Guard Assistance.  UE Dressing with Minimal Assistance.  LE Dressing with Minimal Assistance.  Grooming while seated with Minimal Assistance.  Toileting from toilet with Moderate Assistance for hygiene and clothing management.   Bathing from most appropriate location with Minimal Assistance.  Toilet transfer to  toilet with Minimal Assistance.      BREANA Canchola  11/3/2017                        Plan:  Patient to be seen 6 x/week to address the above listed problems via self-care/home management, community/work re-entry, therapeutic activities, therapeutic exercises, therapeutic groups, neuromuscular re-education, sensory integration, wheelchair management/training  Plan of Care expires: 11/16/17  Plan of Care reviewed with: patient    HUGH Perkins LOTR  11/09/2017

## 2017-11-09 NOTE — PROGRESS NOTES
[facility]  Physical Medicine & Rehab  Progress Note    Patient Name: Zaira Jensen  MRN: [id number]  Patient Class: IP- Rehab   Admission Date: 10/5/2017  Length of Stay: 35 days  Attending Physician: Anirudh Rowley MD  Primary Care Provider: Cuate Alvarez MD    Subjective:     Principal Problem:  quadriparesis   Interval History: pt is 42 y.o female s/p ACDF, quadriparesis,  Cont PT, OT. participating with therapy     Scheduled Medications:    docusate sodium  100 mg Oral Daily    folic acid-vit B6-vit B12 2.5-25-2 mg  1 tablet Oral Daily       PRN Medications: acetaminophen, aluminum-magnesium hydroxide-simethicone, bisacodyl, bisacodyl, calcium carbonate, lactulose, loratadine-pseudoephedrine 5-120 mg, ondansetron, ramelteon, senna-docusate 8.6-50 mg, traMADol    Review of Systems   Constitutional: Negative.    HENT: Negative.    Eyes: Negative.    Respiratory: Negative.    Cardiovascular: Negative.    Gastrointestinal: Negative.    Endocrine: Negative.    Genitourinary: Negative.    Musculoskeletal: Negative.    Skin: Negative.    Allergic/Immunologic: Negative.    Neurological: Negative.    Hematological: Negative.    Psychiatric/Behavioral: Negative.      Objective:     Vital Signs (Most Recent):  Temp: 97.9 °F (36.6 °C) (11/09/17 0706)  Pulse: 94 (11/09/17 0706)  Resp: 17 (11/09/17 0706)  BP: 122/75 (11/09/17 0706)  SpO2: 96 % (11/09/17 0706)    Vital Signs (24h Range):  Temp:  [97.9 °F (36.6 °C)-98.2 °F (36.8 °C)] 97.9 °F (36.6 °C)  Pulse:  [94-95] 94  Resp:  [16-17] 17  SpO2:  [96 %-100 %] 96 %  BP: (114-122)/(64-75) 122/75     Physical Exam   Constitutional: She is oriented to person, place, and time. She appears well-developed and well-nourished. No distress.   HENT:   Head: Normocephalic and atraumatic.   Right Ear: External ear normal.   Left Ear: External ear normal.   Nose: Nose normal.   Mouth/Throat: Oropharynx is clear and moist. No oropharyngeal exudate.   Eyes:  Conjunctivae and EOM are normal. Pupils are equal, round, and reactive to light. Right eye exhibits no discharge. Left eye exhibits no discharge. No scleral icterus.   Neck: Normal range of motion. Neck supple. No JVD present. No tracheal deviation present. No thyromegaly present.   Cardiovascular: Normal rate, regular rhythm, normal heart sounds and intact distal pulses.  Exam reveals no gallop and no friction rub.    No murmur heard.  Pulmonary/Chest: Effort normal and breath sounds normal. No stridor. No respiratory distress. She has no wheezes. She has no rales. She exhibits no tenderness.   Abdominal: Soft. Bowel sounds are normal. She exhibits no distension and no mass. There is no tenderness. There is no rebound and no guarding. No hernia.   Genitourinary:   Genitourinary Comments: deferred   Musculoskeletal: Normal range of motion. She exhibits no edema, tenderness or deformity.   Lymphadenopathy:     She has no cervical adenopathy.   Neurological: She is alert and oriented to person, place, and time. No cranial nerve deficit. She exhibits normal muscle tone.   Skin: No rash noted. She is not diaphoretic. No erythema. No pallor.   Psychiatric: She has a normal mood and affect. Her behavior is normal.     NEUROLOGICAL EXAMINATION:     MENTAL STATUS   Oriented to person, place, and time.     CRANIAL NERVES     CN III, IV, VI   Pupils are equal, round, and reactive to light.  Extraocular motions are normal.       Assessment/Plan:      Zaira Jensen is a 42 y.o. female admitted to inpatient rehabilitation on 10/5/2017 for <principal problem not specified> with impaired mobility and ADLs. Patient remains appropriate for PT, OT, and as required Speech therapy. Patient continues to require 24 hour nursing care as well as daily Physician assessment.    Active Diagnoses:    Diagnosis Date Noted POA    Obesity [E66.9] 10/13/2017 Yes    Quadriplegia, C5-C7 complete [G82.53] 10/05/2017 Yes      Problems Resolved  During this Admission:    Diagnosis Date Noted Date Resolved POA     S/p ACDF, quadriparesis, cont PT, OT  Pain, managed with current meds  DVT prophylaxis, cont SCD      DISCHARGE PLANNING:  Tentative Discharge Date:     No future appointments.    Anirudh Rowley MD  Department of Physical Medicine & Rehab  [facility]

## 2017-11-09 NOTE — PLAN OF CARE
Problem: Patient Care Overview  Goal: Plan of Care Review  Alert, oriented appetite is good, fall prevention ongoing. Transfers one person sba to min assist min assist with  Tracie care. Incision to anterior neck healed, incision to right groin healed..

## 2017-11-10 LAB
ALBUMIN SERPL BCP-MCNC: 3.1 G/DL
ALP SERPL-CCNC: 40 U/L
ALT SERPL W/O P-5'-P-CCNC: 9 U/L
ANION GAP SERPL CALC-SCNC: 7 MMOL/L
AST SERPL-CCNC: 15 U/L
BASOPHILS # BLD AUTO: 0 K/UL
BASOPHILS NFR BLD: 0.3 %
BILIRUB SERPL-MCNC: 0.4 MG/DL
BUN SERPL-MCNC: 10 MG/DL
CALCIUM SERPL-MCNC: 9.1 MG/DL
CHLORIDE SERPL-SCNC: 107 MMOL/L
CO2 SERPL-SCNC: 25 MMOL/L
CREAT SERPL-MCNC: 0.7 MG/DL
DIFFERENTIAL METHOD: ABNORMAL
EOSINOPHIL # BLD AUTO: 0.1 K/UL
EOSINOPHIL NFR BLD: 3.4 %
ERYTHROCYTE [DISTWIDTH] IN BLOOD BY AUTOMATED COUNT: 14.4 %
EST. GFR  (AFRICAN AMERICAN): >60 ML/MIN/1.73 M^2
EST. GFR  (NON AFRICAN AMERICAN): >60 ML/MIN/1.73 M^2
GLUCOSE SERPL-MCNC: 87 MG/DL
HCT VFR BLD AUTO: 33.3 %
HGB BLD-MCNC: 11.4 G/DL
LYMPHOCYTES # BLD AUTO: 1.2 K/UL
LYMPHOCYTES NFR BLD: 27.3 %
MCH RBC QN AUTO: 30.4 PG
MCHC RBC AUTO-ENTMCNC: 34.2 G/DL
MCV RBC AUTO: 89 FL
MONOCYTES # BLD AUTO: 0.3 K/UL
MONOCYTES NFR BLD: 7 %
NEUTROPHILS # BLD AUTO: 2.7 K/UL
NEUTROPHILS NFR BLD: 62 %
PLATELET # BLD AUTO: 262 K/UL
PMV BLD AUTO: 7.7 FL
POTASSIUM SERPL-SCNC: 4 MMOL/L
PROT SERPL-MCNC: 6.7 G/DL
RBC # BLD AUTO: 3.75 M/UL
SODIUM SERPL-SCNC: 139 MMOL/L
WBC # BLD AUTO: 4.4 K/UL

## 2017-11-10 PROCEDURE — 97116 GAIT TRAINING THERAPY: CPT

## 2017-11-10 PROCEDURE — 85025 COMPLETE CBC W/AUTO DIFF WBC: CPT

## 2017-11-10 PROCEDURE — 80053 COMPREHEN METABOLIC PANEL: CPT

## 2017-11-10 PROCEDURE — 97110 THERAPEUTIC EXERCISES: CPT

## 2017-11-10 PROCEDURE — 97535 SELF CARE MNGMENT TRAINING: CPT

## 2017-11-10 PROCEDURE — 25000003 PHARM REV CODE 250: Performed by: PHYSICAL MEDICINE & REHABILITATION

## 2017-11-10 PROCEDURE — 25000003 PHARM REV CODE 250: Performed by: INTERNAL MEDICINE

## 2017-11-10 PROCEDURE — 12800000 HC REHAB SEMI-PRIVATE ROOM

## 2017-11-10 PROCEDURE — 36415 COLL VENOUS BLD VENIPUNCTURE: CPT

## 2017-11-10 RX ADMIN — Medication 1 TABLET: at 02:11

## 2017-11-10 RX ADMIN — DOCUSATE SODIUM 100 MG: 100 CAPSULE, LIQUID FILLED ORAL at 08:11

## 2017-11-10 RX ADMIN — ACETAMINOPHEN 650 MG: 325 TABLET, FILM COATED ORAL at 08:11

## 2017-11-10 NOTE — PT/OT/SLP PROGRESS
"Physical Therapy         Treatment        Zaira Jensen   MRN: [id number]     PT Received On: 11/10/17  Total Time (min): 90        Billable Minutes:  Gait [id number] and Therapeutic Exercise 65  Total Minutes: 90    Treatment Type: Treatment  PT/PTA: PT     PTA Visit Number: 0       General Precautions: Standard, fall  Orthopedic Precautions: Orthopedic Precautions : N/A   Braces: Braces: Cervical collar (in place throughout session)    Objective:  Patient found seated in w/c, pleasant and cooperative. Patient found with: cervical collar    Functional Mobility:    Transfer Training:  Sit to stand:Stand-by Assistance with No Assistive Device and 4 wheeled walker  x 25 reps from low mat    Gait Trainin' x 1, 150' x 1 with 4ww with SBA and cues    Stair Training: ascend/descend 4 6 " steps with rails with CGA and cues    Additional Treatment:  STANDING: in // bars:  ankle PF, mini squats, 2# ankle weights: hip abduction, hamstring curls, hip flexion marches x 30 reps each LE      Activity Tolerance:  Patient tolerated treatment well    Patient left up in chair with call button in reach.    Assessment:  Zaira Jensen is a 42 y.o. female.    Rehab potential is good.    Activity tolerance: Good    Discharge recommendations: Discharge Facility/Level Of Care Needs:     Equipment recommendations: Equipment Needed After Discharge:  (TBD)     GOALS:    Physical Therapy Goals        Problem: Physical Therapy Goal    Goal Priority Disciplines Outcome Goal Variances Interventions   Physical Therapy Goal     PT/OT, PT Ongoing (interventions implemented as appropriate)     Description:  Goals to be met by: 2017     Patient will increase functional independence with mobility by performin). Supine to sit with Stand-by Assistance  2). Sit to supine with Stand-by Assistance  3). Rolling to Left and Right with Stand-by Assistance.  4). Sit to stand transfer with Minimal Assistance = MET  5). Gait  x  > 25 feet with " Contact Guard Assistance using Rolling Walker = MET  6). Wheelchair propulsion x > 150 feet with Stand-by Assistance      NEW 10/18/2017:    7). Gait  x  > 25 feet with Stand-by Assistance using Rolling Walker or Rollator  = MET  NEW 10/23/2017:  8). Sit to stand transfer with Contact Guard Assistance  = MET with SBA    NEW: 11/6/2017   9. Decrease TUG score by 10 seconds (35 seconds) to demonstrate a decrease risk of fall           Problem: Physical Therapy Goal    Goal Priority Disciplines Outcome Goal Variances Interventions   Physical Therapy Goal     PT/OT, PT Ongoing (interventions implemented as appropriate)                     PLAN:    Patient to be seen daily  to address the above listed problems via gait training, therapeutic activities, therapeutic exercises, neuromuscular re-education, wheelchair management/training  Plan of Care expires: 11/16/17  Plan of Care reviewed with: patient         Richie Lemus, PT 11/10/2017

## 2017-11-10 NOTE — PT/OT/SLP PROGRESS
Occupational Therapy  Treatment    Zaira Jensen   MRN: [id number]   Admitting Diagnosis: cervical myelomalacia s/p C5-C6 ACDF    OT Date of Treatment: 11/10/17   Total Time (min): 90 min    Billable Minutes:  Self Care/Home Management 90  Total Minutes: 90    General Precautions: Standard, fall  Orthopedic Precautions: N/A  Braces: Cervical collar    Do you have any cultural, spiritual, Congregation conflicts, given your current situation?: None    Subjective:  Communicated with nurse prior to session.    Pain/Comfort  Pain Rating 1: 0/10    Objective:  Patient found with: cervical collar    Functional Mobility:  Transfer Training:  Pt performing sit <> stand, toilet transfer, and shower transfer to shower chair with SBA with grab bar and 3-in- commode as shower chair & positioned over toilet - no LOB, but pt requires significant increase in time to perform tasks   Pt ambulating with RW from w/c > shower chair > toilet > sink > w/c in pt room with no LOB approximately 35 ft total    Feeding:  Set up of tray, seated    Bathing:  Patient performed bathing with Maximum Assistance with grab bar, Handheld shower head and shower chair at Shower and max verbal cues to initiate performance of tasks - pt requires increased time to perform ALL tasks.    UE Dressing:  Patient performed UE Dressing with Maximum Assistance with max verbal cues to initiate performing tasks at Wheelchair.     Grooming:  Patient washing hands in standing sink side with Supervision - pt able to retrieve soap from wall dispenser & paper towel     LB Dressing:  Total (A) to don/doff socks, shoes, and under wear    Toilet Training:  Min (A) from toilet with drop arm commode positioned over toilet     Additional Treatment:  - OTR reinforcing importance of performing self PROM, stretches, etc. Patient requires MAX verbal cues to initiate ALL tasks and requires extra time to complete ALL tasks.     Patient left up in chair with call button in reach and  nurseChristiana, present    ASSESSMENT:  Zaira Jensen is a 42 y.o. female with a medical diagnosis of cervical myelomalacia s/p C5-C6 ACDF and presents with severe decline in functional independence, decreased strength/ROM of B UE, decreased tendon/ligament liability/extensibility of B UE joints, pain of L shoulder, pain of low back, and patient requires increased time to perform ALL tasks.    Rehab potential is fair    Activity tolerance: Good to fair    Discharge recommendations: home     Equipment recommendations: bedside commode     GOALS:    Occupational Therapy Goals        Problem: Occupational Therapy Goal    Goal Priority Disciplines Outcome Interventions   Occupational Therapy Goal     OT, PT/OT Ongoing (interventions implemented as appropriate)    Description:  Goals to be met by: 11/16/17     Patient will increase functional independence with ADLs by performing:    Feeding with Contact Guard Assistance.  UE Dressing with Minimal Assistance.  LE Dressing with Minimal Assistance.  Grooming while seated with Minimal Assistance.  Toileting from toilet with Moderate Assistance for hygiene and clothing management.   Bathing from most appropriate location with Minimal Assistance.  Toilet transfer to toilet with Minimal Assistance.      BREANA Canchola  11/3/2017                        Plan:  Patient to be seen 6 x/week to address the above listed problems via self-care/home management, community/work re-entry, therapeutic activities, therapeutic exercises, therapeutic groups, neuromuscular re-education, sensory integration, wheelchair management/training  Plan of Care expires: 11/16/17  Plan of Care reviewed with: patient    Suzanne HUGH Perry LOTR  11/10/2017

## 2017-11-10 NOTE — PLAN OF CARE
Problem: Patient Care Overview  Goal: Plan of Care Review  Outcome: Ongoing (interventions implemented as appropriate)  No c/o pain. Requested assist to the bathroom. Doing own pericare. Requires assist to adjust underwear. Progressing well in therapy. Cont poc

## 2017-11-10 NOTE — PROGRESS NOTES
[facility]  Physical Medicine & Rehab  Progress Note    Patient Name: Zaira Jensen  MRN: [id number]  Patient Class: IP- Rehab   Admission Date: 10/5/2017  Length of Stay: 36 days  Attending Physician: Anirudh Rowley MD  Primary Care Provider: Cuate Alvarez MD    Subjective:     Principal Problem:  quadriparesis   Interval History: pt is 42 y.o female  S/p ACDF, quadriparesis   participating with therapy      Scheduled Medications:    docusate sodium  100 mg Oral Daily    folic acid-vit B6-vit B12 2.5-25-2 mg  1 tablet Oral Daily       PRN Medications: acetaminophen, aluminum-magnesium hydroxide-simethicone, bisacodyl, bisacodyl, calcium carbonate, lactulose, loratadine-pseudoephedrine 5-120 mg, ondansetron, ramelteon, senna-docusate 8.6-50 mg, traMADol    Review of Systems   Constitutional: Negative.    HENT: Negative.    Eyes: Negative.    Respiratory: Negative.    Cardiovascular: Negative.    Gastrointestinal: Negative.    Endocrine: Negative.    Genitourinary: Negative.    Musculoskeletal: Negative.    Skin: Negative.    Allergic/Immunologic: Negative.    Neurological: Negative.    Hematological: Negative.    Psychiatric/Behavioral: Negative.      Objective:     Vital Signs (Most Recent):  Temp: 98.4 °F (36.9 °C) (11/10/17 0500)  Pulse: 101 (11/10/17 0500)  Resp: 18 (11/10/17 0500)  BP: 109/66 (11/10/17 0500)  SpO2: 97 % (11/10/17 0500)    Vital Signs (24h Range):  Temp:  [98.3 °F (36.8 °C)-98.4 °F (36.9 °C)] 98.4 °F (36.9 °C)  Pulse:  [] 101  Resp:  [18-20] 18  SpO2:  [97 %-100 %] 97 %  BP: (109-124)/(66-82) 109/66     Physical Exam   Constitutional: She is oriented to person, place, and time. She appears well-developed and well-nourished. No distress.   HENT:   Head: Normocephalic and atraumatic.   Right Ear: External ear normal.   Left Ear: External ear normal.   Nose: Nose normal.   Mouth/Throat: Oropharynx is clear and moist. No oropharyngeal exudate.   Eyes: Conjunctivae and  EOM are normal. Pupils are equal, round, and reactive to light. Right eye exhibits no discharge. Left eye exhibits no discharge. No scleral icterus.   Neck: Normal range of motion. Neck supple. No JVD present. No tracheal deviation present. No thyromegaly present.   Cardiovascular: Normal rate, regular rhythm, normal heart sounds and intact distal pulses.  Exam reveals no gallop and no friction rub.    No murmur heard.  Pulmonary/Chest: Effort normal and breath sounds normal. No stridor. No respiratory distress. She has no wheezes. She has no rales. She exhibits no tenderness.   Abdominal: Soft. Bowel sounds are normal. She exhibits no distension and no mass. There is no tenderness. There is no rebound and no guarding. No hernia.   Genitourinary:   Genitourinary Comments: deferred   Musculoskeletal: Normal range of motion. She exhibits no edema, tenderness or deformity.   Lymphadenopathy:     She has no cervical adenopathy.   Neurological: She is alert and oriented to person, place, and time. She exhibits normal muscle tone.   Skin: No rash noted. She is not diaphoretic. No erythema. No pallor.   Psychiatric: She has a normal mood and affect. Her behavior is normal.     NEUROLOGICAL EXAMINATION:     MENTAL STATUS   Oriented to person, place, and time.     CRANIAL NERVES     CN III, IV, VI   Pupils are equal, round, and reactive to light.  Extraocular motions are normal.       Assessment/Plan:      Zaira Jensen is a 42 y.o. female admitted to inpatient rehabilitation on 10/5/2017 for <principal problem not specified> with impaired mobility and ADLs. Patient remains appropriate for PT, OT, and as required Speech therapy. Patient continues to require 24 hour nursing care as well as daily Physician assessment.    Active Diagnoses:    Diagnosis Date Noted POA    Obesity [E66.9] 10/13/2017 Yes    Quadriplegia, C5-C7 complete [G82.53] 10/05/2017 Yes      Problems Resolved During this Admission:    Diagnosis Date Noted  Date Resolved POA     S/p ACDF , quadriparesis , cont PT, OT  Pain ,managed with current meds  DVT prophylaxis, cont SCD     DISCHARGE PLANNING:  Tentative Discharge Date:     No future appointments.    Anirudh Rowley MD  Department of Physical Medicine & Rehab  [facility]

## 2017-11-11 PROCEDURE — 25000003 PHARM REV CODE 250: Performed by: INTERNAL MEDICINE

## 2017-11-11 PROCEDURE — 25000003 PHARM REV CODE 250: Performed by: PHYSICAL MEDICINE & REHABILITATION

## 2017-11-11 PROCEDURE — 12800000 HC REHAB SEMI-PRIVATE ROOM

## 2017-11-11 PROCEDURE — 97116 GAIT TRAINING THERAPY: CPT

## 2017-11-11 RX ADMIN — ACETAMINOPHEN 650 MG: 325 TABLET, FILM COATED ORAL at 08:11

## 2017-11-11 RX ADMIN — DOCUSATE SODIUM 100 MG: 100 CAPSULE, LIQUID FILLED ORAL at 08:11

## 2017-11-11 RX ADMIN — Medication 1 TABLET: at 08:11

## 2017-11-11 NOTE — PLAN OF CARE
Problem: Patient Care Overview  Goal: Plan of Care Review  Outcome: Ongoing (interventions implemented as appropriate)  Patient improving daily with her current plan of care, will continue to observe and monitor pts progress during HS hours.

## 2017-11-11 NOTE — PLAN OF CARE
Problem: Patient Care Overview  Goal: Plan of Care Review  Outcome: Ongoing (interventions implemented as appropriate)  Patient doing well, compliant with care. Ambulatory with therapy. .

## 2017-11-11 NOTE — PT/OT/SLP PROGRESS
Physical Therapy         Treatment        Zaira Jensen   MRN: [id number]     PT Received On: 11/11/17  Total Time (min): 30        Billable Minutes:  Gait [id number]  Total Minutes: 30    Treatment Type: Treatment  PT/PTA: PTA     PTA Visit Number: 1       General Precautions: Standard, fall  Orthopedic Precautions: Orthopedic Precautions : N/A   Braces:           Subjective:  Communicated with nurse prior to session.    Pain/Comfort  Pain Rating 1: 7/10  Location - Side 1: Right  Location - Orientation 1: generalized  Location 1: hip  Pain Addressed 1: Pre-medicate for activity, Reposition, Distraction, Cessation of Activity  Pain Rating Post-Intervention 1: 7/10    Objective:  Patient found sitting in w/c, with Patient found with: cervical collar    Functional Mobility:  Bed Mobility:   Supine to sit: Activity did not occur   Sit to supine: Activity did not occur   Rolling: Activity did not occur   Scooting: Activity did not occur      Transfer Training:  Sit to stand:Stand-by Assistance with 4 wheeled walker increased time needed    Gait Training:  Patient gait trained FWB/WBAT: bilateral upper and lower extremity 450  feet on level tile, uneven surface, carpet, outdoors and ramp with 4 wheeled walker with Stand-by Assistance.  Pt with demonstarting a  swing through with decreased orquidea, increased time in double stance, decreased velocity of limb motion, decreased step length, decreased stride length, increased stride width, decreased swing-to-stance ratio, decreased toe-to-floor clearance and decreased weight-shifting ability.Impairments contributing to gait deviations include impaired balance, impaired coordination, decreased flexibility, impaired motor control, pain, impaired postural control and decreased strength      Activity Tolerance:  Patient tolerated treatment well and Patient limited by pain    Patient left up in chair with call button in reach and chair alarm on.    Assessment:  Zaira Jensen is  a 42 y.o. female with a medical diagnosis of <principal problem not specified>.  Rehab potential is fair.    Activity tolerance: Good    Discharge recommendations: Discharge Facility/Level Of Care Needs: home     Equipment recommendations:       GOALS:    Physical Therapy Goals        Problem: Physical Therapy Goal    Goal Priority Disciplines Outcome Goal Variances Interventions   Physical Therapy Goal     PT/OT, PT Ongoing (interventions implemented as appropriate)     Description:  Goals to be met by: 2017     Patient will increase functional independence with mobility by performin). Supine to sit with Stand-by Assistance  2). Sit to supine with Stand-by Assistance  3). Rolling to Left and Right with Stand-by Assistance.  4). Sit to stand transfer with Minimal Assistance = MET  5). Gait  x  > 25 feet with Contact Guard Assistance using Rolling Walker = MET  6). Wheelchair propulsion x > 150 feet with Stand-by Assistance      NEW 10/18/2017:    7). Gait  x  > 25 feet with Stand-by Assistance using Rolling Walker or Rollator  = MET  NEW 10/23/2017:  8). Sit to stand transfer with Contact Guard Assistance  = MET with SBA    NEW: 2017   9. Decrease TUG score by 10 seconds (35 seconds) to demonstrate a decrease risk of fall           Problem: Physical Therapy Goal    Goal Priority Disciplines Outcome Goal Variances Interventions   Physical Therapy Goal     PT/OT, PT Ongoing (interventions implemented as appropriate)                     PLAN:    Patient to be seen daily  to address the above listed problems via gait training, therapeutic activities, therapeutic exercises, neuromuscular re-education, wheelchair management/training  Plan of Care expires: 17  Plan of Care reviewed with: patient         Micakirill TseRanjit, PTA 2017

## 2017-11-11 NOTE — PROGRESS NOTES
"REHAB FOLLOWUP NOTE    Zaira Jensen is a 42 y.o. female patient.    Chief Complaint: Quadriparesis post cervical revision decompression fusion.    History: 42-year-old female with history of chronic neck pain had cervical decompression fusion done ×2 in the past has been progressively getting weaker in all 4 extremities. Neuro workup consistent with myelomalacia of the cervical spine and did undergo revision cervical decompression fusion done on 10/3/2017 by Dr. Neal. Patient presents with quadriparesis for acute inpatient rehabilitation.    Past Medical History:   Diagnosis Date    Allergy     Anemia     Arthritis     Chronic back pain     Chronic neck pain     Functional ovarian cysts     GERD (gastroesophageal reflux disease)     Headache(784.0)     Radiculopathy of arm     Rash     Respiratory distress     bronchospasm at emergence years ago       Temp: 97.9 °F (36.6 °C) (11/11/17 0813)  Pulse: 87 (11/11/17 0813)  Resp: 18 (11/11/17 0813)  BP: 120/70 (11/11/17 0813)  SpO2: 97 % (11/11/17 0813)  Weight: 83.6 kg (184 lb 4.8 oz) (11/11/17 0540)  Height: 5' 5" (165.1 cm) (10/17/17 1437)    Lab Results   Component Value Date    WBC 4.40 11/10/2017    HGB 11.4 (L) 11/10/2017    HCT 33.3 (L) 11/10/2017     11/10/2017    ALT 9 (L) 11/10/2017    AST 15 11/10/2017     11/10/2017    K 4.0 11/10/2017     11/10/2017    CREATININE 0.7 11/10/2017    BUN 10 11/10/2017    CO2 25 11/10/2017    TSH 0.830 08/18/2017    INR 1.0 09/21/2017       Physical Examination:  Alert, complaints of soreness all over the body after having a good workout yesterday.    Anterior cervical incision clean and dry.  Lungs with inspiratory wheeze on right.  Heart with a regular rate and rhythm.  Continent of bladder and bowel.  Extremities without any edema or calf tenderness noted. Presents with decreased active to passive range of motion and decreased motor strength in all 4 extremities. Presents with some tremors " in upper extremities.      Impression:  42-year-old female status post revision cervical decompression and fusion for cervical spinal stenosis, cervical myelomalacia with quadriparesis for inpatient rehabilitation.  History of DJD.  History of cervical decompression fusion ×2 in the past.  Postoperative acute blood loss anemia.    Recommendations: Continue current PT, OT, nursing care.    Hamilton Drummond MD  11/11/2017

## 2017-11-11 NOTE — PLAN OF CARE
Problem: Physical Therapy Goal  Goal: Physical Therapy Goal  Goals to be met by: 2017     Patient will increase functional independence with mobility by performin). Supine to sit with Stand-by Assistance  2). Sit to supine with Stand-by Assistance  3). Rolling to Left and Right with Stand-by Assistance.  4). Sit to stand transfer with Minimal Assistance = MET  5). Gait  x  > 25 feet with Contact Guard Assistance using Rolling Walker = MET  6). Wheelchair propulsion x > 150 feet with Stand-by Assistance      NEW 10/18/2017:    7). Gait  x  > 25 feet with Stand-by Assistance using Rolling Walker or Rollator  = MET  NEW 10/23/2017:  8). Sit to stand transfer with Contact Guard Assistance  = MET with SBA    NEW: 2017   9. Decrease TUG score by 10 seconds (35 seconds) to demonstrate a decrease risk of fall   Outcome: Ongoing (interventions implemented as appropriate)  Pt progressing in PT.

## 2017-11-12 PROCEDURE — 12800000 HC REHAB SEMI-PRIVATE ROOM

## 2017-11-12 PROCEDURE — 25000003 PHARM REV CODE 250: Performed by: INTERNAL MEDICINE

## 2017-11-12 PROCEDURE — 97110 THERAPEUTIC EXERCISES: CPT

## 2017-11-12 PROCEDURE — 97116 GAIT TRAINING THERAPY: CPT

## 2017-11-12 RX ADMIN — DOCUSATE SODIUM 100 MG: 100 CAPSULE, LIQUID FILLED ORAL at 09:11

## 2017-11-12 RX ADMIN — Medication 1 TABLET: at 09:11

## 2017-11-12 NOTE — PLAN OF CARE
Problem: Self-Care Deficit (Adult,Obstetrics,Pediatric)  Intervention: Promote Patient Activity Focusing on Functional [place]  Patient participates in therapy;up to toilet as needed, slow movement of extremities

## 2017-11-12 NOTE — PLAN OF CARE
Problem: Patient Care Overview  Goal: Plan of Care Review  Outcome: Ongoing (interventions implemented as appropriate)  Alert and oriented, continent of B&B, no c/o pain this shift, appetite good, healing anterior cervical incision, soft cervical collar remains in use, voices no c/o

## 2017-11-12 NOTE — PLAN OF CARE
Problem: Fall Risk (Adult)  Intervention: Safety Promotion/Fall Prevention  No injury or falls safety maintained

## 2017-11-12 NOTE — PLAN OF CARE
Problem: Patient Care Overview  Goal: Plan of Care Review  Outcome: Ongoing (interventions implemented as appropriate)  Patient improving daily with her current plan of care

## 2017-11-12 NOTE — PLAN OF CARE
Problem: Fall Risk (Adult)  Intervention: Safety Promotion/Fall Prevention  No injury or falls, safety maintained

## 2017-11-12 NOTE — PLAN OF CARE
Problem: Patient Care Overview  Goal: Plan of Care Review  Outcome: Ongoing (interventions implemented as appropriate)  Alert and oriented x 4, continent of B&B, healing anterior cervical incision, soft cervical collar in use, minimal c/o pain, participating in therapy

## 2017-11-12 NOTE — PLAN OF CARE
Problem: Physical Therapy Goal  Goal: Physical Therapy Goal  Goals to be met by: 2017     Patient will increase functional independence with mobility by performin). Supine to sit with Stand-by Assistance  2). Sit to supine with Stand-by Assistance  3). Rolling to Left and Right with Stand-by Assistance.  4). Sit to stand transfer with Minimal Assistance = MET  5). Gait  x  > 25 feet with Contact Guard Assistance using Rolling Walker = MET  6). Wheelchair propulsion x > 150 feet with Stand-by Assistance      NEW 10/18/2017:    7). Gait  x  > 25 feet with Stand-by Assistance using Rolling Walker or Rollator  = MET  NEW 10/23/2017:  8). Sit to stand transfer with Contact Guard Assistance  = MET with SBA    NEW: 2017   9. Decrease TUG score by 10 seconds (35 seconds) to demonstrate a decrease risk of fall   Outcome: Ongoing (interventions implemented as appropriate)  Pt progressing in PT. Needs increased time with transfers and gait training.

## 2017-11-12 NOTE — PT/OT/SLP PROGRESS
Physical Therapy         Treatment        Zaira Jensen   MRN: [id number]     PT Received On: 11/12/17  Total Time (min): 30        Billable Minutes:  Gait [id number] and Therapeutic Exercise 20  Total Minutes: 30    Treatment Type: Treatment  PT/PTA: PTA     PTA Visit Number: 2       General Precautions: Standard, fall  Orthopedic Precautions: Orthopedic Precautions : N/A   Braces:           Subjective:   Pt stated her hip wasn't hurting her as much today.          Objective:  Patient found seated in w/c in room, with Patient found with: cervical collar    Transfer Training:  Sit to stand:Stand-by Assistance with 4 wheeled walker inceased time needed      Gait Training:  Patient gait trained FWB/WBAT: bilateral upper and lower extremity 175'  feet on level tile with 4 wheeled walker with Stand-by Assistance.  Pt with demonstarting a  swing to with decreased orquidea, increased time in double stance, decreased velocity of limb motion, decreased step length, decreased stride length, decreased swing-to-stance ratio, decreased toe-to-floor clearance and decreased weight-shifting ability.Impairments contributing to gait deviations include impaired balance, impaired coordination, decreased flexibility, pain, impaired postural control and decreased strength    Additional Treatment:  Seated there ex with 2# AP, LAQ, hip flex, hip add x 30 reps each    Activity Tolerance:  Patient tolerated treatment well    Patient left up in chair with call button in reach.    Assessment:  Zaira Jensen is a 42 y.o. female with a medical diagnosis of <principal problem not specified>. .    Rehab potential is good.    Activity tolerance: Good    Discharge recommendations: Discharge Facility/Level Of Care Needs: home     Equipment recommendations:       GOALS:    Physical Therapy Goals        Problem: Physical Therapy Goal    Goal Priority Disciplines Outcome Goal Variances Interventions   Physical Therapy Goal     PT/OT, PT Ongoing  (interventions implemented as appropriate)     Description:  Goals to be met by: 2017     Patient will increase functional independence with mobility by performin). Supine to sit with Stand-by Assistance  2). Sit to supine with Stand-by Assistance  3). Rolling to Left and Right with Stand-by Assistance.  4). Sit to stand transfer with Minimal Assistance = MET  5). Gait  x  > 25 feet with Contact Guard Assistance using Rolling Walker = MET  6). Wheelchair propulsion x > 150 feet with Stand-by Assistance      NEW 10/18/2017:    7). Gait  x  > 25 feet with Stand-by Assistance using Rolling Walker or Rollator  = MET  NEW 10/23/2017:  8). Sit to stand transfer with Contact Guard Assistance  = MET with SBA    NEW: 2017   9. Decrease TUG score by 10 seconds (35 seconds) to demonstrate a decrease risk of fall           Problem: Physical Therapy Goal    Goal Priority Disciplines Outcome Goal Variances Interventions   Physical Therapy Goal     PT/OT, PT Ongoing (interventions implemented as appropriate)                     PLAN:    Patient to be seen daily  to address the above listed problems via gait training, therapeutic activities, therapeutic exercises, neuromuscular re-education, wheelchair management/training  Plan of Care expires: 17  Plan of Care reviewed with: patient         Mica Church, PTA 2017

## 2017-11-13 PROCEDURE — 97530 THERAPEUTIC ACTIVITIES: CPT

## 2017-11-13 PROCEDURE — 12800000 HC REHAB SEMI-PRIVATE ROOM

## 2017-11-13 PROCEDURE — 97542 WHEELCHAIR MNGMENT TRAINING: CPT

## 2017-11-13 PROCEDURE — 97116 GAIT TRAINING THERAPY: CPT

## 2017-11-13 PROCEDURE — 97110 THERAPEUTIC EXERCISES: CPT

## 2017-11-13 PROCEDURE — 25000003 PHARM REV CODE 250: Performed by: INTERNAL MEDICINE

## 2017-11-13 PROCEDURE — 97535 SELF CARE MNGMENT TRAINING: CPT

## 2017-11-13 RX ADMIN — Medication 1 TABLET: at 08:11

## 2017-11-13 RX ADMIN — DOCUSATE SODIUM 100 MG: 100 CAPSULE, LIQUID FILLED ORAL at 08:11

## 2017-11-13 NOTE — PT/OT/SLP PROGRESS
"Physical Therapy         Treatment        Zaira Jensen   MRN: [id number]     PT Received On: 17  Total Time (min): 105        Billable Minutes: 105  Gait Training 40, Therapeutic Activity 35, and Train/Wheelchair Management 30  Total Minutes: 105    Treatment Type: Treatment  PT/PTA: PT     PTA Visit Number: 0       General Precautions: Standard, fall  Orthopedic Precautions: Orthopedic Precautions : N/A   Braces: Braces: Cervical collar (in place throughout session)      Subjective:  Communicated with patient prior to session.    Objective:  Patient found seated in w/c. Patient found with: cervical collar    Functional Mobility:  Bed Mobility:   Supine to sit: Standby Assistance   Sit to supine: Standby Assistance   Rolling: Standby Assistance with siderail   Scooting: Standby Assistance bridging    Transfer Training:  Sit <> Stand:   Stand-by Assistance with No Assistive Device and 4 wheeled walker x5  Bed <> Chair:  Stand Pivot with Stand-by Assistance with No Assistive Device x1  Mat <> Stand: Stand-by Assistance with No Assistive Device (4 wheeled walker in front of pt) x 30 reps    Wheelchair Trainin minutes - Slow orquidea, increased time required   Pt propelled Standard wheelchair x 100 feet on Level tile with Bilateral lower extremity > Right upper extremity and required max encouragement and Minimal Assistance. Cueing for instruction    Gait Trainin' and 180' and 100' with 4ww and SBA    Stair Training: ascend/descend 12 6" steps with rail with SBA and cues    Reassessed function and LE strength.    Activity Tolerance:  Patient limited by fatigue and Patient limited by pain    Patient left up in chair with call button in reach and chair alarm on.    Assessment:  Zaira Jensen is a 42 y.o. female with a medical diagnosis of cervical myelomalacia s/p C5-C6 ACDF. She continues to present with L shoulder pain. Pt improved w/c training distance, however, at the expense of needing increased " time to perform. Pt's D/C date remains appropriate to transfer to next level of care with continued PT services.      Rehab potential is fair.    Activity tolerance: Fair    Discharge recommendations: Discharge Facility/Level Of Care Needs: TBD    Equipment recommendations: Equipment Needed After Discharge:  (TBD)     GOALS:    Physical Therapy Goals        Problem: Physical Therapy Goal    Goal Priority Disciplines Outcome Goal Variances Interventions   Physical Therapy Goal     PT/OT, PT Ongoing (interventions implemented as appropriate)     Description:  Goals to be met by: 2017     Patient will increase functional independence with mobility by performin). Supine to sit with Stand-by Assistance  2). Sit to supine with Stand-by Assistance  3). Rolling to Left and Right with Stand-by Assistance.  4). Sit to stand transfer with Minimal Assistance = MET  5). Gait  x  > 25 feet with Contact Guard Assistance using Rolling Walker = MET  6). Wheelchair propulsion x > 150 feet with Stand-by Assistance      NEW 10/18/2017:    7). Gait  x  > 25 feet with Stand-by Assistance using Rolling Walker or Rollator  = MET  NEW 10/23/2017:  8). Sit to stand transfer with Contact Guard Assistance  = MET with SBA    NEW: 2017   9. Decrease TUG score by 10 seconds (35 seconds) to demonstrate a decrease risk of fall           Problem: Physical Therapy Goal    Goal Priority Disciplines Outcome Goal Variances Interventions   Physical Therapy Goal     PT/OT, PT Ongoing (interventions implemented as appropriate)                     PLAN:    Patient to be seen daily  to address the above listed problems via gait training, therapeutic activities, therapeutic exercises, neuromuscular re-education, wheelchair management/training  Plan of Care expires: 17  Plan of Care reviewed with: patient         Richie VANESSA Lemus, PT 2017

## 2017-11-13 NOTE — PT/OT/SLP PROGRESS
Occupational Therapy  Treatment    Zaira Jensen   MRN: [id number]   Admitting Diagnosis: cervical myelomalacia s/p C5-C6 ACDF    OT Date of Treatment: 11/13/17   Total Time (min): 90 min    Billable Minutes:  Self Care/Home Management 25, Therapeutic Activity 30 and Therapeutic Exercise 35  Total Minutes: 90    General Precautions: Standard, fall  Orthopedic Precautions: N/A  Braces: Cervical collar    Do you have any cultural, spiritual, Congregation conflicts, given your current situation?: None    Subjective:  Communicated with nurse prior to session.    Pain/Comfort  Pain Rating 1: 0/10    Objective:  Patient found with: cervical collar    Functional Mobility:  Transfer Training:  Sit to stand:Stand-by Assistance with No Assistive Device    Toilet Transfer:  Pt Stand Pivot with Stand-by Assistance with Grab bars once standing and drop arm commode positioned over toilet    Grooming:   Pt washed and dried hands sitting sink side after toileting     Toilet Training:  Pt performed toileting with Stand-by Assistance with increased time at with drop arm commode positioned over toilet.    Additional Treatment:  - Pulleys: Bilateral shoulder flexion stretch, 5 reps x 20 second hold end range each UE  - R UE elbow, wrist, digit extension stretches, 5 reps with 20 second hold end range each joint   - R Intrinsic stretches, finger ABD/ADD and thumb ABD/opposition 2 sets x 5 reps each motion  - Using R UE, pt retrieving paper sheets x 5 and placing on table to facilitate fine/gross motor coordination, pinch strength; pt then crumpling sheet using both hands to facilitate  strength, fine motor, in hand manipulation, digit strength, forearm supination/pronation   >>>> Patient requires extra time to perform all therapeutic exercises     Patient left up in chair with nurse notified and physical therapist present    ASSESSMENT:  Zaira Jensen is a 42 y.o. female with a medical diagnosis of cervical myelomalacia s/p C5-C6 ACDF  and presents with decreased independence with self care, functional mobility, B UE strength, ROM, coordination. Patient requires increased time to perform ALL tasks.    Rehab potential is good    Activity tolerance: Good    Discharge recommendations: home     Equipment recommendations: bedside commode     GOALS:    Occupational Therapy Goals        Problem: Occupational Therapy Goal    Goal Priority Disciplines Outcome Interventions   Occupational Therapy Goal     OT, PT/OT Ongoing (interventions implemented as appropriate)    Description:  Goals to be met by: 11/16/17     Patient will increase functional independence with ADLs by performing:    Feeding with Contact Guard Assistance.  UE Dressing with Minimal Assistance.  LE Dressing with Minimal Assistance.  Grooming while seated with Minimal Assistance.  Toileting from toilet with Moderate Assistance for hygiene and clothing management.   Bathing from most appropriate location with Minimal Assistance.  Toilet transfer to toilet with Minimal Assistance.      BREANA Canchola  11/3/2017                        Plan:  Patient to be seen 6 x/week to address the above listed problems via self-care/home management, community/work re-entry, therapeutic activities, therapeutic exercises, therapeutic groups, neuromuscular re-education, sensory integration, wheelchair management/training  Plan of Care expires: 11/16/17  Plan of Care reviewed with: patient    HUGH Perkins LOTR  11/13/2017

## 2017-11-13 NOTE — PLAN OF CARE
Problem: Physical Therapy Goal  Goal: Physical Therapy Goal  Goals to be met by: 2017     Patient will increase functional independence with mobility by performin). Supine to sit with Mod I  2). Sit to supine with Mod I  3). Rolling to Left and Right with Mod I  4). Sit to stand transfer with Minimal Assistance = MET  5). Gait  x  > 25 feet with Contact Guard Assistance using Rolling Walker = MET  6). Wheelchair propulsion x > 150 feet with Stand-by Assistance      NEW 10/18/2017:    7). Gait  x  > 25 feet with Stand-by Assistance using Rolling Walker or Rollator  = MET  NEW 10/23/2017:  8). Sit to stand transfer with Contact Guard Assistance  = MET with SBA    NEW 2017:   9. Decrease TUG score by 10 seconds (35 seconds) to demonstrate a decrease risk of fall     NEW 2017:   10. Sit to stand transfer Mod I  Outcome: Ongoing (interventions implemented as appropriate)  Goals remain appropriate for patient's PT POC.

## 2017-11-13 NOTE — PLAN OF CARE
Problem: Patient Care Overview  Goal: Plan of Care Review  Outcome: Ongoing (interventions implemented as appropriate)  Res exhibits difficulty manipulating objects with hands due to impaired fine motor skills to laila hands and laila hand tremors, pt continues to participate with therapy services to assist with functional and occupational deficits, pt is able and is able to make needs known, will continue to monitor, observe and note any changes, safety maintain

## 2017-11-13 NOTE — PLAN OF CARE
Problem: Patient Care Overview  Goal: Plan of Care Review  Outcome: Ongoing (interventions implemented as appropriate)  Patient improving daily with her current plan of care, she is looking forward to going home soon.

## 2017-11-13 NOTE — PROGRESS NOTES
[facility]  Physical Medicine & Rehab  Progress Note    Patient Name: Zaira Jensen  MRN: [id number]  Patient Class: IP- Rehab   Admission Date: 10/5/2017  Length of Stay: 39 days  Attending Physician: Anirudh Rowley MD  Primary Care Provider: Cuate Alvarez MD    Subjective:     Principal Problem: quadriparesis   Interval History: pt is 42 y.o  Female  S/p ACDF, quadriparesis , participating with therapy     Scheduled Medications:    docusate sodium  100 mg Oral Daily    folic acid-vit B6-vit B12 2.5-25-2 mg  1 tablet Oral Daily       PRN Medications: acetaminophen, aluminum-magnesium hydroxide-simethicone, bisacodyl, bisacodyl, calcium carbonate, lactulose, loratadine-pseudoephedrine 5-120 mg, ondansetron, ramelteon, senna-docusate 8.6-50 mg, traMADol    Review of Systems   Constitutional: Negative.    HENT: Negative.    Eyes: Negative.    Respiratory: Negative.    Cardiovascular: Negative.    Gastrointestinal: Negative.    Endocrine: Negative.    Genitourinary: Negative.    Musculoskeletal: Negative.    Skin: Negative.    Allergic/Immunologic: Negative.    Neurological: Negative.    Hematological: Negative.    Psychiatric/Behavioral: Negative.      Objective:     Vital Signs (Most Recent):  Temp: 98.4 °F (36.9 °C) (11/13/17 0440)  Pulse: 92 (11/13/17 0440)  Resp: 18 (11/13/17 0440)  BP: 120/68 (11/13/17 0440)  SpO2: 99 % (11/13/17 0440)    Vital Signs (24h Range):  Temp:  [98.4 °F (36.9 °C)-98.6 °F (37 °C)] 98.4 °F (36.9 °C)  Pulse:  [88-97] 92  Resp:  [18] 18  SpO2:  [98 %-100 %] 99 %  BP: (113-122)/(62-68) 120/68     Physical Exam   Constitutional: She is oriented to person, place, and time. She appears well-developed and well-nourished. No distress.   HENT:   Head: Normocephalic and atraumatic.   Right Ear: External ear normal.   Left Ear: External ear normal.   Nose: Nose normal.   Mouth/Throat: Oropharynx is clear and moist. No oropharyngeal exudate.   Eyes: Conjunctivae and EOM are  normal. Pupils are equal, round, and reactive to light. Right eye exhibits no discharge. Left eye exhibits no discharge. No scleral icterus.   Neck: Normal range of motion. Neck supple. No JVD present. No tracheal deviation present. No thyromegaly present.   Cardiovascular: Normal rate, regular rhythm, normal heart sounds and intact distal pulses.  Exam reveals no gallop and no friction rub.    No murmur heard.  Pulmonary/Chest: Effort normal and breath sounds normal. No stridor. No respiratory distress. She has no wheezes. She has no rales. She exhibits no tenderness.   Abdominal: Soft. Bowel sounds are normal. She exhibits no distension and no mass. There is no tenderness. There is no rebound and no guarding. No hernia.   Genitourinary:   Genitourinary Comments: deferred   Musculoskeletal: Normal range of motion. She exhibits no edema, tenderness or deformity.   Lymphadenopathy:     She has no cervical adenopathy.   Neurological: She is alert and oriented to person, place, and time. No cranial nerve deficit. She exhibits normal muscle tone.   Skin: No rash noted. She is not diaphoretic. No erythema. No pallor.   Psychiatric: She has a normal mood and affect. Her behavior is normal.     NEUROLOGICAL EXAMINATION:     MENTAL STATUS   Oriented to person, place, and time.     CRANIAL NERVES     CN III, IV, VI   Pupils are equal, round, and reactive to light.  Extraocular motions are normal.       Assessment/Plan:      Zaira Jensen is a 42 y.o. female admitted to inpatient rehabilitation on 10/5/2017 for <principal problem not specified> with impaired mobility and ADLs. Patient remains appropriate for PT, OT, and as required Speech therapy. Patient continues to require 24 hour nursing care as well as daily Physician assessment.    Active Diagnoses:    Diagnosis Date Noted POA    Obesity [E66.9] 10/13/2017 Yes    Quadriplegia, C5-C7 complete [G82.53] 10/05/2017 Yes      Problems Resolved During this Admission:     Diagnosis Date Noted Date Resolved POA     Quadriparesis, cont PT, OT  Pain, managed with current meds  DVT prophylaxis, cont SCD     DISCHARGE PLANNING:  Tentative Discharge Date:     No future appointments.    Anirudh Rowley MD  Department of Physical Medicine & Rehab  [facility]

## 2017-11-14 PROCEDURE — 97535 SELF CARE MNGMENT TRAINING: CPT

## 2017-11-14 PROCEDURE — 97116 GAIT TRAINING THERAPY: CPT

## 2017-11-14 PROCEDURE — 12800000 HC REHAB SEMI-PRIVATE ROOM

## 2017-11-14 PROCEDURE — 97110 THERAPEUTIC EXERCISES: CPT

## 2017-11-14 PROCEDURE — 97803 MED NUTRITION INDIV SUBSEQ: CPT

## 2017-11-14 PROCEDURE — 97530 THERAPEUTIC ACTIVITIES: CPT

## 2017-11-14 PROCEDURE — 25000003 PHARM REV CODE 250: Performed by: INTERNAL MEDICINE

## 2017-11-14 RX ADMIN — Medication 1 TABLET: at 09:11

## 2017-11-14 RX ADMIN — DOCUSATE SODIUM 100 MG: 100 CAPSULE, LIQUID FILLED ORAL at 09:11

## 2017-11-14 NOTE — PT/OT/SLP PROGRESS
"Physical Therapy         Treatment        Zaira Jensen   MRN: [id number]     PT Received On: 11/14/17  Total Time (min): 90        Billable Minutes: 90  Gait Training 20, Therapeutic Activity 30 and Therapeutic Exercise 40  Total Minutes: 95 (8:15-8:55, 9:35-10:25)    Treatment Type: Treatment  PT/PTA: PT     PTA Visit Number: 0       General Precautions: Standard, fall  Orthopedic Precautions: Orthopedic Precautions : N/A   Braces: Braces: Cervical collar       Subjective:  Communicated with patient prior to session. Pt relates L shoulder pain to a "deep, constant tooth ache" that causes her to "tense up" her arm    Pain/Comfort  Pain Rating 1: 4/10  Location - Side 1: Left  Location 1: shoulder  Pain Addressed 1: Reposition, Distraction, Cessation of Activity  Pain Rating Post-Intervention 1: 4/10    Objective:  Patient found in w/c, Patient found with: cervical collar    Functional Mobility:  Bed Mobility: n/p    Transfer Training:  Sit to stand:   9x with Supervision or Set-up Assistance and 1x with Minimal Assistance using No Assistive Device and 4 wheeled walker     Wheelchair Training: n/p    Gait Training:  Gait trained 540 ft; 140 ft x 2; using Rolling Walker and Supervision-Mod I; cueing for posture and L foot drag    Timed Up and Go (TUG):  Assistive Device Used: Rollator   Date   Trial  Time (seconds)    11/6/2017  1 45.85   11/6/2017 2 45.56    11/14/2017  1 40.69       Stair Training: n/p      Additional Treatment:  STANDING in // bars: (30 reps each bilaterally) ankle PF, ankle DF, 2# ankle weights: hip abduction, hamstring curls, high knee walking (4x length of // bars = ~24 ft); Standing PF stretch bilaterally on incline 5x30 seconds; Step ups on 6" step 5 minutes forward/backward and 5 minutes side-to-side;     Activity Tolerance:  Patient tolerated treatment well and was somewhat limited by L shoulder pain    Patient left up in chair with call button in reach and chair alarm " on.    Assessment:  Zaira Jensen is a 42 y.o. female with a medical diagnosis of cervical myelomalacia s/p C5-C6 ACDF. She continues to present with L shoulder pain. Pt decreased TUG score by 5 seconds, however not a minimal detectable change which is 10.8 seconds for patients with SCI, according to normative data (Lozano et al, 2007). Pt's D/C date remains appropriate to transfer to next level of care with continued PT services.      Rehab potential is fair.    Activity tolerance: Good    Discharge recommendations: Discharge Facility/Level Of Care Needs:  (TBD)     Equipment recommendations: Equipment Needed After Discharge: rollator     GOALS:    Physical Therapy Goals        Problem: Physical Therapy Goal    Goal Priority Disciplines Outcome Goal Variances Interventions   Physical Therapy Goal     PT/OT, PT Ongoing (interventions implemented as appropriate)     Description:  Goals to be met by: 2017     Patient will increase functional independence with mobility by performin). Supine to sit with Mod I  2). Sit to supine with Mod I  3). Rolling to Left and Right with Mod I  4). Sit to stand transfer with Minimal Assistance = MET  5). Gait  x  > 25 feet with Contact Guard Assistance using Rolling Walker = MET  6). Wheelchair propulsion x > 150 feet with Stand-by Assistance      NEW 10/18/2017:    7). Gait  x  > 25 feet with Stand-by Assistance using Rolling Walker or Rollator  = MET  NEW 10/23/2017:  8). Sit to stand transfer with Contact Guard Assistance  = MET with SBA    NEW 2017:   9. Decrease TUG score by 10 seconds (35 seconds) to demonstrate a decrease risk of fall     NEW 2017:   10. Sit to stand transfer Mod I           Problem: Physical Therapy Goal    Goal Priority Disciplines Outcome Goal Variances Interventions   Physical Therapy Goal     PT/OT, PT Ongoing (interventions implemented as appropriate)                     PLAN:    Patient to be seen 6 x/week  to address the above  listed problems via gait training, therapeutic activities, therapeutic exercises, wheelchair management/training  Plan of Care expires: 11/16/17  Plan of Care reviewed with: patient         Richie MENDEZ Allan, PT 11/14/2017

## 2017-11-14 NOTE — PROGRESS NOTES
[facility]  Physical Medicine & Rehab  Progress Note    Patient Name: Zaira Jensen  MRN: [id number]  Patient Class: IP- Rehab   Admission Date: 10/5/2017  Length of Stay: 40 days  Attending Physician: Anirudh Rowley MD  Primary Care Provider: Cuate Alvarez MD    Subjective:     Principal Problem: quadripresis  Interval History: pt is 42 y.o female s/p ACDF, quadriparesis, participating with therapy & making great recovery     Scheduled Medications:    docusate sodium  100 mg Oral Daily    folic acid-vit B6-vit B12 2.5-25-2 mg  1 tablet Oral Daily       PRN Medications: acetaminophen, aluminum-magnesium hydroxide-simethicone, bisacodyl, bisacodyl, calcium carbonate, lactulose, loratadine-pseudoephedrine 5-120 mg, ondansetron, ramelteon, senna-docusate 8.6-50 mg, traMADol    Review of Systems   Constitutional: Negative.    HENT: Negative.    Eyes: Negative.    Respiratory: Negative.    Cardiovascular: Negative.    Gastrointestinal: Negative.    Endocrine: Negative.    Genitourinary: Negative.    Musculoskeletal: Negative.    Skin: Negative.    Allergic/Immunologic: Negative.    Neurological: Negative.    Hematological: Negative.    Psychiatric/Behavioral: Negative.      Objective:     Vital Signs (Most Recent):  Temp: 98.6 °F (37 °C) (11/14/17 0630)  Pulse: 93 (11/14/17 0630)  Resp: 18 (11/14/17 0630)  BP: (!) 100/56 (11/14/17 0630)  SpO2: 97 % (11/14/17 0630)    Vital Signs (24h Range):  Temp:  [98.2 °F (36.8 °C)-98.6 °F (37 °C)] 98.6 °F (37 °C)  Pulse:  [91-93] 93  Resp:  [18] 18  SpO2:  [97 %-99 %] 97 %  BP: (100-128)/(56-84) 100/56     Physical Exam   Constitutional: She is oriented to person, place, and time. She appears well-developed and well-nourished. No distress.   HENT:   Head: Normocephalic and atraumatic.   Right Ear: External ear normal.   Left Ear: External ear normal.   Nose: Nose normal.   Mouth/Throat: Oropharynx is clear and moist. No oropharyngeal exudate.   Eyes:  Conjunctivae and EOM are normal. Pupils are equal, round, and reactive to light. Right eye exhibits no discharge. Left eye exhibits no discharge. No scleral icterus.   Neck: Normal range of motion. Neck supple. No JVD present. No tracheal deviation present. No thyromegaly present.   Wear soft collar   Cardiovascular: Normal rate, regular rhythm, normal heart sounds and intact distal pulses.  Exam reveals no gallop and no friction rub.    No murmur heard.  Pulmonary/Chest: Effort normal and breath sounds normal. No stridor. No respiratory distress. She has no wheezes. She has no rales. She exhibits no tenderness.   Abdominal: Soft. Bowel sounds are normal. She exhibits no distension and no mass. There is no tenderness. There is no rebound and no guarding. No hernia.   Genitourinary:   Genitourinary Comments: deferred   Musculoskeletal: Normal range of motion. She exhibits no edema, tenderness or deformity.   Lymphadenopathy:     She has no cervical adenopathy.   Neurological: She is alert and oriented to person, place, and time. She exhibits normal muscle tone.   Cont to have bilateral upper ext weakness   Skin: No rash noted. She is not diaphoretic. No erythema. No pallor.   Psychiatric: She has a normal mood and affect. Her behavior is normal. Judgment and thought content normal.     NEUROLOGICAL EXAMINATION:     MENTAL STATUS   Oriented to person, place, and time.     CRANIAL NERVES     CN III, IV, VI   Pupils are equal, round, and reactive to light.  Extraocular motions are normal.       Assessment/Plan:      Zaira Jensen is a 42 y.o. female admitted to inpatient rehabilitation on 10/5/2017 for <principal problem not specified> with impaired mobility and ADLs. Patient remains appropriate for PT, OT, and as required Speech therapy. Patient continues to require 24 hour nursing care as well as daily Physician assessment.    Active Diagnoses:    Diagnosis Date Noted POA    Obesity [E66.9] 10/13/2017 Yes     Quadriplegia, C5-C7 complete [G82.53] 10/05/2017 Yes      Problems Resolved During this Admission:    Diagnosis Date Noted Date Resolved POA     S/p ACDF, quadriparesis, cont PT, OT  Pain, managed with current meds  DVT prophylaxis, cont SCD    DISCHARGE PLANNING:  Tentative Discharge Date:     No future appointments.    Anirudh Rowley MD  Department of Physical Medicine & Rehab  [facility]

## 2017-11-14 NOTE — PROGRESS NOTES
Team conference attended and patient discussed.  Spoke with patient to discuss discharge plans. Also discussed with patient what she needs to do to qualify for Long Term Personal Care services thru Medicaid.

## 2017-11-14 NOTE — PLAN OF CARE
Problem: Nutrition Imbalance: Excess Oral Intake (Adult)  Intervention: Promote Healthy Nutritional Intake/Lifestyle  Recommendation/Intervention:   1.) Continue with adult regular diet 2.) continue Boost Glucose Control and  beneprotein TID   Goals: 1.) patient will consume 75% of meals/ONS 2.) patient will tolerate diet   Nutrition Goal Status: 1)continues 2) met/ongoing  Communication of RD Recs: discussed on round, care plan

## 2017-11-14 NOTE — PLAN OF CARE
Problem: Patient Care Overview  Goal: Plan of Care Review  Outcome: Ongoing (interventions implemented as appropriate)  Patient awake/alert. No c/o pain noted this shift. Free from falls. Generalized weakness noted. Plan of care continued

## 2017-11-14 NOTE — PLAN OF CARE
Problem: Patient Care Overview  Goal: Plan of Care Review  Outcome: Ongoing (interventions implemented as appropriate)  Pt alert and oriented. Cont b and b. Standby assist with transfers. Plan of care continued. Call light in reach

## 2017-11-14 NOTE — PT/OT/SLP PROGRESS
Occupational Therapy  Treatment    Zaira Jensen   MRN: [id number]   Admitting Diagnosis: cervical myelomalacia s/p C5-C6 ACDF    OT Date of Treatment: 11/14/17   Total Time (min): 90 min    Billable Minutes:  Self Care/Home Management 10, Therapeutic Activity 35 and Therapeutic Exercise 45  Total Minutes: 90    General Precautions: Standard, fall  Orthopedic Precautions: N/A  Braces: Cervical collar    Do you have any cultural, spiritual, Restoration conflicts, given your current situation?: None    Subjective:  Communicated with nurse prior to session.    Pain/Comfort  Pain Rating 1: 0/10    Objective:  Patient found with: cervical collar    Functional Mobility:  Transfer Training:   Sit to stand:Stand-by Assistance with Grab bars    Toilet Transfer:  Pt Stand Pivot with Stand-by Assistance with grab bar and drop arm commode positioned over toilet      Grooming:  Stand By Assistance, seated    Toilet Training:  Stand By Assistance to manage clothing over hips and perform hygiene     Additional Treatment:  - Pulleys: Bilateral shoulder flexion stretch, 10 reps x 20 second hold end range each UE, rest break after 5 reps   - R UE elbow, wrist, digit extension stretches, 2 x 5 reps with 20 second hold end range each joint   - R Intrinsic stretches, finger ABD/ADD and thumb ABD/opposition 2 sets x 5 reps each motion  - Graded grasp/release and reach/place x 15 therapy cones of graded size - emphasis on performing full digit flex/exten to facilitate grasp/release, forearm supination/pronation etc - pt performing with Supervision with increased time   - Pt provided with and instructed on HEP of B UE PROM/stretches of shoulder flexion/extension, elbow extension, wrist extension, forearm supination/pronation, digit extension, isolated PIP/DIP extension - written instruction regarding, # of sets/reps and # of sets per day, and note to caregiver to ensure performing stretches using correct techniques     Patient left up in  chair with call button in reach and nurse notified    ASSESSMENT:  Zaira Jensen is a 42 y.o. female with a medical diagnosis of cervical myelomalacia s/p C5-C6 ACDF and presents with impaired fine/gross motor coordination, impaired self care skills, impaired functional mobility, and requires increased time to perform ALL tasks.    Rehab potential is good    Activity tolerance: Good    Discharge recommendations: home     Equipment recommendations: bedside commode, rollator     GOALS:    Occupational Therapy Goals        Problem: Occupational Therapy Goal    Goal Priority Disciplines Outcome Interventions   Occupational Therapy Goal     OT, PT/OT Ongoing (interventions implemented as appropriate)    Description:  Goals to be met by: 11/16/17     Patient will increase functional independence with ADLs by performing:    Feeding with Contact Guard Assistance.  UE Dressing with Minimal Assistance.  LE Dressing with Minimal Assistance.  Grooming while seated with Minimal Assistance.  Toileting from toilet with Moderate Assistance for hygiene and clothing management.   Bathing from most appropriate location with Minimal Assistance.  Toilet transfer to toilet with Minimal Assistance.      BREANA Canchola  11/3/2017                        Plan:  Patient to be seen 6 x/week to address the above listed problems via self-care/home management, community/work re-entry, therapeutic activities, therapeutic exercises, therapeutic groups, neuromuscular re-education, wheelchair management/training  Plan of Care expires: 11/16/17  Plan of Care reviewed with: patient    HUGH Perkins LOTR  11/14/2017

## 2017-11-14 NOTE — PROGRESS NOTES
[facility]  Adult Nutrition  Progress Note    SUMMARY     Recommendations  Recommendation/Intervention:   1.) Continue with adult regular diet 2.) continue Boost Glucose Control and  beneprotein TID   Goals: 1.) patient will consume 75% of meals/ONS 2.) patient will tolerate diet   Nutrition Goal Status: 1)continues 2) met/ongoing  Communication of RD Recs: discussed on round, care plan    1. Quadriplegia, C5-C7 complete      Past Medical History:   Diagnosis Date    Allergy     Anemia     Arthritis     Chronic back pain     Chronic neck pain     Functional ovarian cysts     GERD (gastroesophageal reflux disease)     Headache(784.0)     Radiculopathy of arm     Rash     Respiratory distress     bronchospasm at emergence years ago       Reason for Assessment  Reason for Assessment: RD follow-up   Interdisciplinary Rounds: attended  General Information Comments: Admits to rehab with disablity. Patient with adequate intake at meals (~70% at most meals). Denies nausea/vomiting. Interested in beneprotein with meals. Reports acide reflux. Rd offered to take food preferences but patient refused. No complaints at this time.   10/24/17 Pt is slowly losing weight per her desire.  Spoke with RD to clarify ONS, who reported pt requested both Boost Glucose Control and Beneprotein.  Meal intake continues at ~70%.   10/31/17 Pt continues slow weight loss. Pt reports good appetite and use of ONS, both Boost Glucose Control and Beneprotein.  Requests milk in the mornings to mix the Beneprotein in.   11/7/17 Pt's wt appears stable with slight loss. Meal intake reflects good intake.    11/14/17: Meal intake appears to be decreasing; however, weight continues to be stable.      Wt Readings from Last 1 Encounters:   11/11/17 0540 83.6 kg (184 lb 4.8 oz)   11/05/17 1930 83.8 kg (184 lb 11.2 oz)   10/29/17 0600 83.1 kg (183 lb 3.2 oz)   10/21/17 0600 83.1 kg (183 lb 1.6 oz)   10/17/17 1437 83.7 kg (184 lb  8.4 oz)   10/13/17 1341 83.7 kg (184 lb 8.4 oz)   10/09/17 0523 83.7 kg (184 lb 9.6 oz)   10/08/17 2130 83.7 kg (184 lb 9.6 oz)   10/05/17 1429 84.4 kg (186 lb)       Nutrition Prescription Ordered  Current Diet Order: Adult regular diet   Nutrition Order Comments: Add bottled water to each tray;  Pt notes she is concerned about her weight.  Offered calorie control or low fat option and patient declined noting she would monitor intake.         Evaluation of Received Nutrients/Fluid Intake  Oral Fluid (mL): 1560 (per 24 hr i/os)  Energy Calories Required: meeting needs  Protein Required: meeting needs  % Intake of Estimated Energy Needs: %  % Meal Intake: 62% average for last 6 documented meals plus Boost Glucose Control 1 x daily and Beneprotein     Nutrition Risk Screen  Nutrition Risk Screen: no indicators present    Nutrition/Diet History  Patient Reported Diet/Restrictions/Preferences: general (avoids foods that have a lot of acid)  Food Preferences: No cultural or religous food preferences identified.   Supplemental Drinks or Food Habits:  (none)    Labs/Tests/Procedures/Meds  Diagnostic Test/Procedure Review: reviewed, pertinent  Pertinent Labs Reviewed: reviewed, pertinent  BMP  Lab Results   Component Value Date     11/10/2017    K 4.0 11/10/2017     11/10/2017    CO2 25 11/10/2017    BUN 10 11/10/2017    CREATININE 0.7 11/10/2017    CALCIUM 9.1 11/10/2017    ANIONGAP 7 (L) 11/10/2017    ESTGFRAFRICA >60 11/10/2017    EGFRNONAA >60 11/10/2017     Lab Results   Component Value Date    ALBUMIN 3.1 (L) 11/10/2017     Lab Results   Component Value Date    CALCIUM 9.1 11/10/2017     No results for input(s): POCTGLUCOSE in the last 24 hours.    Pertinent Medications Reviewed: reviewed, pertinent  Scheduled Meds:   docusate sodium  100 mg Oral Daily    folic acid-vit B6-vit B12 2.5-25-2 mg  1 tablet Oral Daily         Physical Findings  Overall Physical Appearance: nourished  Oral/Mouth Cavity:  "WDL  Skin: incision (Jc score 18)    Anthropometrics  Temp: 98.2 °F (36.8 °C)  Height: 5' 5"  Weight Method: Bed Scale  Weight: 83.6 kg (184 lb 4.8 oz)  Ideal Body Weight (IBW), Female: 125 lb  % Ideal Body Weight, Female (lb): 147.62 lb  BMI (Calculated): 30.8  BMI Grade: 30 - 34.9- obesity - grade I  Usual Body Weight (UBW), k.9 kg  % Usual Body Weight: 99.97  % Weight Change From Usual Weight: -0.24 %      Estimated/Assessed Needs  Weight Used For Calorie Calculations: 83.7 kg (184 lb 8.4 oz)   Height (cm): 165.1 cm  Energy Need Method: Ellsworth-St Jeor  RMR (Ellsworth-St. Jeor Equation): 1487.88 x1.2=1784 kcals/day  Weight Used For Protein Calculations: 83.7 kg (184 lb 8.4 oz)  0.8 gm Protein (gm): 67.1 and 1.0 gm Protein (gm): 83.88  Fluid Need Method: RDA Method (or per MD rec)  CHO Requirement: na       Assessment and Plan    Obesity    Related to (etiology):   Excessive energy intake    Signs and Symptoms (as evidenced by):   1) BMI 30    Interventions/Recommendations (treatment strategy):  1) After pt noted a desire to lose weight offered limited calorie or low fat diet to patient and she declined in favor of monitoring her food herself. 2) Monitor weight     Nutrition Diagnosis Status:   progressing              Monitor and Evaluation  Food and Nutrient Intake: energy intake  Food and Nutrient Adminstration: diet order  Anthropometric Measurements: weight, weight change  Biochemical Data, Medical Tests and Procedures: electrolyte and renal panel, inflammatory profile  Nutrition-Focused Physical Findings: overall appearance, skin    Nutrition Risk  Level of Risk:  (x1 weekly)    Nutrition Follow-Up  RD Follow-up?: Yes     Discharge Planning: discharge on adult regular diet.   "

## 2017-11-14 NOTE — PATIENT CARE CONFERENCE
Weekly Staffing Report      Date Admitted: 10/5/2017 :   Staffing Date: 11/14/2017     Patient Active Problem List   Diagnosis    Lumbago    Chronic neck pain    Cervical spondylosis without myelopathy    Degeneration of cervical intervertebral disc    Brachial neuritis or radiculitis NOS    Acquired spondylolisthesis    Biliary colic    Cervical spondylosis with myelopathy    Intervertebral disc disorder of cervical region with myelopathy    Spondylosis with myelopathy, lumbar region    Degeneration of lumbar or lumbosacral intervertebral disc    Atypical chest pain    Normocytic normochromic anemia    Abnormal CT scan    Cervical myelopathy    Stiffness of right shoulder joint    Stiffness of right wrist joint    Bilateral arm weakness    Fibroadenoma    Intervertebral disc stenosis of neural canal    Myelomalacia    Other specified symptoms and signs involving the circulatory and respiratory systems    Brief situational non-psychotic disorder    Goiter    Abnormality of gait    Coarse tremors    Cervical spinal stenosis s/p C5-6 ACDF    GERD (gastroesophageal reflux disease)    Chronic back pain    Quadriplegia, C5-C7 complete    Obesity          Team Members Present:  Physician Team Member: Dr Rowley  Nursing Team Member: Hitesh Ayala RN  Case Management Team Member: Ijeoma Carey RN  PT Team Member: Richie Lemus PT  OT Team Member: Suzanne Perry OT  SLP Team Member: Julien CASTANEDA    Nursing  Skin: Intact  Bowel: Continent  Bladder:continent  Last BM: 11-13-17  Diet: Regular  Appetite: good   Pain Level: 4/10 left shoulder     Physical Therapy  Supine to Sit:  modified independent  Sit to Stand: modified independent  Gait: 600 feet  modified independent on level surface 4- wheeled walker  Wheelchair Mobility:  feet standy by assistance  ROM: within normal limits  Stairs:12 six inch steps  standy by assistance  Strength:  Bilateral ankle 3+/5, hip abd 3-/5, hip flexion  4-/5, knee ext 4/5    Occupational Therapy  Feeding: set up  Grooming:set up  UED: moderate assist  LED: maximal assist  Bathing:maximal assist   Toileting:contact guard to SBA  Toilet Transfer:  standy by assistance  Tub Transfer:  contact guard to SBA  Strength: right 2 to 3+/5, left 2+ to 4-/5            Summary of Progress:  good    Barriers to Progress/Discharge: slow neurologic recovery of  Bilateral upper ext     Tolerates 3 hours of therapy: Yes    Comments:     Estimated Length of Stay: 11-16-17

## 2017-11-15 PROCEDURE — 97535 SELF CARE MNGMENT TRAINING: CPT

## 2017-11-15 PROCEDURE — 97530 THERAPEUTIC ACTIVITIES: CPT

## 2017-11-15 PROCEDURE — 97116 GAIT TRAINING THERAPY: CPT

## 2017-11-15 PROCEDURE — 12800000 HC REHAB SEMI-PRIVATE ROOM

## 2017-11-15 PROCEDURE — 97110 THERAPEUTIC EXERCISES: CPT

## 2017-11-15 PROCEDURE — 25000003 PHARM REV CODE 250: Performed by: PHYSICAL MEDICINE & REHABILITATION

## 2017-11-15 PROCEDURE — 25000003 PHARM REV CODE 250: Performed by: INTERNAL MEDICINE

## 2017-11-15 RX ADMIN — DOCUSATE SODIUM 100 MG: 100 CAPSULE, LIQUID FILLED ORAL at 09:11

## 2017-11-15 RX ADMIN — ACETAMINOPHEN 650 MG: 325 TABLET, FILM COATED ORAL at 09:11

## 2017-11-15 RX ADMIN — Medication 1 TABLET: at 09:11

## 2017-11-15 NOTE — PROGRESS NOTES
[facility]  Physical Medicine & Rehab  Progress Note    Patient Name: Zaira Jensen  MRN: [id number]  Patient Class: IP- Rehab   Admission Date: 10/5/2017  Length of Stay: 41 days  Attending Physician: Anirudh Rowley MD  Primary Care Provider: Cuate Alvarez MD    Subjective:     Principal Problem: quadripresis  Interval History: pt is 42 y.o female s/p ACDF, quadriparesis, participating with therapy     Scheduled Medications:    docusate sodium  100 mg Oral Daily    folic acid-vit B6-vit B12 2.5-25-2 mg  1 tablet Oral Daily       PRN Medications: acetaminophen, aluminum-magnesium hydroxide-simethicone, bisacodyl, bisacodyl, calcium carbonate, lactulose, loratadine-pseudoephedrine 5-120 mg, ondansetron, ramelteon, senna-docusate 8.6-50 mg, traMADol    Review of Systems   Constitutional: Negative.    HENT: Negative.    Eyes: Negative.    Respiratory: Negative.    Cardiovascular: Negative.    Gastrointestinal: Negative.    Endocrine: Negative.    Genitourinary: Negative.    Musculoskeletal: Negative.    Skin: Negative.    Allergic/Immunologic: Negative.    Neurological: Negative.    Hematological: Negative.    Psychiatric/Behavioral: Negative.      Objective:     Vital Signs (Most Recent):  Temp: 98.7 °F (37.1 °C) (11/15/17 0736)  Pulse: 93 (11/15/17 0736)  Resp: 17 (11/15/17 0736)  BP: 126/66 (11/15/17 0736)  SpO2: 100 % (11/15/17 0736)    Vital Signs (24h Range):  Temp:  [98.2 °F (36.8 °C)-98.7 °F (37.1 °C)] 98.7 °F (37.1 °C)  Pulse:  [] 93  Resp:  [16-18] 17  SpO2:  [96 %-100 %] 100 %  BP: (120-134)/(58-66) 126/66     Physical Exam   Constitutional: She is oriented to person, place, and time. She appears well-developed and well-nourished. No distress.   HENT:   Head: Normocephalic and atraumatic.   Right Ear: External ear normal.   Left Ear: External ear normal.   Nose: Nose normal.   Mouth/Throat: Oropharynx is clear and moist. No oropharyngeal exudate.   Eyes: Conjunctivae and EOM are  normal. Pupils are equal, round, and reactive to light. Right eye exhibits no discharge. Left eye exhibits no discharge. No scleral icterus.   Neck: Normal range of motion. Neck supple. No JVD present. No tracheal deviation present. No thyromegaly present.   Cardiovascular: Normal rate, regular rhythm, normal heart sounds and intact distal pulses.  Exam reveals no gallop and no friction rub.    No murmur heard.  Pulmonary/Chest: Effort normal and breath sounds normal. No stridor. No respiratory distress. She has no wheezes. She has no rales. She exhibits no tenderness.   Abdominal: Soft. Bowel sounds are normal. She exhibits no distension and no mass. There is no tenderness. There is no rebound and no guarding. No hernia.   Genitourinary:   Genitourinary Comments: deferred   Musculoskeletal: Normal range of motion. She exhibits no edema, tenderness or deformity.   Lymphadenopathy:     She has no cervical adenopathy.   Neurological: She is alert and oriented to person, place, and time. No cranial nerve deficit. She exhibits normal muscle tone.   Skin: No rash noted. She is not diaphoretic. No erythema. No pallor.   Psychiatric: She has a normal mood and affect. Her behavior is normal. Thought content normal.     NEUROLOGICAL EXAMINATION:     MENTAL STATUS   Oriented to person, place, and time.     CRANIAL NERVES     CN III, IV, VI   Pupils are equal, round, and reactive to light.  Extraocular motions are normal.       Assessment/Plan:      Zaira Jensen is a 42 y.o. female admitted to inpatient rehabilitation on 10/5/2017 for <principal problem not specified> with impaired mobility and ADLs. Patient remains appropriate for PT, OT, and as required Speech therapy. Patient continues to require 24 hour nursing care as well as daily Physician assessment.    Active Diagnoses:    Diagnosis Date Noted POA    Obesity [E66.9] 10/13/2017 Yes    Quadriplegia, C5-C7 complete [G82.53] 10/05/2017 Yes      Problems Resolved  During this Admission:    Diagnosis Date Noted Date Resolved POA     Quadriparesis, cont PT, OT  Pain, managed with current meds  DVT prophylaxis, cont  SCD     DISCHARGE PLANNING:  Tentative Discharge Date:     No future appointments.    Anirudh Rowley MD  Department of Physical Medicine & Rehab  [facility]

## 2017-11-15 NOTE — PT/OT/SLP PROGRESS
Occupational Therapy  Treatment    Zaira Jensen   MRN: [id number]   Admitting Diagnosis: cervical myelomalacia s/p C5-C6 ACDF    OT Date of Treatment: 11/15/17   Total Time (min): 90 min    Billable Minutes:  Self Care/Home Management 90   Total Minutes: 90    General Precautions: Standard, fall  Orthopedic Precautions: N/A  Braces: Cervical collar    Do you have any cultural, spiritual, Jehovah's witness conflicts, given your current situation?: None    Subjective:  Communicated with nurse prior to session.    Pain/Comfort  Pain Rating 1: 0/10    Objective:  Patient found with: cervical collar    Functional Mobility:  Transfer Training:   Sit to stand:Supervision or Set-up Assistance with No Assistive Device    Toilet Transfer:  Pt Stand Pivot with Stand-by Assistance with No Assistive Device grab bar and drop arm commode positioned over toilet  Patient ambulating with SBA and no AD from w/c positioned outside of bathroom door > toilet > sink > w/c - approximately 35 ft with no LOB  Grooming:  Pt performed grooming tasks from wheelchair with Stand By Assistance and increased time to shave, wash face, apply lotion to face  Pt standing sink side with Supervision after toileting to wash hands - pt able to retrieve hand soap from wall dispenser wash & rinse hands and retrieve paper towel from wall dispenser with increased time - no LOB     Toilet Training:  Pt performed toileting with Stand-by Assistance with grab bar and drop arm commode positioned over toilet (urinating only)    Additional Treatment:  - OTR reinforcing education regarding daily self PROM, stretches and importance of performing full ROM of all involved joints during functional tasks to facilitate independence     Patient left up in chair with call button in reach and nurse notified    ASSESSMENT:  Zaira Jensen is a 42 y.o. female with a medical diagnosis of cervical myelomalacia s/p C5-C6 ACDF and presents with some improvement towards OT goals as pt  performing toileting tasks with SBA, grooming tasks with Set-up/SBA. Patient demonstrates B UE decreased tissue extensibility, ROM/strength deficits, poor fine motor coordination which are all limiting patient's independence with self care tasks.     Rehab potential is good    Activity tolerance: Good    Discharge recommendations: home     Equipment recommendations: bedside commode, rollator     GOALS:    Occupational Therapy Goals        Problem: Occupational Therapy Goal    Goal Priority Disciplines Outcome Interventions   Occupational Therapy Goal     OT, PT/OT Ongoing (interventions implemented as appropriate)    Description:  Goals to be met by: 11/16/17     Patient will increase functional independence with ADLs by performing:    Feeding with Contact Guard Assistance.  UE Dressing with Minimal Assistance.  LE Dressing with Minimal Assistance.  Grooming while seated with Minimal Assistance.  Toileting from toilet with Moderate Assistance for hygiene and clothing management.   Bathing from most appropriate location with Minimal Assistance.  Toilet transfer to toilet with Minimal Assistance.      BREANA Canchola  11/3/2017                        Plan:  Patient to be seen 6 x/week to address the above listed problems via self-care/home management, community/work re-entry, therapeutic activities, therapeutic exercises, therapeutic groups, neuromuscular re-education, sensory integration, wheelchair management/training  Plan of Care expires: 11/16/17  Plan of Care reviewed with: patient    HUGH Perkins LOTR  11/15/2017

## 2017-11-15 NOTE — PT/OT/SLP PROGRESS
"Physical Therapy         Treatment        Zaira Jensen   MRN: [id number]     PT Received On: 11/15/17  Total Time (min): 90        Billable Minutes:  Gait Training 45, Therapeutic Activity 20 and Therapeutic Exercise 25  Total Minutes: 90    Treatment Type: Treatment  PT/PTA: PT     PTA Visit Number: 0       General Precautions: Standard, fall  Orthopedic Precautions: Orthopedic Precautions : N/A   Braces: Braces: Cervical collar (in place throughout session)       Subjective:  Communicated with patient prior to session. Pt agreeable to participate in gait training outdoors    Pain/Comfort  Pain Rating 1: 5/10  Location - Side 1: Left  Location 1: shoulder  Pain Addressed 1: Reposition, Distraction, Cessation of Activity (pt declines notify nurse for meds)  Pain Rating Post-Intervention 1: 5/10    Objective:  Patient found in w/c  Patient found with: cervical collar    Functional Mobility:  Bed Mobility: n/p    Transfer Training:  Sit <> Stand:   Modified Independent with No Assistive Device and 4 wheeled walker x7    Wheelchair Training: n/p    Gait Training:  Patient gait trained >800 ft using Rollator Modified Independent on level tile, carpet, unlevel surface, outdoors, ramps, curbs, grass; 180 ft and 150 ft on level tile with No Assistive Device with Contact Guard Assistance; 30 ft using SPC and CGA with cueing for gait sequence - pt tried SPC on both sides - could not coordinate proper gait sequence and was not relying on cane.  Pt exhibiting with decreased orquidea, decreased toe-to-floor clearance and decreased weight-shifting ability.Impairments contributing to gait deviations include R knee extension thrust in Terminal Stance.    Stair Training: ascend/descend 12 6" steps with L rail with SBA; 4" curb step Mod I using Rollator     Additional Treatment:  STANDING in // bars: (20 reps each bilaterally) ankle PF, ankle DF, 3# ankle weights: hip abduction, hamstring curls, high knee walking (6x length of // " bars = ~36 ft); Standing PF stretch bilaterally on incline 5x30 seconds;     Activity Tolerance:  Patient tolerated treatment well    Patient left up in chair with call button in reach and chair alarm on.    Assessment:  Zaira Jensen is a 42 y.o. female with a medical diagnosis of cervical myelomalacia s/p C5-C6 ACDF. She continues to present with L shoulder pain. Pt able to perform stairs using L handrail only which is functionally important for home environment. Pt also able to negotiate curb step and various outdoor unlevel surfaces using Rollator. Despite exhibiting more normal gait ambulating without an AD than the SPC, pt has trouble coordinating SPC gait sequence and cannot yet rely on SPC for balance, therefore Rollator is more appropriate for d/c. Pt's D/C date remains appropriate to transfer to next level of care with continued PT services.     Rehab potential is fair.    Activity tolerance: Good    Discharge recommendations: Discharge Facility/Level Of Care Needs:  (TBD)     Equipment recommendations: Equipment Needed After Discharge: rollator     GOALS:    Physical Therapy Goals        Problem: Physical Therapy Goal    Goal Priority Disciplines Outcome Goal Variances Interventions   Physical Therapy Goal     PT/OT, PT Ongoing (interventions implemented as appropriate)     Description:  Goals to be met by: 2017     Patient will increase functional independence with mobility by performin). Supine to sit with Mod I  2). Sit to supine with Mod I  3). Rolling to Left and Right with Mod I  4). Sit to stand transfer with Minimal Assistance = MET  5). Gait  x  > 25 feet with Contact Guard Assistance using Rolling Walker = MET  6). Wheelchair propulsion x > 150 feet with Stand-by Assistance      NEW 10/18/2017:    7). Gait  x  > 25 feet with Stand-by Assistance using Rolling Walker or Rollator  = MET  NEW 10/23/2017:  8). Sit to stand transfer with Contact Guard Assistance  = MET with SBA    NEW  11/6/2017:   9. Decrease TUG score by 10 seconds (35 seconds) to demonstrate a decrease risk of fall     NEW 11/13/2017:   10. Sit to stand transfer Mod I           Problem: Physical Therapy Goal    Goal Priority Disciplines Outcome Goal Variances Interventions   Physical Therapy Goal     PT/OT, PT Ongoing (interventions implemented as appropriate)     Description:  Goals to be met by: 11/15/2017     Patient will increase functional independence with mobility by performing:      Wheelchair propulsion x > 50 feet with Supervision                       PLAN:    Patient to be seen daily  to address the above listed problems via gait training, therapeutic activities, therapeutic exercises, neuromuscular re-education, wheelchair management/training  Plan of Care expires: 11/16/17  Plan of Care reviewed with: patient         Richie VANESSA Lemus, PT 11/15/2017

## 2017-11-15 NOTE — PLAN OF CARE
Problem: Patient Care Overview  Goal: Plan of Care Review  Fall prevention ongoing self care reinforced. Med teaching reinforced. Incision healed to neck. Cont of b/b. Discharge teaching provided regarding discharge for tomorrow. Verbalized understanding

## 2017-11-15 NOTE — PROGRESS NOTES
Spoke with patient at length regarding discharge plans.  Discussed reason for her being unable to obtain regular Medicaid in order to qualify for Long Term Personal Care. She owns a piece of property which makes her ineligible but does not want to sell the property at this time.  She thinks she will be able to manage at home with help from  and her parents.  Patient wants to use [facility].  DME ordered from Medicare providers. SW will follow and assist with discharge planning as needed.

## 2017-11-15 NOTE — PLAN OF CARE
Problem: Physical Therapy Goal  Goal: Physical Therapy Goal  Goals to be met by: 2017     Patient will increase functional independence with mobility by performin). Supine to sit with Mod I  2). Sit to supine with Mod I  3). Rolling to Left and Right with Mod I  4). Sit to stand transfer with Minimal Assistance = MET  5). Gait  x  > 25 feet with Contact Guard Assistance using Rolling Walker = MET  6). Wheelchair propulsion x > 150 feet with Stand-by Assistance      NEW 10/18/2017:    7). Gait  x  > 25 feet with Stand-by Assistance using Rolling Walker or Rollator  = MET  NEW 10/23/2017:  8). Sit to stand transfer with Contact Guard Assistance  = MET with SBA    NEW 2017:   9. Decrease TUG score by 10 seconds (35 seconds) to demonstrate a decrease risk of fall     NEW 2017:   10. Sit to stand transfer Mod I   Outcome: Ongoing (interventions implemented as appropriate)  Progressing toward goals. Expected D/C 2017.

## 2017-11-15 NOTE — PLAN OF CARE
Problem: Patient Care Overview  Goal: Plan of Care Review  Outcome: Ongoing (interventions implemented as appropriate)  Patient awake/alert. Generalized weakness present. No c/o pain this shift. Free from  Falls. Plan of care continued

## 2017-11-16 VITALS
HEIGHT: 65 IN | TEMPERATURE: 97 F | SYSTOLIC BLOOD PRESSURE: 116 MMHG | WEIGHT: 184.31 LBS | OXYGEN SATURATION: 99 % | DIASTOLIC BLOOD PRESSURE: 76 MMHG | HEART RATE: 90 BPM | RESPIRATION RATE: 17 BRPM | BODY MASS INDEX: 30.71 KG/M2

## 2017-11-16 PROCEDURE — 97110 THERAPEUTIC EXERCISES: CPT

## 2017-11-16 PROCEDURE — 97116 GAIT TRAINING THERAPY: CPT

## 2017-11-16 PROCEDURE — 97535 SELF CARE MNGMENT TRAINING: CPT

## 2017-11-16 PROCEDURE — 97530 THERAPEUTIC ACTIVITIES: CPT

## 2017-11-16 PROCEDURE — 25000003 PHARM REV CODE 250: Performed by: INTERNAL MEDICINE

## 2017-11-16 RX ORDER — RAMELTEON 8 MG/1
8 TABLET ORAL NIGHTLY PRN
Qty: 30 TABLET | Refills: 1 | Status: SHIPPED | OUTPATIENT
Start: 2017-11-16 | End: 2019-01-30

## 2017-11-16 RX ORDER — AMOXICILLIN 250 MG
1 CAPSULE ORAL NIGHTLY PRN
Qty: 30 TABLET | Refills: 0 | Status: SHIPPED | OUTPATIENT
Start: 2017-11-16 | End: 2019-01-30

## 2017-11-16 RX ORDER — ONDANSETRON 8 MG/1
8 TABLET, ORALLY DISINTEGRATING ORAL EVERY 6 HOURS PRN
Qty: 20 TABLET | Refills: 1 | Status: SHIPPED | OUTPATIENT
Start: 2017-11-16 | End: 2019-01-30

## 2017-11-16 RX ORDER — DOCUSATE SODIUM 100 MG/1
100 CAPSULE, LIQUID FILLED ORAL 2 TIMES DAILY PRN
Qty: 60 CAPSULE | Refills: 1 | Status: SHIPPED | OUTPATIENT
Start: 2017-11-16 | End: 2019-01-30

## 2017-11-16 RX ORDER — TRAMADOL HYDROCHLORIDE 50 MG/1
50 TABLET ORAL EVERY 12 HOURS PRN
Qty: 60 TABLET | Refills: 0 | Status: SHIPPED | OUTPATIENT
Start: 2017-11-16 | End: 2017-11-26

## 2017-11-16 RX ADMIN — Medication 1 TABLET: at 08:11

## 2017-11-16 RX ADMIN — DOCUSATE SODIUM 100 MG: 100 CAPSULE, LIQUID FILLED ORAL at 08:11

## 2017-11-16 NOTE — PT/OT/SLP PROGRESS
Physical Therapy         D/C Summary       Zaira Jensen   MRN: [id number]     PT Received On: 17  Total Time (min): 90        Billable Minutes:  Gait Training 25, Therapeutic Activity 15 and Therapeutic Exercise 50  Total Minutes: 90      Treatment Type: Treatment  PT/PTA: PT     PTA Visit Number: 0       General Precautions: Standard, fall  Orthopedic Precautions: Orthopedic Precautions : N/A   Braces: Braces: Cervical collar    Patient Discharged from acute Physical Therapy on 2017  .     Assessment:   Goals partially met. Patient appropriate for care in another setting.    Reasons for Discontinuation of Therapy Services  Transfer to alternate level of care.      Plan:  Patient Discharged to: Home with Home Health Service.    Pain/Comfort  Pain Rating 1: 3/10  Location 1: shoulder  Pain Addressed 1: Reposition, Distraction, Cessation of Activity  Pain Rating Post-Intervention 1: 3/10    Objective:  Patient found in w/c, with Patient found with: cervical collar    Functional Mobility:  Bed Mobility:   Supine to sit: Modified Independent   Sit to supine: Modified Independent   Rolling: Modified Independent   Scooting: Modified Independent    Balance:   Side steps in // bars, no handrail support (6x length of // bars = ~36 ft)    Transfer Training:  Sit to stand:Modified Independent with Rollator x8  Bed <> Chair:  Stand Pivot with Modified Independent with No Assistive Device x1    Wheelchair Training:  Pt propelled Standard wheelchair x 5 feet on Level tile with  Bilateral upper extremity and  occasionaly assist from Bilateral lower extremity with Stand-by Assistance.     Gait Trainin' x 1, 250' x 1using Rollator Mod I; increased time necessary    Stair Training: n/p    Additional Treatment:  STANDING: (30 reps each bilaterally) TKE w/ Blue T-band, ankle DF on incline, ankle PF on incline, 3# ankle weights: hip abduction, hamstring curls; Standing PF stretch bilaterally on incline 5x30 seconds;        Patient left up in chair with call button in reach and chair alarm on.    Patient Discharged from acute Physical Therapy on 2017  .     Assessment:   Goals partially met. Patient appropriate for care in another setting.    Reasons for Discontinuation of Therapy Services  Transfer to alternate level of care.      Discharge recommendations: Discharge Facility/Level Of Care Needs: home health PT, outpatient PT     Equipment recommendations: Equipment Needed After Discharge: rollator     GOALS:    Physical Therapy Goals     Not on file          Multidisciplinary Problems (Resolved)        Problem: Physical Therapy Goal    Goal Priority Disciplines Outcome Goal Variances Interventions   Physical Therapy Goal   (Resolved)     PT/OT, PT Outcome(s) achieved     Description:  Goals to be met by: 2017     Patient will increase functional independence with mobility by performin). Supine to sit with Mod I  2). Sit to supine with Mod I  3). Rolling to Left and Right with Mod I  4). Sit to stand transfer with Minimal Assistance = MET  5). Gait  x  > 25 feet with Contact Guard Assistance using Rolling Walker = MET  6). Wheelchair propulsion x > 150 feet with Stand-by Assistance      NEW 10/18/2017:    7). Gait  x  > 25 feet with Stand-by Assistance using Rolling Walker or Rollator  = MET  NEW 10/23/2017:  8). Sit to stand transfer with Contact Guard Assistance  = MET with SBA    NEW 2017:   9. Decrease TUG score by 10 seconds (35 seconds) to demonstrate a decrease risk of fall     NEW 2017:   10. Sit to stand transfer Mod I           Problem: Physical Therapy Goal    Goal Priority Disciplines Outcome Goal Variances Interventions   Physical Therapy Goal   (Resolved)     PT/OT, PT Outcome(s) achieved     Description:  Goals to be met by: 11/15/2017     Patient will increase functional independence with mobility by performing:      Wheelchair propulsion x > 50 feet with Supervision                        PLAN:  Patient Discharged to: Home with Home Health Service.       Richie Lemus, PT 11/16/2017

## 2017-11-16 NOTE — PLAN OF CARE
Problem: Patient Care Overview  Goal: Plan of Care Review  Outcome: Ongoing (interventions implemented as appropriate)  Patient awake/alert. No c/o pain noted this shift. Generalized weakness. Remains free from falls.Plan of care continued

## 2017-11-16 NOTE — NURSING
Discharge summary explained to patient father and mother. Left hospital via w/c min assist with transfer into family truck. Left hospital in good condition

## 2017-11-16 NOTE — PT/OT/SLP PROGRESS
Occupational Therapy  Treatment / Discharge Note    Zaira Jensen   MRN: [id number]   Admitting Diagnosis: cervical myelomalacia s/p C5-C6 ACDF    OT Date of Treatment: 11/16/17   Total Time (min): 60 min    Billable Minutes:  Self Care/Home Management 30, Therapeutic Activity 15 and Therapeutic Exercise 15  Total Minutes: 60    General Precautions: Standard, fall  Orthopedic Precautions: N/A  Braces: Cervical collar    Do you have any cultural, spiritual, Protestant conflicts, given your current situation?: None    Subjective:  Communicated with nurseChristiana prior to session.    Pain/Comfort  Pain Rating 1: 0/10    Objective:  Patient found with: cervical collar    Functional Mobility:  Transfer Training:   Sit to stand:Supervision or Set-up Assistance with 4 wheeled walker - performing 4+ times throughout session; pt with 1 instance requiring Min (A) to stand  >> Patient ambulating with SBA and rollator from pt room <> therapy gym (100+ ft) with no LOB and increased time     Grooming:  Stand By Assistance from wheelchair    Balance:   Static Sit: GOOD: Takes MODERATE challenges from all directions  Dynamic Sit:  GOOD: Maintains balance through MODERATE excursions of active trunk movement  Static Stand: GOOD: Takes MODERATE challenges from all directions  Dynamic stand: FAIR+: Needs CLOSE SUPERVISION during gait and is able to right self with minor LOB    Additional Treatment:  - OTR reinforcing safety awareness and fall prevention in home environment, safe use of DME/AE, reinforcing HEP for B UE stretch/PROM, etc   - Un-weighted pulleys for bilateral shoulder flexionABD PROM - 10 reps with 20 second hold end range   - R UE: elbow extension, wrist and digit extension stretches     Patient left up in chair with call button in reach and nurse notified    ASSESSMENT:  Zaira Jensen is a 42 y.o. female with a medical diagnosis of cervical myelomalacia s/p C5-C6 ACDF some improvements with B UE joint ROM/ functional use  as pt able to perform grooming, feeding, and toileting tasks with Stand By Assistance with DME/AE as needed; however, pt requires Max (A) to perform lower body dressing and bathing tasks. Patient also at risk of fall. Patient scheduled for discharge on this date home with parents in accessible home. DME includes: rollator and bedside commode and home health OT/PT services.     Rehab potential is fair    Activity tolerance: Good    Discharge recommendations: home     Equipment recommendations: bedside commode, rollator     GOALS:    Occupational Therapy Goals        Problem: Occupational Therapy Goal    Goal Priority Disciplines Outcome Interventions   Occupational Therapy Goal     OT, PT/OT Ongoing (interventions implemented as appropriate)    Description:  Goals to be met by: 11/16/17     Patient will increase functional independence with ADLs by performing:    Feeding with Contact Guard Assistance.  UE Dressing with Minimal Assistance.  LE Dressing with Minimal Assistance.  Grooming while seated with Minimal Assistance.  Toileting from toilet with Moderate Assistance for hygiene and clothing management.   Bathing from most appropriate location with Minimal Assistance.  Toilet transfer to toilet with Minimal Assistance.      BREANA Canchola  11/3/2017                        Plan:  Patient to be seen 6 x/week to address the above listed problems via self-care/home management, community/work re-entry, therapeutic activities, therapeutic exercises, therapeutic groups, neuromuscular re-education, sensory integration, wheelchair management/training  Plan of Care expires: 11/16/17  Plan of Care reviewed with: patient    HUGH Perkins LOTR  11/16/2017

## 2017-11-16 NOTE — DISCHARGE INSTRUCTIONS
FALL PREVENTION. NO THROW RUGS ON FLOOR. PLEASE USE WALKER OR CANE WITH AMBULATION AROUND THE HOME.    KEEP INCISION OPEN TO AIR, CLEAN AND DRY, DO NOT IMMERSE IN A HOT TUB OR SWIMMING POOL OR BATH. MAY SHOWER AND WASH WITH SOAP AND WATER, PAT DRY.    IF NO BM EVERY 3 DAYS MAY USE OVER THE COUNTER LAXATIVE.     PAIN MANAGEMENT AS NEEDED. NO DRIVING. NO LIFTING GREATER THAN  8 LBS OR A JUG OF MILK.    Referral made to [facility] for nurse, PT, OT,  and aid to follow at home.  They will visit you at home starting tomorrow. Their phone # is [phone].    Rollater ordered from [employer]. Their phone # is [phone].    Bedside commode ordered from [employer].  Their phone # is [phone].    You have an appt with Dr. Aminata Reyna next Monday, 11/20/17 @ 11:40 am.

## 2017-11-16 NOTE — PLAN OF CARE
Discharge summary explained to patient , mother and father. Verbalized understanding escorted to car via w/c/ min assist with transfer into family truck. Left hospital in good condition

## 2017-11-16 NOTE — PLAN OF CARE
Problem: Patient Care Overview  Goal: Plan of Care Review  Alert, oriented,appetite is good, fall prevention ongoing. Discharge summary self care and medications explained verbalized understanding

## 2017-11-17 NOTE — DISCHARGE SUMMARY
DATE OF ADMISSION:  10/05/2017    DATE OF DISCHARGE:  11/16/2017    ATTENDING PHYSICIAN:  Anirudh Rowley MD    ADMIT DIAGNOSES:  1.  Status post anterior cervical diskectomy and fusion at C5 through C6.  2.  Quadriparesis.  3.  History of myelomalacia of the cervical cord and history of cervical spinal   stenosis.    DISCHARGE DIAGNOSES:  1.  Status post anterior cervical diskectomy and fusion at C5 through C6.  2.  Quadriparesis.  3.  History of myelomalacia of the cervical cord and history of cervical spinal   stenosis.    CONDITION:  Stable.    ACTIVITY:  As tolerated, fall precaution.    HOSPITAL COURSE:  Ms. Jensen is a pleasant 42-year-old female who during the   course of this hospitalization has remained motivated, has participated in all   aspects of her therapy and has made significant progress.  As of now, her skin   is intact.  She is continent of bowel and bladder.  Her diet is regular,   appetite is good.  Pain is 4/10 for left shoulder, which is managed with current   medication.  She is at modified independent for supine to sit, modified   independent for sit to stand.  She is able to ambulate approximately 600 feet at   modified independent level on level services with four-wheeled walker.  She is   able to propel her wheelchair for approximately 75 to 100 feet at standby   assist.  Her range of motion is within normal limits.  She has also negotiated   twelve 6 inches steps at standby assist.  However, we do not recommend this as   the patient has poor hand  and remains at increased risk of potential fall.    Her strengths for bilateral ankles are 3+/5.  Bilateral hip abduction is 3-/5.    Bilateral hip flexion is 4-/5 and bilateral knee extensions are 4/5.  She is at   setup for feeding and grooming, moderate assist for upper body dressing, max   assist for lower body dressing, max assist for bathing, contact guard assist to   standby assist for toileting, standby assist for toilet  transfer, contact guard   to standby assist for tub transfer and her strength for right upper extremity is   2 to 3+/5, left upper extremity is 2+ to 4-/5.  Overall, the patient continues   to benefit from further therapy.  With that in mind, arrangement for outpatient   therapy is being made.    FOLLOWUP:  1.  Primary care M.D.  2.  Neurosurgeon.  3.  Neurology, call for appointment.    MEDICATIONS:  Docusate 100 mg 1 p.o. daily p.r.n., folic acid 1 tab p.o. daily,   Zofran 8 mg q. 6 hours p.r.n. nausea, ramelteon 8 mg 1 p.o. at bedtime p.r.n.   insomnia, senna 2 tabs p.o. at bedtime p.r.n., and tramadol 50 mg p.o. q. 12   hours p.r.n. pain.    DISPOSITION:  Home to the care of the family.      TOMI  dd: 11/16/2017 08:12:01 (CST)  td: 11/17/2017 03:16:18 (CST)  Doc ID   #[id number]  Job ID #[id number]    CC:

## 2017-12-18 ENCOUNTER — TELEPHONE (OUTPATIENT)
Dept: PHYSICAL MEDICINE AND REHAB | Facility: CLINIC | Age: 42
End: 2017-12-18

## 2017-12-18 NOTE — TELEPHONE ENCOUNTER
----- Message from Joan Delaney sent at 12/14/2017 11:21 AM CST -----  Contact: Self  Pt is requesting a call back, no other message was left.

## 2017-12-20 ENCOUNTER — TELEPHONE (OUTPATIENT)
Dept: PHYSICAL MEDICINE AND REHAB | Facility: CLINIC | Age: 42
End: 2017-12-20

## 2017-12-20 NOTE — TELEPHONE ENCOUNTER
Spoke with pt and answered her questions about r/s her PRP and if she would still need to do therapy for two weeks at  prior and she also asked about suggestions for the swelling in her feet. I let her know about the only thing we can do for her is to have her elevate her feet above her heart and that her PCP would need to be responsible for prescribing any medication for the edema.  She v/u and thanked me for calling.

## 2017-12-20 NOTE — TELEPHONE ENCOUNTER
----- Message from Joan Delaney sent at 12/20/2017  2:11 PM CST -----  Contact: Self  Pt is requesting a call back from the nurse, no other message was left.

## 2018-01-16 ENCOUNTER — OUTSIDE PLACE OF SERVICE (OUTPATIENT)
Dept: FAMILY MEDICINE | Facility: CLINIC | Age: 43
End: 2018-01-16
Payer: MEDICARE

## 2018-01-16 PROCEDURE — G0179 MD RECERTIFICATION HHA PT: HCPCS | Mod: ,,, | Performed by: INTERNAL MEDICINE

## 2018-02-02 ENCOUNTER — TELEPHONE (OUTPATIENT)
Dept: PHYSICAL MEDICINE AND REHAB | Facility: CLINIC | Age: 43
End: 2018-02-02

## 2018-02-02 NOTE — TELEPHONE ENCOUNTER
----- Message from Joan Delaney sent at 2/2/2018 11:41 AM CST -----  Contact: Self  Pt is requesting a call back, no other message was left.

## 2018-02-09 ENCOUNTER — OFFICE VISIT (OUTPATIENT)
Dept: PHYSICAL MEDICINE AND REHAB | Facility: CLINIC | Age: 43
End: 2018-02-09
Payer: MEDICARE

## 2018-02-09 VITALS
BODY MASS INDEX: 30.71 KG/M2 | SYSTOLIC BLOOD PRESSURE: 139 MMHG | HEART RATE: 99 BPM | HEIGHT: 65 IN | DIASTOLIC BLOOD PRESSURE: 91 MMHG | WEIGHT: 184.31 LBS

## 2018-02-09 DIAGNOSIS — G89.29 CHRONIC RIGHT-SIDED LOW BACK PAIN WITHOUT SCIATICA: ICD-10-CM

## 2018-02-09 DIAGNOSIS — M54.50 CHRONIC RIGHT-SIDED LOW BACK PAIN WITHOUT SCIATICA: ICD-10-CM

## 2018-02-09 DIAGNOSIS — R25.2 SPASM: Primary | ICD-10-CM

## 2018-02-09 PROCEDURE — 99999 PR PBB SHADOW E&M-EST. PATIENT-LVL III: CPT | Mod: PBBFAC,,, | Performed by: PHYSICAL MEDICINE & REHABILITATION

## 2018-02-09 PROCEDURE — 99214 OFFICE O/P EST MOD 30 MIN: CPT | Mod: S$PBB,,, | Performed by: PHYSICAL MEDICINE & REHABILITATION

## 2018-02-09 PROCEDURE — 99213 OFFICE O/P EST LOW 20 MIN: CPT | Mod: PBBFAC,PN | Performed by: PHYSICAL MEDICINE & REHABILITATION

## 2018-02-09 RX ORDER — TRAMADOL HYDROCHLORIDE 50 MG/1
50 TABLET ORAL EVERY 6 HOURS PRN
COMMUNITY
End: 2018-10-10

## 2018-02-09 RX ORDER — DICLOFENAC SODIUM 50 MG/1
50 TABLET, DELAYED RELEASE ORAL 2 TIMES DAILY
COMMUNITY
End: 2019-02-18 | Stop reason: ALTCHOICE

## 2018-02-09 RX ORDER — BACLOFEN 10 MG/1
10 TABLET ORAL NIGHTLY
Qty: 30 TABLET | Refills: 6 | Status: SHIPPED | OUTPATIENT
Start: 2018-02-09 | End: 2019-07-02
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
## 2018-02-15 ENCOUNTER — TELEPHONE (OUTPATIENT)
Dept: PHYSICAL MEDICINE AND REHAB | Facility: CLINIC | Age: 43
End: 2018-02-15

## 2018-02-15 NOTE — TELEPHONE ENCOUNTER
Spoke with pt and answered questions regarding a Cluneal nerve block. She states she really isn't in much pain anymore because she is taking medication to help with her pain right now. She understands this injection is more to help with pain so she will let me know before the 27th if she feels she still wants this injection. I explained essentially how the medication works in the body and that this is considered a diagnostic block to know this is the source of the problem. Pt states she was advised by Dr. Reyna to hold off on the PRP injection until her body recovers more from surgery. Pt v/u and thanked me for calling.

## 2018-02-15 NOTE — TELEPHONE ENCOUNTER
----- Message from Joan Delaney sent at 2/15/2018 10:42 AM CST -----  Contact: Self   Pt is requesting a call back re: next appt.

## 2018-03-06 ENCOUNTER — TELEPHONE (OUTPATIENT)
Dept: PHYSICAL MEDICINE AND REHAB | Facility: CLINIC | Age: 43
End: 2018-03-06

## 2018-03-06 NOTE — TELEPHONE ENCOUNTER
----- Message from Joan Delaney sent at 3/6/2018  1:48 PM CST -----  Contact: Self  Pt is requesting a call back, no other message was left.

## 2018-03-12 ENCOUNTER — TELEPHONE (OUTPATIENT)
Dept: PHYSICAL MEDICINE AND REHAB | Facility: CLINIC | Age: 43
End: 2018-03-12

## 2018-03-12 NOTE — TELEPHONE ENCOUNTER
----- Message from Haley Fishman sent at 3/12/2018  9:44 AM CDT -----  Contact: Self  Calling to speak with Amanda.  Please call.

## 2018-04-12 ENCOUNTER — TELEPHONE (OUTPATIENT)
Dept: ORTHOPEDICS | Facility: CLINIC | Age: 43
End: 2018-04-12

## 2018-04-12 DIAGNOSIS — M47.816 SPONDYLOSIS OF LUMBAR SPINE: Primary | ICD-10-CM

## 2018-04-12 DIAGNOSIS — M54.2 CERVICALGIA: ICD-10-CM

## 2018-04-12 NOTE — TELEPHONE ENCOUNTER
Spoke with the patient. She wants to go to action for pt and stacy for ot.     ----- Message from Madeline Miller sent at 4/12/2018  3:13 PM CDT -----  Patient called her home health & p/t has ended and she wants to get an updated to continue for outpatient [phone]

## 2018-04-18 NOTE — TELEPHONE ENCOUNTER
Mother will send over an e-mail she will use \"TJ\" in the e-mail rather than his name.     Patient wanted to reschedule her Nerve Block for this week, nothing available.  Patient states she will call back tomorrow to see if anyone has canceled.  She states she has transportation this week.

## 2018-05-17 ENCOUNTER — TELEPHONE (OUTPATIENT)
Dept: ORTHOPEDICS | Facility: CLINIC | Age: 43
End: 2018-05-17

## 2018-05-17 NOTE — TELEPHONE ENCOUNTER
Spoke with the patient.  Diclofenac 50 mg bid   Tramadol 50  Mg q6 called in to jeimy at [phone]    ----- Message from Chidi Girard sent at 5/17/2018  1:37 PM CDT -----  Patient called about getting 2 prescriptions filled , please call patient [phone]

## 2018-06-20 ENCOUNTER — TELEPHONE (OUTPATIENT)
Dept: ORTHOPEDICS | Facility: CLINIC | Age: 43
End: 2018-06-20

## 2018-06-20 NOTE — TELEPHONE ENCOUNTER
Patient wants referral to neurology.     ----- Message from Chidi Girard sent at 6/20/2018  1:59 PM CDT -----  Patient was returning your call , [phone]

## 2018-06-20 NOTE — TELEPHONE ENCOUNTER
[facility] for the patient.     ----- Message from Chidi Girard sent at 6/19/2018 11:41 AM CDT -----  Patient has a medication question and she has questions about therapy, please call patient [phone]

## 2018-06-21 ENCOUNTER — TELEPHONE (OUTPATIENT)
Dept: PHYSICAL MEDICINE AND REHAB | Facility: CLINIC | Age: 43
End: 2018-06-21

## 2018-06-21 NOTE — TELEPHONE ENCOUNTER
----- Message from Cristina Rodrigues sent at 6/21/2018  1:31 PM CDT -----  Type: Needs Medical Advice    Who Called:  Pateint  Symptoms (please be specific):  n/a  How long has patient had these symptoms:  n/a  Pharmacy name and phone #:  n/a  Best Call Back Number: [phone]  Additional Information: contact to reschedule nerve block     Thank you

## 2018-08-13 ENCOUNTER — CLINICAL SUPPORT (OUTPATIENT)
Dept: PHYSICAL MEDICINE AND REHAB | Facility: CLINIC | Age: 43
End: 2018-08-13
Payer: MEDICARE

## 2018-08-13 VITALS
BODY MASS INDEX: 30.66 KG/M2 | HEART RATE: 100 BPM | SYSTOLIC BLOOD PRESSURE: 123 MMHG | HEIGHT: 65 IN | DIASTOLIC BLOOD PRESSURE: 81 MMHG | WEIGHT: 184 LBS

## 2018-08-13 DIAGNOSIS — M54.12 CERVICAL RADICULITIS: Primary | ICD-10-CM

## 2018-08-13 PROCEDURE — 64450 NJX AA&/STRD OTHER PN/BRANCH: CPT | Mod: PBBFAC,PN | Performed by: PHYSICAL MEDICINE & REHABILITATION

## 2018-08-13 PROCEDURE — 64450 NJX AA&/STRD OTHER PN/BRANCH: CPT | Mod: S$PBB,RT,, | Performed by: PHYSICAL MEDICINE & REHABILITATION

## 2018-08-13 PROCEDURE — 76942 ECHO GUIDE FOR BIOPSY: CPT | Mod: PBBFAC,PN | Performed by: PHYSICAL MEDICINE & REHABILITATION

## 2018-08-13 PROCEDURE — 99214 OFFICE O/P EST MOD 30 MIN: CPT | Mod: 25,S$PBB,, | Performed by: PHYSICAL MEDICINE & REHABILITATION

## 2018-08-13 PROCEDURE — 99213 OFFICE O/P EST LOW 20 MIN: CPT | Mod: PBBFAC,PN,25 | Performed by: PHYSICAL MEDICINE & REHABILITATION

## 2018-08-13 PROCEDURE — 76942 ECHO GUIDE FOR BIOPSY: CPT | Mod: 26,S$PBB,, | Performed by: PHYSICAL MEDICINE & REHABILITATION

## 2018-08-13 PROCEDURE — 99999 PR PBB SHADOW E&M-EST. PATIENT-LVL III: CPT | Mod: PBBFAC,,, | Performed by: PHYSICAL MEDICINE & REHABILITATION

## 2018-08-13 RX ORDER — LIDOCAINE HYDROCHLORIDE 10 MG/ML
10 INJECTION INFILTRATION; PERINEURAL ONCE
Status: COMPLETED | OUTPATIENT
Start: 2018-08-13 | End: 2018-08-13

## 2018-08-13 RX ADMIN — LIDOCAINE HYDROCHLORIDE 10 ML: 10 INJECTION INFILTRATION; PERINEURAL at 12:08

## 2018-08-13 NOTE — PROGRESS NOTES
HPI:  Patient is a 43 y.o. year old female w. Right back and leg pain. She is s/p cervical decompression and fusion d/t cervical myelopathy. She is also complaining of pain of her right hand.    Imaging  There is interval anterior interbody fusion C3-C5.  There is partial visualization of C3-C4 disc space.  Alignment is maintained and there is no acute compression or subluxation.  There are mild degenerative changes.  There is not abnormal prevertebral soft tissue swelling.  The odontoid appears intact      Impression         Mild degenerative change and postsurgical change with no acute compression or subluxation.       FINDINGS: Vertebral body heights and alignment are maintained without acute compression or subluxation findings suggest and interbody fusion C3-C4 and C4-C5.  There is disc desiccation C2-C3 and C5-C6.    C2-C3: Very small posterior disc bulge suggested on axial images without spinal canal or neural foramen narrowing    C3-C4: Facet arthropathy with impression a thecal sac posteriorly on the right where the thecal sac is completely effaced and there appears very mild impression on the spinal cord..  Thecal sac is not completely effaced anteriorly.  There is just mild right neural foramen narrowing.    C4-C5: No focal disc protrusion or spinal canal or neural foramen narrowing    C5-C6: Mild uncovertebral spurring and facet arthropathy and small broad posterior disc bulge/protrusion with mild spinal canal narrowing and mild bilateral neural foramen narrowing    C6-C7: No disc protrusion or spinal canal or neural foramen narrowing    The thecal sac appears completely effaced at C5-C6 but without appreciable spinal cord compression and with no abnormal signal within the spinal cord.  Spinal cord is intrinsically normal in appearance and craniocervical junction is normal in appearance.          Impression    Anterior interbody fusion C3-C5.  Facet arthropathy on the right at C3-C4 appearing in contact  with the spinal cord with mild impression on the posterior aspect of the spinal cord on the right    Mild spinal canal narrowing C5-C6 with complete effacement of the thecal sac  FINDINGS:    Vertebral body heights and alignment are maintained without acute compression or subluxation and there is no abnormal marrow signal suggesting fracture or osseous destruction but the conus is normal in appearance and is at the level of L1-L2.    L1-L2: No disc protrusion or spinal canal or neural foramen narrowing  L2-L3: No disc protrusion or spinal canal or neural foramen narrowing  L3-L4: Facet arthropathy and very mild disc bulge with mild impression on the thecal sac and very mild neural foramen narrowing  L4-5: Facet arthropathy, mild broad posterior disc bulge with mild spinal canal narrowing and mild bilateral neural foramen narrowing.  The mild spinal canal narrowing is slightly greater today than on the prior exam with slightly greater facet arthropathy today.    L5-S1: Facet arthropathy with mild bilateral neural foramen and without appreciable impression on the thecal sac      Impression         Mild degenerative change and disc bulges with mild spinal canal narrowing L4-L5.       Labs  egfr lfts cr nl    Past Medical History:   Diagnosis Date    Allergy     Anemia     Arthritis     Chronic back pain     Chronic neck pain     Functional ovarian cysts     GERD (gastroesophageal reflux disease)     Headache(784.0)     Quadriparesis (muscle weakness) 1/16/2018    HH notes    Radiculopathy of arm     Rash     Respiratory distress     bronchospasm at emergence years ago     Past Surgical History:   Procedure Laterality Date    CERVICAL FUSION      CHOLECYSTECTOMY      COSMETIC SURGERY      EYE SURGERY      FRACTURE SURGERY      OVARIAN CYST REMOVAL      SPINE SURGERY      TOTAL BODY LIFT       Family History   Problem Relation Age of Onset    Diabetes Father     Cancer Father         prostate     Heart disease Father     No Known Problems Mother     Allergic rhinitis Neg Hx     Allergies Neg Hx     Angioedema Neg Hx     Asthma Neg Hx     Atopy Neg Hx     Eczema Neg Hx     Immunodeficiency Neg Hx     Rhinitis Neg Hx     Urticaria Neg Hx      Social History     Socioeconomic History    Marital status: Single     Spouse name: Not on file    Number of children: Not on file    Years of education: Not on file    Highest education level: Not on file   Social Needs    Financial resource strain: Not on file    Food insecurity - worry: Not on file    Food insecurity - inability: Not on file    Transportation needs - medical: Not on file    Transportation needs - non-medical: Not on file   Occupational History    Not on file   Tobacco Use    Smoking status: Never Smoker    Smokeless tobacco: Never Used   Substance and Sexual Activity    Alcohol use: No    Drug use: No    Sexual activity: No   Other Topics Concern    Not on file   Social History Narrative    Not on file       Review of patient's allergies indicates:   Allergen Reactions    Bactrim [sulfamethoxazole-trimethoprim]     Codeine Other (See Comments)     cramping    Hydrocodone     Nexium [esomeprazole magnesium]     Nickel sutures [surgical stainless steel]     Pcn [penicillins]     Sulfa (sulfonamide antibiotics)     Vicks vapor inhaler [l-desoxyephedrine]        Current Outpatient Medications:     baclofen (LIORESAL) 10 MG tablet, Take 1 tablet (10 mg total) by mouth every evening., Disp: 30 tablet, Rfl: 6    diclofenac (VOLTAREN) 50 MG EC tablet, Take 50 mg by mouth 2 (two) times daily., Disp: , Rfl:     docusate sodium (COLACE) 100 MG capsule, Take 1 capsule (100 mg total) by mouth 2 (two) times daily as needed for Constipation., Disp: 60 capsule, Rfl: 1    folic acid-vit B6-vit B12 2.5-25-2 mg (FOLBIC OR EQUIV) 2.5-25-2 mg Tab, Take 1 tablet by mouth once daily., Disp: 30 tablet, Rfl: 1    ondansetron (ZOFRAN-ODT)  8 MG TbDL, Take 1 tablet (8 mg total) by mouth every 6 (six) hours as needed., Disp: 20 tablet, Rfl: 1    ramelteon (ROZEREM) 8 mg tablet, Take 1 tablet (8 mg total) by mouth nightly as needed for Insomnia., Disp: 30 tablet, Rfl: 1    senna-docusate 8.6-50 mg (PERICOLACE) 8.6-50 mg per tablet, Take 1 tablet by mouth nightly as needed for Constipation., Disp: 30 tablet, Rfl: 0    traMADol (ULTRAM) 50 mg tablet, Take 50 mg by mouth every 6 (six) hours as needed for Pain., Disp: , Rfl:       Review of Systems  No nausea, vomiting, fevers, Chills , contipation, diarrhea or sweats      Physical Exam:      Vitals:    08/13/18 1122   BP: 123/81   Pulse: 100     alert and oriented ×4 follows commands answers all questions appropriately,affect wnl  Manual muscle test 4 out of 5 sensation to light touch grossly intact  + tenderness right iliac crest +tenderness right sijt  Ataxic gait  No C/C/E  alert and oriented ×4 follows commands answers all questions appropriately,affect wnl  No hypothenar or thenar eminence atrophy    Assessment:  S/p cervcial fusion d/t cervical myelopathy  Mild lumbar spondylosis  sijt dysfunction  Superimposed right cluneal neuralgia  Left hand and wrist pain new onset  Plan:  Schedule therapeutic/diagnostic right cluneal nerve block+hydrodissection today  Emg/ncs of lue to assess for peripheral nerve entrapment  Cervical spine xray to assess stability of fusion        PROCEDURE NOTE:   risk and benefit of right CLUNEAL NERVE BLOCK AND HYDRODISECTION injection reviewed with. patient. Verbal consent was obtained. Injection was performed after sterile prep w.chrolorohexedine. Short  and long axis APPROACH UTILIZED. First, VISUALIZATION OF THE PSIS  , superior gluteal artery, fascial line between GMAX and GMED was obtained, subsequently  BLOCK + HYDRODISSECTION OF CLUNEAL NERVE PERFORMED. PT. WAS LAYING PRONE. INJECTION WAS PERFORMED WITH A  22g  spinal needle  needle  After a numbing wheal w.  Lidocaine performed.   Ultrasound guidance was utilized for needle visualization. A TOTAL OF 10ML OF LIDOCAINE 1% AND 10ML OF STERILE NORMAL SALINE WAS UTILIZED. No complications. iMMEDIATE IMPROVEMENT OF  PAIN POST PROCEDURE.    Ultrasound interpretation was performed prior to the procedure to identify the target and any adjacent neurovascular structures.  Subsequently, interpretation was performed during real- time needle guidance confirming placement. Post- intervention interpretation was also performed confirming appropriate injectate  flow and hemostasis.  Images indicating needle placement have been saved in [facility].

## 2018-09-04 ENCOUNTER — TELEPHONE (OUTPATIENT)
Dept: ADMINISTRATIVE | Facility: OTHER | Age: 43
End: 2018-09-04

## 2018-09-04 ENCOUNTER — TELEPHONE (OUTPATIENT)
Dept: ORTHOPEDICS | Facility: CLINIC | Age: 43
End: 2018-09-04

## 2018-09-04 DIAGNOSIS — M54.50 LUMBAGO: ICD-10-CM

## 2018-09-04 DIAGNOSIS — M43.10 ACQUIRED SPONDYLOLISTHESIS: Primary | ICD-10-CM

## 2018-09-04 NOTE — TELEPHONE ENCOUNTER
----- Message from Madeline Miller sent at 9/4/2018 10:49 AM CDT -----  (dr bhat) patient called she wants to know if they will schedule an mri [phone]

## 2018-09-04 NOTE — TELEPHONE ENCOUNTER
----- Message from Addie Perez sent at 9/4/2018  4:32 PM CDT -----  Contact: patient   Patient requesting a call back from Nurse Valle. Please advise.   Call back    Thanks!

## 2018-09-05 ENCOUNTER — TELEPHONE (OUTPATIENT)
Dept: ORTHOPEDICS | Facility: CLINIC | Age: 43
End: 2018-09-05

## 2018-09-05 DIAGNOSIS — M54.50 LUMBAGO: Primary | ICD-10-CM

## 2018-09-05 DIAGNOSIS — M25.552 HIP PAIN, BILATERAL: ICD-10-CM

## 2018-09-05 DIAGNOSIS — M25.551 HIP PAIN, BILATERAL: ICD-10-CM

## 2018-09-05 NOTE — TELEPHONE ENCOUNTER
Patient is requesting an order for a MRI of her pelvis, cervical spine and low back.  She states she is having problems with walking.  She has an order for a xray for her cervical spine in epic.  She is asking for a order for a stand up MRI which she had done years ago in [place].  She was seen on 8/13/18.  Please advise

## 2018-09-05 NOTE — TELEPHONE ENCOUNTER
I am not able to order those MRI's. It is best she follow up w. Her nsg if she is having problems w. Walking. He will have an easier time getting those mri's approved

## 2018-09-05 NOTE — TELEPHONE ENCOUNTER
Message has been given to patient.  Patient states she has an appt next week with her neurosurgeon.

## 2018-09-25 ENCOUNTER — TELEPHONE (OUTPATIENT)
Dept: ORTHOPEDICS | Facility: CLINIC | Age: 43
End: 2018-09-25

## 2018-09-25 NOTE — TELEPHONE ENCOUNTER
----- Message from Lyla Irwin sent at 9/25/2018 12:41 PM CDT -----  Dr. Neal......  Needs refill for tramadol and diclofenac called into [facility] [address] .

## 2018-09-26 NOTE — TELEPHONE ENCOUNTER
Called pt, as per Tre, no refills, has not been here in 6 months. Pt does have an rafael't next week

## 2018-10-10 ENCOUNTER — OFFICE VISIT (OUTPATIENT)
Dept: ORTHOPEDICS | Facility: CLINIC | Age: 43
End: 2018-10-10
Payer: MEDICARE

## 2018-10-10 VITALS
HEIGHT: 65 IN | BODY MASS INDEX: 30.66 KG/M2 | DIASTOLIC BLOOD PRESSURE: 70 MMHG | WEIGHT: 184 LBS | SYSTOLIC BLOOD PRESSURE: 128 MMHG

## 2018-10-10 DIAGNOSIS — M47.816 LUMBAR SPONDYLOSIS: ICD-10-CM

## 2018-10-10 DIAGNOSIS — M46.1 SACROILIAC INFLAMMATION: ICD-10-CM

## 2018-10-10 DIAGNOSIS — Z98.1 HISTORY OF LUMBAR SPINAL FUSION: ICD-10-CM

## 2018-10-10 DIAGNOSIS — M48.02 CERVICAL SPINAL STENOSIS: Primary | ICD-10-CM

## 2018-10-10 PROCEDURE — 99213 OFFICE O/P EST LOW 20 MIN: CPT | Mod: 25,,, | Performed by: ORTHOPAEDIC SURGERY

## 2018-10-10 PROCEDURE — 72040 X-RAY EXAM NECK SPINE 2-3 VW: CPT | Mod: ,,, | Performed by: ORTHOPAEDIC SURGERY

## 2018-10-10 RX ORDER — TRAMADOL HYDROCHLORIDE 50 MG/1
50 TABLET ORAL EVERY 6 HOURS PRN
Qty: 28 TABLET | Refills: 2 | Status: SHIPPED | OUTPATIENT
Start: 2018-10-10 | End: 2018-10-20

## 2018-10-10 NOTE — PROGRESS NOTES
Subjective:       Patient ID: Zaira Jensen is a 43 y.o. female.    Chief Complaint: Pain of the Lumbar Spine (MRI/ pelvis/ lumbar results) and Pain of the Neck (Cervical fusion . Neck still hurts and radiates down the left arm to fingers with numbness.)      History of Present Illness  She had right-sided SI joint injections by Dr. Simmons and it did not help her symptoms significantly as well as a right-sided lower back pain      Current Medications  Current Outpatient Medications   Medication Sig Dispense Refill    diclofenac (VOLTAREN) 50 MG EC tablet Take 50 mg by mouth 2 (two) times daily.      docusate sodium (COLACE) 100 MG capsule Take 1 capsule (100 mg total) by mouth 2 (two) times daily as needed for Constipation. 60 capsule 1    folic acid-vit B6-vit B12 2.5-25-2 mg (FOLBIC OR EQUIV) 2.5-25-2 mg Tab Take 1 tablet by mouth once daily. 30 tablet 1    ondansetron (ZOFRAN-ODT) 8 MG TbDL Take 1 tablet (8 mg total) by mouth every 6 (six) hours as needed. 20 tablet 1    ramelteon (ROZEREM) 8 mg tablet Take 1 tablet (8 mg total) by mouth nightly as needed for Insomnia. 30 tablet 1    senna-docusate 8.6-50 mg (PERICOLACE) 8.6-50 mg per tablet Take 1 tablet by mouth nightly as needed for Constipation. 30 tablet 0    baclofen (LIORESAL) 10 MG tablet Take 1 tablet (10 mg total) by mouth every evening. 30 tablet 6    traMADol (ULTRAM) 50 mg tablet Take 1 tablet (50 mg total) by mouth every 6 (six) hours as needed for Pain. 28 tablet 2     No current facility-administered medications for this visit.        Allergies  Review of patient's allergies indicates:   Allergen Reactions    Bactrim [sulfamethoxazole-trimethoprim]     Codeine Other (See Comments)     cramping    Hydrocodone     Nexium [esomeprazole magnesium]     Nickel sutures [surgical stainless steel]     Pantoprazole     Pcn [penicillins]     Sulfa (sulfonamide antibiotics)     Vicks vapor inhaler [l-desoxyephedrine]        Past  Medical History  Past Medical History:   Diagnosis Date    Allergy     Anemia     Arthritis     Chronic back pain     Chronic neck pain     Functional ovarian cysts     GERD (gastroesophageal reflux disease)     Headache(784.0)     Quadriparesis (muscle weakness) 1/16/2018     notes    Radiculopathy of arm     Rash     Respiratory distress     bronchospasm at emergence years ago       Surgical History  Past Surgical History:   Procedure Laterality Date    CERVICAL FUSION      CHOLECYSTECTOMY      COSMETIC SURGERY      DISKECTOMY AND FUSION-ANTERIOR CERVICAL (ACDF) C5-6 N/A 10/3/2017    [facility]    EYE SURGERY      FRACTURE SURGERY      OVARIAN CYST REMOVAL      SPINE SURGERY      TOTAL BODY LIFT         Family History:   Family History   Problem Relation Age of Onset    Diabetes Father     Cancer Father         prostate    Heart disease Father     No Known Problems Mother     Allergic rhinitis Neg Hx     Allergies Neg Hx     Angioedema Neg Hx     Asthma Neg Hx     Atopy Neg Hx     Eczema Neg Hx     Immunodeficiency Neg Hx     Rhinitis Neg Hx     Urticaria Neg Hx        Social History:   Social History     Socioeconomic History    Marital status: Single     Spouse name: Not on file    Number of children: Not on file    Years of education: Not on file    Highest education level: Not on file   Social Needs    Financial resource strain: Not on file    Food insecurity - worry: Not on file    Food insecurity - inability: Not on file    Transportation needs - medical: Not on file    Transportation needs - non-medical: Not on file   Occupational History    Not on file   Tobacco Use    Smoking status: Never Smoker    Smokeless tobacco: Never Used   Substance and Sexual Activity    Alcohol use: No    Drug use: No    Sexual activity: No   Other Topics Concern    Not on file   Social History Narrative    Not on file        Hospitalization/Major Diagnostic Procedure:     Review of Systems     General/Constitutional:  Chills denies. Fatigue denies. Fever denies. Weight gain denies. Weight loss denies.    Respiratory:  Shortness of breath denies.    Cardiovascular:  Chest pain denies.    Gastrointestinal:  Constipation denies. Diarrhea denies. Nausea denies. Vomiting denies.     Hematology:  Easy bruising denies. Prolonged bleeding denies.     Genitourinary:  Frequent urination denies. Pain in lower back denies. Painful urination denies.     Musculoskeletal:  See HPI for details    Skin:  Rash denies.    Neurologic:  Dizziness denies. Gait abnormalities denies. Seizures denies. Tingling/Numbess denies.    Psychiatric:  Anxiety denies. Depressed mood denies.     Objective:   Vital Signs:   Vitals:    10/10/18 1416   BP: 128/70        Physical Exam      General Examination:     Constitutional: The patient is alert and oriented to lace person and time. Mood is pleasant.     Head/Face: Normal facial features normal eyebrows    Eyes: Normal extraocular motion bilaterally    Lungs: Respirations are equal and unlabored    Gait is coordinated.    Cardiovascular: There are no swelling or varicosities present.    Lymphatic: Negative for adenopathy    Skin: Normal    Neurological: Level of consciousness normal. Oriented to place person and time and situation    Psychiatric: Oriented to time place person and situation    Moderate tenderness at the lumbosacral junction right side and over the sacroiliac sulcus. No spasm. Range of motion limited. Cervical range of motion limited.  XRAY Report/ Interpretation:AP and lateral x-rays of the cervical spine were performed today. Indications postoperative cervical spine surgery. Findings: Evidence of previous anterior cervical fusion C5-C6 7 level appears solid it appears to be stable no signs of hardware loosening      Assessment:       1. Cervical spinal stenosis s/p C5-6 ACDF    2. History of lumbar  spinal fusion    3. Lumbar spondylosis    4. Sacroiliac inflammation        Plan:       Zaira was seen today for pain and pain.    Diagnoses and all orders for this visit:    Cervical spinal stenosis s/p C5-6 ACDF  -     X-Ray Cervical Spine AP And Lateral  -     FL Discogram Lumbar Ea Level  -     CT Lumbar Spine Without Contrast  -     Ambulatory referral to Home Health  -     Ambulatory Referral to Neurology    History of lumbar spinal fusion  -     FL Discogram Lumbar Ea Level  -     CT Lumbar Spine Without Contrast  -     Ambulatory referral to Home Health  -     Ambulatory Referral to Neurology    Lumbar spondylosis  -     FL Discogram Lumbar Ea Level  -     CT Lumbar Spine Without Contrast  -     Ambulatory referral to Home Health  -     Ambulatory Referral to Neurology    Sacroiliac inflammation    Other orders  -     Cancel: X-Ray Cervical Spine AP And Lateral  -     Cancel: X-Ray Lumbar Spine Ap And Lateral  -     traMADol (ULTRAM) 50 mg tablet; Take 1 tablet (50 mg total) by mouth every 6 (six) hours as needed for Pain.         No Follow-up on file.    Treatment options discussed. Patient feels lumbar pain is intolerable. Based on the MRI showing mild degenerative changes we will order a provocative discography for diagnostic purposes  She is significantly limit her physical activities requesting home health services    This note was created using Dragon voice recognition software that occasionally misinterpreted phrases or words.

## 2018-10-24 ENCOUNTER — TELEPHONE (OUTPATIENT)
Dept: ORTHOPEDICS | Facility: CLINIC | Age: 43
End: 2018-10-24

## 2018-10-24 NOTE — TELEPHONE ENCOUNTER
----- Message from Madeline Miller sent at 10/24/2018  8:35 AM CDT -----  Contact: ALE NAVA FROM [facility]  (dr bhat) ale is calling about this patient she did her home health admit yesterday and now she would like to get an order for o/t and a home health aide # [phone]

## 2018-10-24 NOTE — TELEPHONE ENCOUNTER
Spoke with Ashtyn the therapist. She states pt needs OT as well, hands are very crippling and she is unable to hold anything in her hands, She needs hep with dressing, feeding, etc. Pt is having a very hard time. Also will get an aide out there 2 x weekly for now.

## 2018-10-31 ENCOUNTER — TELEPHONE (OUTPATIENT)
Dept: ORTHOPEDICS | Facility: CLINIC | Age: 43
End: 2018-10-31

## 2018-10-31 RX ORDER — TRAMADOL HYDROCHLORIDE 50 MG/1
50 TABLET ORAL EVERY 6 HOURS
COMMUNITY
Start: 2018-10-31 | End: 2018-11-21

## 2018-10-31 NOTE — TELEPHONE ENCOUNTER
----- Message from Chidi Girard sent at 10/30/2018 11:37 AM CDT -----  Contact: Dr. Neal patient   Patient called to get a refill on her tramadol , she'd like it sent to [facility] on [address]

## 2018-11-26 ENCOUNTER — TELEPHONE (OUTPATIENT)
Dept: ADMINISTRATIVE | Facility: CLINIC | Age: 43
End: 2018-11-26

## 2018-11-26 NOTE — TELEPHONE ENCOUNTER
Home Health SOC 10/23/2018 - 12/21/2018 with [facility] - Dr. Magan Neal. Patient received PT, OT and  HHA services.

## 2019-01-15 ENCOUNTER — TELEPHONE (OUTPATIENT)
Dept: ORTHOPEDICS | Facility: CLINIC | Age: 44
End: 2019-01-15

## 2019-01-16 ENCOUNTER — TELEPHONE (OUTPATIENT)
Dept: ORTHOPEDICS | Facility: CLINIC | Age: 44
End: 2019-01-16

## 2019-01-16 NOTE — TELEPHONE ENCOUNTER
Pt called needs a rx for tamadol called into her [facility] [address] and also she wants to know if she can get another order for a home health aid to help bath and dressings pts# [phone]

## 2019-01-16 NOTE — TELEPHONE ENCOUNTER
Spoke with the patient. rx was called in yesterday. Informed patient we can not give hh auth. Her sx was over ago.

## 2019-01-30 ENCOUNTER — OFFICE VISIT (OUTPATIENT)
Dept: ORTHOPEDICS | Facility: CLINIC | Age: 44
End: 2019-01-30
Payer: MEDICARE

## 2019-01-30 VITALS
WEIGHT: 171 LBS | HEIGHT: 65 IN | DIASTOLIC BLOOD PRESSURE: 70 MMHG | HEART RATE: 96 BPM | SYSTOLIC BLOOD PRESSURE: 128 MMHG | BODY MASS INDEX: 28.49 KG/M2

## 2019-01-30 DIAGNOSIS — M47.816 FACET ARTHROPATHY, LUMBAR: Primary | ICD-10-CM

## 2019-01-30 DIAGNOSIS — M47.812 FACET ARTHROPATHY, CERVICAL: ICD-10-CM

## 2019-01-30 DIAGNOSIS — M54.2 CERVICALGIA: ICD-10-CM

## 2019-01-30 DIAGNOSIS — Z98.1 HISTORY OF FUSION OF CERVICAL SPINE: ICD-10-CM

## 2019-01-30 DIAGNOSIS — R27.0 ATAXIA: ICD-10-CM

## 2019-01-30 PROCEDURE — 99213 OFFICE O/P EST LOW 20 MIN: CPT | Mod: ,,, | Performed by: ORTHOPAEDIC SURGERY

## 2019-01-30 PROCEDURE — 99213 PR OFFICE/OUTPT VISIT, EST, LEVL III, 20-29 MIN: ICD-10-PCS | Mod: ,,, | Performed by: ORTHOPAEDIC SURGERY

## 2019-01-30 RX ORDER — VITAMIN B COMPLEX
1 CAPSULE ORAL DAILY
COMMUNITY

## 2019-01-30 RX ORDER — DICLOFENAC POTASSIUM 50 MG/1
TABLET, FILM COATED ORAL
Refills: 1 | COMMUNITY
Start: 2018-11-24 | End: 2019-02-18 | Stop reason: ALTCHOICE

## 2019-01-30 RX ORDER — SYRING-NEEDL,DISP,INSUL,0.3 ML 29 G X1/2"
296 SYRINGE, EMPTY DISPOSABLE MISCELLANEOUS ONCE
COMMUNITY
End: 2019-09-03

## 2019-01-30 RX ORDER — PROGESTERONE 200 MG/1
CAPSULE ORAL
COMMUNITY
Start: 2019-01-27

## 2019-01-30 RX ORDER — TRAMADOL HYDROCHLORIDE 50 MG/1
TABLET ORAL
Refills: 5 | COMMUNITY
Start: 2019-01-15 | End: 2020-01-08

## 2019-01-30 RX ORDER — FERROUS GLUCONATE 324(38)MG
324 TABLET ORAL
COMMUNITY
End: 2020-01-08

## 2019-01-30 NOTE — PROGRESS NOTES
Subjective:       Patient ID: Zaira Jensen is a 43 y.o. female.    Chief Complaint: Pain of the Lumbar Spine (CT results)      History of Present Illness  She returns follow-up head is having had a cervical fusion still has some left-sided neck pain and is concerned that they may be new disc problems. She had a sacral leg joint injection on the right side by Dr. Jensen did not help and is scheduled to see him again her pain is in the lumbar region on the right side and does not radiate. She does have ataxia problems of unknown etiology.    Current Medications  Current Outpatient Medications   Medication Sig Dispense Refill    b complex vitamins capsule Take 1 capsule by mouth once daily.      calcium-vitamin D 250-100 mg-unit per tablet Take 1 tablet by mouth 2 (two) times daily.      diclofenac (CATAFLAM) 50 MG tablet TK 1  T  PO  BID  1    diclofenac (VOLTAREN) 50 MG EC tablet Take 50 mg by mouth 2 (two) times daily.      ferrous gluconate (FERGON) 324 MG tablet Take 324 mg by mouth daily with breakfast.      magnesium citrate solution Take 296 mLs by mouth once.      progesterone (PROMETRIUM) 200 MG capsule       traMADol (ULTRAM) 50 mg tablet TK 1 T PO Q 6 H PRN  5    baclofen (LIORESAL) 10 MG tablet Take 1 tablet (10 mg total) by mouth every evening. 30 tablet 6     No current facility-administered medications for this visit.        Allergies  Review of patient's allergies indicates:   Allergen Reactions    Bactrim [sulfamethoxazole-trimethoprim]     Codeine Other (See Comments)     cramping    Hydrocodone     Nexium [esomeprazole magnesium]     Nickel sutures [surgical stainless steel]     Pantoprazole     Pcn [penicillins]     Sulfa (sulfonamide antibiotics)     Vicks vapor inhaler [l-desoxyephedrine]        Past Medical History  Past Medical History:   Diagnosis Date    Allergy     Anemia     Arthritis     Chronic back pain     Chronic neck pain     Functional ovarian cysts      GERD (gastroesophageal reflux disease)     Headache(784.0)     Quadriparesis (muscle weakness) 1/16/2018     notes    Radiculopathy of arm     Rash     Respiratory distress     bronchospasm at emergence years ago       Surgical History  Past Surgical History:   Procedure Laterality Date    CERVICAL FUSION      CHOLECYSTECTOMY      COSMETIC SURGERY      DISKECTOMY AND FUSION-ANTERIOR CERVICAL (ACDF) C5-6 N/A 10/3/2017    [facility]formed by [facility] at [facility] OR    EYE SURGERY      FRACTURE SURGERY      OVARIAN CYST REMOVAL      SPINE SURGERY      TOTAL BODY LIFT         Family History:   Family History   Problem Relation Age of Onset    Diabetes Father     Cancer Father         prostate    Heart disease Father     No Known Problems Mother     Allergic rhinitis Neg Hx     Allergies Neg Hx     Angioedema Neg Hx     Asthma Neg Hx     Atopy Neg Hx     Eczema Neg Hx     Immunodeficiency Neg Hx     Rhinitis Neg Hx     Urticaria Neg Hx        Social History:   Social History     Socioeconomic History    Marital status: Single     Spouse name: Not on file    Number of children: Not on file    Years of education: Not on file    Highest education level: Not on file   Social Needs    Financial resource strain: Not on file    Food insecurity - worry: Not on file    Food insecurity - inability: Not on file    Transportation needs - medical: Not on file    Transportation needs - non-medical: Not on file   Occupational History    Not on file   Tobacco Use    Smoking status: Never Smoker    Smokeless tobacco: Never Used   Substance and Sexual Activity    Alcohol use: No    Drug use: No    Sexual activity: No   Other Topics Concern    Not on file   Social History Narrative    Not on file       Hospitalization/Major Diagnostic Procedure:     Review of Systems     General/Constitutional:  Chills denies. Fatigue denies. Fever denies. Weight gain denies. Weight loss denies.    Respiratory:   Shortness of breath denies.    Cardiovascular:  Chest pain denies.    Gastrointestinal:  Constipation denies. Diarrhea denies. Nausea denies. Vomiting denies.     Hematology:  Easy bruising denies. Prolonged bleeding denies.     Genitourinary:  Frequent urination denies. Pain in lower back denies. Painful urination denies.     Musculoskeletal:  See HPI for details    Skin:  Rash denies.    Neurologic:  Dizziness denies. Gait abnormalities denies. Seizures denies. Tingling/Numbess denies.    Psychiatric:  Anxiety denies. Depressed mood denies.     Objective:   Vital Signs:   Vitals:    01/30/19 1115   BP: 128/70   Pulse: 96        Physical Exam      General Examination:     Constitutional: The patient is alert and oriented to lace person and time. Mood is pleasant.     Head/Face: Normal facial features normal eyebrows    Eyes: Normal extraocular motion bilaterally    Lungs: Respirations are equal and unlabored    Gait is coordinated.    Cardiovascular: There are no swelling or varicosities present.    Lymphatic: Negative for adenopathy    Skin: Normal    Neurological: Level of consciousness normal. Oriented to place person and time and situation    Psychiatric: Oriented to time place person and situation    Examination shows the patient has an ataxic gait she cannot stand fully erect has moderate tenderness right side lumbar region no spasm noted range of motion limited cervical range of motion limited Spurling's maneuver negative  XRAY Report/ Interpretation: Review of lumbar M CT scan was performed showing some multilevel facet arthritis and no significant stenosis      Assessment:       1. Facet arthropathy, lumbar    2. Ataxia    3. Cervicalgia    4. History of fusion of cervical spine    5. Facet arthropathy, cervical        Plan:       Zaira was seen today for pain.    Diagnoses and all orders for this visit:    Facet arthropathy, lumbar    Ataxia    Cervicalgia    History of fusion of cervical spine    Facet  arthropathy, cervical         No Follow-up on file.    Probable facet joint mediated pain cervical and lumbar regions right side lumbar left side cervical the patient wishes to have a new cervical MRI study to make sure she doesn't have adjacent segment disease and we will order it but I don't think she is a candidate for any further cervical surgery would regards to the lumbar spine we'll refer her to Dr. Jett Jensen for right-sided lumbar medial branch blocks based on her cervical MRI she may be a candidate for cervical medial branch blocks as well again unless there is profound abnormality on the cervical MRI don't think that any further neck surgeries in her best interest    This note was created using Dragon voice recognition software that occasionally misinterpreted phrases or words.

## 2019-01-31 ENCOUNTER — TELEPHONE (OUTPATIENT)
Dept: ORTHOPEDICS | Facility: CLINIC | Age: 44
End: 2019-01-31

## 2019-01-31 NOTE — TELEPHONE ENCOUNTER
Pt called needs a rx for tramadol & diclofenac also did you send over the referral to dr rick pts# [phone]

## 2019-02-04 ENCOUNTER — TELEPHONE (OUTPATIENT)
Dept: ORTHOPEDICS | Facility: CLINIC | Age: 44
End: 2019-02-04

## 2019-02-04 DIAGNOSIS — M54.6 DISCOGENIC THORACIC PAIN: Primary | ICD-10-CM

## 2019-02-04 NOTE — TELEPHONE ENCOUNTER
Ms. Jensen wants to know if Dr Neal with send an order for her to get an MRI on her Thoracic so when she goes for her cervical MRI on Friday she can just have both done

## 2019-02-12 ENCOUNTER — TELEPHONE (OUTPATIENT)
Dept: PHYSICAL MEDICINE AND REHAB | Facility: CLINIC | Age: 44
End: 2019-02-12

## 2019-02-12 NOTE — TELEPHONE ENCOUNTER
Patient wanted records faxed over to Dr Jensen's office.  Informed patient she would have to go to medical records to have her records faxed over.  Verbalized understanding

## 2019-02-12 NOTE — TELEPHONE ENCOUNTER
----- Message from Angelo Mercado sent at 2/12/2019 10:57 AM CST -----  Contact: Patient  Patient would like to speak to Dr. Simmons's nurse, patient would not be not spiciffic. Call back number

## 2019-02-12 NOTE — TELEPHONE ENCOUNTER
----- Message from Nohelia Mayer sent at 2/12/2019 10:54 AM CST -----  Contact: patient  Type: Needs Medical Advice    Who Called:  Patient  Best Call Back Number: [phone] (home)   Additional Information: need to give the nurse some information to fax for her would like a call back

## 2019-02-18 ENCOUNTER — OFFICE VISIT (OUTPATIENT)
Dept: ORTHOPEDICS | Facility: CLINIC | Age: 44
End: 2019-02-18
Payer: MEDICARE

## 2019-02-18 VITALS
BODY MASS INDEX: 28.49 KG/M2 | HEIGHT: 65 IN | SYSTOLIC BLOOD PRESSURE: 120 MMHG | DIASTOLIC BLOOD PRESSURE: 72 MMHG | HEART RATE: 76 BPM | WEIGHT: 171 LBS

## 2019-02-18 DIAGNOSIS — R27.0 ATAXIA: Primary | ICD-10-CM

## 2019-02-18 DIAGNOSIS — M47.816 LUMBAR SPONDYLOSIS: ICD-10-CM

## 2019-02-18 DIAGNOSIS — M47.892 OTHER OSTEOARTHRITIS OF SPINE, CERVICAL REGION: ICD-10-CM

## 2019-02-18 DIAGNOSIS — Z98.1 HISTORY OF FUSION OF CERVICAL SPINE: ICD-10-CM

## 2019-02-18 PROCEDURE — 99213 OFFICE O/P EST LOW 20 MIN: CPT | Mod: ,,, | Performed by: ORTHOPAEDIC SURGERY

## 2019-02-18 PROCEDURE — 99213 PR OFFICE/OUTPT VISIT, EST, LEVL III, 20-29 MIN: ICD-10-PCS | Mod: ,,, | Performed by: ORTHOPAEDIC SURGERY

## 2019-02-18 RX ORDER — OXYCODONE AND ACETAMINOPHEN 5; 325 MG/1; MG/1
TABLET ORAL
COMMUNITY
Start: 2019-02-13 | End: 2019-07-02

## 2019-02-18 RX ORDER — LEVOTHYROXINE, LIOTHYRONINE 9.5; 2.25 UG/1; UG/1
TABLET ORAL
Refills: 2 | COMMUNITY
Start: 2019-01-31 | End: 2019-06-24

## 2019-02-18 RX ORDER — OXAPROZIN 600 MG/1
600 TABLET, FILM COATED ORAL 2 TIMES DAILY
Qty: 60 TABLET | Refills: 1 | Status: SHIPPED | OUTPATIENT
Start: 2019-02-18 | End: 2019-03-20

## 2019-02-18 NOTE — PROGRESS NOTES
Subjective:       Patient ID: Zaira Jensen is a 43 y.o. female.    Chief Complaint: Pain of the Thoracic Spine (MRI results) and Pain of the Neck (MRI results)      History of Present Illness  Continuous cervical and lumbar pain here for MRI results status post anterior cervical fusion. She complains of continuous pain and difficulty with ambulation she's had a rheumatology workup that was negative for site neurologist long time ago    Current Medications  Current Outpatient Medications   Medication Sig Dispense Refill    b complex vitamins capsule Take 1 capsule by mouth once daily.      calcium-vitamin D 250-100 mg-unit per tablet Take 1 tablet by mouth 2 (two) times daily.      diclofenac (CATAFLAM) 50 MG tablet TK 1  T  PO  BID  1    diclofenac (VOLTAREN) 50 MG EC tablet Take 50 mg by mouth 2 (two) times daily.      ferrous gluconate (FERGON) 324 MG tablet Take 324 mg by mouth daily with breakfast.      magnesium citrate solution Take 296 mLs by mouth once.      NP THYROID 15 mg Tab TK 1 T PO QAM ON AN EMPTY STOMACH 30 MINUTES PRIOR TO FOOD AND DRINK  2    progesterone (PROMETRIUM) 200 MG capsule       traMADol (ULTRAM) 50 mg tablet TK 1 T PO Q 6 H PRN  5    baclofen (LIORESAL) 10 MG tablet Take 1 tablet (10 mg total) by mouth every evening. 30 tablet 6    oxyCODONE-acetaminophen (PERCOCET) 5-325 mg per tablet        No current facility-administered medications for this visit.        Allergies  Review of patient's allergies indicates:   Allergen Reactions    Bactrim [sulfamethoxazole-trimethoprim]     Codeine Other (See Comments)     cramping    Hydrocodone     Nexium [esomeprazole magnesium]     Nickel sutures [surgical stainless steel]     Pantoprazole     Pcn [penicillins]     Sulfa (sulfonamide antibiotics)     Vicks vapor inhaler [l-desoxyephedrine]        Past Medical History  Past Medical History:   Diagnosis Date    Allergy     Anemia     Arthritis     Chronic back pain      Chronic neck pain     Functional ovarian cysts     GERD (gastroesophageal reflux disease)     Headache(784.0)     Quadriparesis (muscle weakness) 1/16/2018    HH notes    Radiculopathy of arm     Rash     Respiratory distress     bronchospasm at emergence years ago       Surgical History  Past Surgical History:   Procedure Laterality Date    CERVICAL FUSION      CHOLECYSTECTOMY      COSMETIC SURGERY      DISKECTOMY AND FUSION-ANTERIOR CERVICAL (ACDF) C5-6 N/A 10/3/2017    [facility]    EYE SURGERY      FRACTURE SURGERY      OVARIAN CYST REMOVAL      SPINE SURGERY      TOTAL BODY LIFT         Family History:   Family History   Problem Relation Age of Onset    Diabetes Father     Cancer Father         prostate    Heart disease Father     No Known Problems Mother     Allergic rhinitis Neg Hx     Allergies Neg Hx     Angioedema Neg Hx     Asthma Neg Hx     Atopy Neg Hx     Eczema Neg Hx     Immunodeficiency Neg Hx     Rhinitis Neg Hx     Urticaria Neg Hx        Social History:   Social History     Socioeconomic History    Marital status: Single     Spouse name: Not on file    Number of children: Not on file    Years of education: Not on file    Highest education level: Not on file   Social Needs    Financial resource strain: Not on file    Food insecurity - worry: Not on file    Food insecurity - inability: Not on file    Transportation needs - medical: Not on file    Transportation needs - non-medical: Not on file   Occupational History    Not on file   Tobacco Use    Smoking status: Never Smoker    Smokeless tobacco: Never Used   Substance and Sexual Activity    Alcohol use: No    Drug use: No    Sexual activity: No   Other Topics Concern    Not on file   Social History Narrative    Not on file       Hospitalization/Major Diagnostic Procedure:     Review of Systems     General/Constitutional:  Chills denies. Fatigue denies. Fever denies.  Weight gain denies. Weight loss denies.    Respiratory:  Shortness of breath denies.    Cardiovascular:  Chest pain denies.    Gastrointestinal:  Constipation denies. Diarrhea denies. Nausea denies. Vomiting denies.     Hematology:  Easy bruising denies. Prolonged bleeding denies.     Genitourinary:  Frequent urination denies. Pain in lower back denies. Painful urination denies.     Musculoskeletal:  See HPI for details    Skin:  Rash denies.    Neurologic:  Dizziness denies. Gait abnormalities denies. Seizures denies. Tingling/Numbess denies.    Psychiatric:  Anxiety denies. Depressed mood denies.     Objective:   Vital Signs:   Vitals:    02/18/19 1154   BP: 120/72   Pulse: 76        Physical Exam      General Examination:     Constitutional: The patient is alert and oriented to lace person and time. Mood is pleasant.     Head/Face: Normal facial features normal eyebrows    Eyes: Normal extraocular motion bilaterally    Lungs: Respirations are equal and unlabored    Gait is coordinated.    Cardiovascular: There are no swelling or varicosities present.    Lymphatic: Negative for adenopathy    Skin: Normal    Neurological: Level of consciousness normal. Oriented to place person and time and situation    Psychiatric: Oriented to time place person and situation    Ataxia of gait is noted cervical or lumbar range of motion moderately restricted no spasm.  XRAY Report/ Interpretation: Cervical MRI personally reviewed solid anterior cervical fusion C3-C6 with no further spinal cord compression  Thoracic MRI personally reviewed appears to be normal      Assessment:       1. Ataxia    2. History of fusion of cervical spine    3. Lumbar spondylosis    4. Other osteoarthritis of spine, cervical region        Plan:       Zaira was seen today for pain and pain.    Diagnoses and all orders for this visit:    Ataxia    History of fusion of cervical spine    Lumbar spondylosis    Other osteoarthritis of spine, cervical  region         No Follow-up on file.    I've explained to the patient she doesn't have a surgical problem at this point time that I can identify as far as her continued symptoms and difficulty walking I advised that she be referred to pain management for cervical and/or lumbar injections possibly medial branch blocks. I advise she see a neurologist a workup for involuntary tremor as well as gait disturbance    This note was created using Dragon voice recognition software that occasionally misinterpreted phrases or words.

## 2019-02-19 ENCOUNTER — TELEPHONE (OUTPATIENT)
Dept: NEUROLOGY | Facility: CLINIC | Age: 44
End: 2019-02-19

## 2019-02-20 ENCOUNTER — TELEPHONE (OUTPATIENT)
Dept: ORTHOPEDICS | Facility: CLINIC | Age: 44
End: 2019-02-20

## 2019-02-21 ENCOUNTER — TELEPHONE (OUTPATIENT)
Dept: ORTHOPEDICS | Facility: CLINIC | Age: 44
End: 2019-02-21

## 2019-02-25 ENCOUNTER — TELEPHONE (OUTPATIENT)
Dept: ORTHOPEDICS | Facility: CLINIC | Age: 44
End: 2019-02-25

## 2019-02-25 DIAGNOSIS — M21.70 LEG LENGTH DISCREPANCY: Primary | ICD-10-CM

## 2019-02-26 ENCOUNTER — TELEPHONE (OUTPATIENT)
Dept: NEUROLOGY | Facility: CLINIC | Age: 44
End: 2019-02-26

## 2019-02-26 NOTE — TELEPHONE ENCOUNTER
----- Message from Sierra Gil sent at 2/26/2019  3:16 PM CST -----  Contact: self [phone]  Please call her about a sooner appt.  Thank you!

## 2019-02-27 ENCOUNTER — TELEPHONE (OUTPATIENT)
Dept: ORTHOPEDICS | Facility: CLINIC | Age: 44
End: 2019-02-27

## 2019-02-27 NOTE — TELEPHONE ENCOUNTER
Katherine with [facility] called just wanted to let you know they only do half inch inserts because patient told her it was a inch or two and we were going to call her to measure her ph# [phone]

## 2019-03-01 ENCOUNTER — TELEPHONE (OUTPATIENT)
Dept: NEUROLOGY | Facility: CLINIC | Age: 44
End: 2019-03-01

## 2019-03-01 NOTE — TELEPHONE ENCOUNTER
----- Message from Blossom Parmar sent at 3/1/2019  3:03 PM CST -----    Type:  Patient Returning Call    Who Calledpt  Who Left Message for Patient:  nurse  Does the patient know what this is regarding?:   About  Getting  Fitted in   Best Call Back Number: [phone]  Additional Information:    Pt returning   Call

## 2019-03-01 NOTE — TELEPHONE ENCOUNTER
Returned pt call; states that she has acquired transportation for appt w Dr. Sanderson on 03.08.19; notified that the appt had already been filled but her appt 03.27.19 still scheduled and pt will remain on waiting list to be seen sooner; verbalized understanding.

## 2019-03-01 NOTE — TELEPHONE ENCOUNTER
----- Message from Slava Duggan sent at 3/1/2019  4:10 PM CST -----  Contact: Patient  Type:  Patient Returning Call    Who Called:  Patient  Who Left Message for Patient:  Kristie  Does the patient know what this is regarding?:  appointment  Best Call Back Number:  [phone]

## 2019-03-01 NOTE — TELEPHONE ENCOUNTER
Returned pt call and attempted to move pt appt from 03.27.19 to 03.08.19 per pt request; pt refusing that appt due to transportation issues; states that she will try to make arrangements and call us Monday to see if still available.

## 2019-03-06 ENCOUNTER — TELEPHONE (OUTPATIENT)
Dept: ORTHOPEDICS | Facility: CLINIC | Age: 44
End: 2019-03-06

## 2019-03-06 NOTE — TELEPHONE ENCOUNTER
Patient was calling because she is requesting we get an order for her shoe lift sent to [facility] if possible and also wants an stand up open MRI ordered as well if she can.

## 2019-03-11 ENCOUNTER — TELEPHONE (OUTPATIENT)
Dept: ORTHOPEDICS | Facility: CLINIC | Age: 44
End: 2019-03-11

## 2019-03-11 DIAGNOSIS — M48.02 CERVICAL SPINAL STENOSIS: ICD-10-CM

## 2019-03-11 DIAGNOSIS — M47.16 SPONDYLOSIS WITH MYELOPATHY, LUMBAR REGION: Primary | ICD-10-CM

## 2019-03-11 NOTE — TELEPHONE ENCOUNTER
----- Message from Sandy Pearson sent at 3/6/2019  4:52 PM CST -----  Contact: Zaira Jensen  Wants to discuss a phone call she received from a [facility] and discuss an stand up MRI

## 2019-03-11 NOTE — TELEPHONE ENCOUNTER
Spoke with the patient. She is requesting orders to be sent to stand up mri on [facility] since they are now accepting medicare.----- Message from Alexandra Palacio sent at 3/11/2019 10:49 AM CDT -----  Contact: Patient   Pt needs order for stand up MRI.

## 2019-03-11 NOTE — TELEPHONE ENCOUNTER
----- Message from Alexandra Palacio sent at 3/11/2019 10:49 AM CDT -----  Contact: Patient   Pt needs order for stand up MRI.

## 2019-03-11 NOTE — TELEPHONE ENCOUNTER
----- Message from Sandy Pearson sent at 3/6/2019  4:49 PM CST -----  Patient would like you to call her in regards to a phone call she received from a [facility] place and also would like to discuss an open MRI

## 2019-03-25 ENCOUNTER — OFFICE VISIT (OUTPATIENT)
Dept: ORTHOPEDICS | Facility: CLINIC | Age: 44
End: 2019-03-25
Payer: MEDICARE

## 2019-03-25 VITALS
SYSTOLIC BLOOD PRESSURE: 110 MMHG | HEART RATE: 87 BPM | BODY MASS INDEX: 29.99 KG/M2 | HEIGHT: 65 IN | WEIGHT: 180 LBS | DIASTOLIC BLOOD PRESSURE: 70 MMHG

## 2019-03-25 DIAGNOSIS — M21.70 LEG LENGTH DISCREPANCY: ICD-10-CM

## 2019-03-25 DIAGNOSIS — M51.36 BULGE OF LUMBAR DISC WITHOUT MYELOPATHY: ICD-10-CM

## 2019-03-25 DIAGNOSIS — M47.816 FACET SYNDROME, LUMBAR: ICD-10-CM

## 2019-03-25 DIAGNOSIS — M47.812 CERVICAL FACET JOINT SYNDROME: ICD-10-CM

## 2019-03-25 PROCEDURE — 99213 PR OFFICE/OUTPT VISIT, EST, LEVL III, 20-29 MIN: ICD-10-PCS | Mod: ,,, | Performed by: ORTHOPAEDIC SURGERY

## 2019-03-25 PROCEDURE — 99213 OFFICE O/P EST LOW 20 MIN: CPT | Mod: ,,, | Performed by: ORTHOPAEDIC SURGERY

## 2019-03-25 RX ORDER — OXAPROZIN 600 MG/1
600 TABLET, FILM COATED ORAL DAILY
COMMUNITY
End: 2019-04-24 | Stop reason: SDUPTHER

## 2019-03-25 NOTE — PROGRESS NOTES
Subjective:       Patient ID: Zaira Jensen is a 43 y.o. female.    Chief Complaint: Pain of the Lumbar Spine (Measure discrepency in the length of her legs lumbar,cervical, pelvis MRI's done at [facility] 3-13-19)      History of Present Illness  Patient continues with some chronic neck pain as well as low back pain. She's here to discuss results of the MRIs. Status post anterior cervical fusion ×2    Current Medications  Current Outpatient Medications   Medication Sig Dispense Refill    b complex vitamins capsule Take 1 capsule by mouth once daily.      calcium-vitamin D 250-100 mg-unit per tablet Take 1 tablet by mouth 2 (two) times daily.      ferrous gluconate (FERGON) 324 MG tablet Take 324 mg by mouth daily with breakfast.      magnesium citrate solution Take 296 mLs by mouth once.      NP THYROID 15 mg Tab TK 1 T PO QAM ON AN EMPTY STOMACH 30 MINUTES PRIOR TO FOOD AND DRINK  2    oxaprozin (DAYPRO) 600 mg tablet Take 600 mg by mouth once daily.      oxyCODONE-acetaminophen (PERCOCET) 5-325 mg per tablet       progesterone (PROMETRIUM) 200 MG capsule       traMADol (ULTRAM) 50 mg tablet TK 1 T PO Q 6 H PRN  5    baclofen (LIORESAL) 10 MG tablet Take 1 tablet (10 mg total) by mouth every evening. 30 tablet 6     No current facility-administered medications for this visit.        Allergies  Review of patient's allergies indicates:   Allergen Reactions    Bactrim [sulfamethoxazole-trimethoprim]     Codeine Other (See Comments)     cramping    Hydrocodone     Nexium [esomeprazole magnesium]     Nickel sutures [surgical stainless steel]     Pantoprazole     Pcn [penicillins]     Sulfa (sulfonamide antibiotics)     Vicks vapor inhaler [l-desoxyephedrine]        Past Medical History  Past Medical History:   Diagnosis Date    Allergy     Anemia     Arthritis     Chronic back pain     Chronic neck pain     Functional ovarian cysts     GERD (gastroesophageal reflux disease)      Headache(784.0)     Quadriparesis (muscle weakness) 1/16/2018    HH notes    Radiculopathy of arm     Rash     Respiratory distress     bronchospasm at emergence years ago       Surgical History  Past Surgical History:   Procedure Laterality Date    CERVICAL FUSION      CHOLECYSTECTOMY      COSMETIC SURGERY      DISKECTOMY AND FUSION-ANTERIOR CERVICAL (ACDF) C5-6 N/A 10/3/2017    [facility]    EYE SURGERY      FRACTURE SURGERY      OVARIAN CYST REMOVAL      SPINE SURGERY      TOTAL BODY LIFT         Family History:   Family History   Problem Relation Age of Onset    Diabetes Father     Cancer Father         prostate    Heart disease Father     No Known Problems Mother     Allergic rhinitis Neg Hx     Allergies Neg Hx     Angioedema Neg Hx     Asthma Neg Hx     Atopy Neg Hx     Eczema Neg Hx     Immunodeficiency Neg Hx     Rhinitis Neg Hx     Urticaria Neg Hx        Social History:   Social History     Socioeconomic History    Marital status: Single     Spouse name: Not on file    Number of children: Not on file    Years of education: Not on file    Highest education level: Not on file   Occupational History    Not on file   Social Needs    Financial resource strain: Not on file    Food insecurity:     Worry: Not on file     Inability: Not on file    Transportation needs:     Medical: Not on file     Non-medical: Not on file   Tobacco Use    Smoking status: Never Smoker    Smokeless tobacco: Never Used   Substance and Sexual Activity    Alcohol use: No    Drug use: No    Sexual activity: Never   Lifestyle    Physical activity:     Days per week: Not on file     Minutes per session: Not on file    Stress: Not on file   Relationships    Social connections:     Talks on phone: Not on file     Gets together: Not on file     Attends Synagogue service: Not on file     Active member of club or organization: Not on file     Attends meetings of clubs  or organizations: Not on file     Relationship status: Not on file    Intimate partner violence:     Fear of current or ex partner: Not on file     Emotionally abused: Not on file     Physically abused: Not on file     Forced sexual activity: Not on file   Other Topics Concern    Not on file   Social History Narrative    Not on file       Hospitalization/Major Diagnostic Procedure:     Review of Systems     General/Constitutional:  Chills denies. Fatigue denies. Fever denies. Weight gain denies. Weight loss denies.    Respiratory:  Shortness of breath denies.    Cardiovascular:  Chest pain denies.    Gastrointestinal:  Constipation denies. Diarrhea denies. Nausea denies. Vomiting denies.     Hematology:  Easy bruising denies. Prolonged bleeding denies.     Genitourinary:  Frequent urination denies. Pain in lower back denies. Painful urination denies.     Musculoskeletal:  See HPI for details    Skin:  Rash denies.    Neurologic:  Dizziness denies. Gait abnormalities denies. Seizures denies. Tingling/Numbess denies.    Psychiatric:  Anxiety denies. Depressed mood denies.     Objective:   Vital Signs:   Vitals:    03/25/19 1105   BP: 110/70   Pulse: 87        Physical Exam      General Examination:     Constitutional: The patient is alert and oriented to lace person and time. Mood is pleasant.     Head/Face: Normal facial features normal eyebrows    Eyes: Normal extraocular motion bilaterally    Lungs: Respirations are equal and unlabored    Gait is coordinated.    Cardiovascular: There are no swelling or varicosities present.    Lymphatic: Negative for adenopathy    Skin: Normal    Neurological: Level of consciousness normal. Oriented to place person and time and situation    Psychiatric: Oriented to time place person and situation    Cervical exam shows tenderness and left posterior paraspinous muscles without spasm range of motion is slightly limited Spurling maneuver is negative. Lumbar exam shows tenderness  right side lumbosacral junction without spasm straight leg raising negative. Leg length discrepancy detected with left side 1 cm shorter than right  XRAY Report/ Interpretation: Pelvic MRI was personally reviewed shows no musculoskeletal problems. Lumbar MRI demonstrated disc bulge at L4-5 and facet arthritis at L4-5. Cervical MRI show previous cervical fusion at the C3-C6 and facet arthritis C5-6      Assessment:       1. Cervical facet joint syndrome    2. Bulge of lumbar disc without myelopathy    3. Facet syndrome, lumbar    4. Leg length discrepancy        Plan:       Zaira was seen today for pain.    Diagnoses and all orders for this visit:    Cervical facet joint syndrome  -     Ambulatory referral to Pain Clinic    Bulge of lumbar disc without myelopathy    Facet syndrome, lumbar    Leg length discrepancy  -     ORTHOTIC DEVICE (DME)         Follow up in about 3 months (around 6/25/2019).    I discussed with the patient that her pain is axial pain that might be amenable to cervical medial branch blocks. She is seeing a pain management doctor. We may consider lumbar medial branch blocks as well. We will order a 1 cm heel and sole lift to the left shoe    This note was created using Dragon voice recognition software that occasionally misinterpreted phrases or words.

## 2019-03-27 ENCOUNTER — OFFICE VISIT (OUTPATIENT)
Dept: NEUROLOGY | Facility: CLINIC | Age: 44
End: 2019-03-27
Payer: MEDICARE

## 2019-03-27 VITALS
RESPIRATION RATE: 18 BRPM | SYSTOLIC BLOOD PRESSURE: 124 MMHG | BODY MASS INDEX: 29.95 KG/M2 | HEART RATE: 87 BPM | WEIGHT: 180 LBS | DIASTOLIC BLOOD PRESSURE: 79 MMHG

## 2019-03-27 DIAGNOSIS — R25.2 SPASTICITY: ICD-10-CM

## 2019-03-27 DIAGNOSIS — R27.0 ATAXIA: Primary | ICD-10-CM

## 2019-03-27 DIAGNOSIS — R60.0 BILATERAL LOWER EXTREMITY EDEMA: ICD-10-CM

## 2019-03-27 PROCEDURE — 99205 OFFICE O/P NEW HI 60 MIN: CPT | Mod: S$PBB,,, | Performed by: PSYCHIATRY & NEUROLOGY

## 2019-03-27 PROCEDURE — 99213 OFFICE O/P EST LOW 20 MIN: CPT | Mod: PBBFAC,PN | Performed by: PSYCHIATRY & NEUROLOGY

## 2019-03-27 PROCEDURE — 99999 PR PBB SHADOW E&M-EST. PATIENT-LVL III: CPT | Mod: PBBFAC,,, | Performed by: PSYCHIATRY & NEUROLOGY

## 2019-03-27 PROCEDURE — 99999 PR PBB SHADOW E&M-EST. PATIENT-LVL III: ICD-10-PCS | Mod: PBBFAC,,, | Performed by: PSYCHIATRY & NEUROLOGY

## 2019-03-27 PROCEDURE — 99205 PR OFFICE/OUTPT VISIT, NEW, LEVL V, 60-74 MIN: ICD-10-PCS | Mod: S$PBB,,, | Performed by: PSYCHIATRY & NEUROLOGY

## 2019-03-27 NOTE — LETTER
April 3, 2019      Magan Neal MD  [address]  [address]  [place] [address]           [facility]  [address] [facility] [address] [place] [address]  Phone: [phone]  Fax: [phone]          Patient: Zaira Jensen   MR Number: [id number]   YOB: 1975   Date of Visit: 3/27/2019       Dear Dr. Magan Neal:    Thank you for referring Zaira Jensen to me for evaluation. Attached you will find relevant portions of my assessment and plan of care.    If you have questions, please do not hesitate to call me. I look forward to following Zaira Jensen along with you.    Sincerely,    Christopher Sanderson Jr., MD    Enclosure  CC:  No Recipients    If you would like to receive this communication electronically, please contact [email or web address] or [phone] to request more information on [employer] Link access.    For providers and/or their staff who would like to refer a patient to [facility], please contact us through our one-stop-shop provider referral line, [facility], at [phone].    If you feel you have received this communication in error or would no longer like to receive these types of communications, please e-mail [email or web address]

## 2019-03-27 NOTE — PROGRESS NOTES
"Date of service:  3/27/2019    Chief complaint:  Tremor    History of present illness:  The patient is a 43 y.o. female with a reported prior history of cervical myelopathy S/P multiple cervical fusions referred for evaluation of tremor. These began a few of years ago, around the time that she had a "herniated disc" with cord compression. They are located mainly in the left arm.  They are characterized as being noticeable with action and rest.  Exacerbating factors include nervousness or fatigue.  Nothing provides relief.  The patient has noticed no associated symptoms. The tremor is present for seconds to minutes at a time.   The patient's has tremors a few times a day on average.    The patient also reports poor balance.  This started about 6 months after a C-spine fusion for cervical myelopathy.  She reports that her balance may be exacerbated by swelling in the legs.  She has associated numbness and has had falls.      Past Medical History:   Diagnosis Date    Allergy     Anemia     Arthritis     Chronic back pain     Chronic neck pain     Functional ovarian cysts     GERD (gastroesophageal reflux disease)     Headache(784.0)     Quadriparesis (muscle weakness) 1/16/2018     notes    Radiculopathy of arm     Rash     Respiratory distress     bronchospasm at emergence years ago       Past Surgical History:   Procedure Laterality Date    CERVICAL FUSION      CHOLECYSTECTOMY      COSMETIC SURGERY      DISKECTOMY AND FUSION-ANTERIOR CERVICAL (ACDF) C5-6 N/A 10/3/2017    [facility] OR    EYE SURGERY      FRACTURE SURGERY      OVARIAN CYST REMOVAL      SPINE SURGERY      TOTAL BODY LIFT         Family History   Problem Relation Age of Onset    Diabetes Father     Cancer Father         prostate    Heart disease Father     No Known Problems Mother     Allergic rhinitis Neg Hx     Allergies Neg Hx     Angioedema Neg Hx     Asthma Neg Hx     Atopy Neg Hx     " Eczema Neg Hx     Immunodeficiency Neg Hx     Rhinitis Neg Hx     Urticaria Neg Hx        Social History     Socioeconomic History    Marital status: Single     Spouse name: Not on file    Number of children: Not on file    Years of education: Not on file    Highest education level: Not on file   Occupational History    Not on file   Social Needs    Financial resource strain: Not on file    Food insecurity:     Worry: Not on file     Inability: Not on file    Transportation needs:     Medical: Not on file     Non-medical: Not on file   Tobacco Use    Smoking status: Never Smoker    Smokeless tobacco: Never Used   Substance and Sexual Activity    Alcohol use: No    Drug use: No    Sexual activity: Never   Lifestyle    Physical activity:     Days per week: Not on file     Minutes per session: Not on file    Stress: Not on file   Relationships    Social connections:     Talks on phone: Not on file     Gets together: Not on file     Attends Mandaeism service: Not on file     Active member of club or organization: Not on file     Attends meetings of clubs or organizations: Not on file     Relationship status: Not on file    Intimate partner violence:     Fear of current or ex partner: Not on file     Emotionally abused: Not on file     Physically abused: Not on file     Forced sexual activity: Not on file   Other Topics Concern    Not on file   Social History Narrative    Not on file       Review of patient's allergies indicates:   Allergen Reactions    Bactrim [sulfamethoxazole-trimethoprim]     Codeine Other (See Comments)     cramping    Hydrocodone     Nexium [esomeprazole magnesium]     Nickel sutures [surgical stainless steel]     Pantoprazole     Pcn [penicillins]     Sulfa (sulfonamide antibiotics)     Vicks vapor inhaler [l-desoxyephedrine]        Review of Systems   General/Constitutional:  No unintentional weight loss, No change in appetite  Eyes/Vision:  No change in vision, No  double vision  ENT:  No frequent nose bleeds, No ringing in the ears  Respiratory:  No cough, No wheezing  Cardiovascular:  No chest pain, No palpitations  Gastrointestinal:  No jaundice, No nausea/vomiting  Genitourinary:  No incontinence, No burning with urination  Hematologic/Lymphatic:  No easy bruising/bleeding, No night sweats  Neurological:  + numbness, + weakness  Endocrine:  + fatigue, No heat/cold intolerance  Allergy/Immunologic:  No fevers, No chills  Musculoskeletal:  + muscle pain, + joint pain   Psychiatric:  No thoughts of harming self/others, No depression  Integumentary:  No rashes, No sores that do not heal    Physical exam:  /79 (BP Location: Left arm, Patient Position: Sitting, BP Method: Large (Automatic))   Pulse 87   Resp 18   Wt 81.6 kg (180 lb)   BMI 29.95 kg/m²   General: Well developed, well nourished.  No acute distress.  ENT: Mucus membranes moist.  Atraumatic external nose and ears.  Lymphatic: No apparent lymphadenopathy.  Cardiovascular: Regular rate and rhythm.  Pulmonary: No increased work of breathing.  Abdomen/GI: No guarding.  Musculoskeletal: No clubbing or cyanosis.  2+ RORY bilaterally.    Neurological exam:  Mental status: Awake and alert.  Oriented x4.  Speech fluent and appropriate.  Recent and remote memory appear to be intact.  Fund of knowledge normal.  Cranial nerves: Pupils equal round and reactive to light, extraocular movements intact, facial strength and sensation intact bilaterally, palate and tongue midline, hearing grossly intact bilaterally.  Motor: 5 out of 5 strength throughout the upper and lower extremities bilaterally. Spasticity, most apparent in bilateral LEs.  Sensation: Intact to light touch and temperature bilaterally.  DTR: 4+ at the knees and 3+ biceps bilaterally.  Coordination: Finger-nose-finger testing intact bilaterally with L>R intention tremor.  Gait: Cautious gait.  Unsteady.    Data base:  Notes of the referring physician were  "reviewed.  Briefly summarized, these discuss that the patient has no issues contributing to her gait or tremor that could be addressed surgically.    MRI C-spine (2/19, CD available):  "1. Mature osseous fusion at C3-C4 and C4-C5 unchanged previous exam.  2. Interval anterior cervical discectomy fusion (ACDF) at C5-C6 without evident  hardware complication.  3. Degenerative disc disease and facet arthropathy as outlined in detail above  with minimal worsening when compared to the previous exam."  I independently visualized the images of this study and interpreted them.     MRI T-spine (2/19, CD available):  "No abnormality seen."  I independently visualized the images of this study and interpreted them.     Assessment and plan:  The patient is a 43 y.o. female referred for evaluation of tremor and imbalance.  I suspect that the patient's issues stem primarily from her history of cervical myelopathy.  We will look for other potentially contributory factors to make sure we are not missing anything we could intervene upon.  We will check some serologies and an MRI of the brain.  We will see her back to discuss these results.    The patient does have significant RORY.  It may well be related to her relative immobility of the legs.  It is unclear to me, though, the chronicity of this.  We will check LE dopplers to rule out DVT.  "

## 2019-04-10 ENCOUNTER — TELEPHONE (OUTPATIENT)
Dept: NEUROLOGY | Facility: CLINIC | Age: 44
End: 2019-04-10

## 2019-04-10 NOTE — TELEPHONE ENCOUNTER
----- Message from René Love MA sent at 4/10/2019  4:15 PM CDT -----  Contact: Pt  Pt is requesting an appt due to problems using hands and walking.        Pt can be reached at [phone].      Thanks

## 2019-04-11 ENCOUNTER — TELEPHONE (OUTPATIENT)
Dept: NEUROLOGY | Facility: CLINIC | Age: 44
End: 2019-04-11

## 2019-04-16 RX ORDER — OXAPROZIN 600 MG/1
600 TABLET, FILM COATED ORAL DAILY
Qty: 30 TABLET | Refills: 5 | Status: CANCELLED | OUTPATIENT
Start: 2019-04-16 | End: 2019-05-16

## 2019-04-16 NOTE — TELEPHONE ENCOUNTER
----- Message from Alexandra Palacio sent at 4/16/2019 10:17 AM CDT -----  Contact: Patient  Requesting refill Oxaprovozin 600 mg and Tramadol 50 mg.

## 2019-04-24 RX ORDER — OXAPROZIN 600 MG/1
600 TABLET, FILM COATED ORAL 2 TIMES DAILY
Qty: 60 TABLET | Refills: 5 | Status: SHIPPED | OUTPATIENT
Start: 2019-04-24 | End: 2019-05-24

## 2019-04-24 NOTE — TELEPHONE ENCOUNTER
----- Message from Denisse Almaraz sent at 4/24/2019 10:45 AM CDT -----  Contact: patient  Patient receieved a script for oxaprozin for once a day but she stated it needs to be twice a day and wants it corrected, call her back at [phone].

## 2019-06-24 ENCOUNTER — OFFICE VISIT (OUTPATIENT)
Dept: NEUROLOGY | Facility: CLINIC | Age: 44
End: 2019-06-24
Payer: MEDICARE

## 2019-06-24 VITALS
HEART RATE: 87 BPM | WEIGHT: 179.88 LBS | DIASTOLIC BLOOD PRESSURE: 91 MMHG | HEIGHT: 65 IN | RESPIRATION RATE: 20 BRPM | SYSTOLIC BLOOD PRESSURE: 140 MMHG | BODY MASS INDEX: 29.97 KG/M2

## 2019-06-24 DIAGNOSIS — G20.C PARKINSONISM, UNSPECIFIED PARKINSONISM TYPE: Primary | ICD-10-CM

## 2019-06-24 DIAGNOSIS — M47.12 CERVICAL SPONDYLOSIS WITH MYELOPATHY: ICD-10-CM

## 2019-06-24 PROCEDURE — 99215 OFFICE O/P EST HI 40 MIN: CPT | Mod: S$PBB,,, | Performed by: PSYCHIATRY & NEUROLOGY

## 2019-06-24 PROCEDURE — 99999 PR PBB SHADOW E&M-EST. PATIENT-LVL III: ICD-10-PCS | Mod: PBBFAC,,, | Performed by: PSYCHIATRY & NEUROLOGY

## 2019-06-24 PROCEDURE — 99215 PR OFFICE/OUTPT VISIT, EST, LEVL V, 40-54 MIN: ICD-10-PCS | Mod: S$PBB,,, | Performed by: PSYCHIATRY & NEUROLOGY

## 2019-06-24 PROCEDURE — 99213 OFFICE O/P EST LOW 20 MIN: CPT | Mod: PBBFAC,PN | Performed by: PSYCHIATRY & NEUROLOGY

## 2019-06-24 PROCEDURE — 99999 PR PBB SHADOW E&M-EST. PATIENT-LVL III: CPT | Mod: PBBFAC,,, | Performed by: PSYCHIATRY & NEUROLOGY

## 2019-06-24 RX ORDER — LEVOTHYROXINE, LIOTHYRONINE 19; 4.5 UG/1; UG/1
30 TABLET ORAL
Refills: 2 | COMMUNITY
Start: 2019-05-23 | End: 2020-01-08

## 2019-06-24 RX ORDER — OXAPROZIN 600 MG/1
600 TABLET, FILM COATED ORAL
Refills: 5 | COMMUNITY
Start: 2019-05-24 | End: 2019-09-03

## 2019-06-24 NOTE — PROGRESS NOTES
Date: 6/24/2019    Patient ID: Zaira Jensen is a 44 y.o. female.    Chief Complaint: Tremors      History of Present Illness:  Ms. Jensen is a 44 y.o. female who presents for evaluation of tremor and difficulty walking. She saw Dr. Sanderson a few months ago and has not had any of the tests performed that were ordered. I have reviewed his notes and summarized below as well.     She reports the current tremors started in 2017, however, on further questioning, she did have some trouble before this. After her first spinal fusion she had tremors that started with activity but this resolved. In 2013, she started to have more after her second surgery. After her third, this worsened. She notices it more in the left arm than the right. No tremors in the legs. Anxiety or feeling nervous will make it worse. She feels a shocking like or pulling sensation in her left arm when she stretches it out. She was recently diagnosed with thyroid disorder. She notices the tremor more when she is standing and trying to walk.     She has some difficulty moving her arms. She notices difficulty moving her feet as well and trouble initiating movements. She has a feeling of joint inflammation.     She had bone graft from her hip on the right and she has had difficulty walking due to the hip. She reports autoimmune workup in 2011/2012 and it was negative.     She has dry eye. No voice changes. No changes in her thinking or memory. She walks and hasn't had any falls.     She had a MRI brain in 2012 at [facility] imaging but we don't have those results. She was seeing Dr. Salvador at the time.     Review of Systems:   14 systems reviewed - All other systems were reviewed and negative except as mentioned in the HPI    Allergies:  Review of patient's allergies indicates:   Allergen Reactions    Bactrim [sulfamethoxazole-trimethoprim]     Codeine Other (See Comments)     cramping    Hydrocodone     Nexium [esomeprazole magnesium]     Nickel sutures  [surgical stainless steel]     Pantoprazole     Pcn [penicillins]     Sulfa (sulfonamide antibiotics)     Vicks vapor inhaler [l-desoxyephedrine]        Current Medications:  Current Outpatient Medications   Medication Sig Dispense Refill    b complex vitamins capsule Take 1 capsule by mouth once daily.      baclofen (LIORESAL) 10 MG tablet Take 1 tablet (10 mg total) by mouth every evening. 30 tablet 6    calcium-vitamin D 250-100 mg-unit per tablet Take 1 tablet by mouth 2 (two) times daily.      ferrous gluconate (FERGON) 324 MG tablet Take 324 mg by mouth daily with breakfast.      magnesium citrate solution Take 296 mLs by mouth once.      NP THYROID 30 mg Tab 30 mg.  2    oxaprozin (DAYPRO) 600 mg tablet 600 mg.  5    oxyCODONE-acetaminophen (PERCOCET) 5-325 mg per tablet       progesterone (PROMETRIUM) 200 MG capsule       traMADol (ULTRAM) 50 mg tablet TK 1 T PO Q 6 H PRN  5     No current facility-administered medications for this visit.        Past Medical History:  Past Medical History:   Diagnosis Date    Allergy     Anemia     Arthritis     Chronic back pain     Chronic neck pain     Functional ovarian cysts     GERD (gastroesophageal reflux disease)     Headache(784.0)     Quadriparesis (muscle weakness) 1/16/2018    HH notes    Radiculopathy of arm     Rash     Respiratory distress     bronchospasm at emergence years ago       Past Surgical History:  Past Surgical History:   Procedure Laterality Date    CERVICAL FUSION      CHOLECYSTECTOMY      COSMETIC SURGERY      DISKECTOMY AND FUSION-ANTERIOR CERVICAL (ACDF) C5-6 N/A 10/3/2017    [facility]formed [facility]    EYE SURGERY      FRACTURE SURGERY      OVARIAN CYST REMOVAL      SPINE SURGERY      TOTAL BODY LIFT         Family History:  family history includes Cancer in her father; Diabetes in her father; Heart disease in her father; No Known Problems in her mother.    Social History:   reports that  "she has never smoked. She has never used smokeless tobacco. She reports that she does not drink alcohol or use drugs.    Physical Exam:  Vitals:    06/24/19 1501   BP: (!) 140/91   Pulse: 87   Resp: 20   Weight: 81.6 kg (179 lb 14.3 oz)   Height: 5' 5" (1.651 m)   PainSc:   5   PainLoc: Generalized     Body mass index is 29.94 kg/m².  General: Well developed, well nourished.  No acute distress.  Eyes: no ocular hemorrhages  Musculoskeletal: No obvious joint deformities, moves all extremities well.  Peripheral vascular: No edema noted    Neurological Exam:  Mental status: Awake, alert.   Speech/Language: No dysarthria or aphasia on conversation.   Cranial nerves: Masked facies, Visual fields full. Pupils equal round and reactive to light, extraocular movements intact, facial strength and sensation intact bilaterally, palate and tongue midline, hearing grossly intact bilaterally. Shoulder shrug normal bilaterally.   Motor: 5 out of 5 strength throughout the upper and lower extremities bilaterally. No spasticity today. Cogwheeling rigidity in bilateral UE (left > right)  DTR: 4+ at the knees and biceps bilaterally. No clonus  Coordination: Bilateral UE resting tremor (left worse than right). No intention tremor but there is an action component to the tremor as well. Marked bradykinesia in bilateral UE.   Gait: Freezing, short shuffling gait, stooped posture, holds arms at side rigid and tremor markedly increases with walking        Data:  I have personally reviewed the referring provider's notes, labs, & imaging made available to me today.       Labs:  CBC:   Lab Results   Component Value Date    WBC 4.40 11/10/2017    HGB 11.4 (L) 11/10/2017    HCT 33.3 (L) 11/10/2017     11/10/2017    MCV 89 11/10/2017    RDW 14.4 11/10/2017     BMP:   Lab Results   Component Value Date     11/10/2017    K 4.0 11/10/2017     11/10/2017    CO2 25 11/10/2017    BUN 10 11/10/2017    CREATININE 0.7 11/10/2017    GLU 87 " 11/10/2017    CALCIUM 9.1 11/10/2017     LFTS;   Lab Results   Component Value Date    PROT 6.7 11/10/2017    ALBUMIN 3.1 (L) 11/10/2017    BILITOT 0.4 11/10/2017    AST 15 11/10/2017    ALKPHOS 40 (L) 11/10/2017    ALT 9 (L) 11/10/2017     COAGS:   Lab Results   Component Value Date    INR 1.0 09/21/2017     FLP: No results found for: CHOL, HDL, LDLCALC, TRIG, CHOLHDL    Outside labs reviewed from 11/2018: HIV negative, B12 597, ferritin 45, MATT negative, TPO antibodies were positive, TSH normal.   Vitamin D 15    Imaging:  I have personally reviewed the imaging, MRI cervical spine from Feb 2019 shows post surgical changes and no significant cord signal change.     Assessment and Plan:  Ms. Jensen is a 44 y.o. female who presents for evaluation of tremor. This is a complicated case. What is notable on her examination today are parkinsonian features. She has cogwheeling rigidity, bradykinesia, a resting tremor (also an action tremor component), masked facies, shuffling gait, stooped posture. I am concerned as raised previously that she may have parkinsonism. Given her age and unusual presentation, we will proceed with MRI brain, DATscan, and bloodwork to look for secondary causes. We may need to proceed with CSF evaluation pending these results. I discussed a Sinemet trial with her and she declined. She would like to get the tests first. I am concerned about the possibility of an autoimmune disorder and will screen for this. I will see her back after the completion of the tests to review and make further plans.     Parkinsonism, unspecified Parkinsonism type  -     Ceruloplasmin; Future; Expected date: 06/24/2019  -     ZINC; Future; Expected date: 06/24/2019  -     NMO AQUAPORIN-4-IGG, SERUM; Future; Expected date: 06/24/2019  -     MOG-IgG1; Future; Expected date: 06/24/2019  -     MRI Brain Demyelinating W W/O Contrast; Future; Expected date: 06/24/2019  -     MATT Comprehensive Panel; Future; Expected date:  06/24/2019  -     Anti-DNA antibody, double-stranded; Future; Expected date: 06/24/2019  -     ANTI -SSA ANTIBODY; Future; Expected date: 06/24/2019  -     ANTI-SSB ANTIBODY; Future; Expected date: 06/24/2019  -     Pathologist Interpretation Differential; Future; Expected date: 06/24/2019  -     RHEUMATOID FACTOR; Future; Expected date: 06/24/2019  -     CYCLIC CITRUL PEPTIDE ANTIBODY, IGG; Future; Expected date: 06/24/2019  -     HEAVY METALS SCREEN, BLOOD (QUANTITATIVE); Future; Expected date: 06/24/2019  -     Manganese, serum; Future; Expected date: 06/24/2019    Cervical spondylosis with myelopathy

## 2019-06-26 ENCOUNTER — TELEPHONE (OUTPATIENT)
Dept: NEUROLOGY | Facility: CLINIC | Age: 44
End: 2019-06-26

## 2019-06-26 NOTE — TELEPHONE ENCOUNTER
Spoke with patient. Informed her that Dr. Mcghee recommends the contrast and that she may need a second MRI if something were to show up on the non-contrast MRI. She stated she needed time to think about this and would call back with her decision.

## 2019-06-26 NOTE — TELEPHONE ENCOUNTER
----- Message from Nohelia Mayer sent at 6/26/2019  8:50 AM CDT -----  Contact: Patient  Type: Question about a diagnostic test   Needs Medical Advice    Who Called:  Patient  Best Contact Number:[phone] (home)   Additional Information: Calling regards to a diagnostic question that was to be scheduled would like a call back today from the nurse.

## 2019-07-02 ENCOUNTER — OFFICE VISIT (OUTPATIENT)
Dept: RHEUMATOLOGY | Facility: CLINIC | Age: 44
End: 2019-07-02
Payer: MEDICARE

## 2019-07-02 ENCOUNTER — PATIENT OUTREACH (OUTPATIENT)
Dept: ADMINISTRATIVE | Facility: HOSPITAL | Age: 44
End: 2019-07-02

## 2019-07-02 VITALS
HEART RATE: 89 BPM | DIASTOLIC BLOOD PRESSURE: 95 MMHG | SYSTOLIC BLOOD PRESSURE: 145 MMHG | HEIGHT: 65 IN | WEIGHT: 175 LBS | BODY MASS INDEX: 29.16 KG/M2

## 2019-07-02 DIAGNOSIS — N83.202 BILATERAL OVARIAN CYSTS: ICD-10-CM

## 2019-07-02 DIAGNOSIS — N83.201 BILATERAL OVARIAN CYSTS: ICD-10-CM

## 2019-07-02 DIAGNOSIS — M54.50 CHRONIC BILATERAL LOW BACK PAIN WITHOUT SCIATICA: ICD-10-CM

## 2019-07-02 DIAGNOSIS — R53.83 OTHER FATIGUE: ICD-10-CM

## 2019-07-02 DIAGNOSIS — M54.5 CHRONIC LOW BACK PAIN, UNSPECIFIED BACK PAIN LATERALITY, WITH SCIATICA PRESENCE UNSPECIFIED: ICD-10-CM

## 2019-07-02 DIAGNOSIS — M94.9 DISORDER OF CARTILAGE: ICD-10-CM

## 2019-07-02 DIAGNOSIS — M25.551 PAIN OF BOTH HIP JOINTS: ICD-10-CM

## 2019-07-02 DIAGNOSIS — Z79.1 NSAID LONG-TERM USE: Primary | ICD-10-CM

## 2019-07-02 DIAGNOSIS — M25.552 PAIN OF BOTH HIP JOINTS: ICD-10-CM

## 2019-07-02 DIAGNOSIS — G89.29 CHRONIC BILATERAL LOW BACK PAIN WITHOUT SCIATICA: ICD-10-CM

## 2019-07-02 DIAGNOSIS — G89.29 CHRONIC LOW BACK PAIN, UNSPECIFIED BACK PAIN LATERALITY, WITH SCIATICA PRESENCE UNSPECIFIED: ICD-10-CM

## 2019-07-02 PROCEDURE — 99999 PR PBB SHADOW E&M-EST. PATIENT-LVL IV: ICD-10-PCS | Mod: PBBFAC,,, | Performed by: INTERNAL MEDICINE

## 2019-07-02 PROCEDURE — 99204 OFFICE O/P NEW MOD 45 MIN: CPT | Mod: S$PBB,,, | Performed by: INTERNAL MEDICINE

## 2019-07-02 PROCEDURE — 99999 PR PBB SHADOW E&M-EST. PATIENT-LVL IV: CPT | Mod: PBBFAC,,, | Performed by: INTERNAL MEDICINE

## 2019-07-02 PROCEDURE — 99204 PR OFFICE/OUTPT VISIT, NEW, LEVL IV, 45-59 MIN: ICD-10-PCS | Mod: S$PBB,,, | Performed by: INTERNAL MEDICINE

## 2019-07-02 PROCEDURE — 99214 OFFICE O/P EST MOD 30 MIN: CPT | Mod: PBBFAC | Performed by: INTERNAL MEDICINE

## 2019-07-02 RX ORDER — DULOXETIN HYDROCHLORIDE 30 MG/1
30 CAPSULE, DELAYED RELEASE ORAL NIGHTLY
Qty: 60 CAPSULE | Refills: 2 | Status: SHIPPED | OUTPATIENT
Start: 2019-07-02 | End: 2019-09-03

## 2019-07-02 ASSESSMENT — ROUTINE ASSESSMENT OF PATIENT INDEX DATA (RAPID3)
PATIENT GLOBAL ASSESSMENT SCORE: 6
TOTAL RAPID3 SCORE: 5.22
PSYCHOLOGICAL DISTRESS SCORE: 2.2
AM STIFFNESS SCORE: 0, NO
PAIN SCORE: 4
FATIGUE SCORE: 6
MDHAQ FUNCTION SCORE: 1.7

## 2019-07-02 NOTE — PROGRESS NOTES
I have personally taken the history and examined the patient and agree with the resident,s note as stated above           MRI OF THE THORACIC SPINE WITHOUT INTRAVENOUS CONTRAST        CLINICAL INFORMATION: Thoracic pain. M [age]    Comparison studies: None.    FINDINGS: Heights of the vertebral bodies and the alignment of the spine are  normal with no compression fractures.  There is a small hemangioma in body of T9.  Bone marrow signal intensity in the rest of the vertebrae, is normal with no  marrow replacement.    Thoracic discs are normal with normal height and signal intensity and no disc  protrusion without significant intensity.    Thoracic cord is normal without cord expansion or atrophy or focal abnormality.    There are no abnormal lesions within the spinal canal.        IMPRESSION: No abnormality seen.    Read and electronically signed by: Kam Fernandez MD on 2/8/2019 1:14 PM CST      KAM FERNANDEZ MD         Narrative     CLINICAL HISTORY:  43 years (1975) Female Neck pain    TECHNIQUE:  MDI CERVICAL WO CONTRAST MRI. 127 images obtained. MRI of the cervical spine  was performed utilizing 1.5 T magnet.    CONTRAST:  No contrast was administered.    COMPARISON:  MRI the cervical spine from 03/08/2017    FINDINGS:  The prevertebral soft tissues are normal. Alignment is anatomic. Individual  vertebral height is maintained. Marrow signal is within normal limits. The  spinal cord and cervicomedullary junction are within normal limits.    Ankylosis/fusion at C3-C4 and C4-C5 with mature osseous bridging across the  disc space. There is susceptibility artifact at C5-C6 with osseous fusion at  this level as well.    At C2-3, mild disc height loss with a small broad-based posterior disc bulge.  There is mild bilateral facet arthropathy and uncovertebral hypertrophy. This  causes mild spinal canal and bilateral neural foraminal stenosis. The dorsal  margin the spinal cord appears to touch the ventral  margin.    At C3-4, osseous fusion. Mild bilateral facet arthropathy and small bilateral  uncovertebral osteophytes. This causes mild bilateral neural foraminal stenosis.    At C4-5, osseous fusion. Mild bilateral facet arthropathy and small bilateral  uncovertebral osteophytes. This causes no significant spinal canal or neural  foraminal stenosis.    At C5-6, anterior cervical fusion at this level. There is a tiny broad-based  posterior disc osteophyte complex. There is mild bilateral facet arthropathy  and small bilateral uncovertebral osteophytes. This causes mild spinal canal  stenosis and moderate bilateral neural foraminal stenosis.    At C6-7, mild disc height loss with a tiny broad-based posterior disc bulge.  Mild bilateral facet arthropathy without uncovertebral hypertrophy. This causes  mild spinal canal and bilateral neural foraminal stenosis.    At C7-T1, the disc demonstrates normal height, signal intensity and posterior  contour. The uncovertebral joints are normal. There is no spinal canal stenosis.  There is no neural foraminal stenosis.  There is no facet arthropathy.    IMPRESSION:  1. Mature osseous fusion at C3-C4 and C4-C5 unchanged previous exam.  2. Interval anterior cervical discectomy fusion (ACDF) at C5-C6 without evident  hardware complication.  3. Degenerative disc disease and facet arthropathy as outlined in detail above  with minimal worsening when compared to the previous exam.                  .   DATE OF PROCEDURE:  10/03/2017.     PREOPERATIVE DIAGNOSES:  1.  Cervical disk syndrome with cervical stenosis and cervical spinous   spondylosis, C5-C6.  2.  Previous anterior cervical fusion, C3-C4, C4-C5.     FINAL DIAGNOSES:  1.  Cervical disk syndrome with cervical stenosis and cervical spinous   spondylosis, C5-C6.  2.  Previous anterior cervical fusion, C3-C4, C4-C5.     PROCEDURES PERFORMED:  1.  Anterior cervical diskectomy and spinal fusion at C5-C6 level with   autogenous iliac  crest bone graft; CPT code 06042.  2.  Insertion of intervertebral prosthetic interbody device at C5-C6 level using   Medtronic Divergence Stand Alone implant; CPT code 90758.  3.  Harvesting morselized autograft, right iliac crest through separate incision   for spinal fusion; CPT code 40573     SURGEON:  Magan Neal M.D.    Next appt:  07/03/2019 at 10:00 AM in Lab ([facility], [facility])   Details     Reading Physician Reading Date Result Priority   Geovany Bertrand MD 10/9/2018       Narrative     HISTORY: Low back pain, lumbar spondylolisthesis.    FINDINGS: Multiplanar T1, T2 and STIR weighted noncontrast imaging through the  lumbar spine was performed. Comparison to multiple prior exams including MRI of  06/08/2015.    The lumbar spine has normal lordotic curvature and vertebral body alignment,  with normal vertebral heights, and no acute fractures or evidence of a diffuse  marrow replacement process. The intervertebral discs are fairly well preserved  in height and signal. The conus medullaris is normal in signal and terminates  at L1-L2.    At T12-L1, and L1-L2, there is no significant abnormality.    At L2-L3 and L3-L4, there is minimal broad-based disc bulging with mild  bilateral facet hypertrophy, without significant spinal canal stenosis, or  significant neural foraminal narrowing.    At L4-L5, there is broad-based disc bulging which flattens the ventral thecal  sac, with bilateral facet hypertrophy and ligamentum flavum thickening, without  significant spinal canal stenosis. There is mild bilateral neural foraminal  narrowing.    At L5-S1, there is no significant disc bulging or focal disc herniation, with  no spinal canal stenosis or significant foraminal narrowing. There is minimal  bilateral facet hypertrophy.    There is narrowing and low signal intensity subcortical sclerosis along the  right sacroiliac joint inferiorly. The paraspinal ligaments and paraspinal soft  tissues have normal signal  intensity. There are no signal abnormalities of the  visualized retroperitoneum.    IMPRESSION:  1. Broad-based disc bulging and facet hypertrophy at L4-L5, without significant  spinal canal stenosis.  2. No focal disc herniation or evidence of nerve impingement at any level.    Read and electronically signed by: Geovany Bertrand MD on 10/9/2018 2:24 PM CDT      GEOVANY BERTRAND MD           MRI 3/13/19 Open Standing MRI:    Cervical: prior surgical fusion C3-4 C4-5 C5-6 ACDF mild cervical spondylosis and facet disease  Lumbar: mild neural foraminal narrowing L4-5 with annular fissure and also at L5-S1  Pelvis: focal umbilical hernia, bilateral ovarian cysts. No bone/joint abnormalities  CT lumbar 12/27/18 with findings as per MRI above    HISTORY: M 87.9    COMPARISON: None.    LUMBAR SPINE:  Bone mineral density in the lumbar spine from L1 through L4 is 1.085 gm/cm2.    The T-score is not registered due to premenopausal state  (standard deviations  of Young Adult mean).  The Z-score is 0.8  (standard deviations of Age Matched mean).    LEFT HIP:  Bone mineral density in the femoral neck is 0.793 gm/cm2.    The T-score is not registered due to premenopausal state  (standard deviations  of Young Adult mean).  The Z-score is -0.1  (standard deviations of Age Matched mean).    IMPRESSION: Bone mineral density of the lumbar spine and left hip is within  normal limits for patient's age.    Read and electronically signed by: Geovany Bertrand MD on 8/29/2018 1:48 PM CDT      GEOVANY BERTRAND MD          WPI 4  SSS 2        Low back pain, lumbar spondylosis  Hip pain normal MRI hips as reported above.  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    Cbc, cmp, esr, crp, tsh, , vitamin D  X-ray hips and pelvis, lumbar spine  [facility]  F/u Crystal Mcghee in Neurology after MRI brain  Trial of duloxetine 30mg every evening. Risks and benefits discussed. Janneth handout provided  Cont oxaprozin as she feels helps and tolerates  Cont with Home Health PT  RTC 3 months

## 2019-07-02 NOTE — PROGRESS NOTES
"Subjective:       Patient ID: Zaira Jensen is a 44 y.o. female.    Chief Complaint: No chief complaint on file.    HPI   Hx multiple spondylotic patholigies, and DDD who presents to the clinic with complaints of lower back pain that radiates along iliac crest, bilateral leg instability, and left hip pain all for multiple months. She experienced a cervical vertebral injury around 2012 requiring c3-c4, c4-c5, and c5-c6 ASDFs on separate occasions. She is here to address the lower back pain and hip pain. She brought films of past MRIs with her which reveal lumbar spondylosis, disc bulge, normal hip/pelvis. She has OA in SI joint shown on plain film from 8/6/12. She recently had a PRP injection in her SI joints and has an 8 week follow up with the physician next week. The last time she was in PT was in 2017 until 2 weeks ago she restarted home health therapy. She is being followed by neurology for a recently developed left hand/arm tremor with bilateral arm cogwheel rigidity that started one year ago.  A DERIAN scan is scheduled by her neurology clinic. She denies any recent illness or complaints other than the aforementioned musculoskeletal issues.  She reports feeling very overwhelmed lately with life stressors.  Review of Systems   Constitutional: Negative for appetite change, chills and fever.   HENT: Negative for sore throat.    Eyes: Negative.    Respiratory: Negative.    Cardiovascular: Negative.    Gastrointestinal: Negative.    Endocrine: Negative.    Genitourinary: Negative for difficulty urinating and frequency.   Musculoskeletal: Positive for arthralgias, back pain and gait problem (patient has gait instability, altered posture, and hesitancy when attempting to ambulate from her wheelchair).   Neurological: Positive for tremors.       .  Objective:   BP (!) 145/95   Pulse 89   Ht 5' 5" (1.651 m)   Wt 79.4 kg (175 lb)   BMI 29.12 kg/m²      Physical Exam   Constitutional: She is oriented to person, place, " and time and well-developed, well-nourished, and in no distress.   HENT:   Head: Normocephalic.   Eyes: Pupils are equal, round, and reactive to light.   Cardiovascular: Normal rate and normal heart sounds.    Pulmonary/Chest: Effort normal. She has no rales.   Abdominal: Soft.       Right Side Rheumatological Exam     Examination finds the shoulder, elbow, wrist, knee, 1st PIP, 1st MCP, 2nd PIP, 2nd MCP, 3rd PIP, 3rd MCP, 4th PIP, 4th MCP, 5th PIP and 5th MCP normal.    Muscle Strength (0-5 scale):  Deltoid:  5  Biceps: 5/5   Triceps:  5  : 5/5   Iliopsoas: 5  Quadriceps:  5   Distal Lower Extremity: 5    Left Side Rheumatological Exam     Examination finds the shoulder, elbow, wrist, knee, 1st PIP, 1st MCP, 2nd PIP, 2nd MCP, 3rd PIP, 3rd MCP, 4th PIP, 4th MCP, 5th PIP and 5th MCP normal.    Muscle Strength (0-5 scale):  Neck Flexion:  5  Neck Extension: 5  Deltoid:  5  Biceps: 5/5   Triceps:  5  :  5/5   Iliopsoas: 5  Quadriceps:  5   Distal Lower Extremity: 5      Neurological: She is alert and oriented to person, place, and time. She exhibits abnormal muscle tone (bilateral upper extremity cogwheel rigidity).   Right Tate's Sign:  absent  Left Tate's Sign:  Absent  Reflex Scores:       Tricep reflexes are 2+ on the right side and 2+ on the left side.       Bicep reflexes are 2+ on the right side and 2+ on the left side.       Brachioradialis reflexes are 2+ on the right side and 2+ on the left side.       Patellar reflexes are 2+ on the right side and 2+ on the left side.       Achilles reflexes are 0 on the right side and 0 on the left side.  Left hand pill rolling tremor noted at rest  Patient exhibits increased tone in upper extremities   Skin: Skin is warm and dry. No rash noted. No erythema.     Musculoskeletal: Normal range of motion. She exhibits no edema, tenderness or deformity.   TTP along lumbar paraspinal muscles as well as along pattern of the iliac crest, also TTP over gluteus ember       No ankle clonus noted.     Assessment:       1. NSAID long-term use    2. Other fatigue    3. Disorder of cartilage     4. Pain of both hip joints    5. Chronic low back pain, unspecified back pain laterality, with sciatica presence unspecified    6. Bilateral ovarian cysts        Low back pain, lumbar spondylosis  Hip pain normal MRI hips as reported above.  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          Plan:   --Cbc, cmp, esr, crp, tsh, , vitamin D  --plain film X-ray hips and pelvis, lumbar spine  --[facility] referral for SI/back pain evaluation and possible treatment  --F/u Crystal Mcghee in Neurology after MRI brain/DERIAN scan  --Trial of duloxetine 30mg every evening for anxiety and chronic MSK pain. Risks and benefits discussed. Janneth handout provided  --Cont oxaprozin as she feels helps and tolerates  --Cont with Home Health PT  --RTC 3 months

## 2019-07-02 NOTE — PROGRESS NOTES
Widespread pain index 4  Note the areas which the patient has had pain over the last week:                   Shoulder-girdle, left                Shoulder-girdle, right                         Upper arm left                       Upper arm right                         Lower arm left                       Lower arm right    Hip (buttock, trochanter) left 1  Hip (buttock, trochanter) right 1                           Upper leg, left                          Upper leg, right                           Lower leg, left                         Lower leg, right                                     Jaw, left                                   Jaw, right                                        Chest                                  Abdomen                               Upper back                               Lower back 1                                        Neck 1  Score will be from 0-19:                                         Symptom severity score 2  Fatigue 2  Waking Unrefreshed 0  Cognitive 000 0 = no problem, 1=slight or mild problem 2= moderate; considerable problems often present and/or at a moderate level, 3 = severe, pervasive, continuous, life disturbing problem  For each of the 3 symptoms, indicate the level of severity over the past week using the Scale.  The symptom severity score is the sum of the severity of the 3 symptoms (fatigue, waking unrefreshed, and cognitive symptoms) plus the number of the following symptoms occurring during the previous 6 months:   Headaches 0  Pain or cramps in the lower abdomen  Depression   The final score is between 0 and 12                                          Criteria  Patient has fibromyalgia if the following 3 conditions are met:  1.  Widespread pain index greater than or equal to 7 and symptom severity score greater than or equal to 5 or widespread pain index between 3- 6, and symptom severity score greater than or equal to 9.    2.   Symptoms have been present in a similar level for at least 3 months  3.  The patient does not have a disorder that would otherwise sufficiently explain the pain

## 2019-07-02 NOTE — PATIENT INSTRUCTIONS
Start duloxetine 30mg every evening  Labs on 2nd floor  X-rays in [place]  Ref placed to [facility]    Duloxetine delayed-release capsules  What is this medicine?  DULOXETINE (doo LOX e teen) is used to treat depression, anxiety, and different types of chronic pain.  How should I use this medicine?  Take this medicine by mouth with a glass of water. Follow the directions on the prescription label. Do not cut, crush or chew this medicine. You can take this medicine with or without food. Take your medicine at regular intervals. Do not take your medicine more often than directed. Do not stop taking this medicine suddenly except upon the advice of your doctor. Stopping this medicine too quickly may cause serious side effects or your condition may worsen.  A special MedGuide will be given to you by the pharmacist with each prescription and refill. Be sure to read this information carefully each time.  Talk to your pediatrician regarding the use of this medicine in children. While this drug may be prescribed for children as young as 7 years of age for selected conditions, precautions do apply.  What side effects may I notice from receiving this medicine?  Side effects that you should report to your doctor or health care professional as soon as possible:  · allergic reactions like skin rash, itching or hives, swelling of the face, lips, or tongue  · changes in blood pressure  · confusion  · dark urine  · dizziness  · fast talking and excited feelings or actions that are out of control  · fast, irregular heartbeat  · fever  · general ill feeling or flu-like symptoms  · hallucination, loss of contact with reality  · light-colored stools  · loss of balance or coordination  · redness, blistering, peeling or loosening of the skin, including inside the mouth  · right upper belly pain  · seizures  · suicidal thoughts or other mood changes  · trouble concentrating  · trouble passing urine or change in the amount of  urine  · unusual bleeding or bruising  · unusually weak or tired  · yellowing of the eyes or skin  Side effects that usually do not require medical attention (report to your doctor or health care professional if they continue or are bothersome):  · blurred vision  · change in appetite  · change in sex drive or performance  · headache  · increased sweating  · nausea  What may interact with this medicine?  Do not take this medicine with any of the following medications:  · certain diet drugs like dexfenfluramine, fenfluramine  · desvenlafaxine  · linezolid  · MAOIs like Azilect, Carbex, Eldepryl, Marplan, Nardil, and Parnate  · methylene blue (intravenous)  · milnacipran  · thioridazine  · venlafaxine  This medicine may also interact with the following medications:  · alcohol  · aspirin and aspirin-like medicines  · certain antibiotics like ciprofloxacin and enoxacin  · certain medicines for blood pressure, heart disease, irregular heart beat  · certain medicines for depression, anxiety, or psychotic disturbances  · certain medicines for migraine headache like almotriptan, eletriptan, frovatriptan, naratriptan, rizatriptan, sumatriptan, zolmitriptan  · certain medicines that treat or prevent blood clots like warfarin, enoxaparin, and dalteparin  · cimetidine  · fentanyl  · lithium  · NSAIDS, medicines for pain and inflammation, like ibuprofen or naproxen  · phentermine  · procarbazine  · sibutramine  · Deshawn's wort  · theophylline  · tramadol  · tryptophan  What if I miss a dose?  If you miss a dose, take it as soon as you can. If it is almost time for your next dose, take only that dose. Do not take double or extra doses.  Where should I keep my medicine?  Keep out of the reach of children.  Store at room temperature between 20 and 25 degrees C (68 to 77 degrees F). Throw away any unused medicine after the expiration date.  What should I tell my health care provider before I take this medicine?  They need to know  if you have any of these conditions:  · bipolar disorder or a family history of bipolar disorder  · glaucoma  · kidney disease  · liver disease  · suicidal thoughts or a previous suicide attempt  · taken medicines called MAOIs like Carbex, Eldepryl, Marplan, Nardil, and Parnate within 14 days  · an unusual reaction to duloxetine, other medicines, foods, dyes, or preservatives  · pregnant or trying to get pregnant  · breast-feeding  What should I watch for while using this medicine?  Tell your doctor if your symptoms do not get better or if they get worse. Visit your doctor or health care professional for regular checks on your progress. Because it may take several weeks to see the full effects of this medicine, it is important to continue your treatment as prescribed by your doctor.  Patients and their families should watch out for new or worsening thoughts of suicide or depression. Also watch out for sudden changes in feelings such as feeling anxious, agitated, panicky, irritable, hostile, aggressive, impulsive, severely restless, overly excited and hyperactive, or not being able to sleep. If this happens, especially at the beginning of treatment or after a change in dose, call your health care professional.  You may get drowsy or dizzy. Do not drive, use machinery, or do anything that needs mental alertness until you know how this medicine affects you. Do not stand or sit up quickly, especially if you are an older patient. This reduces the risk of dizzy or fainting spells. Alcohol may interfere with the effect of this medicine. Avoid alcoholic drinks.  This medicine can cause an increase in blood pressure. This medicine can also cause a sudden drop in your blood pressure, which may make you feel faint and increase the chance of a fall. These effects are most common when you first start the medicine or when the dose is increased, or during use of other medicines that can cause a sudden drop in blood pressure. Check  with your doctor for instructions on monitoring your blood pressure while taking this medicine.  Your mouth may get dry. Chewing sugarless gum or sucking hard candy, and drinking plenty of water may help. Contact your doctor if the problem does not go away or is severe.  NOTE:This sheet is a summary. It may not cover all possible information. If you have questions about this medicine, talk to your doctor, pharmacist, or health care provider. Copyright© 2017 [employer]

## 2019-07-08 ENCOUNTER — TELEPHONE (OUTPATIENT)
Dept: PHYSICAL MEDICINE AND REHAB | Facility: CLINIC | Age: 44
End: 2019-07-08

## 2019-07-08 ENCOUNTER — DOCUMENTATION ONLY (OUTPATIENT)
Dept: FAMILY MEDICINE | Facility: CLINIC | Age: 44
End: 2019-07-08

## 2019-07-08 NOTE — PROGRESS NOTES
Pre-Visit Chart Review  For Appointment Scheduled on 7/12/2019    Health Maintenance Due   Topic Date Due    TETANUS VACCINE  10/01/2015    Mammogram  07/01/2018

## 2019-07-09 ENCOUNTER — HOSPITAL ENCOUNTER (OUTPATIENT)
Dept: RADIOLOGY | Facility: HOSPITAL | Age: 44
Discharge: HOME | End: 2019-07-09
Attending: PSYCHIATRY & NEUROLOGY
Payer: MEDICARE

## 2019-07-09 ENCOUNTER — HOSPITAL ENCOUNTER (OUTPATIENT)
Dept: RADIOLOGY | Facility: HOSPITAL | Age: 44
Discharge: HOME | End: 2019-07-09
Attending: INTERNAL MEDICINE
Payer: MEDICARE

## 2019-07-09 ENCOUNTER — TELEPHONE (OUTPATIENT)
Dept: RHEUMATOLOGY | Facility: CLINIC | Age: 44
End: 2019-07-09

## 2019-07-09 DIAGNOSIS — R60.0 BILATERAL LOWER EXTREMITY EDEMA: ICD-10-CM

## 2019-07-09 DIAGNOSIS — D64.9 ANEMIA, UNSPECIFIED TYPE: Primary | ICD-10-CM

## 2019-07-09 DIAGNOSIS — D69.6 THROMBOCYTOPENIA, ACQUIRED: ICD-10-CM

## 2019-07-09 DIAGNOSIS — G89.29 CHRONIC LOW BACK PAIN, UNSPECIFIED BACK PAIN LATERALITY, WITH SCIATICA PRESENCE UNSPECIFIED: ICD-10-CM

## 2019-07-09 DIAGNOSIS — M54.5 CHRONIC LOW BACK PAIN, UNSPECIFIED BACK PAIN LATERALITY, WITH SCIATICA PRESENCE UNSPECIFIED: ICD-10-CM

## 2019-07-09 DIAGNOSIS — G20.C PARKINSONISM, UNSPECIFIED PARKINSONISM TYPE: ICD-10-CM

## 2019-07-09 DIAGNOSIS — M25.552 PAIN OF BOTH HIP JOINTS: ICD-10-CM

## 2019-07-09 DIAGNOSIS — M25.551 PAIN OF BOTH HIP JOINTS: ICD-10-CM

## 2019-07-09 DIAGNOSIS — M54.12 CERVICAL RADICULITIS: ICD-10-CM

## 2019-07-09 PROCEDURE — 70553 MRI BRAIN DEMYELINATING W/ WO CONTRAST: ICD-10-PCS | Mod: 26,,, | Performed by: RADIOLOGY

## 2019-07-09 PROCEDURE — 72100 XR LUMBAR SPINE AP AND LATERAL: ICD-10-PCS | Mod: 26,,, | Performed by: RADIOLOGY

## 2019-07-09 PROCEDURE — 93970 US LOWER EXTREMITY VEINS BILATERAL: ICD-10-PCS | Mod: 26,,, | Performed by: RADIOLOGY

## 2019-07-09 PROCEDURE — 93970 EXTREMITY STUDY: CPT | Mod: 26,,, | Performed by: RADIOLOGY

## 2019-07-09 PROCEDURE — 73521 X-RAY EXAM HIPS BI 2 VIEWS: CPT | Mod: 26,,, | Performed by: RADIOLOGY

## 2019-07-09 PROCEDURE — 72050 X-RAY EXAM NECK SPINE 4/5VWS: CPT | Mod: 26,,, | Performed by: RADIOLOGY

## 2019-07-09 PROCEDURE — 93970 EXTREMITY STUDY: CPT | Mod: TC

## 2019-07-09 PROCEDURE — 72050 X-RAY EXAM NECK SPINE 4/5VWS: CPT | Mod: TC,FY

## 2019-07-09 PROCEDURE — 73521 X-RAY EXAM HIPS BI 2 VIEWS: CPT | Mod: TC,FY

## 2019-07-09 PROCEDURE — A9585 GADOBUTROL INJECTION: HCPCS | Performed by: PSYCHIATRY & NEUROLOGY

## 2019-07-09 PROCEDURE — 70553 MRI BRAIN STEM W/O & W/DYE: CPT | Mod: TC

## 2019-07-09 PROCEDURE — 72050 XR CERVICAL SPINE COMPLETE 5 VIEW: ICD-10-PCS | Mod: 26,,, | Performed by: RADIOLOGY

## 2019-07-09 PROCEDURE — 70553 MRI BRAIN STEM W/O & W/DYE: CPT | Mod: 26,,, | Performed by: RADIOLOGY

## 2019-07-09 PROCEDURE — 72100 X-RAY EXAM L-S SPINE 2/3 VWS: CPT | Mod: 26,,, | Performed by: RADIOLOGY

## 2019-07-09 PROCEDURE — 73521 XR HIPS BILATERAL 2 VIEW INCL AP PELVIS: ICD-10-PCS | Mod: 26,,, | Performed by: RADIOLOGY

## 2019-07-09 PROCEDURE — 25500020 PHARM REV CODE 255: Performed by: PSYCHIATRY & NEUROLOGY

## 2019-07-09 PROCEDURE — 72100 X-RAY EXAM L-S SPINE 2/3 VWS: CPT | Mod: TC,FY

## 2019-07-09 RX ORDER — GADOBUTROL 604.72 MG/ML
7 INJECTION INTRAVENOUS
Status: COMPLETED | OUTPATIENT
Start: 2019-07-09 | End: 2019-07-09

## 2019-07-09 RX ADMIN — GADOBUTROL 7 ML: 604.72 INJECTION INTRAVENOUS at 02:07

## 2019-07-09 NOTE — TELEPHONE ENCOUNTER
----- Message from Jason Spence sent at 7/8/2019  4:29 PM CDT -----  Contact: pt  Type: Needs Medical Advice    Who Called:  pt  Best Call Back Number: [phone]  Additional Information: pt had death in the family need to reschedule upcoming appt scheduled for Friday. Please call

## 2019-07-11 ENCOUNTER — TELEPHONE (OUTPATIENT)
Dept: NEUROLOGY | Facility: CLINIC | Age: 44
End: 2019-07-11

## 2019-07-11 RX ORDER — CARBIDOPA AND LEVODOPA 25; 100 MG/1; MG/1
1 TABLET ORAL 3 TIMES DAILY
Qty: 90 TABLET | Refills: 11 | Status: SHIPPED | OUTPATIENT
Start: 2019-07-11 | End: 2019-07-18 | Stop reason: SDUPTHER

## 2019-07-11 NOTE — TELEPHONE ENCOUNTER
Patient requesting a call from Dr. Mcghee due to questions regarding this.      Per Dr. Mcghee -   DERIAN scan showed findings consistent with parkinson disease (decreased dopamine activity in the brain.     We are still awaiting some of the lab results. I will be in touch with the labs once they return.

## 2019-07-11 NOTE — TELEPHONE ENCOUNTER
----- Message from Neisha Ray sent at 7/11/2019 12:42 PM CDT -----  Contact: patient  Type: Needs Medical Advice    Who Called:  patient  Symptoms (please be specific):    How long has patient had these symptoms:    Pharmacy name and phone #:    Best Call Back Number: [phone]  Additional Information: requesting a call back regarding test results

## 2019-07-11 NOTE — TELEPHONE ENCOUNTER
Discussed with the patient the DATscan results. Discussed further sinemet trial. We will start Sinemet 25/100 gm 1 tab TID. I advised her to take on an empty stomach or with carbohydrates. We will be in touch when the remainder of the labs are resulted.

## 2019-07-18 ENCOUNTER — TELEPHONE (OUTPATIENT)
Dept: NEUROLOGY | Facility: CLINIC | Age: 44
End: 2019-07-18

## 2019-07-18 RX ORDER — CARBIDOPA AND LEVODOPA 25; 100 MG/1; MG/1
2 TABLET ORAL 3 TIMES DAILY
Qty: 180 TABLET | Refills: 11 | Status: SHIPPED | OUTPATIENT
Start: 2019-07-18 | End: 2019-07-30 | Stop reason: SDUPTHER

## 2019-07-18 NOTE — TELEPHONE ENCOUNTER
----- Message from Theresa Barber sent at 7/18/2019 12:26 PM CDT -----  Contact: Pt. [phone]  Needs Advice    Reason for call: The patient would like to speak to someone regarding scheduling for tremors. Please contact the patient to discuss further.          Communication Preference:PHONE     Additional Information:

## 2019-07-18 NOTE — TELEPHONE ENCOUNTER
Spoke with patient. She has two questions for Dr. Mcghee.      She stated her Sinemet hasn't helped at all. Today is the 1wk aston of her taking the medication.      Before the PD diagnosis, she was going to get her eggs frozen. She spoke with her fertility doctor and they wanted to know if it was okay for her to still go through that procedure.       Please advise.

## 2019-07-18 NOTE — TELEPHONE ENCOUNTER
Spoke with patient and informed her of what Dr. Mcghee said. She stated that Dr. Escamilla would like a letter saying that it's okay from a Neurological standpoint for her to get the procedure. Fax number provided is [phone].

## 2019-07-18 NOTE — TELEPHONE ENCOUNTER
Spoke with patient. She stated that she will call their office.         Per Dr. Mcghee -     Ok well if I have to write something official then I need to know what the procedure is and what the specific concerns are. We don't typically give neurologic clearance for parkinsons patients for procedures.

## 2019-07-18 NOTE — TELEPHONE ENCOUNTER
----- Message from Dee Courtney sent at 7/18/2019 11:34 AM CDT -----  Contact: patient  Type: Needs Medical Advice    Who Called:  patient  Best Call Back Number:[phone] (home)     Additional Information: patient states she has questions about procedure that is upcoming and about medication.  Please call to advise. Thanks!

## 2019-07-18 NOTE — TELEPHONE ENCOUNTER
It is OK with me for her to go through that procedure if she wishes. In regards to the sinemet, if no side effects, she can increase the dose to 2 tablets TID.

## 2019-07-19 NOTE — TELEPHONE ENCOUNTER
----- Message from Destinee Mora sent at 7/19/2019  1:15 PM CDT -----  Contact: self @ [phone]  Pt says she is returning Ailyn's call concerning an appt.

## 2019-07-25 ENCOUNTER — LAB VISIT (OUTPATIENT)
Dept: LAB | Facility: HOSPITAL | Age: 44
End: 2019-07-25
Attending: INTERNAL MEDICINE
Payer: MEDICARE

## 2019-07-25 DIAGNOSIS — D64.9 ANEMIA, UNSPECIFIED TYPE: ICD-10-CM

## 2019-07-25 DIAGNOSIS — D69.6 THROMBOCYTOPENIA, ACQUIRED: ICD-10-CM

## 2019-07-25 LAB
BASOPHILS # BLD AUTO: 0.02 K/UL (ref 0–0.2)
BASOPHILS NFR BLD: 0.5 % (ref 0–1.9)
DIFFERENTIAL METHOD: ABNORMAL
EOSINOPHIL # BLD AUTO: 0.1 K/UL (ref 0–0.5)
EOSINOPHIL NFR BLD: 1.9 % (ref 0–8)
ERYTHROCYTE [DISTWIDTH] IN BLOOD BY AUTOMATED COUNT: 13.4 % (ref 11.5–14.5)
FERRITIN SERPL-MCNC: 10 NG/ML (ref 20–300)
HCT VFR BLD AUTO: 34.6 % (ref 37–48.5)
HGB BLD-MCNC: 11 G/DL (ref 12–16)
IMM GRANULOCYTES # BLD AUTO: 0 K/UL (ref 0–0.04)
IRON SERPL-MCNC: 34 UG/DL (ref 30–160)
LYMPHOCYTES # BLD AUTO: 0.9 K/UL (ref 1–4.8)
LYMPHOCYTES NFR BLD: 24.1 % (ref 18–48)
MCH RBC QN AUTO: 29.6 PG (ref 27–31)
MCHC RBC AUTO-ENTMCNC: 31.8 G/DL (ref 32–36)
MCV RBC AUTO: 93 FL (ref 82–98)
MONOCYTES # BLD AUTO: 0.3 K/UL (ref 0.3–1)
MONOCYTES NFR BLD: 6.7 % (ref 4–15)
NEUTROPHILS # BLD AUTO: 2.5 K/UL (ref 1.8–7.7)
NEUTROPHILS NFR BLD: 66.8 % (ref 38–73)
NRBC BLD-RTO: 0 /100 WBC
PLATELET # BLD AUTO: 260 K/UL (ref 150–350)
PMV BLD AUTO: 9.2 FL (ref 9.2–12.9)
RBC # BLD AUTO: 3.72 M/UL (ref 4–5.4)
SATURATED IRON: 7 % (ref 20–50)
TOTAL IRON BINDING CAPACITY: 522 UG/DL (ref 250–450)
TRANSFERRIN SERPL-MCNC: 353 MG/DL (ref 200–375)
WBC # BLD AUTO: 3.73 K/UL (ref 3.9–12.7)

## 2019-07-25 PROCEDURE — 36415 COLL VENOUS BLD VENIPUNCTURE: CPT

## 2019-07-25 PROCEDURE — 83540 ASSAY OF IRON: CPT

## 2019-07-25 PROCEDURE — 85025 COMPLETE CBC W/AUTO DIFF WBC: CPT

## 2019-07-25 PROCEDURE — 82728 ASSAY OF FERRITIN: CPT

## 2019-07-26 LAB
ARSENIC BLD-MCNC: <1 NG/ML (ref 0–12)
CADMIUM BLD-MCNC: 0.3 NG/ML (ref 0–4.9)
CITY: NORMAL
COUNTY: NORMAL
GUARDIAN FIRST NAME: NORMAL
GUARDIAN LAST NAME: NORMAL
HOME PHONE: NORMAL
LEAD BLDV-MCNC: <1 MCG/DL (ref 0–4.9)
MERCURY BLD-MCNC: <1 NG/ML (ref 0–9)
RACE: NORMAL
STATE: NORMAL
STFR SERPL-MCNC: 4.8 MG/L (ref 1.8–4.6)
STREET ADDRESS: NORMAL
VENOUS/CAPILLARY: NORMAL
ZIP: NORMAL

## 2019-07-30 ENCOUNTER — TELEPHONE (OUTPATIENT)
Dept: NEUROLOGY | Facility: CLINIC | Age: 44
End: 2019-07-30

## 2019-07-30 RX ORDER — CARBIDOPA AND LEVODOPA 25; 100 MG/1; MG/1
3 TABLET ORAL 3 TIMES DAILY
Qty: 270 TABLET | Refills: 11 | Status: SHIPPED | OUTPATIENT
Start: 2019-07-30 | End: 2019-08-15 | Stop reason: SDUPTHER

## 2019-07-30 NOTE — TELEPHONE ENCOUNTER
----- Message from Cory Voss sent at 7/30/2019 11:48 AM CDT -----  Type: Needs Medical Advice    Who Called:  Self   Symptoms (please be specific):  NA   How long has patient had these symptoms:  [facility]   Pharmacy name and phone #:  [facility]   Best Call Back Number: [phone]  Additional Information: Patient requesting to speak with the nurse regarding symptoms and medication.

## 2019-07-30 NOTE — TELEPHONE ENCOUNTER
The pt has been on Sinement  for three wks and have increased the dosage to 2 tablets TID.   Her gait and ability to use her hands have improved, but the tremors are worse.  She started 1 tablet TID on 7/11/2018 and increased to 2 tablets TID on 7/18.  Please advise.

## 2019-07-31 NOTE — TELEPHONE ENCOUNTER
Informed of prescription increase. She stated that when she takes the medication, it gives her diarrhea. Please advise what to do.

## 2019-07-31 NOTE — TELEPHONE ENCOUNTER
Patient informed of Dr. Mcghee' message.         Per Dr. Mcghee -   She should ensure she is not taking any laxative medications. If she is not, then she can try to eat more things like bananas and rice. Immodium can be used as well to help if the diarrhea is severe.

## 2019-08-06 ENCOUNTER — PATIENT MESSAGE (OUTPATIENT)
Dept: RHEUMATOLOGY | Facility: CLINIC | Age: 44
End: 2019-08-06

## 2019-08-13 ENCOUNTER — TELEPHONE (OUTPATIENT)
Dept: RHEUMATOLOGY | Facility: CLINIC | Age: 44
End: 2019-08-13

## 2019-08-15 ENCOUNTER — TELEPHONE (OUTPATIENT)
Dept: NEUROLOGY | Facility: CLINIC | Age: 44
End: 2019-08-15

## 2019-08-15 RX ORDER — CARBIDOPA AND LEVODOPA 25; 100 MG/1; MG/1
2 TABLET ORAL 3 TIMES DAILY
Qty: 180 TABLET | Refills: 11 | Status: SHIPPED | OUTPATIENT
Start: 2019-08-15 | End: 2020-06-24 | Stop reason: SDUPTHER

## 2019-08-15 RX ORDER — AMANTADINE HYDROCHLORIDE 100 MG/1
100 CAPSULE, GELATIN COATED ORAL 2 TIMES DAILY
Qty: 60 CAPSULE | Refills: 11 | Status: SHIPPED | OUTPATIENT
Start: 2019-08-15 | End: 2020-06-24 | Stop reason: SDUPTHER

## 2019-08-15 NOTE — TELEPHONE ENCOUNTER
She can decrease the sinemet dose back down to 2 tablets TID. She should then wait a few days to see that her side effect symptoms resolve. Then she can try to add amantadine 100 mg BID. This may help to better control her symptoms along with the sinemet.

## 2019-08-15 NOTE — TELEPHONE ENCOUNTER
Spoke with patient. She stated that when she was increased to 3, she'd been having problems with grinding teeth, balance issues, staggering walking, some dizziness. She said it's when she takes the 3 tabs TID; didn't happen with the 2, but still had tremors with the 2. Still having tremors with the 3 and possibly just as intense. Please advise.

## 2019-08-15 NOTE — TELEPHONE ENCOUNTER
Returned pt call and relayed Dr. Mcghee's message re: sinemet and amantadine; pt repeated directions back; verbalized understanding.

## 2019-08-15 NOTE — TELEPHONE ENCOUNTER
----- Message from Cynthia Doyle sent at 8/15/2019 11:29 AM CDT -----  Contact: Zaira pt  Type: Needs Medical Advice    Who Called:  Zaira  Symptoms (please be specific):  She is having side effects from the meds. Balance, dizziness,   How long has patient had these symptoms:  Since meds  Pharmacy name and phone #:    [id number] [place] - [address] AT [facility] [place] [address]  Phone: [phone] Fax: [phone]      Best Call Back Number: [phone]  Additional Information: Pls call pt regarding her symptoms. She needs a med change. She also has other questions

## 2019-09-03 ENCOUNTER — OFFICE VISIT (OUTPATIENT)
Dept: NEUROLOGY | Facility: CLINIC | Age: 44
End: 2019-09-03
Payer: MEDICARE

## 2019-09-03 VITALS
WEIGHT: 167.13 LBS | RESPIRATION RATE: 16 BRPM | BODY MASS INDEX: 27.85 KG/M2 | DIASTOLIC BLOOD PRESSURE: 74 MMHG | SYSTOLIC BLOOD PRESSURE: 109 MMHG | HEIGHT: 65 IN | HEART RATE: 93 BPM

## 2019-09-03 DIAGNOSIS — E61.1 IRON DEFICIENCY: ICD-10-CM

## 2019-09-03 DIAGNOSIS — E53.1 VITAMIN B6 DEFICIENCY: ICD-10-CM

## 2019-09-03 DIAGNOSIS — E60 ZINC DEFICIENCY: ICD-10-CM

## 2019-09-03 DIAGNOSIS — G24.9 DYSKINESIA: ICD-10-CM

## 2019-09-03 DIAGNOSIS — G20.A1 PARKINSON DISEASE: ICD-10-CM

## 2019-09-03 PROCEDURE — 99999 PR PBB SHADOW E&M-EST. PATIENT-LVL III: ICD-10-PCS | Mod: PBBFAC,,, | Performed by: PSYCHIATRY & NEUROLOGY

## 2019-09-03 PROCEDURE — 99214 PR OFFICE/OUTPT VISIT, EST, LEVL IV, 30-39 MIN: ICD-10-PCS | Mod: S$PBB,,, | Performed by: PSYCHIATRY & NEUROLOGY

## 2019-09-03 PROCEDURE — 99213 OFFICE O/P EST LOW 20 MIN: CPT | Mod: PBBFAC,PN | Performed by: PSYCHIATRY & NEUROLOGY

## 2019-09-03 PROCEDURE — 99999 PR PBB SHADOW E&M-EST. PATIENT-LVL III: CPT | Mod: PBBFAC,,, | Performed by: PSYCHIATRY & NEUROLOGY

## 2019-09-03 PROCEDURE — 99214 OFFICE O/P EST MOD 30 MIN: CPT | Mod: S$PBB,,, | Performed by: PSYCHIATRY & NEUROLOGY

## 2019-09-03 RX ORDER — PYRIDOXINE HCL (VITAMIN B6) 25 MG
25 TABLET ORAL DAILY
Qty: 30 TABLET | Refills: 11 | Status: SHIPPED | OUTPATIENT
Start: 2019-09-03

## 2019-09-03 NOTE — PROGRESS NOTES
Date: 9/3/2019    Patient ID: Zaira Jensen is a 44 y.o. female.    Chief Complaint: Follow-up (PD)      History of Present Illness:  Ms. Jensen is a 44 y.o. female who presents for followup of parkinson disease.      She is taking sinemet 2 tablets TID. She tried increasing to 3 tabs TID but had side effects including grinding her teeth, imbalance, staggering when walking, dizziness. We decreased back down and then added amantadine to try to improve her symptoms. She added that last week and she has noticed that she has pain in her back. She doesn't feel her medication kick in or wear off.  She takes the meds at 8am (with the amantadine), 12:30, and 10:30pm. She is taking it on an empty stomach.     With the 2 tablets TID, she feels more unstable. She has been having left leg and hip movements (dyskinesias) since starting the carbidopa. The dyskinesias are not always present. No hallucinations.     The tremors are about the same. She still has them when she is gets anxious but they are better.     Her ferritin levels were very low at 10 and she has started on oral iron replacement.     Review of Systems:   All other systems were reviewed and negative except as mentioned in the HPI    Allergies:  Review of patient's allergies indicates:   Allergen Reactions    Bactrim [sulfamethoxazole-trimethoprim]     Codeine Other (See Comments)     cramping    Hydrocodone     Nexium [esomeprazole magnesium]     Nickel sutures [surgical stainless steel]     Pantoprazole     Pcn [penicillins]     Sulfa (sulfonamide antibiotics)     Vicks vapor inhaler [l-desoxyephedrine]        Current Medications:  Current Outpatient Medications   Medication Sig Dispense Refill    amantadine HCl (SYMMETREL) 100 mg capsule Take 1 capsule (100 mg total) by mouth 2 (two) times daily. 60 capsule 11    b complex vitamins capsule Take 1 capsule by mouth once daily.      carbidopa-levodopa  mg (SINEMET)  mg per tablet  "Take 2 tablets by mouth 3 (three) times daily. 180 tablet 11    ferrous gluconate (FERGON) 324 MG tablet Take 324 mg by mouth daily with breakfast.      NP THYROID 30 mg Tab 30 mg.  2    progesterone (PROMETRIUM) 200 MG capsule       traMADol (ULTRAM) 50 mg tablet TK 1 T PO Q 6 H PRN  5    calcium-vitamin D 250-100 mg-unit per tablet Take 1 tablet by mouth 2 (two) times daily.      pyridoxine, vitamin B6, (B-6) 25 MG Tab Take 1 tablet (25 mg total) by mouth once daily. 30 tablet 11    zinc 50 mg Tab Take 50 mg by mouth once daily. 30 each 11     No current facility-administered medications for this visit.        Past Medical History:  Past Medical History:   Diagnosis Date    Allergy     Anemia     Arthritis     Chronic back pain     Chronic neck pain     Functional ovarian cysts     GERD (gastroesophageal reflux disease)     Headache(784.0)     Quadriparesis (muscle weakness) 1/16/2018    HH notes    Radiculopathy of arm     Rash     Respiratory distress     bronchospasm at emergence years ago       Past Surgical History:  Past Surgical History:   Procedure Laterality Date    CERVICAL FUSION      CHOLECYSTECTOMY      COSMETIC SURGERY      DISKECTOMY AND FUSION-ANTERIOR CERVICAL (ACDF) C5-6 N/A 10/3/2017    [facility]    EYE SURGERY      FRACTURE SURGERY      OVARIAN CYST REMOVAL      SPINE SURGERY      TOTAL BODY LIFT         Family History:  family history includes Cancer in her father; Diabetes in her father; Heart disease in her father; No Known Problems in her mother.    Social History:   reports that she has never smoked. She has never used smokeless tobacco. She reports that she does not drink alcohol or use drugs.    Physical Exam:  Vitals:    09/03/19 1202   BP: 109/74   Pulse: 93   Resp: 16   Weight: 75.8 kg (167 lb 1.7 oz)   Height: 5' 5" (1.651 m)   PainSc:   4   PainLoc: Back     Body mass index is 27.81 kg/m².  General: Well developed, well " nourished.  Decreased blink rate  Musculoskeletal: No obvious joint deformities  Peripheral vascular: No edema noted    Neurological Exam: examined about 1 hour after sinemet dose  Mental status: Awake and alert  Speech language: No dysarthria or aphasia on conversation  Cranial nerves: Face symmetric  Motor: continuous left UE and LE dyskinesias, no rigidity today   Coordination: Bilateral UE and LE bradykinesia. No tremor in the extremties today except for when walking. Tremor under eyes when smiling.  Gait: Improved, gets up easily, no freezing, left UE resting tremor worsens with movement, 3 steps to turn, wider based but still with short steps.    Data:  I have personally reviewed other provider's notes, labs, & imaging made available to me today.       Labs:  CBC:   Lab Results   Component Value Date    WBC 3.73 (L) 07/25/2019    HGB 11.0 (L) 07/25/2019    HCT 34.6 (L) 07/25/2019     07/25/2019    MCV 93 07/25/2019    RDW 13.4 07/25/2019     BMP:   Lab Results   Component Value Date     07/09/2019     07/09/2019    K 3.9 07/09/2019    K 4.0 07/09/2019     07/09/2019     07/09/2019    CO2 24 07/09/2019    CO2 25 07/09/2019    BUN 14 07/09/2019    BUN 14 07/09/2019    CREATININE 0.7 07/09/2019    CREATININE 0.8 07/09/2019    GLU 78 07/09/2019    GLU 80 07/09/2019    CALCIUM 9.3 07/09/2019    CALCIUM 9.4 07/09/2019     LFTS;   Lab Results   Component Value Date    PROT 7.1 07/09/2019    PROT 7.5 07/09/2019    ALBUMIN 3.5 07/09/2019    ALBUMIN 3.7 07/09/2019    BILITOT 0.3 07/09/2019    BILITOT 0.3 07/09/2019    AST 16 07/09/2019    AST 16 07/09/2019    ALKPHOS 30 (L) 07/09/2019    ALKPHOS 32 (L) 07/09/2019    ALT 17 07/09/2019    ALT 18 07/09/2019     COAGS:   Lab Results   Component Value Date    INR 1.0 09/21/2017     FLP: No results found for: CHOL, HDL, LDLCALC, TRIG, CHOLHDL      Imaging:  I have personally reviewed the imaging, MRI brain without evidence of demyelination.      DATscan showed reduced dopamine uptake in the brain.                 Assessment and Plan:  Ms. Jensen is a 44 y.o. female here for followup of parkinson disease. I discussed with her that the symptoms and response to sinemet would suggest parkinson disease. She wonders if her symptoms are due to low iron, low zinc, and low B6. She is on supplements for these. Her age is atypical for onset of parkinson disease. I am happy to see she is scheduled in two days to see Dr. Tanner and will be interested to hear his expert opinion and treatment recommendations. I advised her to continue sinemet 25/100 2 tabs TID and amantadine for the next two days and follow Dr. Tanner's recommendations. She continues to have parkinsonian features but also having dyskinesias.     Parkinson disease    Dyskinesia    Iron deficiency    Zinc deficiency    Vitamin B6 deficiency    Other orders  -     zinc 50 mg Tab; Take 50 mg by mouth once daily.  Dispense: 30 each; Refill: 11  -     pyridoxine, vitamin B6, (B-6) 25 MG Tab; Take 1 tablet (25 mg total) by mouth once daily.  Dispense: 30 tablet; Refill: 11

## 2019-09-05 ENCOUNTER — OFFICE VISIT (OUTPATIENT)
Dept: NEUROLOGY | Facility: CLINIC | Age: 44
End: 2019-09-05
Payer: MEDICARE

## 2019-09-05 VITALS
SYSTOLIC BLOOD PRESSURE: 119 MMHG | BODY MASS INDEX: 27.85 KG/M2 | DIASTOLIC BLOOD PRESSURE: 81 MMHG | HEIGHT: 65 IN | WEIGHT: 167.13 LBS | HEART RATE: 97 BPM

## 2019-09-05 DIAGNOSIS — G24.8 LIMB DYSTONIA: Primary | ICD-10-CM

## 2019-09-05 DIAGNOSIS — G20.C PARKINSONISM, UNSPECIFIED PARKINSONISM TYPE: ICD-10-CM

## 2019-09-05 PROCEDURE — 99999 PR PBB SHADOW E&M-EST. PATIENT-LVL III: CPT | Mod: PBBFAC,,, | Performed by: PSYCHIATRY & NEUROLOGY

## 2019-09-05 PROCEDURE — 99214 OFFICE O/P EST MOD 30 MIN: CPT | Mod: S$PBB,,, | Performed by: PSYCHIATRY & NEUROLOGY

## 2019-09-05 PROCEDURE — 99999 PR PBB SHADOW E&M-EST. PATIENT-LVL III: ICD-10-PCS | Mod: PBBFAC,,, | Performed by: PSYCHIATRY & NEUROLOGY

## 2019-09-05 PROCEDURE — 99213 OFFICE O/P EST LOW 20 MIN: CPT | Mod: PBBFAC | Performed by: PSYCHIATRY & NEUROLOGY

## 2019-09-05 PROCEDURE — 99214 PR OFFICE/OUTPT VISIT, EST, LEVL IV, 30-39 MIN: ICD-10-PCS | Mod: S$PBB,,, | Performed by: PSYCHIATRY & NEUROLOGY

## 2019-09-05 NOTE — PATIENT INSTRUCTIONS
Dx: young onset Parkinson's disease, limb-onset dystonia, early ldopa-induced dyskinesia    Commonly associated with the Corby gene mutation.  But I need to further investigate hereditary hemochromatosis given her abnormal iron studies and imaging suggesting early iron overload in the GPi bilaterally.    What to do:  - I'll connect with my partner Dr. Nolvia Fernandez who has particular training/interest in genetic forms  - Cut your carbidopa/levodopa to one tablet three times a day  - Increase the Amantadine to three times a day   - If your tremor gets worse, contact me and we will likely start benztropine         Shun Tanner MD, MPH  Division of Movement and Memory Disorders  [facility]

## 2019-09-05 NOTE — PROGRESS NOTES
Name: Zaira Jensen  MRN: [id number]   CSN: [id number]      Date: 09/05/2019    Referring physician:  Aaarefneftali Self  No address on file    Chief Complaint / Interval History: No chief complaint on file.      History of Present Illness (HPI):  45 yo RH    Last normal 8 years ago.  Reports that she was overweight at that time (top was 289), did weight watchers down to 252, then did Elisha Rohan plus counting calories to get down another 100 lbs (~150lbs).  Was working out.  Then recalls her toes on her R foot curling under while exercising in 2011.  Though was shoes or the treadmills.  But was curling under also while walking around the complex.  Was living in [place] at the time.  Saw Adebayo in [place] (age 36), thought was early PD.  Considered [facility] there, but held off.  Also got a phone consult from [facility] at the same time and also considered early PD.  Then, an MRI that showed a cervical myelitis from compression - had multiple fusions since then.  More parkinsonism appeared with poor arm swing and progressively lost motor skills in the right side but attributed to myelomalacia at the time.      Nonmotor/Premotor ROS:  Hyposmia (HENT)?Yes  RBD/sleep issues (Constitutional)?Yes  Depression/anxiety (Psychiatric)?Yes  Fatigue (Constitutional)?Yes  Constipation (GI)?No  Urinary issues ()?No  Sexual dysfunction ()?No  Orthostasis (Cardiovascular)?No  Leg swelling (Cardiovascular)? No  Falls (Musculoskeletal)?No  Cognitive impairment (Neurologic)?No  Psychoses (Psychiatric)?No  Pain/Paresthesia (Neurologic)?No  Visual changes (Eyes)?No  Moles / skin changes (Skin)?No  Stridor / SOB (Pulm)?No  Bruising (Heme)?No    Past Medical History: The patient  has a past medical history of Allergy, Anemia, Arthritis, Chronic back pain, Chronic neck pain, Functional ovarian cysts, GERD (gastroesophageal reflux disease), Headache(784.0), Quadriparesis (muscle weakness) (1/16/2018), Radiculopathy of arm, Rash, and  "Respiratory distress.    Social History: The patient  reports that she has never smoked. She has never used smokeless tobacco. She reports that she does not drink alcohol or use drugs.    Family History: Their family history includes Cancer in her father; Diabetes in her father; Heart disease in her father; No Known Problems in her mother.    Allergies: Bactrim [sulfamethoxazole-trimethoprim]; Codeine; Hydrocodone; Nexium [esomeprazole magnesium]; Nickel sutures [surgical stainless steel]; Pantoprazole; Pcn [penicillins]; Sulfa (sulfonamide antibiotics); and Vicks vapor inhaler [l-desoxyephedrine]     Meds:   Current Outpatient Medications on File Prior to Visit   Medication Sig Dispense Refill    amantadine HCl (SYMMETREL) 100 mg capsule Take 1 capsule (100 mg total) by mouth 2 (two) times daily. 60 capsule 11    b complex vitamins capsule Take 1 capsule by mouth once daily.      carbidopa-levodopa  mg (SINEMET)  mg per tablet Take 2 tablets by mouth 3 (three) times daily. 180 tablet 11    ferrous gluconate (FERGON) 324 MG tablet Take 324 mg by mouth daily with breakfast.      NP THYROID 30 mg Tab 30 mg.  2    progesterone (PROMETRIUM) 200 MG capsule       pyridoxine, vitamin B6, (B-6) 25 MG Tab Take 1 tablet (25 mg total) by mouth once daily. 30 tablet 11    zinc 50 mg Tab Take 50 mg by mouth once daily. 30 each 11    calcium-vitamin D 250-100 mg-unit per tablet Take 1 tablet by mouth 2 (two) times daily.      traMADol (ULTRAM) 50 mg tablet TK 1 T PO Q 6 H PRN  5     No current facility-administered medications on file prior to visit.        Exam:  /81   Pulse 97   Ht 5' 5" (1.651 m)   Wt 75.8 kg (167 lb 1.7 oz)   BMI 27.81 kg/m²     Constitutional  Well-developed, well-nourished, appears stated age   Ophthalmoscopic  No papilledema with no hemorrhages or exudates bilaterally   Cardiovascular  Radial pulses 2+ and symmetric, no LE edema bilaterally   Neurological    * Mental status  " MOCA =      - Orientation  Oriented to person, place, time, and situation     - Memory   Intact recent and remote     - Attention/concentration  Attentive, vigilant during exam     - Language  Naming & repetition intact, +2-step commands     - Fund of knowledge  Aware of current events     - Executive  Well-organized thoughts     - Other     * Cranial nerves       - CN II  PERRL, visual fields full to confrontation     - CN III, IV, VI  Extraocular movements full, normal pursuits and saccades     - CN V  Sensation V1 - V3 intact     - CN VII  Face strong and symmetric bilaterally     - CN VIII  Hearing intact bilaterally     - CN IX, X  Palate raises midline and symmetric     - CN XI  SCM and trapezius 5/5 bilaterally     - CN XII  Tongue midline   * Motor  Muscle bulk normal, strength 5/5 throughout   * Sensory   Intact to temperature and vibration throughout   * Coordination  No dysmetria with finger-to-nose or heel-to-shin   * Gait  See below.   * Deep tendon reflexes  2+ and symmetric throughout   Babinski downgoing bilaterally   * Specialized movement exam  No hypophonic speech.    No facial masking.   Mild R>L cogwheel rigidity.     Minimal R, no L bradykinesia.   Writhing dyskinesia of the limbs with catching dystonic postres L>R   No tremor with rest, posture, kinesis, or intention.    No other dystonia, chorea, athetosis, myoclonus, or tics.   No motor impersistence.   Normal-based gait.   No shortened stride length, but turned in ankle with dyatonic posture.   Slight dim abnormal arm swing.     No postural instability.      Medical Record Review:  - Lab Results:  Lab Visit on 07/25/2019   Component Date Value Ref Range Status    Iron 07/25/2019 34  30 - 160 ug/dL Final    Transferrin 07/25/2019 353  200 - 375 mg/dL Final    TIBC 07/25/2019 522* 250 - 450 ug/dL Final    Saturated Iron 07/25/2019 7* 20 - 50 % Final    Ferritin 07/25/2019 10* 20.0 - 300.0 ng/mL Final    Sol. Transferrin Receptor  07/25/2019 4.8* 1.8 - 4.6 mg/L Final    WBC 07/25/2019 3.73* 3.90 - 12.70 K/uL Final    RBC 07/25/2019 3.72* 4.00 - 5.40 M/uL Final    Hemoglobin 07/25/2019 11.0* 12.0 - 16.0 g/dL Final    Hematocrit 07/25/2019 34.6* 37.0 - 48.5 % Final    Mean Corpuscular Volume 07/25/2019 93  82 - 98 fL Final    Mean Corpuscular Hemoglobin 07/25/2019 29.6  27.0 - 31.0 pg Final    Mean Corpuscular Hemoglobin Conc 07/25/2019 31.8* 32.0 - 36.0 g/dL Final    RDW 07/25/2019 13.4  11.5 - 14.5 % Final    Platelets 07/25/2019 260  150 - 350 K/uL Final    MPV 07/25/2019 9.2  9.2 - 12.9 fL Final    Gran # (ANC) 07/25/2019 2.5  1.8 - 7.7 K/uL Final    Immature Grans (Abs) 07/25/2019 0.00  0.00 - 0.04 K/uL Final    Lymph # 07/25/2019 0.9* 1.0 - 4.8 K/uL Final    Mono # 07/25/2019 0.3  0.3 - 1.0 K/uL Final    Eos # 07/25/2019 0.1  0.0 - 0.5 K/uL Final    Baso # 07/25/2019 0.02  0.00 - 0.20 K/uL Final    nRBC 07/25/2019 0  0 /100 WBC Final    Gran% 07/25/2019 66.8  38.0 - 73.0 % Final    Lymph% 07/25/2019 24.1  18.0 - 48.0 % Final    Mono% 07/25/2019 6.7  4.0 - 15.0 % Final    Eosinophil% 07/25/2019 1.9  0.0 - 8.0 % Final    Basophil% 07/25/2019 0.5  0.0 - 1.9 % Final    Differential Method 07/25/2019 Automated   Final    Arsenic 07/25/2019 <1  0 - 12 ng/mL Final    Lead 07/25/2019 <1.0  0.0 - 4.9 mcg/dL Final    Cadmium 07/25/2019 0.3  0.0 - 4.9 ng/mL Final    Mercury 07/25/2019 <1  0 - 9 ng/mL Final    Venous/Capillary 07/25/2019 Venous   Final    Street Address 07/25/2019 [address]   Final    City 07/25/2019 [place]   Final    State 07/25/2019 [place]   Final    Zip 07/25/2019 [address]   Final    [facility] 07/25/2019 [facility]   Final    Guardian First Name 07/25/2019 n/a   Final    Guardian Last Name 07/25/2019 n/a   Final    Home Phone 07/25/2019 [phone]   Final    Race 07/25/2019 n/a   Final   Lab Visit on 07/09/2019   Component Date Value Ref Range Status    Sed Rate 07/09/2019 9  0 - 20 mm/Hr Final     CRP 07/09/2019 0.5  0.0 - 8.2 mg/L Final    WBC 07/09/2019 3.30* 3.90 - 12.70 K/uL Final    RBC 07/09/2019 3.75* 4.00 - 5.40 M/uL Final    Hemoglobin 07/09/2019 11.1* 12.0 - 16.0 g/dL Final    Hematocrit 07/09/2019 35.0* 37.0 - 48.5 % Final    Mean Corpuscular Volume 07/09/2019 93  82 - 98 fL Final    Mean Corpuscular Hemoglobin 07/09/2019 29.6  27.0 - 31.0 pg Final    Mean Corpuscular Hemoglobin Conc 07/09/2019 31.7* 32.0 - 36.0 g/dL Final    RDW 07/09/2019 13.8  11.5 - 14.5 % Final    Platelets 07/09/2019 302  150 - 350 K/uL Final    MPV 07/09/2019 9.9  9.2 - 12.9 fL Final    Gran # (ANC) 07/09/2019 2.1  1.8 - 7.7 K/uL Final    Immature Grans (Abs) 07/09/2019 0.01  0.00 - 0.04 K/uL Final    Lymph # 07/09/2019 0.9* 1.0 - 4.8 K/uL Final    Mono # 07/09/2019 0.3  0.3 - 1.0 K/uL Final    Eos # 07/09/2019 0.1  0.0 - 0.5 K/uL Final    Baso # 07/09/2019 0.02  0.00 - 0.20 K/uL Final    nRBC 07/09/2019 0  0 /100 WBC Final    Gran% 07/09/2019 62.1  38.0 - 73.0 % Final    Lymph% 07/09/2019 26.7  18.0 - 48.0 % Final    Mono% 07/09/2019 7.9  4.0 - 15.0 % Final    Eosinophil% 07/09/2019 2.4  0.0 - 8.0 % Final    Basophil% 07/09/2019 0.6  0.0 - 1.9 % Final    Differential Method 07/09/2019 Automated   Final    Sodium 07/09/2019 139  136 - 145 mmol/L Final    Potassium 07/09/2019 4.0  3.5 - 5.1 mmol/L Final    Chloride 07/09/2019 104  95 - 110 mmol/L Final    CO2 07/09/2019 25  23 - 29 mmol/L Final    Glucose 07/09/2019 80  70 - 110 mg/dL Final    BUN, Bld 07/09/2019 14  6 - 20 mg/dL Final    Creatinine 07/09/2019 0.8  0.5 - 1.4 mg/dL Final    Calcium 07/09/2019 9.4  8.7 - 10.5 mg/dL Final    Total Protein 07/09/2019 7.5  6.0 - 8.4 g/dL Final    Albumin 07/09/2019 3.7  3.5 - 5.2 g/dL Final    Total Bilirubin 07/09/2019 0.3  0.1 - 1.0 mg/dL Final    Alkaline Phosphatase 07/09/2019 32* 55 - 135 U/L Final    AST 07/09/2019 16  10 - 40 U/L Final    ALT 07/09/2019 18  10 - 44 U/L Final     Anion Gap 07/09/2019 10  8 - 16 mmol/L Final    eGFR if African American 07/09/2019 >60  >60 mL/min/1.73 m^2 Final    eGFR if non African American 07/09/2019 >60  >60 mL/min/1.73 m^2 Final    Vit D, 25-Hydroxy 07/09/2019 41  30 - 96 ng/mL Final    TSH 07/09/2019 1.091  0.400 - 4.000 uIU/mL Final    Free T4 07/09/2019 1.05  0.71 - 1.51 ng/dL Final   Lab Visit on 07/09/2019   Component Date Value Ref Range Status    Sodium 07/09/2019 140  136 - 145 mmol/L Final    Potassium 07/09/2019 3.9  3.5 - 5.1 mmol/L Final    Chloride 07/09/2019 106  95 - 110 mmol/L Final    CO2 07/09/2019 24  23 - 29 mmol/L Final    Glucose 07/09/2019 78  70 - 110 mg/dL Final    BUN, Bld 07/09/2019 14  6 - 20 mg/dL Final    Creatinine 07/09/2019 0.7  0.5 - 1.4 mg/dL Final    Calcium 07/09/2019 9.3  8.7 - 10.5 mg/dL Final    Total Protein 07/09/2019 7.1  6.0 - 8.4 g/dL Final    Albumin 07/09/2019 3.5  3.5 - 5.2 g/dL Final    Total Bilirubin 07/09/2019 0.3  0.1 - 1.0 mg/dL Final    Alkaline Phosphatase 07/09/2019 30* 55 - 135 U/L Final    AST 07/09/2019 16  10 - 40 U/L Final    ALT 07/09/2019 17  10 - 44 U/L Final    Anion Gap 07/09/2019 10  8 - 16 mmol/L Final    eGFR if African American 07/09/2019 >60  >60 mL/min/1.73 m^2 Final    eGFR if non African American 07/09/2019 >60  >60 mL/min/1.73 m^2 Final    WBC 07/09/2019 3.58* 3.90 - 12.70 K/uL Final    RBC 07/09/2019 3.71* 4.00 - 5.40 M/uL Final    Hemoglobin 07/09/2019 11.0* 12.0 - 16.0 g/dL Final    Hematocrit 07/09/2019 34.5* 37.0 - 48.5 % Final    Mean Corpuscular Volume 07/09/2019 93  82 - 98 fL Final    Mean Corpuscular Hemoglobin 07/09/2019 29.6  27.0 - 31.0 pg Final    Mean Corpuscular Hemoglobin Conc 07/09/2019 31.9* 32.0 - 36.0 g/dL Final    RDW 07/09/2019 13.9  11.5 - 14.5 % Final    Platelets 07/09/2019 286  150 - 350 K/uL Final    MPV 07/09/2019 10.0  9.2 - 12.9 fL Final    Gran # (ANC) 07/09/2019 2.2  1.8 - 7.7 K/uL Final    Immature Grans (Abs)  07/09/2019 0.01  0.00 - 0.04 K/uL Final    Lymph # 07/09/2019 1.0  1.0 - 4.8 K/uL Final    Mono # 07/09/2019 0.3  0.3 - 1.0 K/uL Final    Eos # 07/09/2019 0.1  0.0 - 0.5 K/uL Final    Baso # 07/09/2019 0.02  0.00 - 0.20 K/uL Final    nRBC 07/09/2019 0  0 /100 WBC Final    Gran% 07/09/2019 61.2  38.0 - 73.0 % Final    Lymph% 07/09/2019 26.5  18.0 - 48.0 % Final    Mono% 07/09/2019 9.2  4.0 - 15.0 % Final    Eosinophil% 07/09/2019 2.2  0.0 - 8.0 % Final    Basophil% 07/09/2019 0.6  0.0 - 1.9 % Final    Differential Method 07/09/2019 Automated   Final    Ammonia 07/09/2019 38  10 - 50 umol/L Final    Folate 07/09/2019 9.7  4.0 - 24.0 ng/mL Final    Homocysteine 07/09/2019 8.9  4.0 - 15.5 umol/L Final    RPR 07/09/2019 Non-reactive  Non-reactive Final    Vitamin B6 07/09/2019 4* 5 - 50 ug/L Final    Thiamine 07/09/2019 44  38 - 122 ug/L Final    Vitamin B-12 07/09/2019 527  210 - 950 pg/mL Final    TSH 07/09/2019 1.056  0.400 - 4.000 uIU/mL Final    Free T4 07/09/2019 1.07  0.71 - 1.51 ng/dL Final    T3, Total 07/09/2019 82  60 - 180 ng/dL Final    Ceruloplasmin 07/09/2019 30.0  15.0 - 45.0 mg/dL Final    Zinc 07/09/2019 55* 60 - 130 ug/dL Final    ds DNA Ab 07/09/2019 Negative 1:10  Negative 1:10 Final    Anti-SSA Antibody 07/09/2019 1.47  0.00 - 19.99 EU Final    Anti-SSA Interpretation 07/09/2019 Negative  Negative Final    Anti-SSB Antibody 07/09/2019 1.24  0.00 - 19.99 EU Final    Anti-SSB Interpretation 07/09/2019 Negative  Negative Final    Pathologist Review 07/09/2019 Review required   Final    Rheumatoid Factor 07/09/2019 12.0  0.0 - 15.0 IU/mL Final    CCP Antibodies 07/09/2019 0.5  <5.0 U/mL Final    NMO/AQP4 FACS,S 07/09/2019 Negative  Negative Final    MOG-IgG1 07/09/2019 Negative  Negative Final    Manganese, Serum 07/09/2019 <1.0  <2.4 ng/mL Final    Pathologist Review Peripheral Smear 07/09/2019 REVIEWED   Final    HTLV I/II Antibody, Donor Eval (Bl* 07/09/2019  Non-reactive  Non-reactive Final       - Independent visualization and interpretation of radiological images:                 - Independent review of consultant's notes: yes      Diagnoses:          Young onset Parkinson's disease, limb-onset dystonia, early ldopa-induced dyskinesia    Commonly associated with the Wild[place] gene mutation.  But I need to further investigate hereditary hemochromatosis given her abnormal iron studies and imaging suggesting early iron overload in the GPi bilaterally.    What to do:  - I'll connect with my partner Dr. Nolvia Fernandez who has particular training/interest in genetic forms  - Cut your carbidopa/levodopa to one tablet three times a day  - Increase the Amantadine to three times a day   - If your tremor gets worse, contact me and we will likely start benztropine         Shun Tanner MD, MPH  Division of Movement and Memory Disorders  [facility]

## 2019-09-06 ENCOUNTER — TELEPHONE (OUTPATIENT)
Dept: NEUROLOGY | Facility: CLINIC | Age: 44
End: 2019-09-06

## 2019-09-06 NOTE — TELEPHONE ENCOUNTER
----- Message from Katherine Leo sent at 9/6/2019 10:06 AM CDT -----  Contact: pt   Type:  Needs Medical Advice    Who Called: pt   Symptoms (please be specific): pt is calling to see if Dr Hidalgo does telephone consultation pt said she had an appt yesterday pt said she wants an opportunity to do a better job to get an answer that she is seeking she is asking for the doctor to call her back   How long has patient had these symptoms: n/a   Pharmacy name and phone #: n/a   Would the patient rather a call back or a response via [facility]? Call back   Best Call Back Number:[phone]  Additional Information: pt needs to speak with the nurse about her appt pt needs to reschedule her appt for another date    BINTA

## 2019-09-12 ENCOUNTER — TELEPHONE (OUTPATIENT)
Dept: NEUROLOGY | Facility: CLINIC | Age: 44
End: 2019-09-12

## 2019-09-12 NOTE — TELEPHONE ENCOUNTER
Noted some improvement in dyskinesias with dose changes in C/L and Amantadine.    But calling to report that Tremors in Left arm are still frequent. Asking if the additional medication can be added.    Patient also asking for earlier appt. In Dec.

## 2019-09-12 NOTE — TELEPHONE ENCOUNTER
----- Message from Katherine Leo sent at 9/12/2019  9:39 AM CDT -----  Contact: pt  Type:  Needs Medical Advice    Who Called: pt   Symptoms (please be specific): pt is having tremors and was told to call and speak with the nurse and Dr Tanner said he will add a medication if the pt was still having the problems    How long has patient had these symptoms:  Pt said she has been having the tremors for a week and half   Pharmacy name and phone #:  [facility] [phone]  Would the patient rather a call back or a response via [facility]? Call back   Best Call Back Number: [phone]  Additional Information: none    BINTA

## 2019-09-17 ENCOUNTER — TELEPHONE (OUTPATIENT)
Dept: NEUROLOGY | Facility: CLINIC | Age: 44
End: 2019-09-17

## 2019-09-17 NOTE — TELEPHONE ENCOUNTER
----- Message from Theresa Barber sent at 9/17/2019 11:25 AM CDT -----  Contact: Pt. [phone]  The patient would like to speak to someone regarding medication.  Please contact the patient to discuss further.

## 2019-09-17 NOTE — TELEPHONE ENCOUNTER
Pt calling back to f/u on call from 9/12. Has not rec'd call back yet.      Noted some improvement in dyskinesias with dose changes in C/L and Amantadine.     But calling to report that Tremors in Left arm are still frequent. Asking if the additional medication can be added.     Patient also asking for earlier appt. In Dec.

## 2019-09-19 ENCOUNTER — TELEPHONE (OUTPATIENT)
Dept: NEUROLOGY | Facility: CLINIC | Age: 44
End: 2019-09-19

## 2019-09-19 NOTE — TELEPHONE ENCOUNTER
What to do:  - I'll connect with my partner Dr. Nolvia Fernandez who has particular training/interest in genetic forms  - Cut your carbidopa/levodopa to one tablet three times a day  - Increase the Amantadine to three times a day   - If your tremor gets worse, contact me and we will likely start benztropine

## 2019-09-19 NOTE — TELEPHONE ENCOUNTER
----- Message from Massiel Johnson sent at 9/19/2019  9:31 AM CDT -----  Contact: pt@ [phone]  Calling to get the status of the adding a new prescription for her tremors. Please call.     [id number] - [place], [place] - [address] AT [facility] [address] [address]  Phone: [phone] Fax: [phone]

## 2019-09-20 NOTE — TELEPHONE ENCOUNTER
----- Message from Katherine Leo sent at 9/20/2019  9:11 AM CDT -----  Contact: pt   Type:  Needs Medical Advice    Who Called: pt   Symptoms (please be specific): no symptoms pt is calling to follow up to see if they sent her prescriptions to [facility]   How long has patient had these symptoms:  No symptoms   Pharmacy name and phone #:  [facility] [phone]  Would the patient rather a call back or a response via [facility]? Call back   Best Call Back Number: can you please call pt at [phone]  Additional Information: pt needs to have her medications     BINTA

## 2019-09-25 ENCOUNTER — TELEPHONE (OUTPATIENT)
Dept: NEUROLOGY | Facility: CLINIC | Age: 44
End: 2019-09-25

## 2019-09-25 NOTE — TELEPHONE ENCOUNTER
----- Message from Destinee Mora sent at 9/25/2019  3:08 PM CDT -----  Contact: self @ [phone]  Pt says she thinks she is having and adverse reaction to her prescription of baclofen.  Pt says she just vomited.  Pls call.

## 2019-09-25 NOTE — TELEPHONE ENCOUNTER
Patient mistakenly took some old Baclofen instead of Benztropine. She will stop the Baclofen and start the Benztropine once daily tomorrow. She will increase the dose to BID on the 3rd day.

## 2019-10-24 ENCOUNTER — TELEPHONE (OUTPATIENT)
Dept: NEUROLOGY | Facility: CLINIC | Age: 44
End: 2019-10-24

## 2019-10-24 NOTE — TELEPHONE ENCOUNTER
----- Message from Heavenly Elizabeth sent at 10/24/2019 11:55 AM CDT -----  Contact: pt at [phone]  Ref to  new medication that is not helping her tremors. Helps a little but still  Has a lot of tremors.  Please call and advise.

## 2019-10-24 NOTE — TELEPHONE ENCOUNTER
Pt states Benztropine 1mg BID. Helps with tremors but would like to better control them with another medicine as well.

## 2019-10-25 NOTE — TELEPHONE ENCOUNTER
Called in Rx to [facility]. And spoke to patient and advised to increase Benztropine to 2mg PO BID. Verbalizes understanding.

## 2019-11-06 RX ORDER — BACLOFEN 10 MG/1
TABLET ORAL
Qty: 90 TABLET | Refills: 0 | OUTPATIENT
Start: 2019-11-06

## 2019-12-19 ENCOUNTER — OFFICE VISIT (OUTPATIENT)
Dept: NEUROLOGY | Facility: CLINIC | Age: 44
End: 2019-12-19
Payer: MEDICARE

## 2019-12-19 VITALS — BODY MASS INDEX: 27.49 KG/M2 | HEIGHT: 65 IN | WEIGHT: 165 LBS

## 2019-12-19 DIAGNOSIS — G20.A1 PARKINSON DISEASE: ICD-10-CM

## 2019-12-19 DIAGNOSIS — G24.8 LIMB DYSTONIA: Primary | ICD-10-CM

## 2019-12-19 PROCEDURE — 99215 PR OFFICE/OUTPT VISIT, EST, LEVL V, 40-54 MIN: ICD-10-PCS | Mod: S$PBB,,, | Performed by: PSYCHIATRY & NEUROLOGY

## 2019-12-19 PROCEDURE — 99213 OFFICE O/P EST LOW 20 MIN: CPT | Mod: PBBFAC | Performed by: PSYCHIATRY & NEUROLOGY

## 2019-12-19 PROCEDURE — 99215 OFFICE O/P EST HI 40 MIN: CPT | Mod: S$PBB,,, | Performed by: PSYCHIATRY & NEUROLOGY

## 2019-12-19 PROCEDURE — 99999 PR PBB SHADOW E&M-EST. PATIENT-LVL III: ICD-10-PCS | Mod: PBBFAC,,, | Performed by: PSYCHIATRY & NEUROLOGY

## 2019-12-19 PROCEDURE — 99999 PR PBB SHADOW E&M-EST. PATIENT-LVL III: CPT | Mod: PBBFAC,,, | Performed by: PSYCHIATRY & NEUROLOGY

## 2019-12-19 RX ORDER — BENZTROPINE MESYLATE 2 MG/1
2 TABLET ORAL 2 TIMES DAILY
Qty: 60 TABLET | Refills: 1 | Status: SHIPPED | OUTPATIENT
Start: 2019-12-19 | End: 2019-12-22

## 2019-12-19 NOTE — PROGRESS NOTES
Name: Zaira Jensen  MRN: [id number]   CSN: [id number]      Date: 12/19/2019    Referring physician:  No referring provider defined for this encounter.    Chief Complaint / Interval History:   - SINCE LAST VISIT, she cut her ldopa, increased amantadine and added benztropine  - overall better since last time, much less tremor or movements  - no imbalance or falls  - biggest questions is her eyesight - dry eyes but also more issues with near vision/accommodating  - some dry mouth  - discussed DBS    History of Present Illness (HPI):  43 yo RH    Last normal 8 years ago.  Reports that she was overweight at that time (top was 289), did weight watchers down to 252, then did Elisha Rohan plus counting calories to get down another 100 lbs (~150lbs).  Was working out.  Then recalls her toes on her R foot curling under while exercising in 2011.  Though was shoes or the treadmills.  But was curling under also while walking around the complex.  Was living in [place] at the time.  Saw [place] in Home (age 36), thought was early PD.  Considered [employer] there, but held off.  Also got a phone consult from [place] at the same time and also considered early PD.  Then, an MRI that showed a cervical myelitis from compression - had multiple fusions since then.  More parkinsonism appeared with poor arm swing and progressively lost motor skills in the right side but attributed to myelomalacia at the time.      Nonmotor/Premotor ROS:  Hyposmia (HENT)?Yes  RBD/sleep issues (Constitutional)?Yes  Depression/anxiety (Psychiatric)?Yes  Fatigue (Constitutional)?Yes  Constipation (GI)?No  Urinary issues ()?No  Sexual dysfunction ()?No  Orthostasis (Cardiovascular)?No  Leg swelling (Cardiovascular)? No  Falls (Musculoskeletal)?No  Cognitive impairment (Neurologic)?No  Psychoses (Psychiatric)?No  Pain/Paresthesia (Neurologic)?No  Visual changes (Eyes)?No  Moles / skin changes (Skin)?No  Stridor / SOB (Pulm)?No  Bruising (Heme)?No    Past  "Medical History: The patient  has a past medical history of Allergy, Anemia, Arthritis, Chronic back pain, Chronic neck pain, Functional ovarian cysts, GERD (gastroesophageal reflux disease), Headache(784.0), Quadriparesis (muscle weakness) (1/16/2018), Radiculopathy of arm, Rash, and Respiratory distress.    Social History: The patient  reports that she has never smoked. She has never used smokeless tobacco. She reports that she does not drink alcohol or use drugs.    Family History: Their family history includes Cancer in her father; Diabetes in her father; Heart disease in her father; No Known Problems in her mother.    Allergies: Bactrim [sulfamethoxazole-trimethoprim]; Codeine; Hydrocodone; Nexium [esomeprazole magnesium]; Nickel sutures [surgical stainless steel]; Pantoprazole; Pcn [penicillins]; Sulfa (sulfonamide antibiotics); and Vicks vapor inhaler [l-desoxyephedrine]     Meds:   Current Outpatient Medications on File Prior to Visit   Medication Sig Dispense Refill    amantadine HCl (SYMMETREL) 100 mg capsule Take 1 capsule (100 mg total) by mouth 2 (two) times daily. 60 capsule 11    b complex vitamins capsule Take 1 capsule by mouth once daily.      calcium-vitamin D 250-100 mg-unit per tablet Take 1 tablet by mouth 2 (two) times daily.      carbidopa-levodopa  mg (SINEMET)  mg per tablet Take 2 tablets by mouth 3 (three) times daily. 180 tablet 11    ferrous gluconate (FERGON) 324 MG tablet Take 324 mg by mouth daily with breakfast.      NP THYROID 30 mg Tab 30 mg.  2    progesterone (PROMETRIUM) 200 MG capsule       pyridoxine, vitamin B6, (B-6) 25 MG Tab Take 1 tablet (25 mg total) by mouth once daily. 30 tablet 11    traMADol (ULTRAM) 50 mg tablet TK 1 T PO Q 6 H PRN  5    zinc 50 mg Tab Take 50 mg by mouth once daily. 30 each 11     No current facility-administered medications on file prior to visit.        Exam:  Ht 5' 5" (1.651 m)   Wt 74.8 kg (165 lb)   BMI 27.46 kg/m² "     * Specialized movement exam  No hypophonic speech.    No facial masking.   Minimal R>L cogwheel rigidity.     Minimal R, no L bradykinesia.   Very mild dyskinesia on the left side predominantly.   No tremor with rest, posture, kinesis, or intention.    No other dystonia, chorea, athetosis, myoclonus, or tics.   No motor impersistence.   Normal-based gait.   No shortened stride length, but turned in ankle with dyatonic posture.   Slight dim abnormal arm swing.     No postural instability.      Medical Record Review:  - Lab Results:  No visits with results within 3 Month(s) from this visit.   Latest known visit with results is:   Lab Visit on 07/25/2019   Component Date Value Ref Range Status    Iron 07/25/2019 34  30 - 160 ug/dL Final    Transferrin 07/25/2019 353  200 - 375 mg/dL Final    TIBC 07/25/2019 522* 250 - 450 ug/dL Final    Saturated Iron 07/25/2019 7* 20 - 50 % Final    Ferritin 07/25/2019 10* 20.0 - 300.0 ng/mL Final    Sol. Transferrin Receptor 07/25/2019 4.8* 1.8 - 4.6 mg/L Final    WBC 07/25/2019 3.73* 3.90 - 12.70 K/uL Final    RBC 07/25/2019 3.72* 4.00 - 5.40 M/uL Final    Hemoglobin 07/25/2019 11.0* 12.0 - 16.0 g/dL Final    Hematocrit 07/25/2019 34.6* 37.0 - 48.5 % Final    Mean Corpuscular Volume 07/25/2019 93  82 - 98 fL Final    Mean Corpuscular Hemoglobin 07/25/2019 29.6  27.0 - 31.0 pg Final    Mean Corpuscular Hemoglobin Conc 07/25/2019 31.8* 32.0 - 36.0 g/dL Final    RDW 07/25/2019 13.4  11.5 - 14.5 % Final    Platelets 07/25/2019 260  150 - 350 K/uL Final    MPV 07/25/2019 9.2  9.2 - 12.9 fL Final    Gran # (ANC) 07/25/2019 2.5  1.8 - 7.7 K/uL Final    Immature Grans (Abs) 07/25/2019 0.00  0.00 - 0.04 K/uL Final    Lymph # 07/25/2019 0.9* 1.0 - 4.8 K/uL Final    Mono # 07/25/2019 0.3  0.3 - 1.0 K/uL Final    Eos # 07/25/2019 0.1  0.0 - 0.5 K/uL Final    Baso # 07/25/2019 0.02  0.00 - 0.20 K/uL Final    nRBC 07/25/2019 0  0 /100 WBC Final    Gran% 07/25/2019 66.8   38.0 - 73.0 % Final    Lymph% 07/25/2019 24.1  18.0 - 48.0 % Final    Mono% 07/25/2019 6.7  4.0 - 15.0 % Final    Eosinophil% 07/25/2019 1.9  0.0 - 8.0 % Final    Basophil% 07/25/2019 0.5  0.0 - 1.9 % Final    Differential Method 07/25/2019 Automated   Final    Arsenic 07/25/2019 <1  0 - 12 ng/mL Final    Lead 07/25/2019 <1.0  0.0 - 4.9 mcg/dL Final    Cadmium 07/25/2019 0.3  0.0 - 4.9 ng/mL Final    Mercury 07/25/2019 <1  0 - 9 ng/mL Final    Venous/Capillary 07/25/2019 Venous   Final    Street Address 07/25/2019 [address]   Final    City 07/25/2019 [place]   Final    State 07/25/2019 [place]   Final    Zip 07/25/2019 [address]   Final    [facility] 07/25/2019 [facility]   Final    Guardian First Name 07/25/2019 n/a   Final    Guardian Last Name 07/25/2019 n/a   Final    Home Phone 07/25/2019 [phone]   Final    Race 07/25/2019 n/a   Final       - Independent visualization and interpretation of radiological images:                 - Independent review of consultant's notes: yes    Diagnoses:          Young onset Parkinson's disease, limb-onset dystonia, early ldopa-induced dyskinesia.  Commonly associated with the Corby gene mutation.  But I need to further investigate hereditary hemochromatosis given her abnormal iron studies and imaging suggesting early iron overload in the GPi bilaterally.    What to do:  - still needs to connect with my partner Dr. Nolvia Fernandez who has particular training/interest in genetic forms  - ophtho now - though dry eyes and vision could be anticolinergia  - cd/ld and amantadine, stop the benztropine        Shun Tanner MD, MPH  Division of Movement and Memory Disorders  [facility]

## 2019-12-22 RX ORDER — BENZTROPINE MESYLATE 2 MG/1
TABLET ORAL
Qty: 180 TABLET | Refills: 3 | Status: SHIPPED | OUTPATIENT
Start: 2019-12-22 | End: 2021-01-08

## 2020-01-08 ENCOUNTER — OFFICE VISIT (OUTPATIENT)
Dept: PODIATRY | Facility: CLINIC | Age: 45
End: 2020-01-08
Payer: MEDICARE

## 2020-01-08 ENCOUNTER — TELEPHONE (OUTPATIENT)
Dept: NEUROLOGY | Facility: CLINIC | Age: 45
End: 2020-01-08

## 2020-01-08 VITALS
HEIGHT: 65 IN | SYSTOLIC BLOOD PRESSURE: 101 MMHG | BODY MASS INDEX: 30.82 KG/M2 | WEIGHT: 185 LBS | HEART RATE: 85 BPM | DIASTOLIC BLOOD PRESSURE: 71 MMHG

## 2020-01-08 DIAGNOSIS — R26.2 DIFFICULTY IN WALKING, NOT ELSEWHERE CLASSIFIED: ICD-10-CM

## 2020-01-08 DIAGNOSIS — M79.674 PAIN OF TOE OF RIGHT FOOT: ICD-10-CM

## 2020-01-08 DIAGNOSIS — M79.672 LEFT FOOT PAIN: ICD-10-CM

## 2020-01-08 DIAGNOSIS — L85.1 ACQUIRED KERATODERMA: ICD-10-CM

## 2020-01-08 DIAGNOSIS — M20.5X1 HALLUX LIMITUS OF RIGHT FOOT: Primary | ICD-10-CM

## 2020-01-08 PROCEDURE — 99213 OFFICE O/P EST LOW 20 MIN: CPT | Performed by: PODIATRIST

## 2020-01-08 PROCEDURE — 99203 OFFICE O/P NEW LOW 30 MIN: CPT | Mod: S$PBB,,, | Performed by: PODIATRIST

## 2020-01-08 PROCEDURE — 99203 PR OFFICE/OUTPT VISIT, NEW, LEVL III, 30-44 MIN: ICD-10-PCS | Mod: S$PBB,,, | Performed by: PODIATRIST

## 2020-01-08 NOTE — TELEPHONE ENCOUNTER
Spoke to pt. She has been having leg pain behind the knees especially upon standing.     Advised this should not be a side effect of her medicine but may be due to PD.    Advised safe to take Advil/ Tylenol PRN. Verbalizes understanding.

## 2020-01-08 NOTE — TELEPHONE ENCOUNTER
----- Message from Carolann Purvis sent at 1/8/2020  1:26 PM CST -----  Contact: pt @[phone]  Pt called in to speak with someone regarding pain in her legs. Pt would like to know if it is from Rx she is taking. Please call pt back

## 2020-01-08 NOTE — PROGRESS NOTES
[address] [facility] [address]  [place] [address]  Phone: [phone]   [phone]    Patient's PCP:Jemma Toscano MD  Referring Provider: No ref. provider found    Subjective:      Chief Complaint:: Foot Pain (left lateral aspect) and Toe Pain (right 1st)    HPI  Zaira Jensen is a 44 y.o. female who presents with a complaint of left foot lateral aspect callus/pain lasting for about 1 month and right 1st toe sensitive lasting for about 3 weeks. Onset of the symptoms was unsure, pt was not walking for about 2-3 years then started walking recently and thinks feet went into overload and caused the problems.  Current symptoms include intense soreness.  Aggravating factors are weight bearing. Treatment to date have included neosporin and band aid. Patients rates pain 7/10 on pain scale.    Vitals:    01/08/20 1038   BP: 101/71   Pulse: 85     Shoe Size: 10    Past Surgical History:   Procedure Laterality Date    CERVICAL FUSION      CHOLECYSTECTOMY      COSMETIC SURGERY      EYE SURGERY      FRACTURE SURGERY      OVARIAN CYST REMOVAL      SPINE SURGERY      TOTAL BODY LIFT       Past Medical History:   Diagnosis Date    Allergy     Anemia     Arthritis     Chronic back pain     Chronic neck pain     Functional ovarian cysts     GERD (gastroesophageal reflux disease)     Headache(784.0)     Quadriparesis (muscle weakness) 1/16/2018     notes    Radiculopathy of arm     Rash     Respiratory distress     bronchospasm at emergence years ago     Family History   Problem Relation Age of Onset    Diabetes Father     Cancer Father         prostate    Heart disease Father     No Known Problems Mother     Allergic rhinitis Neg Hx     Allergies Neg Hx     Angioedema Neg Hx     Asthma Neg Hx     Atopy Neg Hx     Eczema Neg Hx     Immunodeficiency Neg Hx     Rhinitis Neg Hx     Urticaria Neg Hx         Social History:   Marital Status: Single  Alcohol History:  reports that she does not  drink alcohol.  Tobacco History:  reports that she has never smoked. She has never used smokeless tobacco.  Drug History:  reports that she does not use drugs.    Review of patient's allergies indicates:   Allergen Reactions    Bactrim [sulfamethoxazole-trimethoprim]     Codeine Other (See Comments)     cramping    Hydrocodone     Nexium [esomeprazole magnesium]     Nickel sutures [surgical stainless steel]     Pantoprazole     Pcn [penicillins]     Sulfa (sulfonamide antibiotics)     Vicks vapor inhaler [l-desoxyephedrine]        Current Outpatient Medications   Medication Sig Dispense Refill    amantadine HCl (SYMMETREL) 100 mg capsule Take 1 capsule (100 mg total) by mouth 2 (two) times daily. 60 capsule 11    b complex vitamins capsule Take 1 capsule by mouth once daily.      benztropine (COGENTIN) 2 MG Tab TAKE 1 TABLET(2 MG) BY MOUTH TWICE DAILY 180 tablet 3    carbidopa-levodopa  mg (SINEMET)  mg per tablet Take 2 tablets by mouth 3 (three) times daily. 180 tablet 11    progesterone (PROMETRIUM) 200 MG capsule       pyridoxine, vitamin B6, (B-6) 25 MG Tab Take 1 tablet (25 mg total) by mouth once daily. (Patient not taking: Reported on 1/8/2020) 30 tablet 11    zinc 50 mg Tab Take 50 mg by mouth once daily. (Patient not taking: Reported on 1/8/2020) 30 each 11     No current facility-administered medications for this visit.        Review of Systems      Objective:        Physical Exam:   Foot Exam  Physical Exam   Musculoskeletal:        Feet:           Skin is normal age and health appropriate color, turgor, texture, and temperature bilateral lower extremities without ulceration, hyperpigmentation, discoloration, masses nodules or cords palpated.  No ecchymosis, erythema, edema, or cardinal signs of infection bilateral lower extremities.    Hallux limitus of the right 1st metatarsophalangeal joint. Moderate amount of pain with range of motion of right 1st metatarsophalangeal  joint.  Otherwise, Adequate joint range of motion without pain, limitation, nor crepitation Bilateral feet and ankle joints. Muscle strength is 5/5 in all groups bilaterally.     Protective sensation intact by 10 g monofilament all ten toes/both feet.  Pain sensation intact bilateral feet and legs.    DP and PT pulses 2/4 bilateral, capillary refill 3 seconds all toes/distal feet, all toes/both feet warm to touch.   Negavie lower extremity edema bilateral.  Imaging:            Assessment:       1. Hallux limitus of right foot    2. Left foot pain - Left Foot    3. Pain of toe of right foot - Right Foot    4. Acquired keratoderma    5. Difficulty in walking, not elsewhere classified      Plan:   Hallux limitus of right foot    Left foot pain - Left Foot    Pain of toe of right foot - Right Foot    Acquired keratoderma    Difficulty in walking, not elsewhere classified      Follow up if symptoms worsen or fail to improve.    Procedures - None    Counseling:     I provided patient education verbally regarding:   Patient diagnosis, treatment options, as well as alternatives, risks, and benefits.     This note was created using Dragon voice recognition software that occasionally misinterpreted phrases or words.     Apeature Pads placed for calluses of bilateral feet.    Patient instructed to use pumice stone on calluses nightly.  Patient instructed to wear supportive running shoes with inserts. Patient also instructed to use urea cream on calluses.    I explained to the pt the 4 stages of osteoarthritic changes of the 1st MPJ starting with functional loss of range of motion and eventual destruction of the cartilage causing increased deformity, pain and loss of motion.  I discuss the treatment of the early stages 1 and 2 with shoe modification, NSAIDs, possible cortisone shots and CMOs.  I told her most stage 3 and 4 if they become painful enough may require surgical intervention.  I discussed joint preservation procedure  with partial relief of pain and some increase in function and possible second surgery in the future if pain and arthritis become worse.  I discussed joint destruction procedures of fusion o 1st MPJ, [place] bunionectomy and possible joint implant if the pt. meets my criteria of older age and sedentary life style.

## 2020-02-10 ENCOUNTER — TELEPHONE (OUTPATIENT)
Dept: NEUROLOGY | Facility: CLINIC | Age: 45
End: 2020-02-10

## 2020-02-10 NOTE — TELEPHONE ENCOUNTER
----- Message from Cordell Spence sent at 2/10/2020 12:37 PM CST -----  Contact: self  Pt states staff was suppose to schedule her an ophthalmology appointment as well as her f/u w/ Dr Tanner however have not received an appointment or call as of yet Pt ask for a call    Contact info  [phone]

## 2020-03-09 RX ORDER — AMANTADINE HYDROCHLORIDE 100 MG/1
CAPSULE, GELATIN COATED ORAL
Qty: 60 CAPSULE | Refills: 11 | OUTPATIENT
Start: 2020-03-09

## 2020-03-09 NOTE — TELEPHONE ENCOUNTER
This patient looks like she is following with Dr. Tanner now.  Refills should go through him, since he is now managing this medication for her.

## 2020-04-20 ENCOUNTER — TELEPHONE (OUTPATIENT)
Dept: NEUROLOGY | Facility: CLINIC | Age: 45
End: 2020-04-20

## 2020-06-16 ENCOUNTER — TELEPHONE (OUTPATIENT)
Dept: NEUROLOGY | Facility: CLINIC | Age: 45
End: 2020-06-16

## 2020-06-16 NOTE — TELEPHONE ENCOUNTER
----- Message from Joe Caballero sent at 6/16/2020  1:20 PM CDT -----  Contact: pt @ [phone]  Pt calling to confirm the doctor will call her for appt on 06/24/20

## 2020-06-16 NOTE — TELEPHONE ENCOUNTER
----- Message from Joe Caballero sent at 6/16/2020  1:20 PM CDT -----  Contact: pt @ [phone]  Pt calling to confirm the doctor will call her for appt on 06/24/20

## 2020-06-24 ENCOUNTER — OFFICE VISIT (OUTPATIENT)
Dept: NEUROLOGY | Facility: CLINIC | Age: 45
End: 2020-06-24
Payer: MEDICARE

## 2020-06-24 DIAGNOSIS — G20.C PARKINSONISM, UNSPECIFIED PARKINSONISM TYPE: Primary | ICD-10-CM

## 2020-06-24 PROCEDURE — 99441 PR PHYSICIAN TELEPHONE EVALUATION 5-10 MIN: CPT | Mod: 95,,, | Performed by: PSYCHIATRY & NEUROLOGY

## 2020-06-24 PROCEDURE — 99441 PR PHYSICIAN TELEPHONE EVALUATION 5-10 MIN: ICD-10-PCS | Mod: 95,,, | Performed by: PSYCHIATRY & NEUROLOGY

## 2020-06-24 RX ORDER — AMANTADINE HYDROCHLORIDE 100 MG/1
100 CAPSULE, GELATIN COATED ORAL 2 TIMES DAILY
Qty: 60 CAPSULE | Refills: 11 | Status: SHIPPED | OUTPATIENT
Start: 2020-06-24 | End: 2021-06-01

## 2020-06-24 RX ORDER — CARBIDOPA AND LEVODOPA 25; 100 MG/1; MG/1
TABLET ORAL
Qty: 180 TABLET | Refills: 11 | Status: SHIPPED | OUTPATIENT
Start: 2020-06-24 | End: 2021-08-16 | Stop reason: SDUPTHER

## 2020-06-24 NOTE — PROGRESS NOTES
Established Patient - Audio Only Telehealth Visit     The patient location is: home, [place]  The chief complaint leading to consultation is: f/u for pdism  Visit type: Virtual visit with audio only (telephone)  Total time spent with patient: 11 min    The reason for the audio only service rather than synchronous audio and video virtual visit was related to technical difficulties or patient preference/necessity.     Each patient to whom I provide medical services by telemedicine is:  (1) informed of the relationship between the physician and patient and the respective role of any other health care provider with respect to management of the patient; and (2) notified that they may decline to receive medical services by telemedicine and may withdraw from such care at any time. Patient verbally consented to receive this service via voice-only telephone call.     HPI:   - now taking cd/ld 1 tab TID, not taking the benztropine for 2 days and ran out --> and starts to tremor more OFF it  - memory is good, handling life well  - trying to exercise at home, but has gained 40 lbs  - neck is strong, no pain, no cervical signs - only the achiness of the arm that seems parkinsonian  - no new medical issues, except the weight  - admits she is just now quiet as good as before      Current Outpatient Medications on File Prior to Visit   Medication Sig Dispense Refill    b complex vitamins capsule Take 1 capsule by mouth once daily.      benztropine (COGENTIN) 2 MG Tab TAKE 1 TABLET(2 MG) BY MOUTH TWICE DAILY 180 tablet 3    carbidopa-levodopa  mg (SINEMET)  mg per tablet Take 2 tablets by mouth 3 (three) times daily. 180 tablet 11    progesterone (PROMETRIUM) 200 MG capsule       pyridoxine, vitamin B6, (B-6) 25 MG Tab Take 1 tablet (25 mg total) by mouth once daily. (Patient not taking: Reported on 1/8/2020) 30 tablet 11    zinc 50 mg Tab Take 50 mg by mouth once daily. (Patient not taking: Reported on 1/8/2020)  30 each 11    [DISCONTINUED] amantadine HCl (SYMMETREL) 100 mg capsule Take 1 capsule (100 mg total) by mouth 2 (two) times daily. 60 capsule 11     No current facility-administered medications on file prior to visit.         Diagnoses:                                                                                           Young onset Parkinson's disease, limb-onset dystonia, early ldopa-induced dyskinesia.  Commonly associated with the Corby gene mutation.  But I need to further investigate hereditary hemochromatosis given her abnormal iron studies and imaging suggesting early iron overload in the GPi bilaterally.    Haven't seen in 6 months.  Reporting to be a pretty stable period of time for her with the current management plan, but will boost her CD/Ld as a trial.     What to do:  - STILL needs to connect with my partner Dr. Nolvia Fernandez who has particular training/interest in genetic  - ophtho - though dry eyes and vision could be anticolinergia - didn't do it after the last visit.  Having dry eyes still  - qill boost the cd/ld carefully to 1.5 - 1 - 1.  Noted to have early LID prior to seeing me.        Shun Tanner MD, MPH  Division of Movement and Memory Disorders  [facility]       This service was not originating from a related E/M service provided within the previous 7 days nor will  to an E/M service or procedure within the next 24 hours or my soonest available appointment.  Prevailing standard of care was able to be met in this audio-only visit.

## 2020-06-24 NOTE — TELEPHONE ENCOUNTER
----- Message from Aylin Whalen sent at 6/24/2020  1:32 PM CDT -----  Regarding: Pt [phone]  Reason: Refill Request for amantadine HCl (SYMMETREL) 100 mg capsule. Also pt calling to see when will Dr.Houghton call for audio appt today.    Pharmacy:[facility] [address] [place] [address]

## 2020-06-25 ENCOUNTER — TELEPHONE (OUTPATIENT)
Dept: NEUROLOGY | Facility: CLINIC | Age: 45
End: 2020-06-25

## 2020-06-25 RX ORDER — ONDANSETRON 4 MG/1
4 TABLET, FILM COATED ORAL 2 TIMES DAILY
Qty: 60 TABLET | Refills: 2 | OUTPATIENT
Start: 2020-06-25

## 2020-06-25 NOTE — TELEPHONE ENCOUNTER
----- Message from Joe Caballero sent at 6/25/2020  2:03 PM CDT -----  Contact: pt @ [phone]  Pt asking to speak w/ the nurse, pt states she's feeling nauseous and had to throw up. Pt concerned this could be a side effect of her medications.

## 2020-06-25 NOTE — TELEPHONE ENCOUNTER
Patient reports nausea and vomiting x 1 this morning.  Also nauseated all day yesterday. This has happened last month as well. Advised that it could be a side effect of cardidopa/levodopa.    Patient has [employer] now.

## 2020-06-25 NOTE — TELEPHONE ENCOUNTER
Spoke to patient. DJ will prescribe   Zofran 4mg    #60  1 PO BID PRN     With 2 refills called into [facility] in [place].

## 2020-07-17 DIAGNOSIS — Z71.89 COMPLEX CARE COORDINATION: ICD-10-CM

## 2020-08-25 ENCOUNTER — PATIENT OUTREACH (OUTPATIENT)
Dept: ADMINISTRATIVE | Facility: OTHER | Age: 45
End: 2020-08-25

## 2020-08-25 NOTE — PROGRESS NOTES
PCP Outreach/ portal sent  Chart was reviewed for overdue [facility]active [facility]ters ([facility])  topics  Updates were requested from care everywhere  Health Maintenance has been updated  [place] immunization registry triggered

## 2020-10-13 NOTE — PT/OT/SLP PROGRESS
Physical Therapy         Treatment        Zaira Jensen   MRN: [id number]     PT Received On: 10/25/17  Total treatment time: 35        Billable Minutes:  Therapeutic Activity 20 and Therapeutic Exercise 15  Total Minutes: 35    Treatment Type: Treatment  PT/PTA: PTA     PTA Visit Number: 1       General Precautions: Standard, fall  Orthopedic Precautions: Orthopedic Precautions : N/A   Braces: Braces: N/A    Subjective:  Communicated with SHANTE Johnson prior to session.    Pain/Comfort  Pain Rating 1: 2/10  Location - Side 1: Left  Location - Orientation 1: generalized  Location 1: shoulder  Pain Addressed 1: Pre-medicate for activity, Reposition, Distraction, Other (see comments) (states she should have taken higher dose of pain meds. improves with manual L pec stretches )  Pain Rating Post-Intervention 1: 6/10    Objective:  Patient found seated in w/c, wearing c-collar in NAD with cousin present , with Patient found with: cervical collar    Balance:   Static Stand: FAIR: Maintains without assist but unable to take challenges  Dynamic stand: 0: N/A    Transfer Training:  Sit to stand:Minimal Assistance with w/c armrests and cues for foot placement, scooting fwd to edge of chair.       Additional Treatment:  Static standing min A for initial stand, then SBA with rw. Minisquats 10/2 B with rw, standing HR 10/2 B with rw.     Activity Tolerance:  Patient limited by pain    Patient left up in chair with call button in reach, chair alarm on and RN notified.    Assessment:  Zaira Jensen is a 42 y.o. female     Activity tolerance: Fair    GOALS:    Physical Therapy Goals        Problem: Physical Therapy Goal    Goal Priority Disciplines Outcome Goal Variances Interventions   Physical Therapy Goal     PT/OT, PT Ongoing (interventions implemented as appropriate)     Description:  Goals to be met by: 2017     Patient will increase functional independence with mobility by performin). Supine to sit with Stand-by  The ABCs of the Annual Wellness Visit  Subsequent Medicare Wellness Visit    Chief Complaint   Patient presents with   • Medicare Wellness-subsequent   • Diabetes   • Hypertension   • Hyperlipidemia       Subjective   History of Present Illness:  Lexi Wade is a 70 y.o. female who presents for a Subsequent Medicare Wellness Visit.    HEALTH RISK ASSESSMENT    Recent Hospitalizations:  No hospitalization(s) within the last year.    Current Medical Providers:  Patient Care Team:  Joyce Plata MD as PCP - General  MaurizioBayron merchant MD as PCP - Claims Attributed  Wang Felipe MD as Consulting Physician (Cardiology)    Smoking Status:  Social History     Tobacco Use   Smoking Status Former Smoker   Smokeless Tobacco Never Used   Tobacco Comment    as a teenager       Alcohol Consumption:  Social History     Substance and Sexual Activity   Alcohol Use No       Depression Screen:   PHQ-2/PHQ-9 Depression Screening 10/13/2020   Little interest or pleasure in doing things 2   Feeling down, depressed, or hopeless 0   Trouble falling or staying asleep, or sleeping too much 1   Feeling tired or having little energy 0   Poor appetite or overeating 1   Feeling bad about yourself - or that you are a failure or have let yourself or your family down 0   Trouble concentrating on things, such as reading the newspaper or watching television 1   Moving or speaking so slowly that other people could have noticed. Or the opposite - being so fidgety or restless that you have been moving around a lot more than usual 0   Thoughts that you would be better off dead, or of hurting yourself in some way 0   Total Score 5   If you checked off any problems, how difficult have these problems made it for you to do your work, take care of things at home, or get along with other people? Not difficult at all       Fall Risk Screen:  STEADI Fall Risk Assessment was completed, and patient is at LOW risk for falls.Assessment  Assistance  2). Sit to supine with Stand-by Assistance  3). Rolling to Left and Right with Stand-by Assistance.  4). Sit to stand transfer with Minimal Assistance = MET  5). Gait  x  > 25 feet with Contact Guard Assistance using Rolling Walker = MET  6). Wheelchair propulsion x > 150 feet with Stand-by Assistance      NEW 10/18/2017:    7). Gait  x  > 25 feet with Stand-by Assistance using Rolling Walker or Rollator    NEW 10/23/2017:  8). Sit to stand transfer with Contact Guard Assistance             Problem: Physical Therapy Goal    Goal Priority Disciplines Outcome Goal Variances Interventions   Physical Therapy Goal     PT/OT, PT Ongoing (interventions implemented as appropriate)                     PLAN:    Patient to be seen daily  to address the above listed problems via gait training, therapeutic activities, therapeutic exercises, neuromuscular re-education, wheelchair management/training  Plan of Care expires: 11/16/17  Plan of Care reviewed with: patient         Judith Washington, PTA 10/25/2017   completed on:10/13/2020    Health Habits and Functional and Cognitive Screening:  Functional & Cognitive Status 10/13/2020   Do you have difficulty preparing food and eating? No   Do you have difficulty bathing yourself, getting dressed or grooming yourself? No   Do you have difficulty using the toilet? No   Do you have difficulty moving around from place to place? No   Do you have trouble with steps or getting out of a bed or a chair? No   Current Diet Low Fat Diet   Dental Exam Not up to date   Eye Exam Up to date   Exercise (times per week) 3 times per week   Current Exercise Activities Include Walking   Do you need help using the phone?  No   Are you deaf or do you have serious difficulty hearing?  No   Do you need help with transportation? Yes   Do you need help shopping? No   Do you need help preparing meals?  No   Do you need help with housework?  No   Do you need help with laundry? No   Do you need help taking your medications? No   Do you need help managing money? No   Do you ever drive or ride in a car without wearing a seat belt? No   Have you felt unusual stress, anger or loneliness in the last month? No   Who do you live with? Alone   If you need help, do you have trouble finding someone available to you? No   Have you been bothered in the last four weeks by sexual problems? No   Do you have difficulty concentrating, remembering or making decisions? No         Does the patient have evidence of cognitive impairment? No    Asprin use counseling:Taking ASA appropriately as indicated    Age-appropriate Screening Schedule:  Refer to the list below for future screening recommendations based on patient's age, sex and/or medical conditions. Orders for these recommended tests are listed in the plan section. The patient has been provided with a written plan.    Health Maintenance   Topic Date Due   • DIABETIC FOOT EXAM  10/30/2020   • DIABETIC EYE EXAM  01/14/2021   • HEMOGLOBIN A1C  04/13/2021   • LIPID PANEL   10/13/2021   • URINE MICROALBUMIN  10/13/2021   • MAMMOGRAM  10/13/2022   • TDAP/TD VACCINES (2 - Td) 04/04/2027   • INFLUENZA VACCINE  Completed   • ZOSTER VACCINE  Completed          The following portions of the patient's history were reviewed and updated as appropriate: allergies, current medications, past family history, past medical history, past social history, past surgical history and problem list.    Outpatient Medications Prior to Visit   Medication Sig Dispense Refill   • aspirin 81 MG tablet Take 81 mg by mouth every night.     • carvedilol (COREG) 25 MG tablet TAKE 1 TABLET BY MOUTH 2 (TWO) TIMES A DAY WITH MEALS. 180 tablet 3   • diclofenac (VOLTAREN) 1 % gel gel APPLY TO AFFECTED AREA 4 TIMES A DAY Oral Volteran DC'D) 100 g 3   • digoxin (LANOXIN) 125 MCG tablet TAKE 1 TABLET BY MOUTH DAILY 90 tablet 3   • glucose blood (True Metrix Blood Glucose Test) test strip Check BS daily E11.8 100 each 3   • Insulin Pen Needle (B-D UF III MINI PEN NEEDLES) 31G X 5 MM misc USE WITH INSULIN INJECTIONS NIGHTLY 100 each 3   • ipratropium-albuterol (DUO-NEB) 0.5-2.5 mg/3 ml nebulizer Take 3 mL by nebulization 4 (Four) Times a Day. 360 mL 12   • lisinopril (PRINIVIL,ZESTRIL) 10 MG tablet Take 1 tablet by mouth Daily. 90 tablet 3   • magnesium oxide (MAGOX) 400 (241.3 Mg) MG tablet tablet Take 1 tablet by mouth Daily. 90 each 3   • nitroglycerin (NITROSTAT) 0.4 MG SL tablet Place 1 tablet under the tongue Every 5 (Five) Minutes As Needed for Chest Pain. Take no more than 3 doses in 15 minutes. 25 tablet 0   • pantoprazole (PROTONIX) 40 MG EC tablet Take 1 tablet by mouth Daily. 90 tablet 1   • VENTOLIN  (90 Base) MCG/ACT inhaler INHALE 2 PUFFS EVERY 4 (FOUR) HOURS AS NEEDED FOR WHEEZING OR SHORTNESS OF AIR. 18 inhaler 1   • vitamin D (ERGOCALCIFEROL) 59000 units capsule capsule Take 1 capsule by mouth 1 (One) Time Per Week. (Patient taking differently: Take 50,000 Units by mouth 1 (One) Time Per Week. Take 1  capsule by mouth weekly on Tuesdays) 12 capsule 3   • atorvastatin (LIPITOR) 40 MG tablet TAKE 1 TABLET BY MOUTH EVERY DAY 90 tablet 3   • furosemide (LASIX) 80 MG tablet TAKE 1 TABLET BY MOUTH DAILY. 90 tablet 3   • glimepiride (AMARYL) 4 MG tablet Take 1 tablet by mouth Every Morning Before Breakfast. 90 tablet 3   • isosorbide mononitrate (IMDUR) 30 MG 24 hr tablet TAKE 1 TABLET BY MOUTH EVERY DAY (Patient taking differently: Take 30 mg by mouth Daily.) 90 tablet 3   • metFORMIN (GLUCOPHAGE) 1000 MG tablet TAKE 1 TABLET BY MOUTH 2 (TWO) TIMES A DAY WITH MEALS. 180 tablet 3   • potassium chloride (MICRO-K) 10 MEQ CR capsule Take 1 capsule by mouth 2 (Two) Times a Day. 180 capsule 3   • pregabalin (LYRICA) 150 MG capsule TAKE 1 CAPSULE BY MOUTH TWICE A  capsule 1   • VICTOZA 18 MG/3ML solution pen-injector injection INJECT 1.2 MG UNDER THE SKIN INTO THE APPROPRIATE AREA AS DIRECTED EVERY NIGHT. 6 pen 5     No facility-administered medications prior to visit.        Patient Active Problem List   Diagnosis   • Pseudophakia   • Primary open angle glaucoma   • Nonischemic cardiomyopathy (CMS/HCC)   • Congestive heart failure (CMS/HCC)   • Essential hypertension   • GERD (gastroesophageal reflux disease)   • Type 2 diabetes mellitus with complication, without long-term current use of insulin (CMS/HCC)   • Vitamin D deficiency   • Borderline glaucoma   • Neurologic disorder associated with diabetes mellitus (CMS/HCC)   • Mixed hyperlipidemia   • Menopausal syndrome   • Chest pain   • Morbidly obese (CMS/HCC)   • Precordial pain   • Acute respiratory failure with hypoxia (CMS/HCC)   • Coronary artery disease involving native heart with angina pectoris (CMS/HCC)   • Implantable cardioverter-defibrillator (ICD) generator end of life       Advanced Care Planning:  ACP discussion was held with the patient during this visit. Patient does not have an advance directive, information provided.    Review of Systems  "  Constitutional: Negative.  Negative for chills, fatigue, fever and unexpected weight change.   HENT: Negative.  Negative for congestion, ear pain, hearing loss, nosebleeds, rhinorrhea, sneezing, sore throat and tinnitus.    Eyes: Negative.  Negative for discharge.   Respiratory: Negative.  Negative for cough, shortness of breath and wheezing.    Cardiovascular: Negative.  Negative for chest pain and palpitations.   Gastrointestinal: Negative.  Negative for abdominal pain, constipation, diarrhea, nausea and vomiting.   Endocrine: Negative.    Genitourinary: Negative.  Negative for dysuria, frequency and urgency.   Musculoskeletal: Negative.  Negative for arthralgias, back pain, joint swelling, myalgias and neck pain.   Skin: Negative.  Negative for rash.   Allergic/Immunologic: Negative.    Neurological: Negative.  Negative for dizziness, weakness, numbness and headaches.   Hematological: Negative.  Negative for adenopathy.   Psychiatric/Behavioral: Negative.  Negative for dysphoric mood and sleep disturbance. The patient is not nervous/anxious.        Compared to one year ago, the patient feels her physical health is better.  Compared to one year ago, the patient feels her mental health is better.    Reviewed chart for potential of high risk medication in the elderly: yes  Reviewed chart for potential of harmful drug interactions in the elderly:yes    Objective         Vitals:    10/13/20 1301   BP: 128/64   BP Location: Left arm   Patient Position: Sitting   Cuff Size: Adult   Pulse: 88   Resp: 20   SpO2: 98%   Weight: 101 kg (222 lb 3.2 oz)   Height: 157.5 cm (62.01\")   PainSc: 0-No pain       Body mass index is 40.63 kg/m².  Discussed the patient's BMI with her. The BMI is above average; BMI management plan is completed.    Physical Exam  Vitals signs and nursing note reviewed.   Constitutional:       General: She is not in acute distress.     Appearance: She is well-developed.   HENT:      Head: Normocephalic " and atraumatic.      Nose: Nose normal.   Eyes:      General:         Right eye: No discharge.         Left eye: No discharge.      Conjunctiva/sclera: Conjunctivae normal.      Pupils: Pupils are equal, round, and reactive to light.   Neck:      Thyroid: No thyromegaly.   Cardiovascular:      Rate and Rhythm: Normal rate and regular rhythm.      Heart sounds: Normal heart sounds.   Pulmonary:      Effort: Pulmonary effort is normal.      Breath sounds: Normal breath sounds.   Lymphadenopathy:      Cervical: No cervical adenopathy.   Skin:     General: Skin is warm and dry.   Neurological:      Mental Status: She is alert and oriented to person, place, and time.       Assessment/Plan   Medicare Risks and Personalized Health Plan  CMS Preventative Services Quick Reference  Advance Directive Discussion  Breast Cancer/Mammogram Screening  Fall Risk  Immunizations Discussed/Encouraged (specific immunizations; Influenza )  Obesity/Overweight   Osteoprorosis Risk    The above risks/problems have been discussed with the patient.  Pertinent information has been shared with the patient in the After Visit Summary.  Follow up plans and orders are seen below in the Assessment/Plan Section.    Diagnoses and all orders for this visit:    1. Medicare annual wellness visit, subsequent (Primary)    2. Essential hypertension  -     furosemide (LASIX) 80 MG tablet; Take 1 tablet by mouth Daily.  Dispense: 90 tablet; Refill: 3  -     potassium chloride (MICRO-K) 10 MEQ CR capsule; Take 1 capsule by mouth 2 (Two) Times a Day.  Dispense: 180 capsule; Refill: 3    3. Mixed hyperlipidemia  -     atorvastatin (LIPITOR) 40 MG tablet; Take 1 tablet by mouth Daily.  Dispense: 90 tablet; Refill: 3    4. Type 2 diabetes mellitus with complication, without long-term current use of insulin (CMS/[facility])  -     glimepiride (AMARYL) 4 MG tablet; Take 1 tablet by mouth Every Morning Before Breakfast.  Dispense: 90 tablet; Refill: 3  -     metFORMIN  (GLUCOPHAGE) 1000 MG tablet; Take 1 tablet by mouth 2 (Two) Times a Day With Meals.  Dispense: 180 tablet; Refill: 3    5. Neurologic disorder associated with diabetes mellitus (CMS/HCC)  -     pregabalin (LYRICA) 150 MG capsule; Take 1 capsule by mouth 2 (Two) Times a Day.  Dispense: 180 capsule; Refill: 1    6. Diabetes mellitus without complication (CMS/HCC)  -     Liraglutide (Victoza) 18 MG/3ML solution pen-injector injection; Inject 1.2 mg under the skin into the appropriate area as directed Daily.  Dispense: 6 pen; Refill: 5    Other orders  -     isosorbide mononitrate (IMDUR) 30 MG 24 hr tablet; Take 1 tablet by mouth Daily.  Dispense: 90 tablet; Refill: 3      Follow Up:  Return in about 6 months (around 4/13/2021) for Recheck.     An After Visit Summary and PPPS were given to the patient.    Information has been scanned into chart. Discussed importance of taking medications as prescribed. Encouraged healthy eating habits with low fat, low salt choices and working towards maintaining a healthy weight. Recommended regular exercise if able as well as care to prevent falls including avoiding anything on the floor that they could slip or trip on such as throw rugs, making sure they have a bathmat to step onto when their feet are wet and having grab bars and railings where needed.

## 2021-02-19 ENCOUNTER — PES CALL (OUTPATIENT)
Dept: ADMINISTRATIVE | Facility: CLINIC | Age: 46
End: 2021-02-19

## 2021-03-19 ENCOUNTER — TELEPHONE (OUTPATIENT)
Dept: ADMINISTRATIVE | Facility: CLINIC | Age: 46
End: 2021-03-19

## 2021-03-24 ENCOUNTER — TELEPHONE (OUTPATIENT)
Dept: ADMINISTRATIVE | Facility: CLINIC | Age: 46
End: 2021-03-24

## 2021-05-26 ENCOUNTER — TELEPHONE (OUTPATIENT)
Dept: ADMINISTRATIVE | Facility: CLINIC | Age: 46
End: 2021-05-26

## 2021-05-31 ENCOUNTER — PES CALL (OUTPATIENT)
Dept: ADMINISTRATIVE | Facility: CLINIC | Age: 46
End: 2021-05-31

## 2021-08-17 ENCOUNTER — PATIENT MESSAGE (OUTPATIENT)
Dept: PRIMARY CARE CLINIC | Facility: CLINIC | Age: 46
End: 2021-08-17

## 2021-08-28 ENCOUNTER — PATIENT OUTREACH (OUTPATIENT)
Dept: ADMINISTRATIVE | Facility: HOSPITAL | Age: 46
End: 2021-08-28

## 2021-10-01 PROBLEM — G82.50 QUADRIPARESIS: Status: ACTIVE | Noted: 2018-01-16

## 2021-10-29 ENCOUNTER — TELEPHONE (OUTPATIENT)
Dept: NEUROLOGY | Facility: CLINIC | Age: 46
End: 2021-10-29
Payer: MEDICARE

## 2021-11-04 ENCOUNTER — TELEPHONE (OUTPATIENT)
Dept: NEUROLOGY | Facility: CLINIC | Age: 46
End: 2021-11-04

## 2021-11-04 NOTE — TELEPHONE ENCOUNTER
----- Message from Bertha Herrmann sent at 11/4/2021  2:56 PM CDT -----  Regarding: Pt Virtual  Pt had trouble logging on she in the virtual chat and would like staff to call her if its to late and can reschedule.    [phone] (home)

## 2021-11-24 ENCOUNTER — OFFICE VISIT (OUTPATIENT)
Dept: ORTHOPEDICS | Facility: CLINIC | Age: 46
End: 2021-11-24
Payer: MEDICARE

## 2021-11-24 VITALS — WEIGHT: 185 LBS | BODY MASS INDEX: 30.82 KG/M2 | HEIGHT: 65 IN

## 2021-11-24 DIAGNOSIS — S53.402A ELBOW SPRAIN, LEFT, INITIAL ENCOUNTER: Primary | ICD-10-CM

## 2021-11-24 PROCEDURE — 99213 PR OFFICE/OUTPT VISIT, EST, LEVL III, 20-29 MIN: ICD-10-PCS | Mod: S$GLB,,, | Performed by: PHYSICIAN ASSISTANT

## 2021-11-24 PROCEDURE — 99213 OFFICE O/P EST LOW 20 MIN: CPT | Mod: S$GLB,,, | Performed by: PHYSICIAN ASSISTANT

## 2021-11-24 RX ORDER — MELOXICAM 15 MG/1
15 TABLET ORAL DAILY
Qty: 30 TABLET | Refills: 0 | Status: SHIPPED | OUTPATIENT
Start: 2021-11-24 | End: 2021-12-24

## 2021-12-08 ENCOUNTER — OFFICE VISIT (OUTPATIENT)
Dept: ORTHOPEDICS | Facility: CLINIC | Age: 46
End: 2021-12-08
Payer: MEDICARE

## 2021-12-08 VITALS — WEIGHT: 185 LBS | BODY MASS INDEX: 30.82 KG/M2 | HEIGHT: 65 IN

## 2021-12-08 DIAGNOSIS — M70.22 OLECRANON BURSITIS OF LEFT ELBOW: Primary | ICD-10-CM

## 2021-12-08 PROCEDURE — 99213 OFFICE O/P EST LOW 20 MIN: CPT | Mod: S$GLB,,, | Performed by: PHYSICIAN ASSISTANT

## 2021-12-08 PROCEDURE — 99213 PR OFFICE/OUTPT VISIT, EST, LEVL III, 20-29 MIN: ICD-10-PCS | Mod: S$GLB,,, | Performed by: PHYSICIAN ASSISTANT

## 2021-12-08 RX ORDER — METHYLPREDNISOLONE 4 MG/1
TABLET ORAL
Qty: 1 EACH | Refills: 0 | Status: SHIPPED | OUTPATIENT
Start: 2021-12-08

## 2022-01-06 ENCOUNTER — OFFICE VISIT (OUTPATIENT)
Dept: NEUROLOGY | Facility: CLINIC | Age: 47
End: 2022-01-06
Payer: MEDICARE

## 2022-01-06 DIAGNOSIS — G20.C PARKINSONISM, UNSPECIFIED PARKINSONISM TYPE: Primary | ICD-10-CM

## 2022-01-06 PROCEDURE — 99214 PR OFFICE/OUTPT VISIT, EST, LEVL IV, 30-39 MIN: ICD-10-PCS | Mod: 95,,, | Performed by: PSYCHIATRY & NEUROLOGY

## 2022-01-06 PROCEDURE — 99214 OFFICE O/P EST MOD 30 MIN: CPT | Mod: 95,,, | Performed by: PSYCHIATRY & NEUROLOGY

## 2022-01-06 RX ORDER — BENZTROPINE MESYLATE 2 MG/1
2 TABLET ORAL 3 TIMES DAILY
Qty: 270 TABLET | Refills: 3 | Status: SHIPPED | OUTPATIENT
Start: 2022-01-06 | End: 2022-05-30

## 2022-01-06 NOTE — PROGRESS NOTES
"Neurology Telemedicine Note     Name: Zaira Jensen  MRN: [id number]   CSN: [id number]      Date: 01/06/2022    The patient location is: Home  The chief complaint leading to consultation is: f/u PD  Visit type: Virtual visit with synchronous audio and video    TOTAL TIME SPENT: 18 min    Each patient to whom I provide medical services by telemedicine is:  (1) informed of the relationship between the physician and patient and the respective role of any other health care provider with respect to management of the patient; and (2) notified that they may decline to receive medical services by telemedicine and may withdraw from such care at any time.    History of Present Illness (HPI):   - is having more discoloration of the legs (hard to visualize on camera), doesn't clearly describe livedo, but could be different appearnce with her natural pigment  - feels her leg cramping and dystonia are doing well though, only a few bad toe cramps for the last several months!  - still driving, not as nervous as she used to be  - but still has some fears when her menstrual cycle is there - seems to be with worse PD symptoms then  - memory is "pretty good", mood is good too  - usual pattern of 1 CD/LD TID, Amantadine BID and Bentropine BID  - tremor only occurs if she is anxious  - does complain of some word-finding difficulties      Nonmotor/Premotor ROS: as per HPI, and all other systems are negative    Past Medical History: The patient  has a past medical history of Allergy, Anemia, Arthritis, Chronic back pain, Chronic neck pain, Functional ovarian cysts, GERD (gastroesophageal reflux disease), Headache(784.0), Quadriparesis (muscle weakness) (1/16/2018), Radiculopathy of arm, Rash, and Respiratory distress.    Social History: The patient  reports that she has never smoked. She has never used smokeless tobacco. She reports that she does not drink alcohol and does not use drugs.    Family History: Their family history includes " Cancer in her father; Diabetes in her father; Heart disease in her father; No Known Problems in her mother.    Allergies: Bactrim [sulfamethoxazole-trimethoprim], Codeine, Hydrocodone, Nexium [esomeprazole magnesium], Nickel sutures [surgical stainless steel], Pantoprazole, Pcn [penicillins], Sulfa (sulfonamide antibiotics), and Vicks vapor inhaler [l-desoxyephedrine]     Meds:   Current Outpatient Medications on File Prior to Visit   Medication Sig Dispense Refill    amantadine HCL (SYMMETREL) 100 mg capsule TAKE 1 CAPSULE(100 MG) BY MOUTH TWICE DAILY 60 capsule 11    b complex vitamins capsule Take 1 capsule by mouth once daily.      benztropine (COGENTIN) 2 MG Tab TAKE 1 TABLET(2 MG) BY MOUTH TWICE DAILY 180 tablet 3    carbidopa-levodopa  mg (SINEMET)  mg per tablet TAKE 1 AND 1/2 TABLET EVERY MORNING,1 TABLET AT NOOD, 1 EVERY EVENING 180 tablet 11    methylPREDNISolone (MEDROL DOSEPACK) 4 mg tablet use as directed 1 each 0    progesterone (PROMETRIUM) 200 MG capsule       pyridoxine, vitamin B6, (B-6) 25 MG Tab Take 1 tablet (25 mg total) by mouth once daily. (Patient not taking: No sig reported) 30 tablet 11    zinc 50 mg Tab Take 50 mg by mouth once daily. (Patient not taking: No sig reported) 30 each 11     No current facility-administered medications on file prior to visit.       Exam:  Vital Signs deferred with home visit    Constitutional  Well-developed, well-nourished, appears stated age   Eyes  No scleral icterus   ENT  Moist oral mucosa   Cardiovascular  No lower extremity edema    Respiratory  No labored breathing    Skin  No rashes   Hematologic  No bruising   Psychiatric  Normal mood and affect   Other  GI/ deferred    Neurological     * Mental status  Alert and oriented to person, place, time, and situation; no dysarthria; no aphasia; normal recent and remote memory; follows commands   * Cranial nerves     - CN II  Pupils midposition and symmetric   - CN III, IV, VI   Extraocular movements full, no nystagmus visualized   - CN V  Unable to test   - CN VII  Face strong and symmetric bilaterally    - CN VIII  Hearing grossly intact with conversation    - CN IX, X  Palate raises midline and symmetric    - CN XI  Strong shoulder shrug B    - CN XII  Tongue appears midline    * Motor  Normal bulk by appearance, no drift    * Sensory  Not tested objectively, no changes described by the patient   * Deep tendon reflexes  Not tested   * Coord/Movemt/Gait Mild softer, scratchy speech pattern, query dystonic.  Mild facial masking.  Mod L>R B bradykinesia.  No tremor with rest, posture, kinesis, or intention.   No chorea, athetosis, dystonia, myoclonus, or tics.   No motor impersistence.  Gait is deferred for safety.       Medical Record Review:  - Lab Results:  No visits with results within 3 Month(s) from this visit.   Latest known visit with results is:   Lab Visit on 07/25/2019   Component Date Value Ref Range Status    Iron 07/25/2019 34  30 - 160 ug/dL Final    Transferrin 07/25/2019 353  200 - 375 mg/dL Final    TIBC 07/25/2019 522* 250 - 450 ug/dL Final    Saturated Iron 07/25/2019 7* 20 - 50 % Final    Ferritin 07/25/2019 10* 20.0 - 300.0 ng/mL Final    Sol. Transferrin Receptor 07/25/2019 4.8* 1.8 - 4.6 mg/L Final    WBC 07/25/2019 3.73* 3.90 - 12.70 K/uL Final    RBC 07/25/2019 3.72* 4.00 - 5.40 M/uL Final    Hemoglobin 07/25/2019 11.0* 12.0 - 16.0 g/dL Final    Hematocrit 07/25/2019 34.6* 37.0 - 48.5 % Final    MCV 07/25/2019 93  82 - 98 fL Final    MCH 07/25/2019 29.6  27.0 - 31.0 pg Final    MCHC 07/25/2019 31.8* 32.0 - 36.0 g/dL Final    RDW 07/25/2019 13.4  11.5 - 14.5 % Final    Platelets 07/25/2019 260  150 - 350 K/uL Final    MPV 07/25/2019 9.2  9.2 - 12.9 fL Final    Gran # (ANC) 07/25/2019 2.5  1.8 - 7.7 K/uL Final    Immature Grans (Abs) 07/25/2019 0.00  0.00 - 0.04 K/uL Final    Lymph # 07/25/2019 0.9* 1.0 - 4.8 K/uL Final    Mono # 07/25/2019 0.3   0.3 - 1.0 K/uL Final    Eos # 07/25/2019 0.1  0.0 - 0.5 K/uL Final    Baso # 07/25/2019 0.02  0.00 - 0.20 K/uL Final    nRBC 07/25/2019 0  0 /100 WBC Final    Gran % 07/25/2019 66.8  38.0 - 73.0 % Final    Lymph % 07/25/2019 24.1  18.0 - 48.0 % Final    Mono % 07/25/2019 6.7  4.0 - 15.0 % Final    Eosinophil % 07/25/2019 1.9  0.0 - 8.0 % Final    Basophil % 07/25/2019 0.5  0.0 - 1.9 % Final    Differential Method 07/25/2019 Automated   Final    Arsenic 07/25/2019 <1  0 - 12 ng/mL Final    Lead 07/25/2019 <1.0  0.0 - 4.9 mcg/dL Final    Cadmium 07/25/2019 0.3  0.0 - 4.9 ng/mL Final    Mercury 07/25/2019 <1  0 - 9 ng/mL Final    Venous/Capillary 07/25/2019 Venous   Final    Street Address 07/25/2019 [address]   Final    City 07/25/2019 [place]   Final    State 07/25/2019 [place]   Final    Zip 07/25/2019 [id number]   Final    [facility] 07/25/2019 [facility]   Final    Guardian First Name 07/25/2019 n/a   Final    Guardian Last Name 07/25/2019 n/a   Final    Home Phone 07/25/2019 [phone]   Final    Race 07/25/2019 n/a   Final       Diagnoses:                                                                                           Young onset Parkinson's disease, limb-onset dystonia, early ldopa-induced dyskinesia.  Commonly associated with the Corby gene mutation.  But I need to further investigate hereditary hemochromatosis given her abnormal iron studies and imaging suggesting early iron overload in the GPi bilaterally.  Still stable.     Plan:  1) STILL needs to connect with my partner Dr. Nolvia Fernandez who has particular training/interest in genetic.  We've justyn trying to hook this up for a couple of years.  Will try again.  2) even though benztropine has a long half life, she is describing worsening nausea and off time when she misses doses.  Will go to TID now.  3) neuropsych testing          Shun Tanner MD, MPH  Division of Movement and Memory Disorders  [facility]  [place] [phone]

## 2022-01-12 ENCOUNTER — TELEPHONE (OUTPATIENT)
Dept: NEUROLOGY | Facility: CLINIC | Age: 47
End: 2022-01-12
Payer: MEDICARE

## 2022-01-12 NOTE — TELEPHONE ENCOUNTER
Called and spoke with patient about scheduling her for a NP appointment. Patient stated she would call back and schedule the appointment.

## 2022-01-12 NOTE — TELEPHONE ENCOUNTER
----- Message from Nolvia Fernandez MD sent at 1/7/2022  8:53 AM CST -----  Please schedule with me new pt in person or video  ----- Message -----  From: Shun Tanner MD  Sent: 1/6/2022   4:40 PM CST  To: Nolvia Fernandez MD, Cristin Alvarenga MA    Needs to see nolvia for AA genetics in a young onset.  Please.

## 2022-01-14 ENCOUNTER — TELEPHONE (OUTPATIENT)
Dept: NEUROLOGY | Facility: CLINIC | Age: 47
End: 2022-01-14
Payer: MEDICARE

## 2022-01-14 NOTE — TELEPHONE ENCOUNTER
LVM for patient to contact the office to be scheduled to see Dr. Fernandez per referral from Dr. Tanner. Left contact information on voicemail for patient to contact clinic.

## 2022-02-10 ENCOUNTER — PES CALL (OUTPATIENT)
Dept: ADMINISTRATIVE | Facility: CLINIC | Age: 47
End: 2022-02-10
Payer: MEDICARE

## 2022-02-15 ENCOUNTER — TELEPHONE (OUTPATIENT)
Dept: NEUROLOGY | Facility: CLINIC | Age: 47
End: 2022-02-15
Payer: MEDICARE

## 2022-02-15 NOTE — TELEPHONE ENCOUNTER
LVM for patient to contact the clinic to be scheduled with Dr. Fernandez per Dr. Tanner. Left contact information on the voicemail.

## 2022-05-13 DIAGNOSIS — Z12.31 ENCOUNTER FOR SCREENING MAMMOGRAM FOR MALIGNANT NEOPLASM OF BREAST: Primary | ICD-10-CM

## 2022-06-03 NOTE — PLAN OF CARE
HISTORY OF PRESENT ILLNESS  Ralph Alexander is a 78 y.o. male. HPI     Last here 3/02/22. Pt is here to f/u on chronic conditions.      Has a history of hypertension  BP today is 120/72  No longer on lisinopril 5mg daily to protect his kidneys d/t high k   He is now taking coreg 6.25 mg BID for cardiomyopathy     Wt today is 176 lbs, down 8 lbs x lov  Discussed recent wt/l- he has been eating 2 meals per day due to stomach upset from medication he reports he is eating less overall     Reviewed labs   Ordered labs      Previously, I referred the pt to Dr. Ceci Kingsley (rheum) for history of osteoarthritis he has not decided to go     Pt follows with Dr. Vani Thompson (cardio) for decreased EF cardiomyopathy he is now on Coreg  He gets winded with exertion at times  Last visit 5/22, will f/u in 6 mo     Recall echo 6/21/21:  · LV: Estimated LVEF is 35 - 40%. Normal cavity size and wall thickness.   Recall stress test 7/23/21:  · SPECT: Left ventricular perfusion is normal. Myocardial perfusion imaging supports a low risk stress test.     Pt follows with Dr. Dede Alston ([facility]) for prostate cancer  Last PSA 0.3- rising I have discussed this multiple times with patient he is not interested in following up with urology no longer checking his PSAs I have discussed that this means he likely has biochemical recurrence of his prostate cancer he is aware not interested in follow-up  Recall last note 3/30/16:  PSA undetectable,  We are no longer monitoring PSAs patient is not interested  Recall pt's prostate is removed     Should take over-the-counter vitamin D      No longer taking pepcid-- no current issues with heartburn since switching to 2 meals per day      Continues on xyzol qhs for allergies - he continues to struggle with allergies   Recall pt is allergic to fungus and pet dander     No longer uses lactulose prn - he has been eating collards which has helped sxs     I give him tramadol for his back and hand pain  Now he is Problem: Patient Care Overview  Goal: Plan of Care Review  Outcome: Ongoing (interventions implemented as appropriate)  AAOx3 tolerating therapy well no acute distress noted       getting 90 tablets q1 months- about 2 per day up to 3 lasts for more than 1 months generally lasts a month and a half  Will take an extra tab when his hands are bothering him a lot primarily when it is cold out  Pt knows not to give this to anyone else  This helps with his pain  Not sedating or overly constipating  Pt also has percocet to use prn in the past he no longer has been using this   Pain contract signed on 09/01/21  Urine drug screen complete 3/22--     Has mild CKD  Has CKD I monitor his renal function creatinine runs around 1.5-1.7 baseline     ACP not on file. SDM is his son Nathan Rodrigues).      Recall he has a dx of prostate cancer since January 2013 , sees calin annually and last saw him in January 2015--states he was released from care at that time.    Recall the patient has h/o ckd, last cr 1.5 continues to be stable. prior was 1.77, uns 1.5-1.77  Recall that he Used to follow with rheumatologist dr Jenny Wesley years ago --currently not seeing anyone. Recall the patient has hansens and a h/o stomach ulcer, sees ry for this.  Sx controlled with prilosec bid in the past no longer takes anything, not interested in going back      PREVENTIVE:    Colonoscopy: 10/22/13, Dr. Jennifer Benavides, repeat 10 years  EGD: 4/16/16, Dr Jennifer Benavides, repeat 3 years, cancelled has not had any problems  PSA: 1/13 dx'd with prostate cancer,  12/18 0.2, followed by Dr. Caputo Late, 12/19 0.3  AAA screen: 10/10, negative  Tdap: declines further   Pneumovax: 12/20/2019  Prevnar 13: 6/14/2017  Shingrix: both doses complete  Flu shot: 09/01/21  Eye exam: Dr. Evelia Croft 3/22  Lipids: 8/21   A1c: 6/16 5.9, 12/16 5.7  Pain contract: 09/01/21  Urine drug screen: c/w tramadol, 3/22  Covid: both + booster (moderna)    Patient Active Problem List    Diagnosis Date Noted    Opioid use, unspecified with unspecified opioid-induced disorder 82/87/3188    Acute systolic congestive heart failure ([facility] [address] 08/05/2021    Dilated aortic root ([facility] [address] 08/05/2021    Mixed hyperlipidemia 08/05/2021    Primary osteoarthritis of both hands 06/13/2018    Prostate cancer ([facility] [address] 02/18/2013    Chronic kidney disease, stage III (moderate) (HCC) 06/28/2011    Vitamin D deficiency 06/27/2011    DJD (degenerative joint disease), multiple sites 03/09/2010    GERD (gastroesophageal reflux disease) 03/09/2010    History of peptic ulcer disease 03/09/2010    Benign prostatic hyperplasia 03/09/2010    Perennial allergic rhinitis 03/09/2010     Current Outpatient Medications   Medication Sig Dispense Refill    traMADoL (ULTRAM) 50 mg tablet TAKE 1 TABLET BY MOUTH THREE TIMES A DAY AS NEEDED FOR PAIN UP TO 30 DAYS.  MAX 3 TABLETS PER DAY 90 Tablet 0    levocetirizine (XYZAL) 5 mg tablet TAKE ONE TABLET BY MOUTH EVERY NIGHT AT BEDTIME 90 Tablet 0    carvediloL (COREG) 6.25 mg tablet TAKE ONE TABLET BY MOUTH TWICE A  Tablet 3    lactulose (CHRONULAC) 10 gram/15 mL solution TAKE 5ML BY MOUTH THREE TIMES A DAY (Patient taking differently: daily as needed.) 473 mL 1     Past Surgical History:   Procedure Laterality Date    ENDOSCOPY, COLON, DIAGNOSTIC  2006    by Lucy IRELAND; normal    HX CATARACT REMOVAL  12/28/2014    both eyes    HX COLONOSCOPY  10/22/2013    HX ENDOSCOPY      Followed by Dr. Santos Resendiz (multiple)    HX HEENT  1970's    septoplasty    HX ORTHOPAEDIC  2000    right rotator cuff repair    HX ORTHOPAEDIC      right knee total replacement    HX PROSTATECTOMY  2010    green light laser by Dr. Stacey Sharif HX UROLOGICAL  5/7/13    CYSTOSCOPY WITH OPTICAL INTERNAL URETHROTOMY, AND CRYOABLATION OF PROSTATE      Lab Results   Component Value Date/Time    WBC 5.3 08/13/2021 09:03 AM    HGB 14.7 08/13/2021 09:03 AM    HCT 43.7 08/13/2021 09:03 AM    PLATELET 031 82/71/4632 09:03 AM    MCV 98 (H) 08/13/2021 09:03 AM     Lab Results   Component Value Date/Time    Cholesterol, total 194 08/13/2021 09:03 AM    HDL Cholesterol 38 (L) 08/13/2021 09:03 AM LDL, calculated 131 (H) 08/13/2021 09:03 AM    LDL, calculated 131 (H) 08/27/2020 10:36 AM    Triglyceride 138 08/13/2021 09:03 AM    CHOL/HDL Ratio 3.6 06/25/2010 08:36 AM     Lab Results   Component Value Date/Time    GFR est non-AA 36 (L) 02/24/2022 09:27 AM    GFR est AA 42 (L) 02/24/2022 09:27 AM    Creatinine 1.67 (H) 02/24/2022 09:27 AM    BUN 32 (H) 02/24/2022 09:27 AM    Sodium 145 (H) 02/24/2022 09:27 AM    Potassium 5.1 02/24/2022 09:27 AM    Chloride 109 (H) 02/24/2022 09:27 AM    CO2 20 02/24/2022 09:27 AM        Review of Systems   Respiratory: Negative for shortness of breath. Cardiovascular: Negative for chest pain. Physical Exam  Constitutional:       General: He is not in acute distress. Appearance: Normal appearance. He is not ill-appearing, toxic-appearing or diaphoretic. HENT:      Head: Normocephalic and atraumatic. Right Ear: External ear normal.      Left Ear: External ear normal.   Eyes:      General:         Right eye: No discharge. Left eye: No discharge. Conjunctiva/sclera: Conjunctivae normal.      Pupils: Pupils are equal, round, and reactive to light. Cardiovascular:      Rate and Rhythm: Normal rate and regular rhythm. Heart sounds: Murmur (2/6 murmur) heard. No friction rub. No gallop. Pulmonary:      Effort: No respiratory distress. Breath sounds: Normal breath sounds. No wheezing or rales. Chest:      Chest wall: No tenderness. Musculoskeletal:         General: Normal range of motion. Cervical back: Normal range of motion and neck supple. Right lower leg: No edema. Left lower leg: No edema. Skin:     General: Skin is warm and dry. Neurological:      Mental Status: He is alert and oriented to person, place, and time. Mental status is at baseline.       Coordination: Coordination normal.      Gait: Gait normal.   Psychiatric:         Mood and Affect: Mood normal.         Behavior: Behavior normal. ASSESSMENT and PLAN    ICD-10-CM ICD-9-CM    1. Acute systolic congestive heart failure (HCC)    I50.21 428.21 LIPID PANEL     276.9 METABOLIC PANEL, COMPREHENSIVE   Medically managed on Coreg up-to-date with cardiology   HEMOGLOBIN A1C WITH EAG      TSH 3RD GENERATION      CBC W/O DIFF   2. Stage 3a chronic kidney disease (HCC)  N18.31 585.3 LIPID PANEL   Creatinine overall stable runs around 1.5-1.7 baseline monitoring   METABOLIC PANEL, COMPREHENSIVE      HEMOGLOBIN A1C WITH EAG      TSH 3RD GENERATION      CBC W/O DIFF   3. Prostate cancer ([facility] [address]  C61 185 LIPID PANEL      METABOLIC PANEL, COMPREHENSIVE   Has biochemical recurrence not interested in further monitoring or seeing urology   HEMOGLOBIN A1C WITH EAG      TSH 3RD GENERATION      CBC W/O DIFF   4. IFG (impaired fasting glucose)  R73.01 790.21 LIPID PANEL      METABOLIC PANEL, COMPREHENSIVE   Check A1c's diet controlled will check labs prior to follow-up   HEMOGLOBIN A1C WITH EAG      TSH 3RD GENERATION      CBC W/O DIFF   5. Osteoarthritis of multiple joints, unspecified osteoarthritis type  M15.9 715.89 LIPID PANEL      METABOLIC PANEL, COMPREHENSIVE   Gets tramadol from me    90 tablets at a time    Uses 2 to 3/day    No signs of abuse    Pain contract will be updated this fall    Urine drug screen is up-to-date and will be repeated at follow-up     will be reviewed   HEMOGLOBIN A1C WITH EAG      TSH 3RD GENERATION      CBC W/O DIFF      TOXASSURE SELECT 13 (MW)   6. Gastroesophageal reflux disease without esophagitis  K21.9 530.81 LIPID PANEL      METABOLIC PANEL, COMPREHENSIVE   Diet controlled   HEMOGLOBIN A1C WITH EAG      TSH 3RD GENERATION      CBC W/O DIFF   7. Dilated aortic root (HCC)  I77.810 447.71 LIPID PANEL      METABOLIC PANEL, COMPREHENSIVE   Follows with cardiology   HEMOGLOBIN A1C WITH EAG      TSH 3RD GENERATION      CBC W/O DIFF   8.  Mixed hyperlipidemia  E78.2 272.2 LIPID PANEL      METABOLIC PANEL, COMPREHENSIVE   Diet controlled   HEMOGLOBIN A1C WITH EAG      TSH 3RD GENERATION      CBC W/O DIFF   9. Opioid use, unspecified with unspecified opioid-induced disorder  F11.99 292.9 TOXASSURE SELECT 13 (MW)   See above  305.50         Depression screen reviewed and negative     Scribed by Werner Abernathy [facility], as dictated by Dr. Vicente Schmidt. Current diagnosis and concerns discussed with pt at length. Pt understands risks and benefits or current treatment plan and medications, and accepts the treatment and medication with any possible risks. Pt asks appropriate questions, which were answered. Pt was instructed to call with any concerns or problems. I have reviewed the note documented by the scribe. The services provided are my own.   The documentation is accurate

## 2022-08-18 ENCOUNTER — OFFICE VISIT (OUTPATIENT)
Dept: NEUROLOGY | Facility: CLINIC | Age: 47
End: 2022-08-18
Payer: MEDICARE

## 2022-08-18 DIAGNOSIS — G20.A1 PARKINSON DISEASE: Primary | ICD-10-CM

## 2022-08-18 PROCEDURE — 99213 OFFICE O/P EST LOW 20 MIN: CPT | Mod: 95,,, | Performed by: PSYCHIATRY & NEUROLOGY

## 2022-08-18 PROCEDURE — 99213 PR OFFICE/OUTPT VISIT, EST, LEVL III, 20-29 MIN: ICD-10-PCS | Mod: 95,,, | Performed by: PSYCHIATRY & NEUROLOGY

## 2022-08-18 RX ORDER — AMANTADINE HYDROCHLORIDE 100 MG/1
100 CAPSULE, GELATIN COATED ORAL 2 TIMES DAILY
Qty: 180 CAPSULE | Refills: 3 | Status: SHIPPED | OUTPATIENT
Start: 2022-08-18 | End: 2023-07-25 | Stop reason: SDUPTHER

## 2022-08-18 RX ORDER — BENZTROPINE MESYLATE 2 MG/1
2 TABLET ORAL 2 TIMES DAILY
Qty: 180 TABLET | Refills: 3 | Status: SHIPPED | OUTPATIENT
Start: 2022-08-18 | End: 2023-05-31

## 2022-08-18 RX ORDER — CARBIDOPA AND LEVODOPA 25; 100 MG/1; MG/1
TABLET ORAL
Qty: 315 TABLET | Refills: 3 | Status: SHIPPED | OUTPATIENT
Start: 2022-08-18 | End: 2023-07-25 | Stop reason: SDUPTHER

## 2022-08-18 NOTE — PROGRESS NOTES
"Neurology Telemedicine Note     Name: Zaira Jensen  MRN: [id number]   CSN: [id number]      Date: 08/18/2022    The patient location is: Home  The chief complaint leading to consultation is: f/u PD  Visit type: Virtual visit with synchronous audio and video    TOTAL TIME SPENT: 15 min    Each patient to whom I provide medical services by telemedicine is:  (1) informed of the relationship between the physician and patient and the respective role of any other health care provider with respect to management of the patient; and (2) notified that they may decline to receive medical services by telemedicine and may withdraw from such care at any time.    History of Present Illness (HPI):   - cd/ld 25-100mg 1 tablet TID and 0.5 once nightly.  Had tried full dose QID and felt her taste changed.  - at baseline since last visit   - denies shuffling, festination, gait disturbance  - doing well with memory  - dyskinesia is mild but noticeable  - DISCUSSED FUS AT HER QUESTIONING - SHE IS LIKELY NOT A GREAT CANDIDATE GIVEN B SYMPTOMS AND MINIMAL TREMOR    From Jan 2022:  - is having more discoloration of the legs (hard to visualize on camera), doesn't clearly describe livedo, but could be different appearnce with her natural pigment  - feels her leg cramping and dystonia are doing well though, only a few bad toe cramps for the last several months!  - still driving, not as nervous as she used to be  - but still has some fears when her menstrual cycle is there - seems to be with worse PD symptoms then  - memory is "pretty good", mood is good too  - usual pattern of 1 CD/LD TID, Amantadine BID and Bentropine BID  - tremor only occurs if she is anxious  - does complain of some word-finding difficulties      Nonmotor/Premotor ROS: as per HPI, and all other systems are negative    Past Medical History: The patient  has a past medical history of Allergy, Anemia, Arthritis, Chronic back pain, Chronic neck pain, Functional ovarian cysts, " GERD (gastroesophageal reflux disease), Headache(784.0), Quadriparesis (muscle weakness) (1/16/2018), Radiculopathy of arm, Rash, and Respiratory distress.    Social History: The patient  reports that she has never smoked. She has never used smokeless tobacco. She reports that she does not drink alcohol and does not use drugs.    Family History: Their family history includes Cancer in her father; Diabetes in her father; Heart disease in her father; No Known Problems in her mother.    Allergies: Bactrim [sulfamethoxazole-trimethoprim], Codeine, Hydrocodone, Nexium [esomeprazole magnesium], Nickel sutures [surgical stainless steel], Pantoprazole, Pcn [penicillins], Sulfa (sulfonamide antibiotics), and Vicks vapor inhaler [l-desoxyephedrine]     Meds:   Current Outpatient Medications on File Prior to Visit   Medication Sig Dispense Refill    amantadine HCL (SYMMETREL) 100 mg capsule TAKE 1 CAPSULE(100 MG) BY MOUTH TWICE DAILY 60 capsule 11    b complex vitamins capsule Take 1 capsule by mouth once daily.      benztropine (COGENTIN) 2 MG Tab TAKE 1 TABLET(2 MG) BY MOUTH TWICE DAILY 180 tablet 3    carbidopa-levodopa  mg (SINEMET)  mg per tablet TAKE 1 AND 1/2 TABLET EVERY MORNING,1 TABLET AT NOOD, 1 EVERY EVENING 180 tablet 11    methylPREDNISolone (MEDROL DOSEPACK) 4 mg tablet use as directed 1 each 0    progesterone (PROMETRIUM) 200 MG capsule       pyridoxine, vitamin B6, (B-6) 25 MG Tab Take 1 tablet (25 mg total) by mouth once daily. (Patient not taking: No sig reported) 30 tablet 11    zinc 50 mg Tab Take 50 mg by mouth once daily. (Patient not taking: No sig reported) 30 each 11     No current facility-administered medications on file prior to visit.       Exam:  Vital Signs deferred with home visit    Constitutional  Well-developed, well-nourished, appears stated age   Eyes  No scleral icterus   ENT  Moist oral mucosa   Cardiovascular  No lower extremity edema    Respiratory  No labored  breathing    Skin  No rashes   Hematologic  No bruising   Psychiatric  Normal mood and affect   Other  GI/ deferred    Neurological     * Mental status  Alert and oriented to person, place, time, and situation; no dysarthria; no aphasia; normal recent and remote memory; follows commands   * Cranial nerves     - CN II  Pupils midposition and symmetric   - CN III, IV, VI  Extraocular movements full, no nystagmus visualized   - CN V  Unable to test   - CN VII  Face strong and symmetric bilaterally    - CN VIII  Hearing grossly intact with conversation    - CN IX, X  Palate raises midline and symmetric    - CN XI  Strong shoulder shrug B    - CN XII  Tongue appears midline    * Motor  Normal bulk by appearance, no drift    * Sensory  Not tested objectively, no changes described by the patient   * Deep tendon reflexes  Not tested   * Coord/Movemt/Gait Mild softer, scratchy speech pattern, query dystonic.  Mild facial masking.  Mod L>R B bradykinesia - stable.  Has head moving dyskinesia, mild  No tremor with rest, posture, kinesis, or intention.   No chorea, athetosis, dystonia, myoclonus, or tics.   No motor impersistence.  Gait is deferred for safety.           Medical Record Review:  - Lab Results:  No visits with results within 3 Month(s) from this visit.   Latest known visit with results is:   Lab Visit on 07/25/2019   Component Date Value Ref Range Status    Iron 07/25/2019 34  30 - 160 ug/dL Final    Transferrin 07/25/2019 353  200 - 375 mg/dL Final    TIBC 07/25/2019 522 (A) 250 - 450 ug/dL Final    Saturated Iron 07/25/2019 7 (A) 20 - 50 % Final    Ferritin 07/25/2019 10 (A) 20.0 - 300.0 ng/mL Final    Sol. Transferrin Receptor 07/25/2019 4.8 (A) 1.8 - 4.6 mg/L Final    WBC 07/25/2019 3.73 (A) 3.90 - 12.70 K/uL Final    RBC 07/25/2019 3.72 (A) 4.00 - 5.40 M/uL Final    Hemoglobin 07/25/2019 11.0 (A) 12.0 - 16.0 g/dL Final    Hematocrit 07/25/2019 34.6 (A) 37.0 - 48.5 % Final    MCV 07/25/2019 93  82  - 98 fL Final    MCH 07/25/2019 29.6  27.0 - 31.0 pg Final    MCHC 07/25/2019 31.8 (A) 32.0 - 36.0 g/dL Final    RDW 07/25/2019 13.4  11.5 - 14.5 % Final    Platelets 07/25/2019 260  150 - 350 K/uL Final    MPV 07/25/2019 9.2  9.2 - 12.9 fL Final    Gran # (ANC) 07/25/2019 2.5  1.8 - 7.7 K/uL Final    Immature Grans (Abs) 07/25/2019 0.00  0.00 - 0.04 K/uL Final    Lymph # 07/25/2019 0.9 (A) 1.0 - 4.8 K/uL Final    Mono # 07/25/2019 0.3  0.3 - 1.0 K/uL Final    Eos # 07/25/2019 0.1  0.0 - 0.5 K/uL Final    Baso # 07/25/2019 0.02  0.00 - 0.20 K/uL Final    nRBC 07/25/2019 0  0 /100 WBC Final    Gran % 07/25/2019 66.8  38.0 - 73.0 % Final    Lymph % 07/25/2019 24.1  18.0 - 48.0 % Final    Mono % 07/25/2019 6.7  4.0 - 15.0 % Final    Eosinophil % 07/25/2019 1.9  0.0 - 8.0 % Final    Basophil % 07/25/2019 0.5  0.0 - 1.9 % Final    Differential Method 07/25/2019 Automated   Final    Arsenic 07/25/2019 <1  0 - 12 ng/mL Final    Lead 07/25/2019 <1.0  0.0 - 4.9 mcg/dL Final    Cadmium 07/25/2019 0.3  0.0 - 4.9 ng/mL Final    Mercury 07/25/2019 <1  0 - 9 ng/mL Final    Venous/Capillary 07/25/2019 Venous   Final    Street Address 07/25/2019 [address]   Final    City 07/25/2019 [place]   Final    State [place]   Final    Zip 07/25/2019 [id number]   Final    [facility] 07/25/[facility]   Final    Guardian First Name 07/25/2019 n/a   Final    Guardian Last Name 07/25/2019 n/a   Final    Home Phone 07/25/2019 [phone]   Final    Race 07/25/2019 n/a   Final       Diagnoses:                                                                                           Young onset Parkinson's disease, limb-onset dystonia, early ldopa-induced dyskinesia.  Commonly associated with the [place] gene mutation.  But I need to further investigate hereditary hemochromatosis given her abnormal iron studies and imaging suggesting early iron overload in the GPi bilaterally.  Still stable.     Plan:  - still  encouraged genetic studies in the future if interested  - needs 90 day supplies with 1 year refills  - KEEP MEDS THE SAME            Shun Tanner MD, MPH  Division of Movement and Memory Disorders  [facility]  [phone]

## 2022-09-20 ENCOUNTER — PATIENT MESSAGE (OUTPATIENT)
Dept: FAMILY MEDICINE | Facility: CLINIC | Age: 47
End: 2022-09-20
Payer: MEDICARE

## 2022-09-20 ENCOUNTER — TELEPHONE (OUTPATIENT)
Dept: FAMILY MEDICINE | Facility: CLINIC | Age: 47
End: 2022-09-20
Payer: MEDICARE

## 2022-10-26 ENCOUNTER — OCCUPATIONAL HEALTH (OUTPATIENT)
Dept: URGENT CARE | Facility: CLINIC | Age: 47
End: 2022-10-26

## 2022-10-26 DIAGNOSIS — Z13.9 ENCOUNTER FOR SCREENING: Primary | ICD-10-CM

## 2022-10-26 LAB — COLLECTION ONLY: NORMAL

## 2022-10-26 PROCEDURE — 80305 DRUG TEST PRSMV DIR OPT OBS: CPT | Mod: S$GLB,,, | Performed by: EMERGENCY MEDICINE

## 2022-10-26 PROCEDURE — 80305 OOH COLLECTION ONLY DRUG SCREEN: ICD-10-PCS | Mod: S$GLB,,, | Performed by: EMERGENCY MEDICINE

## 2023-02-07 NOTE — PT/OT/SLP PROGRESS
Physical Therapy         Treatment        Zaira Jensen   MRN: [id number]     PT Received On: 11/01/17  Total Time (min): 110        Billable Minutes:  Gait [id number], Therapeutic Activity 25min, Therapeutic Exercise 20min, Neuromuscular Re-education 20min and Train/Wheelchair Management 20min  Total Minutes: 110    Treatment Type: Treatment  PT/PTA: PTA     PTA Visit Number: 2       General Precautions: Standard, fall  Orthopedic Precautions: Orthopedic Precautions : N/A   Braces: Braces: Cervical collar         Subjective:  Communicated with nurse Villeda prior to session.    Pain/Comfort  Pain Rating 1: 0/10    Objective:  Patient found seated commode in restroom, with Patient found with: cervical collar    Functional Mobility:  Bed Mobility:   Supine to sit:    Sit to supine:    Rolling:    Scooting:     Balance:   Static Sit: FAIR: Maintains without assist, but unable to take any challenges   Dynamic Sit:  FAIR+: Maintains balance through MINIMAL excursions of active trunk motion  Static Stand: FAIR: Maintains without assist but unable to take challenges  Dynamic stand: FAIR: Needs CONTACT GUARD during gait    Transfer Training:  Sit to stand:Contact Guard Assistance with rollator 4 trials    Wheelchair Training:  Pt propelled Standard wheelchair x 30' feet on Level tile with  Bilateral upper extremity and Bilateral lower extremity with Contact Guard Assistance.     Gait Training:  Ambulated 300' x 3 trials with rollator, CGA  and rest periods between each.    Stair Training:        Additional Treatment:  Transferred Off commode with CGA, stood for hygiene with SBA, with extra time and tissue give. Washed hands at sink seated in chair with A for soap.  LE exercises seated EOM with UE support: TKE, Hip abd /add/ flexion, AP's. Red theraband for Hip abd / add and resisted TKE's,  2 sets 10 reps each.  Standing in // bars : Heel raises, mini squats, marches, hip abd /add x 20 reps each.    Activity  Tolerance:  Patient tolerated treatment well    Patient left up in chair with call button in reach, chair alarm on and nurse Christiana notified.    Assessment:  Zaira Jensen is a 42 y.o. female with a medical diagnosis of <principal problem not specified>. She presents with weakness, occasional L shoulder pain.    Rehab potential is good.    Activity tolerance: Good    Discharge recommendations: Discharge Facility/Level Of Care Needs: home     Equipment recommendations:       GOALS:    Physical Therapy Goals        Problem: Physical Therapy Goal    Goal Priority Disciplines Outcome Goal Variances Interventions   Physical Therapy Goal     PT/OT, PT Ongoing (interventions implemented as appropriate)     Description:  Goals to be met by: 2017     Patient will increase functional independence with mobility by performin). Supine to sit with Stand-by Assistance  2). Sit to supine with Stand-by Assistance  3). Rolling to Left and Right with Stand-by Assistance.  4). Sit to stand transfer with Minimal Assistance = MET  5). Gait  x  > 25 feet with Contact Guard Assistance using Rolling Walker = MET  6). Wheelchair propulsion x > 150 feet with Stand-by Assistance      NEW 10/18/2017:    7). Gait  x  > 25 feet with Stand-by Assistance using Rolling Walker or Rollator    NEW 10/23/2017:  8). Sit to stand transfer with Contact Guard Assistance             Problem: Physical Therapy Goal    Goal Priority Disciplines Outcome Goal Variances Interventions   Physical Therapy Goal     PT/OT, PT Ongoing (interventions implemented as appropriate)                     PLAN:    Patient to be seen daily  to address the above listed problems via gait training, therapeutic activities, therapeutic exercises, wheelchair management/training  Plan of Care expires: 17  Plan of Care reviewed with: patient         Rody Murphy, PTA 2017   Is This A New Presentation, Or A Follow-Up?: Follow Up Imiquimod Therapy What Type Of Imiquimod Are You Applying: generic 5% cream How Severe Are The Lesions?: moderate How Many Weeks Of Imiquimod Have You Completed?: 5

## 2023-05-31 RX ORDER — BENZTROPINE MESYLATE 2 MG/1
TABLET ORAL
Qty: 180 TABLET | Refills: 3 | Status: SHIPPED | OUTPATIENT
Start: 2023-05-31 | End: 2023-07-25 | Stop reason: SDUPTHER

## 2023-07-25 NOTE — TELEPHONE ENCOUNTER
----- Message from Alanna Martinez sent at 7/25/2023  1:44 PM CDT -----  Regarding: RX Refill; appt on 9/21/23  Contact: PT @ [phone]  Rx Refill/Request    Is this a Refill or New Rx: refills; 3     Rx Name and Strength: amantadine HCL (SYMMETREL) 100 mg capsule, carbidopa-levodopa  mg (SINEMET)  mg per tablet and benztropine (COGENTIN) 2 MG Tab    Preferred Pharmacy with phone number:     [facility] #[id number] [address] & [address] [address]  Phone: [phone] Fax: [phone]       Communication Preference: PT @ [phone]    Additional Information: Pt is scheduled for a virtual visit on 9/21/23 but is asking to please have rxs sent to pharmacy today; she is running out. Please call to advise. Thanks.

## 2023-07-25 NOTE — TELEPHONE ENCOUNTER
----- Message from Alanna Martinez sent at 7/25/2023  1:44 PM CDT -----  Regarding: RX Refill; appt on 9/21/23  Contact: PT @ [phone]  Rx Refill/Request    Is this a Refill or New Rx: refills; 3     Rx Name and Strength: amantadine HCL (SYMMETREL) 100 mg capsule, carbidopa-levodopa  mg (SINEMET)  mg per tablet and benztropine (COGENTIN) 2 MG Tab    Preferred Pharmacy with phone number:     [facility] #[id number] [address] & [address] [address]  Phone: [phone] Fax: [phone]       Communication Preference: PT @ [phone]    Additional Information: Pt is scheduled for a virtual visit on 9/21/23 but is asking to please have rxs sent to pharmacy today; she is running out. Please call to advise. Thanks.

## 2023-07-26 RX ORDER — BENZTROPINE MESYLATE 2 MG/1
2 TABLET ORAL 2 TIMES DAILY
Qty: 180 TABLET | Refills: 3 | Status: SHIPPED | OUTPATIENT
Start: 2023-07-26 | End: 2023-08-30

## 2023-07-26 RX ORDER — AMANTADINE HYDROCHLORIDE 100 MG/1
100 CAPSULE, GELATIN COATED ORAL 2 TIMES DAILY
Qty: 180 CAPSULE | Refills: 3 | Status: SHIPPED | OUTPATIENT
Start: 2023-07-26

## 2023-07-26 RX ORDER — CARBIDOPA AND LEVODOPA 25; 100 MG/1; MG/1
TABLET ORAL
Qty: 315 TABLET | Refills: 3 | Status: SHIPPED | OUTPATIENT
Start: 2023-07-26 | End: 2024-02-02 | Stop reason: SDUPTHER

## 2023-07-28 RX ORDER — BENZTROPINE MESYLATE 2 MG/1
2 TABLET ORAL 2 TIMES DAILY
Qty: 180 TABLET | Refills: 3 | OUTPATIENT
Start: 2023-07-28

## 2023-07-28 RX ORDER — CARBIDOPA AND LEVODOPA 25; 100 MG/1; MG/1
TABLET ORAL
Qty: 315 TABLET | Refills: 3 | OUTPATIENT
Start: 2023-07-28

## 2023-07-28 RX ORDER — AMANTADINE HYDROCHLORIDE 100 MG/1
100 CAPSULE, GELATIN COATED ORAL 2 TIMES DAILY
Qty: 180 CAPSULE | Refills: 3 | OUTPATIENT
Start: 2023-07-28

## 2023-08-28 ENCOUNTER — TELEPHONE (OUTPATIENT)
Dept: NEUROLOGY | Facility: CLINIC | Age: 48
End: 2023-08-28
Payer: MEDICARE

## 2023-08-28 NOTE — TELEPHONE ENCOUNTER
----- Message from Tayler Moctezuma sent at 8/28/2023 11:11 AM CDT -----  Regarding: Rx Refill  Contact: [phone]  Rx Refill/Request      Rx Name and Strength:benztropine (COGENTIN) 2 MG Tab      Preferred Pharmacy with phone number:     [facility] #[id number] [address] & [address] [address]  Phone: [phone] Fax: [phone]         Communication Preference:[phone]

## 2023-08-28 NOTE — TELEPHONE ENCOUNTER
----- Message from Tayler Moctezuma sent at 8/28/2023 11:11 AM CDT -----  Regarding: Rx Refill  Contact: [phone]  Rx Refill/Request      Rx Name and Strength:benztropine (COGENTIN) 2 MG Tab      Preferred Pharmacy with phone number:     [facility] #[id number] [address] & [address] [address]  Phone: [phone] Fax: [phone]         Communication Preference:[phone]

## 2023-08-28 NOTE — TELEPHONE ENCOUNTER
Called patient to advise her Medication refill is already at the pharmacy. Also called [facility] to let them know to refill- refills are on file.

## 2023-08-28 NOTE — TELEPHONE ENCOUNTER
----- Message from Tayler Moctezuma sent at 8/28/2023 11:11 AM CDT -----  Regarding: Rx Refill  Contact: [phone]  Rx Refill/Request      Rx Name and Strength:benztropine (COGENTIN) 2 MG Tab      Preferred Pharmacy with phone number:     [facility] #[id number] [address] & [address] [address]  Phone: [phone] Fax: [phone]         Communication Preference:[phone]

## 2023-08-30 RX ORDER — BENZTROPINE MESYLATE 2 MG/1
2 TABLET ORAL 2 TIMES DAILY
Qty: 180 TABLET | Refills: 3 | Status: SHIPPED | OUTPATIENT
Start: 2023-08-30

## 2023-09-21 ENCOUNTER — OFFICE VISIT (OUTPATIENT)
Dept: NEUROLOGY | Facility: CLINIC | Age: 48
End: 2023-09-21
Payer: MEDICARE

## 2023-09-21 DIAGNOSIS — G20.A1 PARKINSON DISEASE: Primary | ICD-10-CM

## 2023-09-21 PROCEDURE — 99214 OFFICE O/P EST MOD 30 MIN: CPT | Mod: 95,,, | Performed by: PSYCHIATRY & NEUROLOGY

## 2023-09-21 PROCEDURE — 99214 PR OFFICE/OUTPT VISIT, EST, LEVL IV, 30-39 MIN: ICD-10-PCS | Mod: 95,,, | Performed by: PSYCHIATRY & NEUROLOGY

## 2023-09-21 NOTE — PROGRESS NOTES
Neurology Telemedicine Note     Name: Zaira Jensen  MRN: [id number]   CSN: [id number]      Date: 09/21/2023    The patient location is: Home  The chief complaint leading to consultation is: f/u PD  Visit type: Virtual visit with synchronous audio and video    TOTAL TIME SPENT: 22 min    Each patient to whom I provide medical services by telemedicine is:  (1) informed of the relationship between the physician and patient and the respective role of any other health care provider with respect to management of the patient; and (2) notified that they may decline to receive medical services by telemedicine and may withdraw from such care at any time.    History of Present Illness (HPI):  - new thing is the R hip/back are starting to hurt as a sign of wearing off  - more dyskinesia in general, tries to suppress  - still taking amantadine and benztropine BID each, might be worse in the middle of the day (9:30 and 10 pm)  - was worried about work abilities and memory, but may just be system (supervisor reassured)  - a week ago, started getting more dry mouth  - says she is taking cd/ld now up to three hours not 3x/day  - better in AM    From Aug 2022:  - cd/ld 25-100mg 1 tablet TID and 0.5 once nightly.  Had tried full dose QID and felt her taste changed.  - at baseline since last visit   - denies shuffling, festination, gait disturbance  - doing well with memory  - dyskinesia is mild but noticeable  - DISCUSSED FUS AT HER QUESTIONING - SHE IS LIKELY NOT A GREAT CANDIDATE GIVEN B SYMPTOMS AND MINIMAL TREMOR    From Jan 2022:  - is having more discoloration of the legs (hard to visualize on camera), doesn't clearly describe livedo, but could be different appearnce with her natural pigment  - feels her leg cramping and dystonia are doing well though, only a few bad toe cramps for the last several months!  - still driving, not as nervous as she used to be  - but still has some fears when her menstrual cycle is there - seems to  "be with worse PD symptoms then  - memory is "pretty good", mood is good too  - usual pattern of 1 CD/LD TID, Amantadine BID and Bentropine BID  - tremor only occurs if she is anxious  - does complain of some word-finding difficulties      Nonmotor/Premotor ROS: as per HPI, and all other systems are negative    Past Medical History: The patient  has a past medical history of Allergy, Anemia, Arthritis, Chronic back pain, Chronic neck pain, Functional ovarian cysts, GERD (gastroesophageal reflux disease), Headache(784.0), Quadriparesis (muscle weakness) (1/16/2018), Radiculopathy of arm, Rash, and Respiratory distress.    Social History: The patient  reports that she has never smoked. She has never used smokeless tobacco. She reports that she does not drink alcohol and does not use drugs.    Family History: Their family history includes Cancer in her father; Diabetes in her father; Heart disease in her father; No Known Problems in her mother.    Allergies: Bactrim [sulfamethoxazole-trimethoprim], Codeine, Hydrocodone, Nexium [esomeprazole magnesium], Nickel sutures [surgical stainless steel], Pantoprazole, Pcn [penicillins], Sulfa (sulfonamide antibiotics), and Vicks vapor inhaler [l-desoxyephedrine]     Meds:   Current Outpatient Medications on File Prior to Visit   Medication Sig Dispense Refill    amantadine HCL (SYMMETREL) 100 mg capsule Take 1 capsule (100 mg total) by mouth 2 (two) times a day. 180 capsule 3    b complex vitamins capsule Take 1 capsule by mouth once daily.      benztropine (COGENTIN) 2 MG Tab TAKE 1 TABLET(2 MG) BY MOUTH TWICE DAILY 180 tablet 3    carbidopa-levodopa  mg (SINEMET)  mg per tablet TAKE 1 TAB PO TID + 1/2 TAB PO  tablet 3    methylPREDNISolone (MEDROL DOSEPACK) 4 mg tablet use as directed 1 each 0    progesterone (PROMETRIUM) 200 MG capsule       pyridoxine, vitamin B6, (B-6) 25 MG Tab Take 1 tablet (25 mg total) by mouth once daily. (Patient not taking: No sig " reported) 30 tablet 11    zinc 50 mg Tab Take 50 mg by mouth once daily. (Patient not taking: No sig reported) 30 each 11     No current facility-administered medications on file prior to visit.       Exam:  Vital Signs deferred with home visit    * Coord/Movemt/Gait Mild soft speech, moves head more with talking  Mild facial masking.  Has either dystolic face (left pulling) versus right sided facial palsy, has head moving dyskinesia, mild  No tremor with rest, posture, kinesis, or intention.   No chorea, athetosis, dystonia, myoclonus, or tics.   No motor impersistence.  Gait is deferred for safety.           Medical Record Review:  - Lab Results:  No visits with results within 3 Month(s) from this visit.   Latest known visit with results is:   Lab Visit on 07/25/2019   Component Date Value Ref Range Status    Iron 07/25/2019 34  30 - 160 ug/dL Final    Transferrin 07/25/2019 353  200 - 375 mg/dL Final    TIBC 07/25/2019 522 (H)  250 - 450 ug/dL Final    Saturated Iron 07/25/2019 7 (L)  20 - 50 % Final    Ferritin 07/25/2019 10 (L)  20.0 - 300.0 ng/mL Final    Sol. Transferrin Receptor 07/25/2019 4.8 (H)  1.8 - 4.6 mg/L Final    WBC 07/25/2019 3.73 (L)  3.90 - 12.70 K/uL Final    RBC 07/25/2019 3.72 (L)  4.00 - 5.40 M/uL Final    Hemoglobin 07/25/2019 11.0 (L)  12.0 - 16.0 g/dL Final    Hematocrit 07/25/2019 34.6 (L)  37.0 - 48.5 % Final    MCV 07/25/2019 93  82 - 98 fL Final    MCH 07/25/2019 29.6  27.0 - 31.0 pg Final    MCHC 07/25/2019 31.8 (L)  32.0 - 36.0 g/dL Final    RDW 07/25/2019 13.4  11.5 - 14.5 % Final    Platelets 07/25/2019 260  150 - 350 K/uL Final    MPV 07/25/2019 9.2  9.2 - 12.9 fL Final    Gran # (ANC) 07/25/2019 2.5  1.8 - 7.7 K/uL Final    Immature Grans (Abs) 07/25/2019 0.00  0.00 - 0.04 K/uL Final    Lymph # 07/25/2019 0.9 (L)  1.0 - 4.8 K/uL Final    Mono # 07/25/2019 0.3  0.3 - 1.0 K/uL Final    Eos # 07/25/2019 0.1  0.0 - 0.5 K/uL Final    Baso # 07/25/2019 0.02  0.00 - 0.20 K/uL Final     "nRBC 07/25/2019 0  0 /100 WBC Final    Gran % 07/25/2019 66.8  38.0 - 73.0 % Final    Lymph % 07/25/2019 24.1  18.0 - 48.0 % Final    Mono % 07/25/2019 6.7  4.0 - 15.0 % Final    Eosinophil % 07/25/2019 1.9  0.0 - 8.0 % Final    Basophil % 07/25/2019 0.5  0.0 - 1.9 % Final    Differential Method 07/25/2019 Automated   Final    Arsenic 07/25/2019 <1  0 - 12 ng/mL Final    Lead 07/25/2019 <1.0  0.0 - 4.9 mcg/dL Final    Cadmium 07/25/2019 0.3  0.0 - 4.9 ng/mL Final    Mercury 07/25/2019 <1  0 - 9 ng/mL Final    Venous/Capillary 07/25/2019 Venous   Final    Street Address 07/25/2019 [address]   Final    [facility] 07/25/2019 [place]   Final    State 07/25/2019 [place]   Final    Zip 07/25/2019 [id number]   Final    [facility] 07/25/2019 [place]   Final    Guardian First Name 07/25/2019 n/a   Final    Guardian Last Name 07/25/2019 n/a   Final    Home Phone 07/25/2019 [phone]   Final    Race 07/25/2019 n/a   Final       Diagnoses:                                                                                           Young onset Parkinson's disease, limb-onset dystonia, early ldopa-induced dyskinesia.  Commonly associated with the [place] gene mutation.  But I need to further investigate hereditary hemochromatosis given her abnormal iron studies and imaging suggesting early iron overload in the GPi bilaterally.    More unstable now.     Plan:  - still encouraged genetic studies in the future   - will cut benztropine to 1/2 tab BID due to antichol SE (dry mouth) but I'd like to also make "room" to give amtantadine three times a day  - will take cd/ld at 9:30-1:30-5:30-9:30  - amantadine at 9:30-1:30-5:30, 1/2 benztropine 9:30-5:30  - will see if nourianz might help at next visit  - introduced DBS, she'll wait              Shun Tanner MD, MPH  Division of Movement and Memory Disorders  [facility]  [phone]    "

## 2023-09-28 ENCOUNTER — TELEPHONE (OUTPATIENT)
Dept: NEUROLOGY | Facility: CLINIC | Age: 48
End: 2023-09-28
Payer: MEDICARE

## 2023-09-28 NOTE — TELEPHONE ENCOUNTER
----- Message from Nicole Nascimento sent at 9/28/2023  1:02 PM CDT -----  Regarding: Medicatio Review  Contact: [phone]  Calling in regards to speaking with provider about prescribing new medication discussed in visit. Please call to discuss.

## 2024-02-02 DIAGNOSIS — G20.A1 PARKINSON'S DISEASE, UNSPECIFIED WHETHER DYSKINESIA PRESENT, UNSPECIFIED WHETHER MANIFESTATIONS FLUCTUATE: Primary | ICD-10-CM

## 2024-02-02 RX ORDER — CARBIDOPA AND LEVODOPA 25; 100 MG/1; MG/1
1.5 TABLET ORAL
Qty: 225 TABLET | Refills: 11 | Status: SHIPPED | OUTPATIENT
Start: 2024-02-02 | End: 2024-02-29

## 2024-02-02 NOTE — TELEPHONE ENCOUNTER
"Having difficulty with cd/ld not lasting long enough.2.5 hours.    Cd/ld takes 1.25 pills- sometimes 1.5 per dose, is afraid to run out of quantity prescribed too early.     Sometimes cd/ld does not "kick in" at all.   Has started mediterranean diet but feels meds are not lasting long enough. Staggers her meds, so they are not taken at meal times.     9 am  12PM  3PM  6PM  10pm        Feels unsteady, stiffness, as med wears off.     Wants to know is she can to increase to either 1.5 tabs or 2 tabs per dose. Needs new script.    Unable to find open appt. Needs VV    With Dr. Tanner. Offered in person with Renetta but patient declines.        "

## 2024-02-02 NOTE — TELEPHONE ENCOUNTER
----- Message from Krysten Rosemary Akbar sent at 2024 11:32 AM CST -----  Regarding: Help with Medication Manangement  Contact: Pt @[phone]  Pt called in to schedule an appt; no available appts in Epic. Pt is asking for a call back soon to schedule. Thanks.         Reason appt not schedule: no availability          Patient's DX:movement disorders         Patient requesting call back or [facility] [phone]      Additional Info: Pt also states the rx carbidopa-levodopa  mg (SINEMET)  mg per tablet is only lasting for two hours.

## 2024-02-29 ENCOUNTER — OFFICE VISIT (OUTPATIENT)
Dept: NEUROLOGY | Facility: CLINIC | Age: 49
End: 2024-02-29
Payer: MEDICARE

## 2024-02-29 DIAGNOSIS — G20.B2 PARKINSON'S DISEASE WITH DYSKINESIA AND FLUCTUATING MANIFESTATIONS: Primary | ICD-10-CM

## 2024-02-29 PROCEDURE — 1159F MED LIST DOCD IN RCRD: CPT | Mod: CPTII,95,, | Performed by: PSYCHIATRY & NEUROLOGY

## 2024-02-29 PROCEDURE — 99214 OFFICE O/P EST MOD 30 MIN: CPT | Mod: 95,,, | Performed by: PSYCHIATRY & NEUROLOGY

## 2024-02-29 RX ORDER — LEVODOPA AND CARBIDOPA 95; 23.75 MG/1; MG/1
2 CAPSULE, EXTENDED RELEASE ORAL 4 TIMES DAILY
Qty: 240 CAPSULE | Refills: 11 | Status: SHIPPED | OUTPATIENT
Start: 2024-02-29 | End: 2024-03-22 | Stop reason: SDUPTHER

## 2024-02-29 NOTE — PROGRESS NOTES
Neurology Telemedicine Note     Name: Zaira Jensen  MRN: [id number]   CSN: [id number]      Date: 02/29/2024    The patient location is: Home  The chief complaint leading to consultation is: f/u  Visit type: Virtual visit with synchronous audio and video    TOTAL TIME SPENT: 22 min    Each patient to whom I provide medical services by telemedicine is:  (1) informed of the relationship between the physician and patient and the respective role of any other health care provider with respect to management of the patient; and (2) notified that they may decline to receive medical services by telemedicine and may withdraw from such care at any time.    History of Present Illness (HPI):  - first two hours of meds are best, then wears off, balance gets worse (especially stairs)  - taking meds 1 tab 5x per day, sometimes extra half dose  - extra dose causes dyskinesia though  - still on amantadine and benztropine    From [facility] 9/2023:  - new thing is the R hip/back are starting to hurt as a sign of wearing off  - more dyskinesia in general, tries to suppress  - still taking amantadine and benztropine BID each, might be worse in the middle of the day (9:30 and 10 pm)  - was worried about work abilities and memory, but may just be system (supervisor reassured)  - a week ago, started getting more dry mouth  - says she is taking cd/ld now up to three hours not 3x/day  - better in AM    From Aug 2022:  - cd/ld 25-100mg 1 tablet TID and 0.5 once nightly.  Had tried full dose QID and felt her taste changed.  - at baseline since last visit   - denies shuffling, festination, gait disturbance  - doing well with memory  - dyskinesia is mild but noticeable  - DISCUSSED FUS AT HER QUESTIONING - SHE IS LIKELY NOT A GREAT CANDIDATE GIVEN B SYMPTOMS AND MINIMAL TREMOR    From Jan 2022:  - is having more discoloration of the legs (hard to visualize on camera), doesn't clearly describe livedo, but could be different appearnce with her natural  "pigment  - feels her leg cramping and dystonia are doing well though, only a few bad toe cramps for the last several months!  - still driving, not as nervous as she used to be  - but still has some fears when her menstrual cycle is there - seems to be with worse PD symptoms then  - memory is "pretty good", mood is good too  - usual pattern of 1 CD/LD TID, Amantadine BID and Bentropine BID  - tremor only occurs if she is anxious  - does complain of some word-finding difficulties      Nonmotor/Premotor ROS: as per HPI, and all other systems are negative    Past Medical History: The patient  has a past medical history of Allergy, Anemia, Arthritis, Chronic back pain, Chronic neck pain, Functional ovarian cysts, GERD (gastroesophageal reflux disease), Headache(784.0), Quadriparesis (muscle weakness) (1/16/2018), Radiculopathy of arm, Rash, and Respiratory distress.    Social History: The patient  reports that she has never smoked. She has never used smokeless tobacco. She reports that she does not drink alcohol and does not use drugs.    Family History: Their family history includes Cancer in her father; Diabetes in her father; Heart disease in her father; No Known Problems in her mother.    Allergies: Bactrim [sulfamethoxazole-trimethoprim], Codeine, Hydrocodone, Nexium [esomeprazole magnesium], Nickel sutures [surgical stainless steel], Nuts [tree nut], Pantoprazole, Pcn [penicillins], Sulfa (sulfonamide antibiotics), and Vicks vapor inhaler [l-desoxyephedrine]     Meds:   Current Outpatient Medications on File Prior to Visit   Medication Sig Dispense Refill    amantadine HCL (SYMMETREL) 100 mg capsule Take 1 capsule (100 mg total) by mouth 2 (two) times a day. 180 capsule 3    b complex vitamins capsule Take 1 capsule by mouth once daily.      benztropine (COGENTIN) 2 MG Tab TAKE 1 TABLET(2 MG) BY MOUTH TWICE DAILY 180 tablet 3    carbidopa-levodopa  mg (SINEMET)  mg per tablet Take 1.5 tablets by mouth 5 " (five) times daily. 225 tablet 11    methylPREDNISolone (MEDROL DOSEPACK) 4 mg tablet use as directed 1 each 0    progesterone (PROMETRIUM) 200 MG capsule       pyridoxine, vitamin B6, (B-6) 25 MG Tab Take 1 tablet (25 mg total) by mouth once daily. 30 tablet 11    sars-cov-2, covid-19, (SPIKEVAX, MODERNA,, 12YRS AND UP 2023,) 50 mcg/0.5 mL injection Inject into the muscle. 0.5 mL 0    zinc 50 mg Tab Take 50 mg by mouth once daily. 30 each 11     No current facility-administered medications on file prior to visit.       Exam:  Vital Signs deferred with home visit    * Coord/Movemt/Gait Mild soft speech, moves head more with talking  Mild facial masking.  Has either dystolic face (left pulling) versus right sided facial palsy, has head moving dyskinesia, mild  No tremor with rest, posture, kinesis, or intention.   No chorea, athetosis, dystonia, myoclonus, or tics.   No motor impersistence.  Gait is deferred for safety.           Medical Record Review:  - Lab Results:  No visits with results within 3 Month(s) from this visit.   Latest known visit with results is:   Lab Visit on 07/25/2019   Component Date Value Ref Range Status    Iron 07/25/2019 34  30 - 160 ug/dL Final    Transferrin 07/25/2019 353  200 - 375 mg/dL Final    TIBC 07/25/2019 522 (H)  250 - 450 ug/dL Final    Saturated Iron 07/25/2019 7 (L)  20 - 50 % Final    Ferritin 07/25/2019 10 (L)  20.0 - 300.0 ng/mL Final    Sol. Transferrin Receptor 07/25/2019 4.8 (H)  1.8 - 4.6 mg/L Final    WBC 07/25/2019 3.73 (L)  3.90 - 12.70 K/uL Final    RBC 07/25/2019 3.72 (L)  4.00 - 5.40 M/uL Final    Hemoglobin 07/25/2019 11.0 (L)  12.0 - 16.0 g/dL Final    Hematocrit 07/25/2019 34.6 (L)  37.0 - 48.5 % Final    MCV 07/25/2019 93  82 - 98 fL Final    MCH 07/25/2019 29.6  27.0 - 31.0 pg Final    MCHC 07/25/2019 31.8 (L)  32.0 - 36.0 g/dL Final    RDW 07/25/2019 13.4  11.5 - 14.5 % Final    Platelets 07/25/2019 260  150 - 350 K/uL Final    MPV 07/25/2019 9.2  9.2 - 12.9  fL Final    Gran # (ANC) 07/25/2019 2.5  1.8 - 7.7 K/uL Final    Immature Grans (Abs) 07/25/2019 0.00  0.00 - 0.04 K/uL Final    Lymph # 07/25/2019 0.9 (L)  1.0 - 4.8 K/uL Final    Mono # 07/25/2019 0.3  0.3 - 1.0 K/uL Final    Eos # 07/25/2019 0.1  0.0 - 0.5 K/uL Final    Baso # 07/25/2019 0.02  0.00 - 0.20 K/uL Final    nRBC 07/25/2019 0  0 /100 WBC Final    Gran % 07/25/2019 66.8  38.0 - 73.0 % Final    Lymph % 07/25/2019 24.1  18.0 - 48.0 % Final    Mono % 07/25/2019 6.7  4.0 - 15.0 % Final    Eosinophil % 07/25/2019 1.9  0.0 - 8.0 % Final    Basophil % 07/25/2019 0.5  0.0 - 1.9 % Final    Differential Method 07/25/2019 Automated   Final    Arsenic 07/25/2019 <1  0 - 12 ng/mL Final    Lead 07/25/2019 <1.0  0.0 - 4.9 mcg/dL Final    Cadmium 07/25/2019 0.3  0.0 - 4.9 ng/mL Final    Mercury 07/25/2019 <1  0 - 9 ng/mL Final    Venous/Capillary 07/25/2019 Venous   Final    Street Address 07/25/2019 [address] [address]    Final    City 07/25/2019 [place]   Final    State 07/25/2019 [place]   Final    Zip 07/25/2019 [address]   Final    [facility] 07/25/2019 [facility]   Final    Guardian First Name 07/25/2019 n/a   Final    Guardian Last Name 07/25/2019 n/a   Final    Home Phone 07/25/2019 [phone]   Final    Race 07/25/2019 n/a   Final       Diagnoses:                                                                                           Young onset Parkinson's disease, limb-onset dystonia, early ldopa-induced dyskinesia.  Commonly associated with the Bonesteel gene mutation.  But I need to further investigate hereditary hemochromatosis given her abnormal iron studies and imaging suggesting early iron overload in the GPi bilaterally.     Plan:  - converting to Rytary for on-off fluctuaqtions  - still good genetics candidate in the future  - Pt/OT as needed, BIG style              Shun Tanenr MD, MPH  Division of Movement and Memory Disorders  [facility]  [phone]

## 2024-03-08 ENCOUNTER — TELEPHONE (OUTPATIENT)
Dept: NEUROLOGY | Facility: CLINIC | Age: 49
End: 2024-03-08
Payer: MEDICARE

## 2024-03-08 NOTE — TELEPHONE ENCOUNTER
Spoke to pateint regarding Rytary.   She will take 2 caps QID. Enc to take doses about every 4 hours.   She took her 1st dose today and felt it wearing off after about 3 hours.   Encouraged to give it 3-4 days.   And please call back if she feels that she may need a dose increase.

## 2024-03-08 NOTE — TELEPHONE ENCOUNTER
----- Message from Barb Cramer sent at 3/8/2024  8:24 AM CST -----  Regarding: Rx Questions  Contact: [phone]  Zaira Thompson calling regarding Patient Advice (message) for # pt is calling to speak to nurse asap she is calling regarding her medication when is the best time to take it for the best results its the medication she will be starting today

## 2024-03-13 ENCOUNTER — TELEPHONE (OUTPATIENT)
Dept: NEUROLOGY | Facility: CLINIC | Age: 49
End: 2024-03-13
Payer: MEDICARE

## 2024-03-13 NOTE — TELEPHONE ENCOUNTER
----- Message from Kathy Bloom sent at 3/13/2024  8:47 AM CDT -----  Contact: [phone]  PATIENTCALL     Pt is calling to speak with someone in provider office regarding she needs to speak to them about the new medication that she started. She is asking for a return call please call.

## 2024-03-15 ENCOUNTER — TELEPHONE (OUTPATIENT)
Dept: NEUROLOGY | Facility: CLINIC | Age: 49
End: 2024-03-15
Payer: MEDICARE

## 2024-03-15 NOTE — TELEPHONE ENCOUNTER
----- Message from Yessenia Ku sent at 3/15/2024 10:04 AM CDT -----  Regarding: call back  Contact: [phone]  Pt calling in returning call please call to discuss Further

## 2024-03-15 NOTE — TELEPHONE ENCOUNTER
Returned call to pt and she was still having some trouble with her walking, so instead of taken 2 caps QID she is taken 3 cap QID. She is taken every 4 hours currently. She is taking at 8-12-4-8. Not getting any on-time until 11am right before the 12 noon dose.     She is taken the amantadine and the cogentin as well as scheduled. She would like to know how to go about the medication.

## 2024-03-20 DIAGNOSIS — G20.B2 PARKINSON'S DISEASE WITH DYSKINESIA AND FLUCTUATING MANIFESTATIONS: Primary | ICD-10-CM

## 2024-03-20 NOTE — TELEPHONE ENCOUNTER
----- Message from Marilou Veliz sent at 3/20/2024  9:18 AM CDT -----  Regarding: Needs to speak with someone regardine side effects of medication  Contact: [phone]  Name of Who is Calling:CLEM VELIZ [[id number]]        What is the request in detail: Pt called states she's having trouble with medication states it maybe side effects of the medicine. Please advise         Can the clinic reply by [facility]:No        What Number to Call Back if not in [facility]: Telephone Information:  Mobile          [phone]

## 2024-03-21 ENCOUNTER — TELEPHONE (OUTPATIENT)
Dept: NEUROLOGY | Facility: CLINIC | Age: 49
End: 2024-03-21
Payer: MEDICARE

## 2024-03-21 ENCOUNTER — PATIENT MESSAGE (OUTPATIENT)
Dept: NEUROLOGY | Facility: CLINIC | Age: 49
End: 2024-03-21
Payer: MEDICARE

## 2024-03-21 NOTE — TELEPHONE ENCOUNTER
----- Message from Krysten Rosemary Akbar sent at 3/21/2024  4:43 PM CDT -----  Regarding: question about rx  Contact: Pt @ [phone]  Pt is calling in regards to ask Dr. Tanner a few questions about the rx carbidopa-levodopa (RYTARY) 23.75-95 mg CpSR. Please c/b to advise.. Thanks

## 2024-03-21 NOTE — TELEPHONE ENCOUNTER
----- Message from Fatoumata White MA sent at 3/21/2024  4:43 PM CDT -----  Regarding: FW: Advise  Contact: [phone]    ----- Message -----  From: Georgia Balderas  Sent: 3/21/2024   2:02 PM CDT  To: [facility] Staff  Subject: Advise                                           Pt would like to speak w nurse in regards to medication management. Please give pt a call to further discuss.

## 2024-03-22 RX ORDER — LEVODOPA AND CARBIDOPA 95; 23.75 MG/1; MG/1
CAPSULE, EXTENDED RELEASE ORAL
Qty: 480 CAPSULE | Refills: 11 | Status: SHIPPED | OUTPATIENT
Start: 2024-03-22

## 2024-03-22 NOTE — TELEPHONE ENCOUNTER
Spoke to pt. She feels underdosed on Rytary. Will need new rx. She has added a Rytary    2 caps at 12 AM,   And 1 at 4am. [place] some relief.   She feels she needs 2 at 4am.     Now takinAM - 3 caps  12 PM- 3caps  4PM- 3 caps   8PM-3 caps    Will ask [facility] if increase is okay.   Patient counseled on side effects and is monitoring for dyskinesia.

## 2024-03-28 ENCOUNTER — OFFICE VISIT (OUTPATIENT)
Dept: NEUROLOGY | Facility: CLINIC | Age: 49
End: 2024-03-28
Payer: MEDICARE

## 2024-03-28 DIAGNOSIS — G20.B2 PARKINSON'S DISEASE WITH DYSKINESIA AND FLUCTUATING MANIFESTATIONS: Primary | ICD-10-CM

## 2024-03-28 PROCEDURE — G2211 COMPLEX E/M VISIT ADD ON: HCPCS | Mod: 95,,, | Performed by: PSYCHIATRY & NEUROLOGY

## 2024-03-28 PROCEDURE — 99214 OFFICE O/P EST MOD 30 MIN: CPT | Mod: 95,,, | Performed by: PSYCHIATRY & NEUROLOGY

## 2024-03-28 RX ORDER — RASAGILINE 1 MG/1
1 TABLET ORAL DAILY
Qty: 30 TABLET | Refills: 11 | Status: SHIPPED | OUTPATIENT
Start: 2024-03-28 | End: 2025-03-28

## 2024-03-28 NOTE — PROGRESS NOTES
Neurology Telemedicine Note     Name: Zaira Jensen  MRN: [id number]   CSN: [id number]      Date: 03/28/2024    The patient location is: Home  The chief complaint leading to consultation is: f/u  Visit type: Virtual visit with synchronous audio and video    TOTAL TIME SPENT: 22 min    Each patient to whom I provide medical services by telemedicine is:  (1) informed of the relationship between the physician and patient and the respective role of any other health care provider with respect to management of the patient; and (2) notified that they may decline to receive medical services by telemedicine and may withdraw from such care at any time.    History of Present Illness (HPI):  - started rytary two weeks ago, seems to be still having some symptoms (focusing on back pain and arm disuse)  - thinks it was a little undertreated and having more issues walking  - added an overnight dose, rx is correct in chart  - bigger problem is getting angry, scared, upset and having more symptoms then (non-motor fluc)  - still feels like she is having more off time even on rytary 6x/day  -    From Feb 2024:  - first two hours of meds are best, then wears off, balance gets worse (especially stairs)  - taking meds 1 tab 5x per day, sometimes extra half dose  - extra dose causes dyskinesia though  - still on amantadine and benztropine    From [facility] 9/2023:  - new thing is the R hip/back are starting to hurt as a sign of wearing off  - more dyskinesia in general, tries to suppress  - still taking amantadine and benztropine BID each, might be worse in the middle of the day (9:30 and 10 pm)  - was worried about work abilities and memory, but may just be system (supervisor reassured)  - a week ago, started getting more dry mouth  - says she is taking cd/ld now up to three hours not 3x/day  - better in AM    From Aug 2022:  - cd/ld 25-100mg 1 tablet TID and 0.5 once nightly.  Had tried full dose QID and felt her taste changed.  - at  "baseline since last visit   - denies shuffling, festination, gait disturbance  - doing well with memory  - dyskinesia is mild but noticeable  - DISCUSSED FUS AT HER QUESTIONING - SHE IS LIKELY NOT A GREAT CANDIDATE GIVEN B SYMPTOMS AND MINIMAL TREMOR    From Jan 2022:  - is having more discoloration of the legs (hard to visualize on camera), doesn't clearly describe livedo, but could be different appearnce with her natural pigment  - feels her leg cramping and dystonia are doing well though, only a few bad toe cramps for the last several months!  - still driving, not as nervous as she used to be  - but still has some fears when her menstrual cycle is there - seems to be with worse PD symptoms then  - memory is "pretty good", mood is good too  - usual pattern of 1 CD/LD TID, Amantadine BID and Bentropine BID  - tremor only occurs if she is anxious  - does complain of some word-finding difficulties      Nonmotor/Premotor ROS: as per HPI, and all other systems are negative    Past Medical History: The patient  has a past medical history of Allergy, Anemia, Arthritis, Chronic back pain, Chronic neck pain, Functional ovarian cysts, GERD (gastroesophageal reflux disease), Headache(784.0), Quadriparesis (muscle weakness) (1/16/2018), Radiculopathy of arm, Rash, and Respiratory distress.    Social History: The patient  reports that she has never smoked. She has never used smokeless tobacco. She reports that she does not drink alcohol and does not use drugs.    Family History: Their family history includes Cancer in her father; Diabetes in her father; Heart disease in her father; No Known Problems in her mother.    Allergies: Bactrim [sulfamethoxazole-trimethoprim], Codeine, Hydrocodone, Nexium [esomeprazole magnesium], Nickel sutures [surgical stainless steel], Nuts [tree nut], Pantoprazole, Pcn [penicillins], Sulfa (sulfonamide antibiotics), and Vicks vapor inhaler [l-desoxyephedrine]     Meds:   Current Outpatient " Medications on File Prior to Visit   Medication Sig Dispense Refill    amantadine HCL (SYMMETREL) 100 mg capsule Take 1 capsule (100 mg total) by mouth 2 (two) times a day. 180 capsule 3    b complex vitamins capsule Take 1 capsule by mouth once daily.      benztropine (COGENTIN) 2 MG Tab TAKE 1 TABLET(2 MG) BY MOUTH TWICE DAILY 180 tablet 3    carbidopa-levodopa (RYTARY) 23.75-95 mg CpSR Take 3 caps PO 4 times per day and 2 caps PO at 12AM and 4AM. 480 capsule 11    methylPREDNISolone (MEDROL DOSEPACK) 4 mg tablet use as directed 1 each 0    progesterone (PROMETRIUM) 200 MG capsule       pyridoxine, vitamin B6, (B-6) 25 MG Tab Take 1 tablet (25 mg total) by mouth once daily. 30 tablet 11    sars-cov-2, covid-19, (SPIKEVAX, MODERNA,, 12YRS AND UP 2023,) 50 mcg/0.5 mL injection Inject into the muscle. 0.5 mL 0     No current facility-administered medications on file prior to visit.       Exam:  Vital Signs deferred with home visit    * Coord/Movemt/Gait Strong speech, minimal dyskinesia  Very mild dyskinesia  No tremor with rest, posture, kinesis, or intention.   No chorea, athetosis, dystonia, myoclonus, or tics.   No motor impersistence.  Gait is deferred for safety.           Medical Record Review:  - Lab Results:  No visits with results within 3 Month(s) from this visit.   Latest known visit with results is:   Lab Visit on 07/25/2019   Component Date Value Ref Range Status    Iron 07/25/2019 34  30 - 160 ug/dL Final    Transferrin 07/25/2019 353  200 - 375 mg/dL Final    TIBC 07/25/2019 522 (H)  250 - 450 ug/dL Final    Saturated Iron 07/25/2019 7 (L)  20 - 50 % Final    Ferritin 07/25/2019 10 (L)  20.0 - 300.0 ng/mL Final    Sol. Transferrin Receptor 07/25/2019 4.8 (H)  1.8 - 4.6 mg/L Final    WBC 07/25/2019 3.73 (L)  3.90 - 12.70 K/uL Final    RBC 07/25/2019 3.72 (L)  4.00 - 5.40 M/uL Final    Hemoglobin 07/25/2019 11.0 (L)  12.0 - 16.0 g/dL Final    Hematocrit 07/25/2019 34.6 (L)  37.0 - 48.5 % Final    MCV  07/25/2019 93  82 - 98 fL Final    MCH 07/25/2019 29.6  27.0 - 31.0 pg Final    MCHC 07/25/2019 31.8 (L)  32.0 - 36.0 g/dL Final    RDW 07/25/2019 13.4  11.5 - 14.5 % Final    Platelets 07/25/2019 260  150 - 350 K/uL Final    MPV 07/25/2019 9.2  9.2 - 12.9 fL Final    Gran # (ANC) 07/25/2019 2.5  1.8 - 7.7 K/uL Final    Immature Grans (Abs) 07/25/2019 0.00  0.00 - 0.04 K/uL Final    Lymph # 07/25/2019 0.9 (L)  1.0 - 4.8 K/uL Final    Mono # 07/25/2019 0.3  0.3 - 1.0 K/uL Final    Eos # 07/25/2019 0.1  0.0 - 0.5 K/uL Final    Baso # 07/25/2019 0.02  0.00 - 0.20 K/uL Final    nRBC 07/25/2019 0  0 /100 WBC Final    Gran % 07/25/2019 66.8  38.0 - 73.0 % Final    Lymph % 07/25/2019 24.1  18.0 - 48.0 % Final    Mono % 07/25/2019 6.7  4.0 - 15.0 % Final    Eosinophil % 07/25/2019 1.9  0.0 - 8.0 % Final    Basophil % 07/25/2019 0.5  0.0 - 1.9 % Final    Differential Method 07/25/2019 Automated   Final    Arsenic 07/25/2019 <1  0 - 12 ng/mL Final    Lead 07/25/2019 <1.0  0.0 - 4.9 mcg/dL Final    Cadmium 07/25/2019 0.3  0.0 - 4.9 ng/mL Final    Mercury 07/25/2019 <1  0 - 9 ng/mL Final    Venous/Capillary 07/25/2019 Venous   Final    Street Address 07/25/2019 [address]   Final    City 07/25/2019 [place]   Final    State 07/25/2019 [place]   Final    Zip 07/25/2019 [id number]   Final    [facility] 07/25/2019 [facility]   Final    Guardian First Name 07/25/2019 n/a   Final    Guardian Last Name 07/25/2019 n/a   Final    Home Phone 07/25/2019 [phone]   Final    Race 07/25/2019 n/a   Final       Diagnoses:                                                                                           Young onset Parkinson's disease, limb-onset dystonia, early ldopa-induced dyskinesia.  Commonly associated with the [place] gene mutation.  But I need to further investigate hereditary hemochromatosis given her abnormal iron studies and imaging suggesting early iron overload in the GPi bilaterally.     Plan:  - stay on Rytary, awaiting mail in  rx  - adding rasagiline 1mg daily  - still good genetics candidate in the future - GP2  - Pt/OT as needed, BIG style    This is a patient with a serious and complex neurologic diagnosis whose overall, ongoing care is being managed and monitored by me and our Neurology clinic.   As such, since 2024,  is the appropriate add-on code to accompany the other E/M billing for this visit.            Shun Tanner MD, MPH  Division of Movement and Memory Disorders  [facility]  [phone]

## 2024-04-08 NOTE — PLAN OF CARE
Problem: Patient Care Overview  Goal: Plan of Care Review  Outcome: Ongoing (interventions implemented as appropriate)  No c/o pain. Does call for assist with the bathroom. Continent and pericare is given by nurse. Movements getting better and almost min assist with transfers. Fine motor movements still impaired. Having menses.  Family in room. Cont plan of care,       What Type Of Note Output Would You Prefer (Optional)?: Standard Output Is This A New Presentation, Or A Follow-Up?: Rash Additional History: Patient reports having itchy bumps all over that appeared about 3 weeks ago. She has tried various over the counter topicals. Patient denies any other concerns.

## 2024-05-08 NOTE — PT/OT/SLP PROGRESS
Medication: Rosuvastatin Calcium 20 MG Oral Tablet  passed protocol.   Last office visit date: 10/20/2023  Next appointment scheduled?: No;  Sent to [facility] to schedule    Number of refills given: 90 day, no refills     Occupational Therapy  Treatment    Zaira Jensen   MRN: [id number]   Admitting Diagnosis: cervical myelomalacia s/p C5-C6 ACDF    OT Date of Treatment: 11/08/17   Total Time (min): 110 min    Billable Minutes:  Self Care/Home Management 45, Therapeutic Activity 15 and Therapeutic Exercise 50  Total Minutes: 110    General Precautions: Standard, fall  Orthopedic Precautions: N/A  Braces: Cervical collar    Do you have any cultural, spiritual, Lutheran conflicts, given your current situation?: None    Subjective:  Communicated with nurse prior to session.    Pain/Comfort  Pain Rating 1: 0/10    Objective:  Patient found with: cervical collar    Functional Mobility:  Transfer Training:  Walking with CGA to SBA with Rollator from w/c positioned in pt room doorway > toilet > sink in bathroom > w/c - no LOB with increased time especially with step turn pivots     Grooming:  Patient standing with SBA to wash hands after toileting - verbal instruction for safe/correct technique with standing with use of DME; seated, pt shaving with Min (A) - pt opening shaving cream bottle with SBA, applying shave cream with SBA, shaving with Min (A) >> patient requires increased time to perform all tasks of activity and requires max encouragement to perform full ROM of all joints to facilitate increased independence with tasks     Toilet Training:  Pt performed toileting with Stand-by Assistance to Contact Guard Assistance with Rollator and drop arm commode positioned over toilet - requires increased time     Additional Treatment:  - R UE PROM all joints - extended stretch of biceps, wrist & digit flexors; reinforced instruction on self PROM/stretch - OTR instructing on 3 different techniques to perform self PROM; PROM of digit ABD including thumb opposition/ABD  - R UE AROM digit ABD, thumb opposition, and thumb ABD x 10  - OTR reinforcing importance of performing self PROM/stretch as well as attempting full ROM of all joints with  Surgery  Counseling: I have discussed the option of glasses versus cataract surgery versus following. It was explained that when vision no longer meets the patient's visual needs and a new prescription for glasses is not likely to improve the patient's visual symptoms, the option of cataract surgery is a reasonable next step. It was explained that there is no guarantee that removing the cataract will improve their visual symptoms, however; it is believed that the cataract is contributing to the patient's visual impairment and surgery may significantly improve both the visual and functional status of the patient. The risks, benefits and alternatives of surgery were discussed with the patient. After this discussion, the patient desires to proceed with cataract surgery with implantation of an intraocular lens to improve vision to drive safely and read small print. functional movement - also reinforced importance of positioning of UE while seated in w/c and in bed - emphasize wrist/digits in neutral     Patient left up in chair with call button in reach and nurse notified    ASSESSMENT:  Zaira Jensen is a 42 y.o. female with a medical diagnosis ofcervical myelomalacia s/p C5-C6 ACDF and presents with decreased B UE strength, ROM, coordination, endurance, mobility, self care skills, and safety; pt requires extended time to complete ALL tasks.     Rehab potential is fair    Activity tolerance: Good    Discharge recommendations: home     Equipment recommendations: bedside commode     GOALS:    Occupational Therapy Goals        Problem: Occupational Therapy Goal    Goal Priority Disciplines Outcome Interventions   Occupational Therapy Goal     OT, PT/OT Ongoing (interventions implemented as appropriate)    Description:  Goals to be met by: 11/16/17     Patient will increase functional independence with ADLs by performing:    Feeding with Contact Guard Assistance.  UE Dressing with Minimal Assistance.  LE Dressing with Minimal Assistance.  Grooming while seated with Minimal Assistance.  Toileting from toilet with Moderate Assistance for hygiene and clothing management.   Bathing from most appropriate location with Minimal Assistance.  Toilet transfer to toilet with Minimal Assistance.      BREANA Canchola  11/3/2017                        Plan:  Patient to be seen 6 x/week to address the above listed problems via self-care/home management, community/work re-entry, therapeutic activities, therapeutic exercises, therapeutic groups, neuromuscular re-education, sensory integration, wheelchair management/training  Plan of Care expires: 11/16/17  Plan of Care reviewed with: patient    HUGH Perkins LOTR  11/08/2017

## 2024-05-22 ENCOUNTER — PATIENT MESSAGE (OUTPATIENT)
Dept: NEUROLOGY | Facility: CLINIC | Age: 49
End: 2024-05-22
Payer: MEDICARE

## 2024-07-01 RX ORDER — AMANTADINE HYDROCHLORIDE 100 MG/1
100 CAPSULE, GELATIN COATED ORAL 2 TIMES DAILY
Qty: 180 CAPSULE | Refills: 3 | Status: SHIPPED | OUTPATIENT
Start: 2024-07-01

## 2024-07-03 ENCOUNTER — TELEPHONE (OUTPATIENT)
Dept: NEUROLOGY | Facility: CLINIC | Age: 49
End: 2024-07-03
Payer: MEDICARE

## 2024-07-03 NOTE — TELEPHONE ENCOUNTER
----- Message from Manjeet Hernandez sent at 7/3/2024 11:34 AM CDT -----  Regarding: pt advice  Contact: pt @ [phone]  Pt is wanting to know if the dizziness she is experiencing is associated with her Rx benztropine (COGENTIN) 2 MG Tab or amantadine HCL (SYMMETREL) 100 mg capsule. Please call to advise further. Thank you for all you are doing.

## 2024-07-05 ENCOUNTER — TELEPHONE (OUTPATIENT)
Dept: NEUROLOGY | Facility: CLINIC | Age: 49
End: 2024-07-05
Payer: MEDICARE

## 2024-07-05 NOTE — TELEPHONE ENCOUNTER
Patient reports having to call EMS to take her BP after episode of extreme dizziness. Her BP cuff would not fit her upper arm and was giving high readings. The paramedics also did not have a cuff that fit her. Recommended that she purchase a BP cuff that can be used on her wrist.     The dizziness seems to be wearing a off symptom. It happens whenever she is due for the next dose of her meds.Gave OK to move carbidopa/levodopa oosing to every 2.5 hrs to see if this may help.    Patient will try this. Will f/u with her next week.

## 2024-07-05 NOTE — TELEPHONE ENCOUNTER
"----- Message from Eloise Reed sent at 7/5/2024 10:27 AM CDT -----  Regarding: pt advice  Contact: [phone]  .Name Of Caller: Self     Contact Preference?: [phone]     What is the nature of the call?: Pt needing a call; back from nurse . Pls call      Additional Notes:  "Thank you for all that you do for our patients"  "

## 2024-09-10 RX ORDER — BENZTROPINE MESYLATE 2 MG/1
2 TABLET ORAL 2 TIMES DAILY
Qty: 180 TABLET | Refills: 3 | Status: SHIPPED | OUTPATIENT
Start: 2024-09-10

## 2024-10-18 ENCOUNTER — PATIENT MESSAGE (OUTPATIENT)
Dept: NEUROLOGY | Facility: CLINIC | Age: 49
End: 2024-10-18
Payer: MEDICARE

## 2024-12-09 ENCOUNTER — TELEPHONE (OUTPATIENT)
Dept: NEUROLOGY | Facility: CLINIC | Age: 49
End: 2024-12-09
Payer: MEDICARE

## 2024-12-09 RX ORDER — CARBIDOPA AND LEVODOPA 25; 100 MG/1; MG/1
1.5 TABLET ORAL
COMMUNITY
End: 2024-12-09 | Stop reason: SDUPTHER

## 2024-12-09 NOTE — TELEPHONE ENCOUNTER
----- Message from Evy sent at 12/2/2024  1:57 PM CST -----  Regarding: Medication  Name of Who is Calling:  Patient          What is the request in detail:  Please call patient she has questions about one of her medications            [facility]            [facility]: [phone]

## 2024-12-09 NOTE — TELEPHONE ENCOUNTER
Called and spoke to and she states she is taking her medication every 2 hours even through the night.     She is taken 1.5 tablet fives time a day and at night/ overnight she is taken 1 tablet every 2 hours starting at 6pm.     She is taken the 25/100mg CD/LD. She does not do well on the genetic brand on the medication.     She takes the rest of her medication in the morning as well.     She is taken this much to help with her walking and mobility.     She is still having some freezing of gait, but this week she is much better.     Also, rescheduled appointment to March per patient's request!

## 2024-12-09 NOTE — TELEPHONE ENCOUNTER
----- Message from Kamlesh sent at 12/9/2024  9:25 AM CST -----  Regarding: appt  Name of Who is Calling:Pt         What is the request in detail: Pt requesting reschedule for February           Can the clinic reply by [facility]: yes         What [facility]:Telephone Information:  Mobile          [phone]

## 2024-12-13 ENCOUNTER — PATIENT MESSAGE (OUTPATIENT)
Dept: NEUROLOGY | Facility: CLINIC | Age: 49
End: 2024-12-13
Payer: MEDICARE

## 2024-12-14 RX ORDER — CARBIDOPA AND LEVODOPA 25; 100 MG/1; MG/1
TABLET ORAL
Qty: 360 TABLET | Refills: 11 | Status: SHIPPED | OUTPATIENT
Start: 2024-12-14

## 2025-02-11 ENCOUNTER — TELEPHONE (OUTPATIENT)
Dept: OBSTETRICS AND GYNECOLOGY | Facility: CLINIC | Age: 50
End: 2025-02-11

## 2025-02-11 ENCOUNTER — OFFICE VISIT (OUTPATIENT)
Dept: OBSTETRICS AND GYNECOLOGY | Facility: CLINIC | Age: 50
End: 2025-02-11
Payer: MEDICARE

## 2025-02-11 VITALS
WEIGHT: 216.81 LBS | DIASTOLIC BLOOD PRESSURE: 86 MMHG | HEIGHT: 65 IN | BODY MASS INDEX: 36.12 KG/M2 | RESPIRATION RATE: 17 BRPM | SYSTOLIC BLOOD PRESSURE: 118 MMHG

## 2025-02-11 DIAGNOSIS — Z01.89 ENCOUNTER FOR OTHER SPECIFIED SPECIAL EXAMINATIONS: ICD-10-CM

## 2025-02-11 DIAGNOSIS — Z01.419 WELL WOMAN EXAM: Primary | ICD-10-CM

## 2025-02-11 DIAGNOSIS — Z12.4 CERVICAL CANCER SCREENING: ICD-10-CM

## 2025-02-11 DIAGNOSIS — Z12.31 SCREENING MAMMOGRAM FOR BREAST CANCER: ICD-10-CM

## 2025-02-11 DIAGNOSIS — Z11.3 SCREEN FOR STD (SEXUALLY TRANSMITTED DISEASE): ICD-10-CM

## 2025-02-11 PROCEDURE — 99999 PR PBB SHADOW E&M-EST. PATIENT-LVL III: CPT | Mod: PBBFAC,,, | Performed by: GENERAL PRACTICE

## 2025-02-11 PROCEDURE — 87591 N.GONORRHOEAE DNA AMP PROB: CPT | Performed by: GENERAL PRACTICE

## 2025-02-11 PROCEDURE — 87624 HPV HI-RISK TYP POOLED RSLT: CPT | Performed by: GENERAL PRACTICE

## 2025-02-11 RX ORDER — BLACK COHOSH ROOT 540 MG
CAPSULE ORAL
COMMUNITY

## 2025-02-11 RX ORDER — FOLIC ACID 0.4 MG
400 TABLET ORAL DAILY
COMMUNITY

## 2025-02-11 RX ORDER — UBIDECARENONE 30 MG
30 CAPSULE ORAL 3 TIMES DAILY
COMMUNITY

## 2025-02-11 RX ORDER — ZINC GLUCONATE 50 MG
50 TABLET ORAL DAILY
COMMUNITY

## 2025-02-11 RX ORDER — BIOTIN 10 MG
TABLET ORAL
COMMUNITY

## 2025-02-11 NOTE — PROGRESS NOTES
ASSESSMENT AND PLAN     2025  49 y.o. for [employer].  No acute issues.  Normal exam.    f/u results of CT/NG testing, HIV, TPA-Abs, and Hepatitis labs  Cervical Cancer screening: f/u results of PAP / HPV --> if both negative then next screen in THREE yrs  Mammogram: ordered and scheduled today  Encouraged self breast awareness; RTC for breast concerns  Return to clinic: for periodic GYN wellness exam, every 1-3 years per patient preference and comfort level      SUBJECTIVE     2025  Zaira Jensen is a 49 y.o. here for WWE.  No acute questions or concerns.  Requests STD screen.  Periods have started to space out.    G'sP's: No obstetric history on file.  LMP: Patient's last menstrual period was 12/15/2024 (approximate).  Relationship: BF x 2yrs, sexually active (hasn't been using contraception)  PAP Hx: had biopsy ~ with normal PAP's since  MMG (--) = due for one     GYNECOLOGIC HISTORY  PAP Hx: had biopsy ~ with normal PAP's since  Genital HSV: No  Other STD Hx: No    Menarche: 9yoa  Bleeding -- #Days Bleedin / # Heavy Days: 3 / Product Change on heavy days: 4x / Intermenstrual Bleeding: No / Cycle: monthly but sometimes skips a couple of months for the past year or so  Pain -- Dysmenorrhea: denies / Non-menstrual pelvic pain: No / Dyspareunia: No  Other -- Vasomotor Sxs: yes, typically 3 per day / Vaginal dryness: denies    OBSTETRIC HISTORY  G0    SOCIAL HISTORY  Lives with: self  Smoker: non-smoker / Alcohol: denies / Drugs: denies  Domestic Violence: No  Occupation:  caregiver / special     PAST MEDICAL HISTORY  -------------------------------------    Allergy    Anemia    Arthritis    Chronic back pain    Chronic neck pain    Disorder of thyroid, unspecified    GERD (gastroesophageal reflux disease)    Headache(784.0)    Obesity, unspecified    BMI 36    Quadriparesis (muscle weakness)     notes    Radiculopathy of arm    Respiratory distress    bronchospasm at emergence  years ago     PAST SURGICAL HISTORY  ----------------------------    Cervical fusion    Cholecystectomy    Cosmetic surgery    Eye surgery    Fracture surgery    Ovarian cyst removal    Spine surgery    Total body lift     ALLERGIES  Review of patient's allergies indicates:   Allergen Reactions    Bactrim [sulfamethoxazole-trimethoprim]     Codeine Other (See Comments)     cramping    Hydrocodone     Nexium [esomeprazole magnesium]     Nickel sutures [surgical stainless steel]     Nuts [tree nut]     Pantoprazole     Pcn [penicillins]     Sulfa (sulfonamide antibiotics)     Vicks vapor inhaler [l-desoxyephedrine]      MEDICATIONS  Current Outpatient Medications   Medication Instructions    amantadine HCL (SYMMETREL) 100 mg, Oral, 2 times daily    b complex vitamins capsule 1 capsule, Daily    benztropine (COGENTIN) 2 mg, Oral, 2 times daily    biotin 10 mg Tab Take by mouth.    black cohosh 540 mg Cap Take by mouth.    carbidopa-levodopa  mg (SINEMET)  mg per tablet Take 12 pills divided daily as instructed    co-enzyme Q-10 30 mg, 3 times daily    folic acid (FOLVITE) 400 mcg, Daily    methylPREDNISolone (MEDROL DOSEPACK) 4 mg tablet use as directed    pyridoxine (vitamin B6) (B-6) 25 mg, Oral, Daily    rasagiline (AZILECT) 1 mg, Oral, Daily    vitamin E 100 Units, Daily    zinc gluconate 50 mg, Daily     FAMILY HISTORY  BLEEDING or CLOTTING DISORDERS: none  BREAST CA: none / UTERINE CA: none / OVARIAN CA: none  COLON CA: none     OBJECTIVE     PHYSICAL EXAM  Vitals:    02/11/25 1023   BP: 118/86   Resp: 17     GEN = alert/oriented, nad, pleasant, seems to have a tremor  HEENT = sclera anicteric, EOM grossly normal  BREASTS = nontender, no masses palpated, no skin changes, no nipple discharge  CV = BP and HR as per vitals  PULM = normal respiratory effort   =      External: nefg, no lesions     Vagina: mildly atrophied and without lesions and urethral meatus normal     Discharge: normal and  physiologic     Cervix: normal-appearing, PAP smear obtained, and CT/NG swab obtained     Bimanual: uterus normal size and nontender, no adnexal masses or tenderness and exam limited by habitus and abdominal wall scarring    Shereen Hassan MD

## 2025-02-11 NOTE — TELEPHONE ENCOUNTER
----- Message from Monster sent at 2/11/2025 11:40 AM CST -----  Contact: Self  Type: Needs Medical Advice  Who Called:  Patient    Best Call Back Number: [phone]    Additional Information: Called to speak with office regarding 2/11 appt. Please call

## 2025-02-11 NOTE — PATIENT INSTRUCTIONS
PAP SMEARS:    Screening for cervical cancer involves 1 or 2 parts based on your age and situation:  PAP smear (looking at the cells from your cervix)  HPV testing (checking whether you have the Human Papilloma Virus in your cervical cells)    These test results often come back at different times, but you won't hear from me until they BOTH are back since both pieces of information are important in deciding what to do next.    Soif you see a PAP result in [facility] (or an HPV result) that is abnormal, please allow another 7-10 business days before you send a message asking what to do next.  I am waiting for the other test result before I make a recommendation.    If both tests are resulted in [facility], you should hear from me within 2-3 business days.    Abnormal tests will be followed up in one of two ways:  Repeat testing of PAP and/or HPV  Colposcopy (examination and biopsy of your cervix in the office using staining solutions and magnification)     STD AND VAGINITIS TESTING:  If you are enrolled in [facility], I will trust that when you see a negative result, you understand that means you do not have the particular infection or STD we were checking for.  You will not get a message from me.    If something comes back positive, one of my staff members or I will call you to let you know about the result and what the recommended treatment is.  For most STD's, it is VERY important that you do not have sex until both you and your partner(s) have completed treatment for the STD.  I cannot prescribe medications for your partner(s).    Bacterial Vaginitis (BV) and vaginal yeast infections (Candida, for example) are not STD's.    Trichomonas is an STD.    If you are prescribed an antibiotic, it is very important that you take all of the medicine as prescribed.  Incomplete treatment can cause a relapse and/or antibiotic resistance.

## 2025-02-12 LAB
C TRACH DNA SPEC QL NAA+PROBE: NOT DETECTED
N GONORRHOEA DNA SPEC QL NAA+PROBE: NOT DETECTED

## 2025-02-18 ENCOUNTER — LAB VISIT (OUTPATIENT)
Dept: LAB | Facility: HOSPITAL | Age: 50
End: 2025-02-18
Attending: GENERAL PRACTICE
Payer: MEDICARE

## 2025-02-18 DIAGNOSIS — Z01.89 ENCOUNTER FOR OTHER SPECIFIED SPECIAL EXAMINATIONS: ICD-10-CM

## 2025-02-18 DIAGNOSIS — Z11.3 SCREEN FOR STD (SEXUALLY TRANSMITTED DISEASE): ICD-10-CM

## 2025-02-18 DIAGNOSIS — Z01.419 WELL WOMAN EXAM: ICD-10-CM

## 2025-02-18 LAB — HIV 1+2 AB+HIV1 P24 AG SERPL QL IA: NEGATIVE

## 2025-02-18 PROCEDURE — 87389 HIV-1 AG W/HIV-1&-2 AB AG IA: CPT | Performed by: GENERAL PRACTICE

## 2025-02-18 PROCEDURE — 80074 ACUTE HEPATITIS PANEL: CPT | Performed by: GENERAL PRACTICE

## 2025-02-18 PROCEDURE — 36415 COLL VENOUS BLD VENIPUNCTURE: CPT | Performed by: GENERAL PRACTICE

## 2025-02-19 ENCOUNTER — RESULTS FOLLOW-UP (OUTPATIENT)
Dept: OBSTETRICS AND GYNECOLOGY | Facility: CLINIC | Age: 50
End: 2025-02-19

## 2025-02-19 LAB — TREPONEMA PALLIDUM IGG+IGM AB [PRESENCE] IN SERUM OR PLASMA BY IMMUNOASSAY: NONREACTIVE

## 2025-02-20 LAB
HAV IGM SERPL QL IA: NEGATIVE
HBV CORE IGM SERPL QL IA: NEGATIVE
HBV SURFACE AG SERPL QL IA: NEGATIVE
HCV AB S/CO SERPL IA: NON REACTIVE
HCV AB SERPL QL IA: NORMAL

## 2025-02-24 ENCOUNTER — TELEPHONE (OUTPATIENT)
Facility: CLINIC | Age: 50
End: 2025-02-24
Payer: MEDICARE

## 2025-03-10 ENCOUNTER — TELEPHONE (OUTPATIENT)
Facility: CLINIC | Age: 50
End: 2025-03-10
Payer: MEDICARE

## 2025-03-10 NOTE — TELEPHONE ENCOUNTER
----- Message from Robert sent at 3/10/2025 11:50 AM CDT -----  Type:  Patient Returning CallWho Called: ptWho Left Message for Patient: Fatoumata White MADoes the patient know what this is regarding?: Would the patient rather a call back or a response via [facility]? callBest Call Back Number: [phone] Information:

## 2025-03-27 ENCOUNTER — TELEPHONE (OUTPATIENT)
Facility: CLINIC | Age: 50
End: 2025-03-27
Payer: MEDICARE

## 2025-03-27 NOTE — TELEPHONE ENCOUNTER
----- Message from Tia sent at 3/26/2025 12:43 PM CDT -----  Who Called: PtWhat is the request in detail: Requesting call back to discuss if appt can be virtual or switched to next week. Please adviseCan the clinic reply by [facility]? Erica Call Back Number: [phone] Information:

## 2025-03-31 NOTE — TRANSFER OF CARE
"Anesthesia Transfer of Care Note    Patient: Zaira Jensen    Procedure(s) Performed: Procedure(s) (LRB):  DISKECTOMY AND FUSION-ANTERIOR CERVICAL (ACDF) C5-6 (N/A)    Patient location: PACU    Anesthesia Type: general    Transport from OR: Transported from OR on 2-3 L/min O2 by NC with adequate spontaneous ventilation    Post pain: adequate analgesia    Post assessment: no apparent anesthetic complications and tolerated procedure well    Post vital signs: stable    Level of consciousness: responds to stimulation and sedated    Nausea/Vomiting: no nausea/vomiting    Complications: none    Transfer of care protocol was followed      Last vitals:   Visit Vitals  BP (!) 137/92 (BP Location: Left arm, Patient Position: Lying)   Pulse 71   Temp 36.4 °C (97.5 °F) (Temporal)   Resp 16   Ht 5' 5" (1.651 m)   Wt 84.4 kg (186 lb)   LMP 09/03/2017   SpO2 100%   Breastfeeding? No   BMI 30.95 kg/m²     "
Bleeding that does not stop/Pain not relieved by Medications/Fever greater than (need to indicate Fahrenheit or Celsius)/Nausea and vomiting that does not stop/Excessive diarrhea/Inability to tolerate liquids or foods

## 2025-04-28 ENCOUNTER — TELEPHONE (OUTPATIENT)
Facility: CLINIC | Age: 50
End: 2025-04-28
Payer: MEDICARE

## 2025-04-28 NOTE — TELEPHONE ENCOUNTER
"----- Message from Eloise sent at 4/24/2025  1:32 PM CDT -----  Regarding: pt adivce  Contact: [phone]  .Name Of Caller: Self Contact Preference?:[phone] What is the nature of the call?: in regards to medication carbidopa-levodopa  mg (SINEMET)  mg per tablet, pls call  Additional Notes:"Thank you for all that you do for our patients"  "

## 2025-05-16 ENCOUNTER — PATIENT MESSAGE (OUTPATIENT)
Facility: CLINIC | Age: 50
End: 2025-05-16
Payer: MEDICARE

## 2025-05-19 ENCOUNTER — TELEPHONE (OUTPATIENT)
Facility: CLINIC | Age: 50
End: 2025-05-19
Payer: MEDICARE

## 2025-05-19 NOTE — TELEPHONE ENCOUNTER
----- Message from Barb sent at 5/15/2025  4:40 PM CDT -----  Regarding: Rx Advise  Zaira Thompson calling regarding Patient Advice (message) for # pt is requesting a call back from Kaya regarding medication carbidopa-levodopa  mg (SINEMET)  mg per tablet pls advise

## 2025-05-19 NOTE — TELEPHONE ENCOUNTER
Called Pt today about the message phone staff left. Pt picks up and informs me that her pharmacy is suggesting to her to increase the amount of Carbidopa Levodopa due to the refill only lasting two weeks. Pt asked if she can get a months supply due to her taking it overnight. Will message the doctor.

## 2025-05-29 ENCOUNTER — OFFICE VISIT (OUTPATIENT)
Facility: CLINIC | Age: 50
End: 2025-05-29
Payer: MEDICARE

## 2025-05-29 DIAGNOSIS — G82.50 QUADRIPARESIS: ICD-10-CM

## 2025-05-29 DIAGNOSIS — G20.B2 PARKINSON'S DISEASE WITH DYSKINESIA AND FLUCTUATING MANIFESTATIONS: ICD-10-CM

## 2025-05-29 PROCEDURE — G2211 COMPLEX E/M VISIT ADD ON: HCPCS | Mod: 95,,, | Performed by: PSYCHIATRY & NEUROLOGY

## 2025-05-29 PROCEDURE — 98006 SYNCH AUDIO-VIDEO EST MOD 30: CPT | Mod: 95,,, | Performed by: PSYCHIATRY & NEUROLOGY

## 2025-06-07 NOTE — PLAN OF CARE
Problem: Patient Care Overview  Goal: Plan of Care Review  Alert, oriented appetite is good fall prevention ongoing. Transfers 1-2 person assist for safety. Max assist with feeding. Med teaching ongoing  Safety maintained.        rle swelling

## 2025-07-22 DIAGNOSIS — E04.1 THYROID NODULE: Primary | ICD-10-CM

## 2025-07-24 ENCOUNTER — TELEPHONE (OUTPATIENT)
Facility: CLINIC | Age: 50
End: 2025-07-24
Payer: MEDICARE

## 2025-07-24 NOTE — TELEPHONE ENCOUNTER
Spoke with pt about the symptoms she was having. Pt left arm is numb for almost 2 months and having pain too. She was reading about David Crespo having parkinson and how one of his arms was hurting. Pt is worried about the symptoms progressing.

## 2025-07-24 NOTE — TELEPHONE ENCOUNTER
Copied from CRM #[id number]. Topic: General Inquiry - Patient Advice  >> Jul 24, 2025  8:43 AM Barb wrote:  Pt is calling to speak to someone in the office to discuss symptoms they are having. Pt is asking for a call back today. Please call to advise. Thanks.     Symptoms: L arm / hand is numb     How long has patient had these symptoms: 2 months        Best call back number: [phone]       Additional Information:

## 2025-07-29 ENCOUNTER — HOSPITAL ENCOUNTER (OUTPATIENT)
Dept: RADIOLOGY | Facility: HOSPITAL | Age: 50
Discharge: HOME | End: 2025-07-29
Attending: GENERAL PRACTICE
Payer: MEDICARE

## 2025-07-29 ENCOUNTER — HOSPITAL ENCOUNTER (OUTPATIENT)
Dept: RADIOLOGY | Facility: HOSPITAL | Age: 50
Discharge: HOME | End: 2025-07-29
Attending: FAMILY MEDICINE
Payer: MEDICARE

## 2025-07-29 DIAGNOSIS — Z01.419 WELL WOMAN EXAM: ICD-10-CM

## 2025-07-29 DIAGNOSIS — E04.1 THYROID NODULE: ICD-10-CM

## 2025-07-29 DIAGNOSIS — Z12.31 SCREENING MAMMOGRAM FOR BREAST CANCER: ICD-10-CM

## 2025-07-29 PROCEDURE — 76536 US EXAM OF HEAD AND NECK: CPT | Mod: 26,,, | Performed by: RADIOLOGY

## 2025-07-29 PROCEDURE — 77067 SCR MAMMO BI INCL CAD: CPT | Mod: TC,PO

## 2025-07-29 PROCEDURE — 77063 BREAST TOMOSYNTHESIS BI: CPT | Mod: 26,,, | Performed by: RADIOLOGY

## 2025-07-29 PROCEDURE — 77067 SCR MAMMO BI INCL CAD: CPT | Mod: 26,,, | Performed by: RADIOLOGY

## 2025-07-29 PROCEDURE — 76536 US EXAM OF HEAD AND NECK: CPT | Mod: TC,PO

## 2025-08-12 RX ORDER — BENZTROPINE MESYLATE 2 MG/1
2 TABLET ORAL 2 TIMES DAILY
Qty: 180 TABLET | Refills: 3 | Status: SHIPPED | OUTPATIENT
Start: 2025-08-12

## 2025-08-14 ENCOUNTER — LAB VISIT (OUTPATIENT)
Dept: LAB | Facility: HOSPITAL | Age: 50
End: 2025-08-14
Attending: FAMILY MEDICINE
Payer: MEDICARE

## 2025-08-14 DIAGNOSIS — Z00.00 ROUTINE GENERAL MEDICAL EXAMINATION AT A HEALTH CARE FACILITY: Primary | ICD-10-CM

## 2025-08-14 LAB
BILIRUB UR QL STRIP.AUTO: NEGATIVE
CLARITY UR: CLEAR
COLOR UR AUTO: YELLOW
GLUCOSE UR QL STRIP: NEGATIVE
HGB UR QL STRIP: NEGATIVE
KETONES UR QL STRIP: NEGATIVE
LEUKOCYTE ESTERASE UR QL STRIP: NEGATIVE
NITRITE UR QL STRIP: NEGATIVE
PH UR STRIP: 7 [PH]
PROT UR QL STRIP: NEGATIVE
SP GR UR STRIP: 1.01
UROBILINOGEN UR STRIP-ACNC: NEGATIVE EU/DL

## 2025-08-14 PROCEDURE — 81003 URINALYSIS AUTO W/O SCOPE: CPT

## 2025-08-22 ENCOUNTER — TELEPHONE (OUTPATIENT)
Facility: CLINIC | Age: 50
End: 2025-08-22
Payer: MEDICARE

## (undated) DEVICE — APPLICATOR CHLORAPREP ORN 26ML

## (undated) DEVICE — SUT VICRYL 0 CT-2 27 DYE

## (undated) DEVICE — FORCEP BAYO INSU SGL USE STRGT

## (undated) DEVICE — NDL BOX COUNTER

## (undated) DEVICE — DRAPE C-ARM FOR MOBILE XRAY

## (undated) DEVICE — SEE MEDLINE ITEM 157116

## (undated) DEVICE — INSTRUMENT FRAZIER 10FR W/VENT

## (undated) DEVICE — SEE MEDLINE ITEM 152622

## (undated) DEVICE — BLADE OSC & SAGITTAL 9.0MM

## (undated) DEVICE — CLIPPER BLADE MOD 4406 (CAREF)

## (undated) DEVICE — SEE MEDLINE ITEM 157117

## (undated) DEVICE — ELECTRODE REM PLYHSV RETURN 9

## (undated) DEVICE — CONTAINER SPECIMEN STRL 4OZ

## (undated) DEVICE — PIN DISTRACTION 12MM
Type: IMPLANTABLE DEVICE | Site: BACK | Status: NON-FUNCTIONAL
Removed: 2017-10-03

## (undated) DEVICE — SLEEVE SCD EXPRESS CALF MEDIUM

## (undated) DEVICE — SOL 9P NACL IRR PIC IL

## (undated) DEVICE — SYS LABEL CORRECT MED

## (undated) DEVICE — DRAPE STERI-DRAPE 1000 17X11IN

## (undated) DEVICE — COVER SURG LIGHT HANDLE

## (undated) DEVICE — DRAPE INCISE IOBAN 2 23X17IN

## (undated) DEVICE — UNDERGLOVES BIOGEL PI SIZE 8

## (undated) DEVICE — SUT CTD VICRYL CT-1 27

## (undated) DEVICE — DRAPE STERI INSTRUMENT 1018

## (undated) DEVICE — SUT CTD VICRYL 2.0

## (undated) DEVICE — SUT BONE WAX 2.5 GRMS 12/BX

## (undated) DEVICE — SEE MEDLINE ITEM 153151

## (undated) DEVICE — SHEET DRAPE TOP BAR - EMERALD

## (undated) DEVICE — UNDERGLOVES BIOGEL PI SIZE 8.5

## (undated) DEVICE — STRAP OR TABLE 5IN X 72IN

## (undated) DEVICE — EVACUATOR WOUND BULB 100CC

## (undated) DEVICE — STAPLER SKIN ROTATING HEAD

## (undated) DEVICE — LINER SUCTION 3000CC

## (undated) DEVICE — HALTER DELUXE DISCARD HEAD

## (undated) DEVICE — DRAPE THYROID WITH ARMBOARD

## (undated) DEVICE — GLOVE SURG ULTRA TOUCH 8

## (undated) DEVICE — GAUZE SPONGE BULKEE 6X6.75IN

## (undated) DEVICE — Device

## (undated) DEVICE — TAPE MEDIPORE 4IN X 2YDS

## (undated) DEVICE — ALCOHOL 70% ISOP RUBBING 4OZ

## (undated) DEVICE — SEE MEDLINE ITEM 157131

## (undated) DEVICE — COVER TABLE 77 X 96

## (undated) DEVICE — NDL SPINAL 18GX3.5 SPINOCAN

## (undated) DEVICE — GAUGE FLUFF X-SUPER 36X36 2PLY

## (undated) DEVICE — GAUZE SPONGE PEANUT STRL

## (undated) DEVICE — ADHESIVE DERMABOND ADVANCED

## (undated) DEVICE — SUCTION FRAZIER TIP SURG 12FR

## (undated) DEVICE — PACK CUSTOM LUMBAR LAM SLI

## (undated) DEVICE — BURR LGND 7.5CM 3MM FLTD

## (undated) DEVICE — DRESSING N ADH OIL EMUL 3X3

## (undated) DEVICE — PADDING CAST 4IN SPECIALIST

## (undated) DEVICE — DRAIN SIL FLT 7MM FULL PERF ST

## (undated) DEVICE — BLADE SURG CARBON STEEL #10

## (undated) DEVICE — SEE MEDLINE ITEM 146292

## (undated) DEVICE — SKINMARKER W/RULER DEVON

## (undated) DEVICE — GOWN B1 X-LG X-LONG